# Patient Record
Sex: FEMALE | URBAN - METROPOLITAN AREA
[De-identification: names, ages, dates, MRNs, and addresses within clinical notes are randomized per-mention and may not be internally consistent; named-entity substitution may affect disease eponyms.]

---

## 2019-12-10 ENCOUNTER — APPOINTMENT (RX ONLY)
Dept: URBAN - METROPOLITAN AREA CLINIC 4 | Facility: CLINIC | Age: 64
Setting detail: DERMATOLOGY
End: 2019-12-10

## 2019-12-10 DIAGNOSIS — L30.9 DERMATITIS, UNSPECIFIED: ICD-10-CM

## 2019-12-10 PROCEDURE — 87220 TISSUE EXAM FOR FUNGI: CPT

## 2019-12-10 PROCEDURE — ? BIOPSY BY PUNCH METHOD

## 2019-12-10 PROCEDURE — ? COUNSELING

## 2019-12-10 PROCEDURE — 11104 PUNCH BX SKIN SINGLE LESION: CPT

## 2019-12-10 PROCEDURE — ? KOH PREP

## 2019-12-10 ASSESSMENT — LOCATION ZONE DERM: LOCATION ZONE: LEG

## 2019-12-10 ASSESSMENT — LOCATION SIMPLE DESCRIPTION DERM: LOCATION SIMPLE: LEFT KNEE

## 2019-12-10 ASSESSMENT — LOCATION DETAILED DESCRIPTION DERM: LOCATION DETAILED: LEFT MEDIAL KNEE

## 2019-12-10 NOTE — HPI: RASH
Is The Patient Presenting As Previously Scheduled?: Yes
Is This A New Presentation, Or A Follow-Up?: Rash
Additional History: Rash first started in June with a flare on lower legs, no trigger that she could think of. She used hydrocortisone and most of the rash went away besides one area behind her knee. That patch has been continued to enlarge over the last few months. She feels that the hydrocortisone will help for a while, then it will come back worse than before. Her PCP recommended continuing hydrocortisone and Vaseline to affected area

## 2019-12-10 NOTE — PROCEDURE: MIPS QUALITY
Quality 130: Documentation Of Current Medications In The Medical Record: Current Medications Documented
Quality 110: Preventive Care And Screening: Influenza Immunization: Influenza Immunization previously received during influenza season
Quality 402: Tobacco Use And Help With Quitting Among Adolescents: Patient screened for tobacco and never smoked
Quality 226: Preventive Care And Screening: Tobacco Use: Screening And Cessation Intervention: Patient screened for tobacco use and is an ex/non-smoker
Detail Level: Detailed

## 2019-12-24 ENCOUNTER — APPOINTMENT (RX ONLY)
Dept: URBAN - METROPOLITAN AREA CLINIC 4 | Facility: CLINIC | Age: 64
Setting detail: DERMATOLOGY
End: 2019-12-24

## 2019-12-24 DIAGNOSIS — Z48.02 ENCOUNTER FOR REMOVAL OF SUTURES: ICD-10-CM

## 2019-12-24 PROCEDURE — ? SUTURE REMOVAL (GLOBAL PERIOD)

## 2019-12-24 PROCEDURE — 99024 POSTOP FOLLOW-UP VISIT: CPT

## 2019-12-24 ASSESSMENT — LOCATION SIMPLE DESCRIPTION DERM: LOCATION SIMPLE: LEFT KNEE

## 2019-12-24 ASSESSMENT — LOCATION DETAILED DESCRIPTION DERM: LOCATION DETAILED: LEFT KNEE

## 2019-12-24 ASSESSMENT — LOCATION ZONE DERM: LOCATION ZONE: LEG

## 2019-12-24 NOTE — PROCEDURE: SUTURE REMOVAL (GLOBAL PERIOD)
Add 27498 Cpt? (Important Note: In 2017 The Use Of 02016 Is Being Tracked By Cms To Determine Future Global Period Reimbursement For Global Periods): yes
Detail Level: Detailed

## 2019-12-26 ENCOUNTER — RX ONLY (OUTPATIENT)
Age: 64
Setting detail: RX ONLY
End: 2019-12-26

## 2019-12-26 RX ORDER — TRIAMCINOLONE ACETONIDE 1 MG/G
OINTMENT TOPICAL
Qty: 1 | Refills: 1 | Status: ERX | COMMUNITY
Start: 2019-12-26

## 2020-02-11 ENCOUNTER — APPOINTMENT (RX ONLY)
Dept: URBAN - METROPOLITAN AREA CLINIC 4 | Facility: CLINIC | Age: 65
Setting detail: DERMATOLOGY
End: 2020-02-11

## 2020-02-11 DIAGNOSIS — L92.0 GRANULOMA ANNULARE: ICD-10-CM

## 2020-02-11 PROCEDURE — 99212 OFFICE O/P EST SF 10 MIN: CPT

## 2020-02-11 PROCEDURE — ? ADDITIONAL NOTES

## 2020-02-11 PROCEDURE — ? COUNSELING

## 2020-02-11 ASSESSMENT — LOCATION DETAILED DESCRIPTION DERM: LOCATION DETAILED: LEFT KNEE

## 2020-02-11 ASSESSMENT — LOCATION ZONE DERM: LOCATION ZONE: LEG

## 2020-02-11 ASSESSMENT — LOCATION SIMPLE DESCRIPTION DERM: LOCATION SIMPLE: LEFT KNEE

## 2020-02-11 NOTE — PROCEDURE: ADDITIONAL NOTES
Additional Notes: Patient has seen significant improvement in last few weeks.\\nUsed triamcinolone for 2 weeks, then stopped as it became difficult to see.\\nWill observe and monitor \\nPhotos taken\\nKeep triamcinolone as needed if flaring/spreading.
Detail Level: Simple

## 2021-03-23 ENCOUNTER — IMMUNIZATION (OUTPATIENT)
Dept: FAMILY PLANNING/WOMEN'S HEALTH CLINIC | Facility: IMMUNIZATION CENTER | Age: 66
End: 2021-03-23
Payer: COMMERCIAL

## 2021-03-23 DIAGNOSIS — Z23 ENCOUNTER FOR VACCINATION: Primary | ICD-10-CM

## 2021-03-23 PROCEDURE — 91300 PFIZER SARS-COV-2 VACCINE: CPT

## 2021-03-23 PROCEDURE — 0001A PFIZER SARS-COV-2 VACCINE: CPT

## 2021-04-16 ENCOUNTER — IMMUNIZATION (OUTPATIENT)
Dept: FAMILY PLANNING/WOMEN'S HEALTH CLINIC | Facility: IMMUNIZATION CENTER | Age: 66
End: 2021-04-16
Attending: INTERNAL MEDICINE
Payer: COMMERCIAL

## 2021-04-16 DIAGNOSIS — Z23 ENCOUNTER FOR VACCINATION: Primary | ICD-10-CM

## 2021-04-16 PROCEDURE — 91300 PFIZER SARS-COV-2 VACCINE: CPT

## 2021-04-16 PROCEDURE — 0002A PFIZER SARS-COV-2 VACCINE: CPT

## 2021-09-27 NOTE — PROCEDURE: BIOPSY BY PUNCH METHOD
X Size Of Lesion In Cm (Optional): 0
Detail Level: Detailed
Render Post-Care Instructions In Note?: no
Punch Size In Mm: 4
Anesthesia Volume In Cc: 0.5
Wound Care: Petrolatum
Billing Type: Third-Party Bill
Epidermal Sutures: 4-0 Ethilon
Lab: 253
Was A Bandage Applied: Yes
Biopsy Type: H and E
Hemostasis: None
Post-Care Instructions: I reviewed with the patient in detail post-care instructions. Patient is to keep the biopsy site dry overnight, and then apply bacitracin twice daily until healed. Patient may apply hydrogen peroxide soaks to remove any crusting.
Dressing: bandage
Lab Facility: 
Home Suture Removal Text: Patient was provided a home suture removal kit and will remove their sutures at home.  If they have any questions or difficulties they will call the office.
Anesthesia Type: 1% lidocaine with epinephrine
Consent: Written consent was obtained and risks were reviewed including but not limited to scarring, infection, bleeding, scabbing, incomplete removal, nerve damage and allergy to anesthesia.
Notification Instructions: Patient will be notified of biopsy results. However, patient instructed to call the office if not contacted within 2 weeks.
Suture Removal: 14 days
0

## 2022-01-25 ENCOUNTER — TELEPHONE (OUTPATIENT)
Dept: SCHEDULING | Facility: IMAGING CENTER | Age: 67
End: 2022-01-25

## 2022-03-03 ENCOUNTER — TELEPHONE (OUTPATIENT)
Dept: HEALTH INFORMATION MANAGEMENT | Facility: OTHER | Age: 67
End: 2022-03-03

## 2022-04-03 SDOH — ECONOMIC STABILITY: FOOD INSECURITY: WITHIN THE PAST 12 MONTHS, THE FOOD YOU BOUGHT JUST DIDN'T LAST AND YOU DIDN'T HAVE MONEY TO GET MORE.: NEVER TRUE

## 2022-04-03 SDOH — HEALTH STABILITY: PHYSICAL HEALTH: ON AVERAGE, HOW MANY MINUTES DO YOU ENGAGE IN EXERCISE AT THIS LEVEL?: 30 MIN

## 2022-04-03 SDOH — ECONOMIC STABILITY: TRANSPORTATION INSECURITY
IN THE PAST 12 MONTHS, HAS THE LACK OF TRANSPORTATION KEPT YOU FROM MEDICAL APPOINTMENTS OR FROM GETTING MEDICATIONS?: NO

## 2022-04-03 SDOH — ECONOMIC STABILITY: INCOME INSECURITY: IN THE LAST 12 MONTHS, WAS THERE A TIME WHEN YOU WERE NOT ABLE TO PAY THE MORTGAGE OR RENT ON TIME?: NO

## 2022-04-03 SDOH — HEALTH STABILITY: MENTAL HEALTH
STRESS IS WHEN SOMEONE FEELS TENSE, NERVOUS, ANXIOUS, OR CAN'T SLEEP AT NIGHT BECAUSE THEIR MIND IS TROUBLED. HOW STRESSED ARE YOU?: RATHER MUCH

## 2022-04-03 SDOH — ECONOMIC STABILITY: HOUSING INSECURITY
IN THE LAST 12 MONTHS, WAS THERE A TIME WHEN YOU DID NOT HAVE A STEADY PLACE TO SLEEP OR SLEPT IN A SHELTER (INCLUDING NOW)?: NO

## 2022-04-03 SDOH — HEALTH STABILITY: PHYSICAL HEALTH: ON AVERAGE, HOW MANY DAYS PER WEEK DO YOU ENGAGE IN MODERATE TO STRENUOUS EXERCISE (LIKE A BRISK WALK)?: 2 DAYS

## 2022-04-03 SDOH — ECONOMIC STABILITY: FOOD INSECURITY: WITHIN THE PAST 12 MONTHS, YOU WORRIED THAT YOUR FOOD WOULD RUN OUT BEFORE YOU GOT MONEY TO BUY MORE.: NEVER TRUE

## 2022-04-03 SDOH — ECONOMIC STABILITY: INCOME INSECURITY: HOW HARD IS IT FOR YOU TO PAY FOR THE VERY BASICS LIKE FOOD, HOUSING, MEDICAL CARE, AND HEATING?: NOT VERY HARD

## 2022-04-03 SDOH — ECONOMIC STABILITY: HOUSING INSECURITY: IN THE LAST 12 MONTHS, HOW MANY PLACES HAVE YOU LIVED?: 1

## 2022-04-03 SDOH — ECONOMIC STABILITY: TRANSPORTATION INSECURITY
IN THE PAST 12 MONTHS, HAS LACK OF TRANSPORTATION KEPT YOU FROM MEETINGS, WORK, OR FROM GETTING THINGS NEEDED FOR DAILY LIVING?: NO

## 2022-04-03 SDOH — ECONOMIC STABILITY: TRANSPORTATION INSECURITY
IN THE PAST 12 MONTHS, HAS LACK OF RELIABLE TRANSPORTATION KEPT YOU FROM MEDICAL APPOINTMENTS, MEETINGS, WORK OR FROM GETTING THINGS NEEDED FOR DAILY LIVING?: NO

## 2022-04-03 ASSESSMENT — SOCIAL DETERMINANTS OF HEALTH (SDOH)
WITHIN THE PAST 12 MONTHS, YOU WORRIED THAT YOUR FOOD WOULD RUN OUT BEFORE YOU GOT THE MONEY TO BUY MORE: NEVER TRUE
DO YOU BELONG TO ANY CLUBS OR ORGANIZATIONS SUCH AS CHURCH GROUPS UNIONS, FRATERNAL OR ATHLETIC GROUPS, OR SCHOOL GROUPS?: YES
HOW OFTEN DO YOU ATTENT MEETINGS OF THE CLUB OR ORGANIZATION YOU BELONG TO?: 1 TO 4 TIMES PER YEAR
DO YOU BELONG TO ANY CLUBS OR ORGANIZATIONS SUCH AS CHURCH GROUPS UNIONS, FRATERNAL OR ATHLETIC GROUPS, OR SCHOOL GROUPS?: YES
HOW HARD IS IT FOR YOU TO PAY FOR THE VERY BASICS LIKE FOOD, HOUSING, MEDICAL CARE, AND HEATING?: NOT VERY HARD
HOW OFTEN DO YOU ATTEND CHURCH OR RELIGIOUS SERVICES?: 1 TO 4 TIMES PER YEAR
HOW OFTEN DO YOU HAVE SIX OR MORE DRINKS ON ONE OCCASION: NEVER
HOW OFTEN DO YOU ATTEND CHURCH OR RELIGIOUS SERVICES?: 1 TO 4 TIMES PER YEAR
HOW OFTEN DO YOU GET TOGETHER WITH FRIENDS OR RELATIVES?: ONCE A WEEK
HOW OFTEN DO YOU HAVE A DRINK CONTAINING ALCOHOL: NEVER
IN A TYPICAL WEEK, HOW MANY TIMES DO YOU TALK ON THE PHONE WITH FAMILY, FRIENDS, OR NEIGHBORS?: TWICE A WEEK
IN A TYPICAL WEEK, HOW MANY TIMES DO YOU TALK ON THE PHONE WITH FAMILY, FRIENDS, OR NEIGHBORS?: TWICE A WEEK
HOW OFTEN DO YOU GET TOGETHER WITH FRIENDS OR RELATIVES?: ONCE A WEEK
HOW OFTEN DO YOU ATTENT MEETINGS OF THE CLUB OR ORGANIZATION YOU BELONG TO?: 1 TO 4 TIMES PER YEAR

## 2022-04-03 ASSESSMENT — LIFESTYLE VARIABLES
HOW OFTEN DO YOU HAVE SIX OR MORE DRINKS ON ONE OCCASION: NEVER
HOW OFTEN DO YOU HAVE A DRINK CONTAINING ALCOHOL: NEVER

## 2022-04-05 ENCOUNTER — TELEMEDICINE (OUTPATIENT)
Dept: MEDICAL GROUP | Facility: MEDICAL CENTER | Age: 67
End: 2022-04-05
Payer: MEDICARE

## 2022-04-05 VITALS — WEIGHT: 179 LBS | BODY MASS INDEX: 28.77 KG/M2 | HEIGHT: 66 IN

## 2022-04-05 DIAGNOSIS — E78.5 DYSLIPIDEMIA: ICD-10-CM

## 2022-04-05 DIAGNOSIS — F43.21 GRIEF: ICD-10-CM

## 2022-04-05 DIAGNOSIS — R53.83 OTHER FATIGUE: ICD-10-CM

## 2022-04-05 DIAGNOSIS — Z79.890 HORMONE REPLACEMENT THERAPY: ICD-10-CM

## 2022-04-05 DIAGNOSIS — R73.9 HYPERGLYCEMIA: ICD-10-CM

## 2022-04-05 DIAGNOSIS — Z11.59 NEED FOR HEPATITIS C SCREENING TEST: ICD-10-CM

## 2022-04-05 PROCEDURE — 99204 OFFICE O/P NEW MOD 45 MIN: CPT | Performed by: FAMILY MEDICINE

## 2022-04-05 ASSESSMENT — PATIENT HEALTH QUESTIONNAIRE - PHQ9: CLINICAL INTERPRETATION OF PHQ2 SCORE: 0

## 2022-04-05 NOTE — ASSESSMENT & PLAN NOTE
Tita lost her  9/2021 after a escalante with bladder cancer. She reports her  declined rapidly toward the end. She is not currently seeing a therapist but does feel this may be helpful. She has used some online resources.

## 2022-04-05 NOTE — PROGRESS NOTES
Virtual Visit: New Patient     This evaluation was conducted via Zoom using secure and encrypted videoconferencing technology. The patient was in a private location in the state of [place].   The patient's identity was confirmed and verbal consent was obtained for this virtual visit.      Subjective:     CC:   Tita Pitts is a 66 y.o. female presenting to [facility]. She lost her  per below, has children, is retired. UTD on mammo and colonoscopy, s/p hysterectomy.    Grief  Tita lost her  9/2021 after a escalante with bladder cancer. She reports her  declined rapidly toward the end. She is not currently seeing a therapist but does feel this may be helpful. She has used some online resources.     Other fatigue  Tita had COVID 1/2022. She reports decreased energy following her infection. She notes she it is hard to catch her breath following her infection. Her symptoms do seem to be slowly improving.    Hormone replacement therapy  Tiat has been on oral HRT since 35yo s/p TAHBSO secondary to severe endometriosis. She has been working on tapering HRT with her gynecologist.     BMI 28.0-28.9,adult  Tita is actively working on weight loss through diet and exercise.        ROS  See HPI  Constitutional: Negative for fever, chills and malaise/fatigue.   HENT: Negative for congestion.    Eyes: Negative for pain.   Respiratory: Negative for cough and shortness of breath.    Cardiovascular: Negative for leg swelling.   Gastrointestinal: Negative for nausea, vomiting, abdominal pain and diarrhea.     Allergies   Allergen Reactions   • Other Drug Unspecified     Other reaction(s): Welts on legs   • Macrodantin [Nitrofurantoin]        Current medicines (including changes today)  Current Outpatient Medications   Medication Sig Dispense Refill   • ESTRADIOL PO        No current facility-administered medications for this visit.       She  has a past medical history of Allergy.  She  has a past  "surgical history that includes abdominal hysterectomy total and appendectomy.      Family History   Problem Relation Age of Onset   • Diabetes Mother         Developed in 70s, at 90 Kidney failure   • Hypertension Mother         diagnosed in 70s   • Hyperlipidemia Mother         diagnosed in 70s   • Diabetes Maternal Uncle          at 68   • Heart Disease Father          at 64   • Heart Disease Brother         Heart Attack at 72   • Stroke Brother         occurred at 78     Family Status   Relation Name Status   • Mo Fawn Barb (Not Specified)   • Mario Alberto Samano (Not Specified)   • An Day (Not Specified)   • Jose Luis Patelesph Tolu (Not Specified)       Patient Active Problem List    Diagnosis Date Noted   • Grief 2022   • Other fatigue 2022   • Hormone replacement therapy 2022   • BMI 28.0-28.9,adult 2022          Objective:   Vitals obtained by patient:  Ht 1.676 m (5' 6\")   Wt 81.2 kg (179 lb)   BMI 28.89 kg/m²     Physical Exam:  Constitutional: Alert, no distress, well-groomed.  Skin: No rashes in visible areas.  Eye: Round. Conjunctiva clear, lids normal. No icterus.   ENMT: Lips pink without lesions, good dentition, moist mucous membranes. Phonation normal.  CV: Pulse as reported by patient  Respiratory: Unlabored respiratory effort, no cough or audible wheeze  Psych: Alert and oriented x3, normal affect and mood.       Assessment and Plan:   The following treatment plan was discussed:     1. Grief  - Referral to Behavioral Health    2. Other fatigue  Shortness of breath  - symptoms following COVID19 infection; will proceed with labs and gentle return to physical activity; if symptoms do not continue to improve over next 3 months discussed further testing   - Comp Metabolic Panel; Future  - CBC WITH DIFFERENTIAL; Future  - TSH WITH REFLEX TO FT4; Future    3. BMI 28.0-28.9,adult  - Comp Metabolic Panel; Future  - HEMOGLOBIN A1C; Future  - Lipid " Profile; Future    4. Hormone replacement therapy  - following with gynecology    5. Hyperglycemia  - HEMOGLOBIN A1C; Future  - Lipid Profile; Future    6. Dyslipidemia  - Lipid Profile; Future    7. Need for hepatitis C screening test  - HCV Scrn ( 5525-9616 1xLife); Future    Other orders  - ESTRADIOL PO        Follow-up: Return in about 3 months (around 2022).       Please note this dictation was created using voice recognition software. I have made every reasonable attempt to correct obvious errors, but I expect there may be errors of grammar, and possibly content, that I did not discover before finalizing the note.

## 2022-04-05 NOTE — ASSESSMENT & PLAN NOTE
Tita had COVID 1/2022. She reports decreased energy following her infection. She notes she it is hard to catch her breath following her infection. Her symptoms do seem to be slowly improving.

## 2022-04-05 NOTE — ASSESSMENT & PLAN NOTE
Tita has been on oral HRT since 35yo s/p TAHBSO secondary to severe endometriosis. She has been working on tapering HRT with her gynecologist.

## 2022-04-06 ENCOUNTER — HOSPITAL ENCOUNTER (OUTPATIENT)
Dept: LAB | Facility: MEDICAL CENTER | Age: 67
End: 2022-04-06
Attending: FAMILY MEDICINE
Payer: MEDICARE

## 2022-04-06 DIAGNOSIS — R53.83 OTHER FATIGUE: ICD-10-CM

## 2022-04-06 DIAGNOSIS — E78.5 DYSLIPIDEMIA: ICD-10-CM

## 2022-04-06 DIAGNOSIS — R73.9 HYPERGLYCEMIA: ICD-10-CM

## 2022-04-06 DIAGNOSIS — Z11.59 NEED FOR HEPATITIS C SCREENING TEST: ICD-10-CM

## 2022-04-06 LAB
ALBUMIN SERPL BCP-MCNC: 4.6 G/DL (ref 3.2–4.9)
ALBUMIN/GLOB SERPL: 1.7 G/DL
ALP SERPL-CCNC: 71 U/L (ref 30–99)
ALT SERPL-CCNC: 9 U/L (ref 2–50)
ANION GAP SERPL CALC-SCNC: 12 MMOL/L (ref 7–16)
AST SERPL-CCNC: 14 U/L (ref 12–45)
BASOPHILS # BLD AUTO: 2.4 % (ref 0–1.8)
BASOPHILS # BLD: 0.09 K/UL (ref 0–0.12)
BILIRUB SERPL-MCNC: 0.7 MG/DL (ref 0.1–1.5)
BUN SERPL-MCNC: 17 MG/DL (ref 8–22)
CALCIUM SERPL-MCNC: 9.5 MG/DL (ref 8.5–10.5)
CHLORIDE SERPL-SCNC: 102 MMOL/L (ref 96–112)
CHOLEST SERPL-MCNC: 168 MG/DL (ref 100–199)
CO2 SERPL-SCNC: 22 MMOL/L (ref 20–33)
CREAT SERPL-MCNC: 1.16 MG/DL (ref 0.5–1.4)
EOSINOPHIL # BLD AUTO: 0.09 K/UL (ref 0–0.51)
EOSINOPHIL NFR BLD: 2.4 % (ref 0–6.9)
ERYTHROCYTE [DISTWIDTH] IN BLOOD BY AUTOMATED COUNT: 44 FL (ref 35.9–50)
EST. AVERAGE GLUCOSE BLD GHB EST-MCNC: 103 MG/DL
FASTING STATUS PATIENT QL REPORTED: NORMAL
GFR SERPLBLD CREATININE-BSD FMLA CKD-EPI: 52 ML/MIN/1.73 M 2
GLOBULIN SER CALC-MCNC: 2.7 G/DL (ref 1.9–3.5)
GLUCOSE SERPL-MCNC: 91 MG/DL (ref 65–99)
HBA1C MFR BLD: 5.2 % (ref 4–5.6)
HCT VFR BLD AUTO: 38.4 % (ref 37–47)
HCV AB SER QL: NORMAL
HDLC SERPL-MCNC: 59 MG/DL
HGB BLD-MCNC: 12.7 G/DL (ref 12–16)
IMM GRANULOCYTES # BLD AUTO: 0.01 K/UL (ref 0–0.11)
IMM GRANULOCYTES NFR BLD AUTO: 0.3 % (ref 0–0.9)
LDLC SERPL CALC-MCNC: 96 MG/DL
LYMPHOCYTES # BLD AUTO: 1.54 K/UL (ref 1–4.8)
LYMPHOCYTES NFR BLD: 41.5 % (ref 22–41)
MCH RBC QN AUTO: 31.5 PG (ref 27–33)
MCHC RBC AUTO-ENTMCNC: 33.1 G/DL (ref 33.6–35)
MCV RBC AUTO: 95.3 FL (ref 81.4–97.8)
MONOCYTES # BLD AUTO: 0.34 K/UL (ref 0–0.85)
MONOCYTES NFR BLD AUTO: 9.2 % (ref 0–13.4)
NEUTROPHILS # BLD AUTO: 1.64 K/UL (ref 2–7.15)
NEUTROPHILS NFR BLD: 44.2 % (ref 44–72)
NRBC # BLD AUTO: 0 K/UL
NRBC BLD-RTO: 0 /100 WBC
PLATELET # BLD AUTO: 321 K/UL (ref 164–446)
PMV BLD AUTO: 10.4 FL (ref 9–12.9)
POTASSIUM SERPL-SCNC: 4.2 MMOL/L (ref 3.6–5.5)
PROT SERPL-MCNC: 7.3 G/DL (ref 6–8.2)
RBC # BLD AUTO: 4.03 M/UL (ref 4.2–5.4)
SODIUM SERPL-SCNC: 136 MMOL/L (ref 135–145)
TRIGL SERPL-MCNC: 67 MG/DL (ref 0–149)
TSH SERPL DL<=0.005 MIU/L-ACNC: 1.79 UIU/ML (ref 0.38–5.33)
WBC # BLD AUTO: 3.7 K/UL (ref 4.8–10.8)

## 2022-04-06 PROCEDURE — 84443 ASSAY THYROID STIM HORMONE: CPT

## 2022-04-06 PROCEDURE — 80053 COMPREHEN METABOLIC PANEL: CPT

## 2022-04-06 PROCEDURE — G0472 HEP C SCREEN HIGH RISK/OTHER: HCPCS

## 2022-04-06 PROCEDURE — 83036 HEMOGLOBIN GLYCOSYLATED A1C: CPT

## 2022-04-06 PROCEDURE — 85025 COMPLETE CBC W/AUTO DIFF WBC: CPT

## 2022-04-06 PROCEDURE — 36415 COLL VENOUS BLD VENIPUNCTURE: CPT

## 2022-04-06 PROCEDURE — 80061 LIPID PANEL: CPT

## 2022-04-08 ENCOUNTER — TELEMEDICINE (OUTPATIENT)
Dept: MEDICAL GROUP | Facility: MEDICAL CENTER | Age: 67
End: 2022-04-08
Payer: MEDICARE

## 2022-04-08 VITALS — WEIGHT: 177 LBS | BODY MASS INDEX: 28.45 KG/M2 | HEIGHT: 66 IN

## 2022-04-08 DIAGNOSIS — D70.9 NEUTROPENIA, UNSPECIFIED TYPE (HCC): ICD-10-CM

## 2022-04-08 DIAGNOSIS — R94.4 DECREASED GFR: ICD-10-CM

## 2022-04-08 PROCEDURE — 99214 OFFICE O/P EST MOD 30 MIN: CPT | Mod: 95 | Performed by: FAMILY MEDICINE

## 2022-04-08 ASSESSMENT — FIBROSIS 4 INDEX: FIB4 SCORE: 0.93

## 2022-04-09 NOTE — ASSESSMENT & PLAN NOTE
Recent labs reveal GFR 52. Patient reports she tried to stay hydrated prior to recent labs. She avoids regular use of NSAIDS, takes Advil occasionally for headaches. No DMII or history of HTN. Tita notes her mother had kidney disease (she did have DMII and HTN however renal disease preceded these conditions by multiple years per patient report).     Outside labs provided by patient today:  2018: 47  2016: 60  2015: 52

## 2022-04-09 NOTE — ASSESSMENT & PLAN NOTE
Recent labs demonstrate mild neutropenia per below.    Outside labs, results provided by patient today   11/2018: WBC 4.8, abs neutrophils 2.4  2015: WBC 3.7, abs neutrophils 1.8

## 2022-04-09 NOTE — PROGRESS NOTES
Telemedicine Visit: Established Patient     This evaluation was conducted via Zoom using secure and encrypted videoconferencing technology. The patient was in a private location in the state of [place].    The patient's identity was confirmed and verbal consent was obtained for this virtual visit.    Subjective:   CC:   Tita Pitts is a 66 y.o. female presenting for evaluation and management of:    Neutropenia (HCC)  Recent labs demonstrate mild neutropenia per below.    Outside labs, results provided by patient today   2018: WBC 4.8, abs neutrophils 2.4  2015: WBC 3.7, abs neutrophils 1.8    Decreased GFR  Recent labs reveal GFR 52. Patient reports she tried to stay hydrated prior to recent labs. She avoids regular use of NSAIDS, takes Advil occasionally for headaches. No DMII or history of HTN. Tita notes her mother had kidney disease (she did have DMII and HTN however renal disease preceded these conditions by multiple years per patient report).     Outside labs provided by patient today:  2018: 47  2016: 60  2015: 52    ROS  Denies any recent fevers or chills. No nausea or vomiting. No chest pains or shortness of breath.     Allergies   Allergen Reactions   • Other Drug Unspecified     Other reaction(s): Welts on legs   • Macrodantin [Nitrofurantoin]        Current medicines (including changes today)  Current Outpatient Medications   Medication Sig Dispense Refill   • ESTRADIOL PO 0.5 mg. 1/2 a tab daily       No current facility-administered medications for this visit.       Patient Active Problem List    Diagnosis Date Noted   • Neutropenia (HCC) 2022   • Decreased GFR 2022   • Grief 2022   • Other fatigue 2022   • Hormone replacement therapy 2022   • BMI 28.0-28.9,adult 2022       Family History   Problem Relation Age of Onset   • Diabetes Mother         Developed in 70s, at 90 Kidney failure   • Hypertension Mother         diagnosed in 70s   •  "Hyperlipidemia Mother         diagnosed in 70s   • Kidney Disease Mother    • Diabetes Maternal Uncle          at 68   • Heart Disease Father          at 64   • Heart Disease Brother         Heart Attack at 72   • Stroke Brother         occurred at 78       She  has a past medical history of Allergy.  She  has a past surgical history that includes abdominal hysterectomy total and appendectomy.       Objective:   Ht 1.676 m (5' 6\") Comment: pt. reported  Wt 80.3 kg (177 lb) Comment: pt. reported  BMI 28.57 kg/m²     Physical Exam:  Constitutional: Alert, no distress, well-groomed.  Skin: No rashes in visible areas.  Eye: Round. Conjunctiva clear, lids normal. No icterus.   ENMT: Lips pink without lesions, good dentition, moist mucous membranes. Phonation normal.  Respiratory: Unlabored respiratory effort, no cough or audible wheeze  Psych: Alert and oriented x3, normal affect and mood.       Assessment and Plan:   The following treatment plan was discussed:     1. Neutropenia, unspecified type (HCC)  Mild decrease in neutrophils per above however this may be patient's baseline as she reports similar previous labs. Will plan to repeat CBC prior to her follow up appointment 8/10/22.  - CBC WITH DIFFERENTIAL; Future    2. Decreased GFR  Mildly decreased GFR, pt reports her mother had renal disease preceding DMII and HTN, pt does not use NSAIDS regularly, maintains adequate hydration. Given family history of renal disease and unknown etiology of intermittently decreased GFR, recommended patient proceed with renal US.  - Comp Metabolic Panel; Future  - US-RENAL; Future      Follow-up: Return in about 3 months (around 2022).          "

## 2022-04-12 ENCOUNTER — HOSPITAL ENCOUNTER (OUTPATIENT)
Dept: RADIOLOGY | Facility: MEDICAL CENTER | Age: 67
End: 2022-04-12
Attending: FAMILY MEDICINE
Payer: MEDICARE

## 2022-04-12 DIAGNOSIS — R94.4 DECREASED GFR: ICD-10-CM

## 2022-04-12 PROCEDURE — 76775 US EXAM ABDO BACK WALL LIM: CPT

## 2022-04-21 ENCOUNTER — OFFICE VISIT (OUTPATIENT)
Dept: URGENT CARE | Facility: CLINIC | Age: 67
End: 2022-04-21
Payer: MEDICARE

## 2022-04-21 VITALS
WEIGHT: 176.5 LBS | TEMPERATURE: 99.1 F | HEART RATE: 72 BPM | OXYGEN SATURATION: 98 % | BODY MASS INDEX: 28.37 KG/M2 | HEIGHT: 66 IN | RESPIRATION RATE: 18 BRPM | SYSTOLIC BLOOD PRESSURE: 126 MMHG | DIASTOLIC BLOOD PRESSURE: 72 MMHG

## 2022-04-21 DIAGNOSIS — M54.6 ACUTE RIGHT-SIDED THORACIC BACK PAIN: ICD-10-CM

## 2022-04-21 DIAGNOSIS — M79.18 ACUTE MYOFASCIAL PAIN: ICD-10-CM

## 2022-04-21 PROCEDURE — 99213 OFFICE O/P EST LOW 20 MIN: CPT | Performed by: NURSE PRACTITIONER

## 2022-04-21 RX ORDER — KETOROLAC TROMETHAMINE 30 MG/ML
30 INJECTION, SOLUTION INTRAMUSCULAR; INTRAVENOUS ONCE
Status: COMPLETED | OUTPATIENT
Start: 2022-04-21 | End: 2022-04-21

## 2022-04-21 RX ORDER — CYCLOBENZAPRINE HCL 5 MG
5-10 TABLET ORAL 3 TIMES DAILY PRN
Qty: 30 TABLET | Refills: 0 | Status: SHIPPED | OUTPATIENT
Start: 2022-04-21 | End: 2022-08-10

## 2022-04-21 RX ORDER — KETOROLAC TROMETHAMINE 30 MG/ML
60 INJECTION, SOLUTION INTRAMUSCULAR; INTRAVENOUS ONCE
Status: DISCONTINUED | OUTPATIENT
Start: 2022-04-21 | End: 2022-04-21

## 2022-04-21 RX ADMIN — KETOROLAC TROMETHAMINE 30 MG: 30 INJECTION, SOLUTION INTRAMUSCULAR; INTRAVENOUS at 14:46

## 2022-04-21 ASSESSMENT — ENCOUNTER SYMPTOMS
HEADACHES: 0
TINGLING: 0
FEVER: 0

## 2022-04-21 ASSESSMENT — FIBROSIS 4 INDEX: FIB4 SCORE: 0.94

## 2022-04-21 NOTE — PROGRESS NOTES
Tita Pitts is a 67 y.o. female who presents for Back Pain (Today, constant RT side back pain under shoulder blade, pulsating pain.)      HPI   0200 this morning she was woke up with right sided scapula pain. Pulsating/ throbbing pain.  She was able to go back to sleep. Pain was persistent and constant. Tried changing position and pillow. She tried various positions.nothing works. Pain 10/10 . She also tried not using her right arm.  Used Lanacane back rub.  No unusual activity or trauma. No other aggravating or alleviating factors.  Hx of shingles at age 52 and shingles vaccination at age 57.   Right hand dominant       Review of Systems   Constitutional: Negative for fever.   Neurological: Negative for tingling and headaches.       Allergies:       Allergies   Allergen Reactions   • Other Drug Unspecified     Other reaction(s): Welts on legs   • Macrodantin [Nitrofurantoin]        PMSFS Hx:  Past Medical History:   Diagnosis Date   • Allergy      Past Surgical History:   Procedure Laterality Date   • ABDOMINAL HYSTERECTOMY TOTAL     • APPENDECTOMY       Family History   Problem Relation Age of Onset   • Diabetes Mother         Developed in 70s, at 90 Kidney failure   • Hypertension Mother         diagnosed in 70s   • Hyperlipidemia Mother         diagnosed in 70s   • Kidney Disease Mother    • Diabetes Maternal Uncle          at 68   • Heart Disease Father          at 64   • Heart Disease Brother         Heart Attack at 72   • Stroke Brother         occurred at 78     Social History     Tobacco Use   • Smoking status: Never Smoker   • Smokeless tobacco: Never Used   Substance Use Topics   • Alcohol use: Not Currently     Alcohol/week: 0.6 oz     Types: 1 Glasses of wine per week     Comment: Some alcohol socially over the years.  Not regular use       Problems:   Patient Active Problem List   Diagnosis   • Grief   • Other fatigue   • Hormone replacement therapy   • BMI  "28.0-28.9,adult   • Neutropenia (HCC)   • Decreased GFR       Medications:   Current Outpatient Medications on File Prior to Visit   Medication Sig Dispense Refill   • ESTRADIOL PO 0.5 mg. 1/2 a tab daily       No current facility-administered medications on file prior to visit.          Objective:     /72   Pulse 72   Temp 37.3 °C (99.1 °F) (Temporal)   Resp 18   Ht 1.676 m (5' 6\")   Wt 80.1 kg (176 lb 8 oz)   SpO2 98%   Breastfeeding No   BMI 28.49 kg/m²     Physical Exam  Vitals and nursing note reviewed.   Constitutional:       Appearance: Normal appearance. She is normal weight.   Cardiovascular:      Rate and Rhythm: Normal rate and regular rhythm.      Pulses: Normal pulses.      Heart sounds: Normal heart sounds.   Pulmonary:      Effort: Pulmonary effort is normal.      Breath sounds: Normal breath sounds.   Musculoskeletal:      Cervical back: Normal.      Thoracic back: Tenderness (see graphic) present.      Lumbar back: Normal.        Back:       Comments: + spasm sensations in her back during exam causing her to jump and grimace.    Skin:     General: Skin is warm.      Capillary Refill: Capillary refill takes less than 2 seconds.      Findings: No rash.   Neurological:      Mental Status: She is alert and oriented to person, place, and time.   Psychiatric:         Mood and Affect: Mood normal.         Behavior: Behavior normal.         Thought Content: Thought content normal.         Assessment /Associated Orders:      1. Acute right-sided thoracic back pain  cyclobenzaprine (FLEXERIL) 5 mg tablet    ketorolac (TORADOL) injection 30 mg    DISCONTINUED: ketorolac (TORADOL) injection 60 mg   2. Acute myofascial pain  cyclobenzaprine (FLEXERIL) 5 mg tablet    ketorolac (TORADOL) injection 30 mg         Medical Decision Making:    Pt is clinically stable at today's acute urgent care visit.  No acute distress noted. Appropriate for outpatient management at this time.   Acute problem today with " uncertain prognosis.   Differential Diagnosis includes but is not limited to:  Herpes Zoster that has yet to fully develop, muscle spasm    Toradol given in clinic today. Tolerated well without adverse effects. Advised not to take NSAIDS for 6-8 hours post injection.     Continue NSAIDS OTC   OTC acetaminophen for breakthrough pain. Dosage and directions per . Do not exceed 3000 mg in 24 hours.   OTC gels/ creams prn pain   Ice/ heat packs prn pain   Educated in proper administration of medication(s) ordered today including safety, possible SE, risks, benefits, rationale and alternatives to therapy.   Educated in sedative effect of flexeril. Fall precautions discussed. Do not drive, drink alcohol or operate machinery while using.       Advised to follow-up with the primary care provider for recheck, reevaluation, and consideration of further management if necessary.   Discussed management options (risks,benefits, and alternatives to treatment). Expressed understanding and the treatment plan was agreed upon. Questions were encouraged and answered   Return to urgent care prn if new or worsening sx or if there is no improvement in condition prn.    Educated in Red flags and indications to immediately call 911 or present to the Emergency Department.     I personally reviewed prior external notes and test results pertinent to today's visit.  I have independently reviewed and interpreted all diagnostics ordered during this urgent care acute visit.   Time spent evaluating this patient was at least 30 minutes and includes preparing for visit, counseling/education, exam and evaluation, obtaining history, independent interpretation, ordering lab/test/procedures,medication management and documentation.Time does not include separately billable procedures noted .

## 2022-05-02 ENCOUNTER — PATIENT MESSAGE (OUTPATIENT)
Dept: HEALTH INFORMATION MANAGEMENT | Facility: OTHER | Age: 67
End: 2022-05-02

## 2022-05-09 PROBLEM — E66.3 OVERWEIGHT WITH BODY MASS INDEX (BMI) OF 29 TO 29.9 IN ADULT: Status: ACTIVE | Noted: 2022-05-09

## 2022-07-26 ENCOUNTER — HOSPITAL ENCOUNTER (OUTPATIENT)
Dept: RADIOLOGY | Facility: MEDICAL CENTER | Age: 67
End: 2022-07-26
Payer: MEDICARE

## 2022-08-03 ENCOUNTER — HOSPITAL ENCOUNTER (OUTPATIENT)
Dept: LAB | Facility: MEDICAL CENTER | Age: 67
End: 2022-08-03
Attending: FAMILY MEDICINE
Payer: MEDICARE

## 2022-08-03 DIAGNOSIS — R94.4 DECREASED GFR: ICD-10-CM

## 2022-08-03 DIAGNOSIS — D70.9 NEUTROPENIA, UNSPECIFIED TYPE (HCC): ICD-10-CM

## 2022-08-03 LAB
ALBUMIN SERPL BCP-MCNC: 4.4 G/DL (ref 3.2–4.9)
ALBUMIN/GLOB SERPL: 1.6 G/DL
ALP SERPL-CCNC: 68 U/L (ref 30–99)
ALT SERPL-CCNC: 10 U/L (ref 2–50)
ANION GAP SERPL CALC-SCNC: 11 MMOL/L (ref 7–16)
AST SERPL-CCNC: 16 U/L (ref 12–45)
BASOPHILS # BLD AUTO: 1.9 % (ref 0–1.8)
BASOPHILS # BLD: 0.08 K/UL (ref 0–0.12)
BILIRUB SERPL-MCNC: 0.8 MG/DL (ref 0.1–1.5)
BUN SERPL-MCNC: 11 MG/DL (ref 8–22)
CALCIUM SERPL-MCNC: 9.2 MG/DL (ref 8.5–10.5)
CHLORIDE SERPL-SCNC: 104 MMOL/L (ref 96–112)
CO2 SERPL-SCNC: 23 MMOL/L (ref 20–33)
CREAT SERPL-MCNC: 0.93 MG/DL (ref 0.5–1.4)
EOSINOPHIL # BLD AUTO: 0.08 K/UL (ref 0–0.51)
EOSINOPHIL NFR BLD: 1.9 % (ref 0–6.9)
ERYTHROCYTE [DISTWIDTH] IN BLOOD BY AUTOMATED COUNT: 42.7 FL (ref 35.9–50)
GFR SERPLBLD CREATININE-BSD FMLA CKD-EPI: 68 ML/MIN/1.73 M 2
GLOBULIN SER CALC-MCNC: 2.8 G/DL (ref 1.9–3.5)
GLUCOSE SERPL-MCNC: 79 MG/DL (ref 65–99)
HCT VFR BLD AUTO: 37.5 % (ref 37–47)
HGB BLD-MCNC: 12.5 G/DL (ref 12–16)
IMM GRANULOCYTES # BLD AUTO: 0.01 K/UL (ref 0–0.11)
IMM GRANULOCYTES NFR BLD AUTO: 0.2 % (ref 0–0.9)
LYMPHOCYTES # BLD AUTO: 1.95 K/UL (ref 1–4.8)
LYMPHOCYTES NFR BLD: 47.3 % (ref 22–41)
MCH RBC QN AUTO: 31.3 PG (ref 27–33)
MCHC RBC AUTO-ENTMCNC: 33.3 G/DL (ref 33.6–35)
MCV RBC AUTO: 94 FL (ref 81.4–97.8)
MONOCYTES # BLD AUTO: 0.31 K/UL (ref 0–0.85)
MONOCYTES NFR BLD AUTO: 7.5 % (ref 0–13.4)
NEUTROPHILS # BLD AUTO: 1.69 K/UL (ref 2–7.15)
NEUTROPHILS NFR BLD: 41.2 % (ref 44–72)
NRBC # BLD AUTO: 0 K/UL
NRBC BLD-RTO: 0 /100 WBC
PLATELET # BLD AUTO: 354 K/UL (ref 164–446)
PMV BLD AUTO: 10.3 FL (ref 9–12.9)
POTASSIUM SERPL-SCNC: 4.5 MMOL/L (ref 3.6–5.5)
PROT SERPL-MCNC: 7.2 G/DL (ref 6–8.2)
RBC # BLD AUTO: 3.99 M/UL (ref 4.2–5.4)
SODIUM SERPL-SCNC: 138 MMOL/L (ref 135–145)
WBC # BLD AUTO: 4.1 K/UL (ref 4.8–10.8)

## 2022-08-03 PROCEDURE — 36415 COLL VENOUS BLD VENIPUNCTURE: CPT

## 2022-08-03 PROCEDURE — 85025 COMPLETE CBC W/AUTO DIFF WBC: CPT

## 2022-08-03 PROCEDURE — 80053 COMPREHEN METABOLIC PANEL: CPT

## 2022-08-10 ENCOUNTER — OFFICE VISIT (OUTPATIENT)
Dept: MEDICAL GROUP | Facility: MEDICAL CENTER | Age: 67
End: 2022-08-10
Payer: MEDICARE

## 2022-08-10 VITALS
OXYGEN SATURATION: 97 % | RESPIRATION RATE: 20 BRPM | SYSTOLIC BLOOD PRESSURE: 102 MMHG | HEIGHT: 66 IN | TEMPERATURE: 97.1 F | BODY MASS INDEX: 29.19 KG/M2 | DIASTOLIC BLOOD PRESSURE: 70 MMHG | HEART RATE: 65 BPM

## 2022-08-10 DIAGNOSIS — R94.4 DECREASED GFR: ICD-10-CM

## 2022-08-10 DIAGNOSIS — Z23 NEED FOR VACCINATION: ICD-10-CM

## 2022-08-10 DIAGNOSIS — D70.9 NEUTROPENIA, UNSPECIFIED TYPE (HCC): ICD-10-CM

## 2022-08-10 DIAGNOSIS — Z78.0 POSTMENOPAUSAL: ICD-10-CM

## 2022-08-10 PROCEDURE — 90677 PCV20 VACCINE IM: CPT | Performed by: FAMILY MEDICINE

## 2022-08-10 PROCEDURE — 90471 IMMUNIZATION ADMIN: CPT | Performed by: FAMILY MEDICINE

## 2022-08-10 PROCEDURE — 99214 OFFICE O/P EST MOD 30 MIN: CPT | Mod: 25 | Performed by: FAMILY MEDICINE

## 2022-08-10 NOTE — LETTER
[facility]  Zee Last M.D.  [address]  [facility] [address]  Fax: [phone]   Authorization for Release/Disclosure of   Protected Health Information   Name: TITA URENA : 1955 SSN: [id number]   Address: [address] [place] [address] Phone:    [phone] (home)    I authorize the entity listed below to release/disclose the PHI below to:   [facility]/Zee Last M.D. and Zee Last M.D.   Provider or Entity Name:     Address   City, State, Zip   Phone:    Fax:   Reason for request: continuity of care   Information to be released:    [  ] LAST COLONOSCOPY,  including any PATH REPORT and follow-up  [  ] LAST FIT/COLOGUARD RESULT [  ] LAST DEXA  [  ] LAST MAMMOGRAM  [  ] LAST PAP  [  ] LAST LABS [  ] RETINA EXAM REPORT  [  ] IMMUNIZATION RECORDS  [  ] Release all info      [  ] Check here and initial the line next to each item to release ALL health information INCLUDING  _____ Care and treatment for drug and / or alcohol abuse  _____ HIV testing, infection status, or AIDS  _____ Genetic Testing    DATES OF SERVICE OR TIME PERIOD TO BE DISCLOSED: _____________  I understand and acknowledge that:  * This Authorization may be revoked at any time by you in writing, except if your health information has already been used or disclosed.  * Your health information that will be used or disclosed as a result of you signing this authorization could be re-disclosed by the recipient. If this occurs, your re-disclosed health information may no longer be protected by State or Federal laws.  * You may refuse to sign this Authorization. Your refusal will not affect your ability to obtain treatment.  * This Authorization becomes effective upon signing and will  on (date) __________.      If no date is indicated, this Authorization will  one (1) year from the signature date.    Name: Tita Urena    Signature:   Date:            PLEASE FAX REQUESTED  RECORDS BACK TO: [phone]

## 2022-08-10 NOTE — PROGRESS NOTES
"Subjective:     CC: follow up labs    HPI:   Tita presents today with:    Neutropenia (HCC)  Recent labs demonstrate persistent mild neutropenia and lymphocytosis.    Outside labs, results provided by patient:  11/2018: WBC 4.8, abs neutrophils 2.4  2015: WBC 3.7, abs neutrophils 1.8    Patient reports she is feeling well with no s/sx of infection however she does note she often feels very tired and has been bruising more easily.    Decreased GFR  Most recent labs reveal normal GFR, patient is maintaining good hydration, avoiding NSAIDS.    Past Medical History:   Diagnosis Date    Allergy        Social History     Tobacco Use    Smoking status: Never    Smokeless tobacco: Never   Vaping Use    Vaping Use: Never used   Substance Use Topics    Alcohol use: Not Currently     Alcohol/week: 0.6 oz     Types: 1 Glasses of wine per week     Comment: Some alcohol socially over the years.  Not regular use    Drug use: Never       Current Outpatient Medications Ordered in Epic   Medication Sig Dispense Refill    Ascorbic Acid (VITAMIN C PO) Take  by mouth.      ESTRADIOL PO 0.5 mg. 1/2 a tab daily       No current [facility]-ordered facility-administered medications on file.       Allergies:  Other drug and Macrodantin [nitrofurantoin]    Health Maintenance: dexa ordered, requesting pap records, PCV 20 administered    ROS:  Gen: no fevers/chills, no changes in weight  Eyes: no changes in vision  ENT: no sore throat, no hearing loss, no bloody nose  Pulm: no SOB  CV: no chest pain        Objective:       Exam:  /70 (BP Location: Left arm, Patient Position: Sitting, BP Cuff Size: Adult long)   Pulse 65   Temp 36.2 °C (97.1 °F) (Temporal)   Resp 20   Ht 1.664 m (5' 5.5\")   SpO2 97%   BMI 29.19 kg/m²  Body mass index is 29.19 kg/m².    Gen: Alert and oriented, No apparent distress  Lungs: Normal effort, CTA bilaterally, no wheezes, rhonchi, or rales  CV: Regular rate and rhythm, no murmurs, rubs, or " alem      Assessment & Plan:     67 y.o. female with the following -     1. Neutropenia, unspecified type (HCC)  Persistent mild neutropenia and lymphocytosis, pt reports chronic fatigue; recommended consultation with hematology, patient is in agreement  - Referral to Hematology Oncology    2. Decreased GFR  GFR has returned to normal, continue adequate hydration and NSAID avoidance    3. Postmenopausal  - DS-BONE DENSITY STUDY (DEXA); Future    4. Need for vaccination  - Pneumococcal Conjugate Vaccine 20-Valent (19 yrs+)    Other orders  - Ascorbic Acid (VITAMIN C PO); Take  by mouth.      3-6 months    Please note this dictation was created using voice recognition software. I have made every reasonable attempt to correct obvious errors, but I expect there may be errors of grammar, and possibly content, that I did not discover before finalizing the note.

## 2022-08-10 NOTE — ASSESSMENT & PLAN NOTE
Recent labs demonstrate persistent mild neutropenia and lymphocytosis.    Outside labs, results provided by patient:  11/2018: WBC 4.8, abs neutrophils 2.4  2015: WBC 3.7, abs neutrophils 1.8    Patient reports she is feeling well with no s/sx of infection however she does note she often feels very tired and has been bruising more easily.

## 2022-08-23 ENCOUNTER — HOSPITAL ENCOUNTER (OUTPATIENT)
Dept: RADIOLOGY | Facility: MEDICAL CENTER | Age: 67
End: 2022-08-23
Attending: FAMILY MEDICINE
Payer: MEDICARE

## 2022-08-23 DIAGNOSIS — Z78.0 POSTMENOPAUSAL: ICD-10-CM

## 2022-08-23 PROCEDURE — 77080 DXA BONE DENSITY AXIAL: CPT

## 2022-08-25 ENCOUNTER — HOSPITAL ENCOUNTER (OUTPATIENT)
Dept: RADIOLOGY | Facility: MEDICAL CENTER | Age: 67
End: 2022-08-25
Attending: OBSTETRICS & GYNECOLOGY
Payer: MEDICARE

## 2022-08-25 DIAGNOSIS — Z12.31 VISIT FOR SCREENING MAMMOGRAM: ICD-10-CM

## 2022-08-25 PROCEDURE — 77063 BREAST TOMOSYNTHESIS BI: CPT

## 2022-09-01 ENCOUNTER — OFFICE VISIT (OUTPATIENT)
Dept: BEHAVIORAL HEALTH | Facility: CLINIC | Age: 67
End: 2022-09-01
Payer: MEDICARE

## 2022-09-01 DIAGNOSIS — F43.29 GRIEF REACTION WITH PROLONGED BEREAVEMENT: ICD-10-CM

## 2022-09-01 PROCEDURE — 90791 PSYCH DIAGNOSTIC EVALUATION: CPT | Performed by: MARRIAGE & FAMILY THERAPIST

## 2022-09-01 NOTE — PROGRESS NOTES
[facility]   Initial Assessment    Name: Tita Pitts  MRN: [id number]  : 1955  Age: 67 y.o.  Date of assessment: 2022  PCP: Zee Last M.D.  Persons in attendance: Patient  Total session time: 50 minutes    This intake assessment was completed with Mark Car MFT-Intern    CHIEF COMPLAINT AND HISTORY OF PRESENTING PROBLEM:  (as stated by Patient):  Ttia Pitts is a 67 y.o., White female referred for assessment by Zee Last M.D..  Primary presenting issue includes the upcoming one year anniversary of my  Je goff from cancer.       BEHAVIORAL HEALTH TREATMENT HISTORY  Does patient/parent report a history of prior behavioral health treatment for patient? No:  History of untreated behavioral health issues identified? No  Does patient/parent report change in appetite or weight loss/gain? Yes, I haven't been hungry, I've become regimented to eat something, but not much.   Does patient/parent report physical pain? No                 FAMILY/SOCIAL HISTORY  Current living situation/household members: Tita lives alone  Does patient/parent report a family history of behavioral health issues, diagnoses, or treatment? No  Family History   Problem Relation Age of Onset    Diabetes Mother         Developed in 70s, at 90 Kidney failure    Hypertension Mother         diagnosed in 70s    Hyperlipidemia Mother         diagnosed in 70s    Kidney Disease Mother     Diabetes Maternal Uncle          at 68    Heart Disease Father          at 64    Heart Disease Brother         Heart Attack at 72    Stroke Brother         occurred at 78          EMPLOYMENT/RESOURCES  Is the patient currently employed? No  Does the patient/parent report adequate financial resources? Yes, at this time I do.        HISTORY:  Does patient report current or past enlistment? No               SPIRITUAL/CULTURAL/IDENTITY:  What are the  patient's/family's spiritual beliefs or practices? Hindu, but not attending much after death of  Je      ABUSE/NEGLECT/TRAUMA SCREENING  Does patient report feeling “unsafe” in his/her home, or afraid of anyone? No  Does patient report any history of physical, sexual, or emotional abuse? No  Is there evidence of neglect by self? No                                                                                                        SAFETY ASSESSMENT - SELF  Does patient acknowledge current or past symptoms of dangerousness to self? No  Recent change in frequency/specificity/intensity of suicidal thoughts or self-harm behavior? No    Current Suicide Risk: Low  Crisis Safety Plan completed and copy given to patient: No      SAFETY ASSESSMENT - OTHERS  Recent change in frequency/specificity/intensity of thoughts or threats to harm others? No  If Yes:  Current access to firearms/other identified means of harm?   If yes, willing to restrict access to weapons/means of harm?     Current Homicide Risk:  Low    SUBSTANCE USE/ADDICTION HISTORY  Patient denies use of any substance/addictive behaviors No    If No:  Is there a family history of substance use/addiction? No  Does patient acknowledge or parent/significant other report use of/dependence on substances? No  Last time patient used alcohol: n/a  Within the past week? No  Last time patient used marijuana: n/a  Within the past month? No  Any other street drugs ever tried even once? No  Any use of prescription medications/pills without a prescription, or for reasons others than originally prescribed?  No  Any other addictive behavior reported (gambling, shopping, sex)? No       MENTAL STATUS/OBSERVATIONS              Participation: Active verbal participation, Attentive, Engaged, and Open to feedback  Grooming: Casual and Neat  Orientation:Alert and Fully Oriented   Behavior: Calm and Tense  Eye contact: Good          Mood:Euthymic and  Depressed  Affect:Flexible, Full range, Expansive, Congruent with content, and Sad  Thought process: Logical and Goal-directed  Thought content:  Within normal limits  Speech: Rate within normal limits and Volume within normal limits  Perception: Within normal limits  Memory: No gross evidence of memory deficits  Insight: Good  Judgment:  Good      Patient's motivation/readiness for change: Patient discussed some depressive symptoms. Rule out MDD    Topics addressed in psychotherapy include: Patient would like to learn to grieve and return to normal activities.    Care plan completed: No  Does patient express agreement with the above plan? Yes     Diagnosis:  1. Grief reaction with prolonged bereavement        Referral appointment(s) scheduled? No       FELICITAS Fournier.

## 2022-09-13 ENCOUNTER — OFFICE VISIT (OUTPATIENT)
Dept: BEHAVIORAL HEALTH | Facility: CLINIC | Age: 67
End: 2022-09-13
Payer: MEDICARE

## 2022-09-13 DIAGNOSIS — F41.1 GENERALIZED ANXIETY DISORDER: ICD-10-CM

## 2022-09-13 PROCEDURE — 90834 PSYTX W PT 45 MINUTES: CPT | Performed by: PSYCHIATRY & NEUROLOGY

## 2022-09-13 NOTE — PROGRESS NOTES
[facility]   Psychotherapy Progress Note    Therapy was provided on this date in coordination with the [facility] approved Clinical Supervisor under the direct supervision of Dr. Rafa Villarreal who was on site during this visit.     Name: Tita Pitts  MRN: [id number]  : 1955  Age: 67 y.o.  Date of assessment: 2022  PCP: Zee Last M.D.  Persons in attendance: Patient  Total session time: 50 minutes      Topics addressed in psychotherapy include:     Data: Mila has been keeping up with gratitude journalling, she attended [employer] and felt at peace for the rest of the day, but is coming up to the 1 year anniversary of Je passing on 2022 and knows it will be difficult.    Assessment: Mila has a lot of unprocessed emotions around the passing of Je and the 1 year anniversary will be difficult.  Mila will benefit from processing those emotions in providing her closure. She also needs grounding tools for when she feels anxiety that comes and goes with different activities.     Plan:  Mila will write a letter to Je communicating everything to him that she wants to. She may bring it in for review or revision if she feels it is unorganized. Additionally we reviewed a 5 senses grounding technique for when she feels anxiety or emotionally dysregulated.     Objective Observations:   Participation:Active verbal participation, Attentive, Engaged, and Open to feedback   Grooming:Casual and Neat   Cognition:Alert and Fully Oriented   Eye Contact:Good   Mood:Euthymic   Affect:Flexible, Full range, Expansive, Congruent with content, Sad, and Anxious   Thought Process:Logical and Goal-directed   Speech:Rate within normal limits and Volume within normal limits    Current Risk:   Suicide: low   Homicide: low   Self-Harm: low     Care Plan Updated: Yes    Does patient express agreement with the above plan? Yes     Diagnosis:  Generalized Anxiety Disorder    Referral appointment(s) scheduled?  Yes       Mark Car

## 2022-09-27 ENCOUNTER — OFFICE VISIT (OUTPATIENT)
Dept: BEHAVIORAL HEALTH | Facility: CLINIC | Age: 67
End: 2022-09-27
Payer: MEDICARE

## 2022-09-27 DIAGNOSIS — F41.1 GENERALIZED ANXIETY DISORDER: ICD-10-CM

## 2022-09-27 DIAGNOSIS — F43.29 GRIEF REACTION WITH PROLONGED BEREAVEMENT: ICD-10-CM

## 2022-09-27 PROCEDURE — 90834 PSYTX W PT 45 MINUTES: CPT | Performed by: PSYCHIATRY & NEUROLOGY

## 2022-09-27 NOTE — PROGRESS NOTES
[facility]   Psychotherapy Progress Note    Therapy was provided on this date in coordination with the [facility] approved Clinical Supervisor under the direct supervision of Dr. Rafa Goodrich who was on site during this visit.     Name: Tita Pitts  MRN: [id number]  : 1955  Age: 67 y.o.  Date of assessment: 2022  PCP: Zee Last M.D.  Persons in attendance: Patient  Total session time: 50 minutes      Topics addressed in psychotherapy include:     Data: Tita still has all the belongings of her   and she is having trouble deciding how to go through things to decide what to give away to the kids, keep, or donate.  She notes the kids haven't volunteered to help her with it, nor has she come out and asked for help.     Assessment: Je is trouble coming up with a helpful way of starting to clear out the house of things of Je's that she doesn't want to keep seeing.     Plan: We reviewed DBT models of assessing the emotional and logical values of items to help Mila easier decide what items of Je' she would like to keep and what items she will be comfortable getting rid of to reduce her anxiety around performing this activity.     Objective Observations:   Participation:Active verbal participation, Attentive, Engaged, and Open to feedback   Grooming:Casual and Neat   Cognition:Alert and Fully Oriented   Eye Contact:Good   Mood:Euthymic   Affect:Flexible, Full range, Expansive, Congruent with content, and Sad   Thought Process:Logical and Goal-directed   Speech:Rate within normal limits and Volume within normal limits    Current Risk:   Suicide: low   Homicide: low   Self-Harm: low     Care Plan Updated: Yes    Does patient express agreement with the above plan? Yes     Diagnosis:  1. Generalized anxiety disorder    2. Grief reaction with prolonged bereavement        Referral appointment(s) scheduled? Yes       Mark Car

## 2022-10-11 ENCOUNTER — OFFICE VISIT (OUTPATIENT)
Dept: BEHAVIORAL HEALTH | Facility: CLINIC | Age: 67
End: 2022-10-11
Payer: MEDICARE

## 2022-10-11 DIAGNOSIS — F43.29 GRIEF REACTION WITH PROLONGED BEREAVEMENT: ICD-10-CM

## 2022-10-11 DIAGNOSIS — F41.1 GENERALIZED ANXIETY DISORDER: ICD-10-CM

## 2022-10-11 PROCEDURE — 90834 PSYTX W PT 45 MINUTES: CPT | Performed by: PSYCHIATRY & NEUROLOGY

## 2022-10-11 NOTE — PROGRESS NOTES
[facility]   Psychotherapy Progress Note    Therapy was provided on this date in coordination with the [facility] approved Clinical Supervisor under the direct supervision of Dr. Rafa Villarreal who was on site during this visit.     Name: Tita Pitts  MRN: [id number]  : 1955  Age: 67 y.o.  Date of assessment: 10/11/2022  PCP: Zee Last M.D.  Persons in attendance: Patient  Total session time: 50 minutes      Topics addressed in psychotherapy include:     Data: Mila has been using the chart we developed last session to determine what things of her   Je' to keep and what is okay to donate or give away.  She has been able to get rid of most of his clothes by donating them.  Her son Angel will be moving in with his girlfriend next month for about 6-8 months while they save up some money to buy a home and Mila is already sad thinking of how lonely she will be when they move out.    Assessment: Mila reports that she has trouble getting back to sleep when she wakes up in the middle of the night, and she often has thoughts ruminating that prevent her from resting and relaxing.    Plan: Psychoeducation on Doing mode vs. Being mode to promote self-care and down time.  We also discussed Progressive Muscle Relaxation and how that may help her get comfortable and relaxed enough to get back to sleep when she wakes up in the middle of the night.     Objective Observations:   Participation:Active verbal participation, Attentive, Engaged, and Open to feedback   Grooming:Casual and Neat   Cognition:Alert and Fully Oriented   Eye Contact:Good   Mood:Euthymic   Affect:Flexible, Full range, Expansive, and Congruent with content   Thought Process:Logical and Goal-directed   Speech:Rate within normal limits and Volume within normal limits    Current Risk:   Suicide: low   Homicide: low   Self-Harm: low     Care Plan Updated: Yes    Does patient express agreement with the above plan? Yes      Diagnosis:  1. Generalized anxiety disorder    2. Grief reaction with prolonged bereavement        Referral appointment(s) scheduled? No       BRETT Mcpherson-I

## 2022-10-15 ENCOUNTER — TELEPHONE (OUTPATIENT)
Dept: SCHEDULING | Facility: IMAGING CENTER | Age: 67
End: 2022-10-15
Payer: MEDICARE

## 2022-10-19 NOTE — PROGRESS NOTES
10/25/22    Subjective    Chief Complaint:  Neutrpenia    HPI:  67 female referred for consultation by Dr. Zee Last because of neutropenia. H/H and platelet counts are normal. She is on no significant medications. Has had COVID x 2 despite vaccinations. Fatigued. Has lost weight but trying to..   a year ago of metastatic bladder cancer. Says has had abnormal lab in past but did not bring results with her/    ROS:    Constitutional: No weight loss  Skin: No rash or jaundice  HENT: No change in eyesight or hearing  Cardiovascular:No chest pain or arrythmia  Respiratory:No cough or SOB  GI:No nausea, vomiting, diarrhea, constipation  :No dysuria or frequency  Musculoskeletal:No bone or joint pain  Neuro:No sx's of neuropathy  Psych: No complaints    PMH:      Allergies   Allergen Reactions    Other Drug Unspecified     Other reaction(s): Welts on legs    Macrodantin [Nitrofurantoin]        Past Medical History:   Diagnosis Date    Allergy         Past Surgical History:   Procedure Laterality Date    ABDOMINAL HYSTERECTOMY TOTAL      APPENDECTOMY          Medications:    Current Outpatient Medications on File Prior to Encounter   Medication Sig Dispense Refill    Ascorbic Acid (VITAMIN C PO) Take  by mouth.      ESTRADIOL PO 0.5 mg. 1/2 a tab daily       No current facility-administered medications on file prior to encounter.       Social History     Tobacco Use    Smoking status: Never    Smokeless tobacco: Never   Substance Use Topics    Alcohol use: Not Currently     Alcohol/week: 0.6 oz     Types: 1 Glasses of wine per week     Comment: Some alcohol socially over the years.  Not regular use        Family History   Problem Relation Age of Onset    Diabetes Mother         Developed in 70s, at 90 Kidney failure    Hypertension Mother         diagnosed in 70s    Hyperlipidemia Mother         diagnosed in 70s    Kidney Disease Mother     Diabetes Maternal Uncle          at 68    Heart  "Disease Father          at 64    Heart Disease Brother         Heart Attack at 72    Stroke Brother         occurred at 78        Objective    Vitals:    Resp 18   Ht 1.664 m (5' 5.51\")   BMI 29.18 kg/m²     Physical Exam:    Appears well-developed and well-nourished. No distress.    Head -  Normocephalic .   Eyes - Pupils are equal. Conjunctivae normal. No scleral icterus.   Ears - normal hearing  Mouth - Throat: Oropharynx is clear and moist. No oropharyngeal exudate  Neck - Neck supple. No thyromegaly  Cardiovascular - Normal rate, regular rhythm, normal heart sounds and intact distal pulses. No  gallop, murmur or rub  Pulmonary - Normal breath sounds.  No wheeze, rales or rhonchi  Breast - symmetrical. No mass on indentation.  Abdominal -Soft. No distension, tenderness, organomegaly or mass  Extremities-  No edema or tenderness.    Nodes - No submental, submandibular, preauricular, cervical, axillary or inguinal adenopathy.    Neurological -   Alert and oriented.  Skin - Skin is warm and dry. No rash noted. Not diaphoretic. No erythema. No pallor. No jaundice   Psychiatric -  Normal mood and affect.    Labs:     Latest Reference Range & Units 22 08:19 8/3/22 08:48   WBC 4.8 - 10.8 K/uL 3.7 (L) 4.1 (L)   RBC 4.20 - 5.40 M/uL 4.03 (L) 3.99 (L)   Hemoglobin 12.0 - 16.0 g/dL 12.7 12.5   Hematocrit 37.0 - 47.0 % 38.4 37.5   MCV 81.4 - 97.8 fL 95.3 94.0   MCH 27.0 - 33.0 pg 31.5 31.3   MCHC 33.6 - 35.0 g/dL 33.1 (L) 33.3 (L)   RDW 35.9 - 50.0 fL 44.0 42.7   Platelet Count 164 - 446 K/uL 321 354   MPV 9.0 - 12.9 fL 10.4 10.3   Neutrophils-Polys 44.00 - 72.00 % 44.20 41.20 (L)   Neutrophils (Absolute) 2.00 - 7.15 K/uL 1.64 (L) 1.69 (L)   Lymphocytes 22.00 - 41.00 % 41.50 (H) 47.30 (H)   Lymphs (Absolute) 1.00 - 4.80 K/uL 1.54 1.95   Monocytes 0.00 - 13.40 % 9.20 7.50   Monos (Absolute) 0.00 - 0.85 K/uL 0.34 0.31   Eosinophils 0.00 - 6.90 % 2.40 1.90   Eos (Absolute) 0.00 - 0.51 K/uL 0.09 0.08   Basophils " 0.00 - 1.80 % 2.40 (H) 1.90 (H)       Assessment    Imp:    Visit Diagnosis:    1. Neutropenia, unspecified type (HCC)  VITAMIN B12    FOLATE    SANJIV REFLEXIVE PROFILE    HEPATITIS PANEL ACUTE(4 COMPONENTS)    HIV AG/AB COMBO ASSAY SCREENING    CBC WITH DIFFERENTIAL        Plan:  Above lab and then return to complete consult    Yvan Oleary M.D.

## 2022-10-25 ENCOUNTER — OFFICE VISIT (OUTPATIENT)
Dept: BEHAVIORAL HEALTH | Facility: CLINIC | Age: 67
End: 2022-10-25
Payer: MEDICARE

## 2022-10-25 ENCOUNTER — HOSPITAL ENCOUNTER (OUTPATIENT)
Dept: HEMATOLOGY ONCOLOGY | Facility: MEDICAL CENTER | Age: 67
End: 2022-10-25
Attending: INTERNAL MEDICINE
Payer: MEDICARE

## 2022-10-25 ENCOUNTER — HOSPITAL ENCOUNTER (OUTPATIENT)
Dept: LAB | Facility: MEDICAL CENTER | Age: 67
End: 2022-10-25
Attending: INTERNAL MEDICINE
Payer: MEDICARE

## 2022-10-25 VITALS
OXYGEN SATURATION: 98 % | TEMPERATURE: 96.6 F | SYSTOLIC BLOOD PRESSURE: 116 MMHG | BODY MASS INDEX: 25.07 KG/M2 | DIASTOLIC BLOOD PRESSURE: 70 MMHG | HEART RATE: 77 BPM | WEIGHT: 156 LBS | RESPIRATION RATE: 18 BRPM | HEIGHT: 66 IN

## 2022-10-25 DIAGNOSIS — F41.1 GENERALIZED ANXIETY DISORDER: ICD-10-CM

## 2022-10-25 DIAGNOSIS — F43.29 GRIEF REACTION WITH PROLONGED BEREAVEMENT: ICD-10-CM

## 2022-10-25 DIAGNOSIS — D70.9 NEUTROPENIA, UNSPECIFIED TYPE (HCC): ICD-10-CM

## 2022-10-25 LAB
BASOPHILS # BLD AUTO: 2.5 % (ref 0–1.8)
BASOPHILS # BLD: 0.09 K/UL (ref 0–0.12)
EOSINOPHIL # BLD AUTO: 0.06 K/UL (ref 0–0.51)
EOSINOPHIL NFR BLD: 1.7 % (ref 0–6.9)
ERYTHROCYTE [DISTWIDTH] IN BLOOD BY AUTOMATED COUNT: 45.8 FL (ref 35.9–50)
HCT VFR BLD AUTO: 37.5 % (ref 37–47)
HGB BLD-MCNC: 12.2 G/DL (ref 12–16)
IMM GRANULOCYTES # BLD AUTO: 0.01 K/UL (ref 0–0.11)
IMM GRANULOCYTES NFR BLD AUTO: 0.3 % (ref 0–0.9)
LYMPHOCYTES # BLD AUTO: 1.15 K/UL (ref 1–4.8)
LYMPHOCYTES NFR BLD: 32.3 % (ref 22–41)
MCH RBC QN AUTO: 31.6 PG (ref 27–33)
MCHC RBC AUTO-ENTMCNC: 32.5 G/DL (ref 33.6–35)
MCV RBC AUTO: 97.2 FL (ref 81.4–97.8)
MONOCYTES # BLD AUTO: 0.3 K/UL (ref 0–0.85)
MONOCYTES NFR BLD AUTO: 8.4 % (ref 0–13.4)
NEUTROPHILS # BLD AUTO: 1.95 K/UL (ref 2–7.15)
NEUTROPHILS NFR BLD: 54.8 % (ref 44–72)
NRBC # BLD AUTO: 0 K/UL
NRBC BLD-RTO: 0 /100 WBC
PLATELET # BLD AUTO: 325 K/UL (ref 164–446)
PMV BLD AUTO: 10.5 FL (ref 9–12.9)
RBC # BLD AUTO: 3.86 M/UL (ref 4.2–5.4)
WBC # BLD AUTO: 3.6 K/UL (ref 4.8–10.8)

## 2022-10-25 PROCEDURE — 90834 PSYTX W PT 45 MINUTES: CPT | Performed by: PSYCHIATRY & NEUROLOGY

## 2022-10-25 PROCEDURE — 85025 COMPLETE CBC W/AUTO DIFF WBC: CPT

## 2022-10-25 PROCEDURE — 36415 COLL VENOUS BLD VENIPUNCTURE: CPT

## 2022-10-25 PROCEDURE — 99203 OFFICE O/P NEW LOW 30 MIN: CPT | Performed by: INTERNAL MEDICINE

## 2022-10-25 PROCEDURE — 99212 OFFICE O/P EST SF 10 MIN: CPT | Performed by: INTERNAL MEDICINE

## 2022-10-25 PROCEDURE — 86038 ANTINUCLEAR ANTIBODIES: CPT

## 2022-10-25 PROCEDURE — 80074 ACUTE HEPATITIS PANEL: CPT

## 2022-10-25 PROCEDURE — 82607 VITAMIN B-12: CPT

## 2022-10-25 PROCEDURE — 82746 ASSAY OF FOLIC ACID SERUM: CPT

## 2022-10-25 PROCEDURE — 87389 HIV-1 AG W/HIV-1&-2 AB AG IA: CPT

## 2022-10-25 RX ORDER — VITAMIN B COMPLEX
1000 TABLET ORAL DAILY
COMMUNITY
End: 2023-07-05

## 2022-10-25 RX ORDER — IBUPROFEN 200 MG
500 CAPSULE ORAL DAILY
COMMUNITY
End: 2024-02-13

## 2022-10-25 ASSESSMENT — FIBROSIS 4 INDEX: FIB4 SCORE: 0.93

## 2022-10-25 ASSESSMENT — PAIN SCALES - GENERAL: PAINLEVEL: NO PAIN

## 2022-10-26 LAB
FOLATE SERPL-MCNC: >20 NG/ML
HAV IGM SERPL QL IA: NORMAL
HBV CORE IGM SER QL: NORMAL
HBV SURFACE AG SER QL: NORMAL
HCV AB SER QL: NORMAL
HIV 1+2 AB+HIV1 P24 AG SERPL QL IA: NORMAL
VIT B12 SERPL-MCNC: 319 PG/ML (ref 211–911)

## 2022-10-26 NOTE — PROGRESS NOTES
[facility]   Psychotherapy Progress Note    Therapy was provided on this date in coordination with the [facility] approved Clinical Supervisor under the direct supervision of Dr. Rafa Villarreal who was on site during this visit.     Name: Tita Pitts  MRN: [id number]  : 1955  Age: 67 y.o.  Date of assessment: 10/25/2022  PCP: Zee Last M.D.  Persons in attendance: Patient  Total session time: 50 minutes      Topics addressed in psychotherapy include:     Data: Mila has been feeling stressed out due to conflict between her two adult children Penny & Angel that they are unwilling to resolve amicably, and the compounded stress of lab results that require further testing to see if Mila has a weakened immune system due to long haul covid or if it is due to other health issues.    Assessment: Mila's children bring a lot of stress to her table and have not reached out to her a single time to offer support since prior to the passing away of Mila's / their father.  Mila is lacking assertiveness to communicate needs and boundaries.     Plan: We discussed love languages to improve communication of needs between Mila and her kids, and what setting boundaries looks like so that Mila can address her health issues instead of allowing the kids dramas to interfere with her stress and health.     Objective Observations:   Participation:Active verbal participation, Attentive, Engaged, and Open to feedback   Grooming:Casual and Neat   Cognition:Alert and Fully Oriented   Eye Contact:Good   Mood:Euthymic   Affect:Flexible, Full range, Expansive, and Congruent with content   Thought Process:Logical and Goal-directed   Speech:Rate within normal limits and Volume within normal limits    Current Risk:   Suicide: low   Homicide: low   Self-Harm: low     Care Plan Updated: Yes    Does patient express agreement with the above plan? Yes     Diagnosis:  1. Generalized anxiety disorder    2. Grief reaction with  prolonged bereavement        Referral appointment(s) scheduled? No       Mark Car, MFT-I

## 2022-10-27 LAB — NUCLEAR IGG SER QL IA: NORMAL

## 2022-10-31 ENCOUNTER — APPOINTMENT (OUTPATIENT)
Dept: MEDICAL GROUP | Facility: PHYSICIAN GROUP | Age: 67
End: 2022-10-31
Payer: MEDICARE

## 2022-11-02 ENCOUNTER — OFFICE VISIT (OUTPATIENT)
Dept: MEDICAL GROUP | Facility: MEDICAL CENTER | Age: 67
End: 2022-11-02
Payer: MEDICARE

## 2022-11-02 VITALS
SYSTOLIC BLOOD PRESSURE: 118 MMHG | HEART RATE: 77 BPM | TEMPERATURE: 97.1 F | WEIGHT: 155.98 LBS | BODY MASS INDEX: 25.07 KG/M2 | HEIGHT: 66 IN | OXYGEN SATURATION: 97 % | DIASTOLIC BLOOD PRESSURE: 68 MMHG

## 2022-11-02 DIAGNOSIS — Z79.890 HORMONE REPLACEMENT THERAPY: ICD-10-CM

## 2022-11-02 DIAGNOSIS — D70.9 NEUTROPENIA, UNSPECIFIED TYPE (HCC): ICD-10-CM

## 2022-11-02 DIAGNOSIS — R94.4 DECREASED GFR: ICD-10-CM

## 2022-11-02 DIAGNOSIS — R06.02 SOB (SHORTNESS OF BREATH): ICD-10-CM

## 2022-11-02 DIAGNOSIS — Z80.3 FAMILY HISTORY OF BREAST CANCER: ICD-10-CM

## 2022-11-02 DIAGNOSIS — Z91.89 OTHER SPECIFIED PERSONAL RISK FACTORS, NOT ELSEWHERE CLASSIFIED: ICD-10-CM

## 2022-11-02 DIAGNOSIS — M85.89 OSTEOPENIA OF MULTIPLE SITES: ICD-10-CM

## 2022-11-02 PROBLEM — E66.3 OVERWEIGHT WITH BODY MASS INDEX (BMI) OF 29 TO 29.9 IN ADULT: Status: RESOLVED | Noted: 2022-05-09 | Resolved: 2022-11-02

## 2022-11-02 PROCEDURE — 99214 OFFICE O/P EST MOD 30 MIN: CPT | Performed by: FAMILY MEDICINE

## 2022-11-02 RX ORDER — ESTRADIOL 0.5 MG/1
0.5 TABLET ORAL
COMMUNITY
Start: 2022-10-24 | End: 2023-06-23

## 2022-11-02 ASSESSMENT — FIBROSIS 4 INDEX: FIB4 SCORE: 1.011928851253881386

## 2022-11-02 ASSESSMENT — ENCOUNTER SYMPTOMS
COUGH: 0
SHORTNESS OF BREATH: 1
FEVER: 0
PALPITATIONS: 0
VOMITING: 0
CHILLS: 0
BLOOD IN STOOL: 0
WHEEZING: 0
DIARRHEA: 0
SORE THROAT: 0
CONSTIPATION: 0
NAUSEA: 0
ABDOMINAL PAIN: 0

## 2022-11-02 NOTE — ASSESSMENT & PLAN NOTE
Chronic problem, improving, continue to drink 64 ounces of water in a day.  Avoid nephrotoxic agents.

## 2022-11-02 NOTE — ASSESSMENT & PLAN NOTE
Chronic problem, stable, counseled regarding risk of breast cancer with hormone replacement.  Recommended to taper off estradiol which she is agreeable for.  Recommend to do this taper over last 6 months with increasing days in between hormone replacement every month

## 2022-11-02 NOTE — ASSESSMENT & PLAN NOTE
Chronic problem, neutrophil count improved on recent labs.  Continue to follow-up with hematology and oncology for further evaluation

## 2022-11-02 NOTE — ASSESSMENT & PLAN NOTE
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  Check chest x-ray and echocardiogram.  Check CT cardiac scoring also as she has family history of heart disease.

## 2022-11-02 NOTE — PROGRESS NOTES
FAMILY MEDICINE VISIT                                                               Chief complaint::Diagnoses of Decreased GFR, SOB (shortness of breath), Neutropenia, unspecified type (HCC), Family history of breast cancer, Hormone replacement therapy, Osteopenia of multiple sites, and Other specified personal risk factors, not elsewhere classified were pertinent to this visit.    History of present illness: Tita Pitts is a 67 y.o. female who presented for shortness of breath, fatigue symptoms.    Problem   Bmi 25.0-25.9,Adult   Osteopenia of Multiple Sites    Bone density scan on 8/23/2022 showed According to the World Health Organization classification, bone mineral density of this patient is osteopenic in the lumbar spine and left femur.  She is taking calcium and vitamin D supplementation.  No recent falls or fractures.     Sob (Shortness of Breath)    She reports that she got COVID in January and since then she has not been feeling to her normal self.  She has been experiencing shortness of breath on exertion and fatigue symptoms.  She has family history of congestive heart failure, coronary artery disease in his father.  Her last cholesterol numbers were normal.  Has been taking vitamin D and calcium supplementation.  No chest pain, palpitations, lower leg swelling.     Neutropenia (Hcc)    Has history of neutropenia, currently following with Dr. Oleary hematology and oncology.  Had blood work done including vitamin B12, folic acid, SANJIV, HIV and hepatitis B which came back negative.     Decreased Gfr    Recent GFR came back at 68 which improved from 52    Component      Latest Ref Rng & Units 4/6/2022 8/3/2022           8:19 AM  8:48 AM   GFR (CKD-EPI)      >60 mL/min/1.73 m 2 52 (A) 68        Hormone Replacement Therapy    She is currently on estradiol 0. 5 mg tablet which she takes half tablet daily for hormone replacement.  She has history of hysterectomy done in her 30s and she has been on just  "hormone replacement since then.  Has family history of breast cancer in her mother and grandmother     Family History of Breast Cancer    Has family history of breast cancer in her mother and her grandmother.  She is currently on estrogen which she has been taking when she was in her 30s when she had a hysterectomy done.     Bmi 29.0-29.9,Adult (Resolved)   Overweight With Body Mass Index (Bmi) of 29 to 29.9 in Adult (Resolved)   Bmi 28.0-28.9,Adult (Resolved)              Review of systems:     Review of Systems   Constitutional:  Positive for malaise/fatigue. Negative for chills and fever.   HENT:  Negative for ear discharge, ear pain and sore throat.    Respiratory:  Positive for shortness of breath. Negative for cough and wheezing.    Cardiovascular:  Negative for chest pain, palpitations and leg swelling.   Gastrointestinal:  Negative for abdominal pain, blood in stool, constipation, diarrhea, nausea and vomiting.   Skin:  Negative for rash.      Medications and Allergies:     Current Outpatient Medications   Medication Sig Dispense Refill    estradiol (ESTRACE) 0.5 MG tablet Take 0.5 mg by mouth every day.      vitamin D3 (CHOLECALCIFEROL) 1000 Unit (25 mcg) Tab Take 1,000 Units by mouth every day.      calcium carbonate (OS-RADHA 500) 1250 (500 Ca) MG Tab Take 500 mg by mouth every day.      Ascorbic Acid (VITAMIN C PO) Take  by mouth.       No current facility-administered medications for this visit.          Vitals:    /68 (BP Location: Left arm, Patient Position: Sitting, BP Cuff Size: Adult)   Pulse 77   Temp 36.2 °C (97.1 °F) (Temporal)   Ht 1.664 m (5' 5.5\")   Wt 70.8 kg (155 lb 15.6 oz)   SpO2 97%  Body mass index is 25.56 kg/m².    Physical Exam:     Physical Exam  Constitutional:       General: She is not in acute distress.  HENT:      Head: Normocephalic and atraumatic.   Eyes:      Conjunctiva/sclera: Conjunctivae normal.   Cardiovascular:      Rate and Rhythm: Normal rate and regular " rhythm.      Heart sounds: Normal heart sounds. No murmur heard.    No friction rub. No gallop.   Pulmonary:      Effort: Pulmonary effort is normal. No respiratory distress.      Breath sounds: Normal breath sounds. No wheezing or rales.   Musculoskeletal:         General: No deformity.      Cervical back: Neck supple.   Neurological:      Mental Status: She is alert.      Gait: Gait is intact.   Psychiatric:         Mood and Affect: Mood and affect normal.         Judgment: Judgment normal.        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  Check chest x-ray and echocardiogram.  Check CT cardiac scoring also as she has family history of heart disease.         Relevant Orders    DX-CHEST-2 VIEWS    EC-ECHOCARDIOGRAM COMPLETE W/O CONT    Osteopenia of multiple sites     Chronic problem, stable, continue to take calcium and vitamin D supplementation.  We will recheck her bone density scan in 2 years.  Counseled regarding muscle strengthening exercises         Neutropenia (HCC)     Chronic problem, neutrophil count improved on recent labs.  Continue to follow-up with hematology and oncology for further evaluation         Hormone replacement therapy     Chronic problem, stable, counseled regarding risk of breast cancer with hormone replacement.  Recommended to taper off estradiol which she is agreeable for.  Recommend to do this taper over last 6 months with increasing days in between hormone replacement every month         Family history of breast cancer     We discussed about doing genetic testing, would like to hold onto genetic testing currently.         Decreased GFR     Chronic problem, improving, continue to drink 64 ounces of water in a day.  Avoid nephrotoxic agents.          Other Visit Diagnoses        Other specified personal risk factors, not elsewhere classified        Relevant Orders    CT-CARDIAC SCORING             Please note that this dictation was created using voice recognition software. I have made every reasonable attempt to correct obvious errors, but I expect that there are errors of grammar and possibly content that I did not discover before finalizing the note.    Follow up in 2 months for imaging follow-up.

## 2022-11-02 NOTE — ASSESSMENT & PLAN NOTE
Chronic problem, stable, continue to take calcium and vitamin D supplementation.  We will recheck her bone density scan in 2 years.  Counseled regarding muscle strengthening exercises

## 2022-11-03 NOTE — PROGRESS NOTES
22    Subjective    Chief Complaint:  Follow up from consultation for neutropenia    HPI:  67 female seen in consultation 10/25/22 for neutropenia. B12 and folate are normal. Viral studies negative. SANJIV negative.   Brings in old lab slips - WBC 3.7 in .    ROS:    Constitutional: No weight loss  Skin: No rash or jaundice  HENT: No change in eyesight or hearing  Cardiovascular:No chest pain or arrythmia  Respiratory:No cough or SOB  GI:No nausea, vomiting, diarrhea, constipation  :No dysuria or frequency  Musculoskeletal:No bone or joint pain  Neuro:No sx's of neuropathy  Psych: No complaints    PMH:      Allergies   Allergen Reactions    Other Drug Unspecified     Other reaction(s): Welts on legs    Macrodantin [Nitrofurantoin]        Past Medical History:   Diagnosis Date    Allergy         Past Surgical History:   Procedure Laterality Date    ABDOMINAL HYSTERECTOMY TOTAL      APPENDECTOMY          Medications:    Current Outpatient Medications on File Prior to Encounter   Medication Sig Dispense Refill    estradiol (ESTRACE) 0.5 MG tablet Take 1 Tablet by mouth every day.      vitamin D3 (CHOLECALCIFEROL) 1000 Unit (25 mcg) Tab Take 1 Tablet by mouth every day.      calcium carbonate (OS-RADHA 500) 1250 (500 Ca) MG Tab Take 1 Tablet by mouth every day.      Ascorbic Acid (VITAMIN C PO) Take  by mouth.       No current facility-administered medications on file prior to encounter.       Social History     Tobacco Use    Smoking status: Never    Smokeless tobacco: Never   Substance Use Topics    Alcohol use: Not Currently     Alcohol/week: 0.6 oz     Types: 1 Glasses of wine per week     Comment: Some alcohol socially over the years.  Not regular use        Family History   Problem Relation Age of Onset    Breast Cancer Mother     Diabetes Mother         Developed in 70s, at 90 Kidney failure    Hypertension Mother         diagnosed in 70s    Hyperlipidemia Mother         diagnosed in 70s    Kidney  "Disease Mother     Heart Disease Father          at 64, CHF, CAD    Heart Disease Brother         Heart Attack at 72    Stroke Brother         occurred at 78    Diabetes Maternal Uncle          at 68        Objective    Vitals:    /80 (BP Location: Left arm, Patient Position: Sitting, BP Cuff Size: Adult)   Pulse 74   Temp 36.4 °C (97.5 °F) (Temporal)   Resp 18   Ht 1.664 m (5' 5.51\")   Wt 70.9 kg (156 lb 4.9 oz)   SpO2 98%   BMI 25.61 kg/m²     Physical Exam:    Appears well-developed and well-nourished. No distress.    Head -  Normocephalic .   Eyes - Pupils are equal. Conjunctivae normal. No scleral icterus.   Ears - normal hearing  Neurological -   Alert and oriented.  Skin - Skin is warm and dry. No rash noted. Not diaphoretic. No erythema. No pallor. No jaundice   Psychiatric -  Normal mood and affect.    Labs:     Latest Reference Range & Units 22 08:19 8/3/22 08:48 10/25/22 10:43   WBC 4.8 - 10.8 K/uL 3.7 (L) 4.1 (L) 3.6 (L)   RBC 4.20 - 5.40 M/uL 4.03 (L) 3.99 (L) 3.86 (L)   Hemoglobin 12.0 - 16.0 g/dL 12.7 12.5 12.2   Hematocrit 37.0 - 47.0 % 38.4 37.5 37.5   MCV 81.4 - 97.8 fL 95.3 94.0 97.2   MCH 27.0 - 33.0 pg 31.5 31.3 31.6   MCHC 33.6 - 35.0 g/dL 33.1 (L) 33.3 (L) 32.5 (L)   RDW 35.9 - 50.0 fL 44.0 42.7 45.8   Platelet Count 164 - 446 K/uL 321 354 325   MPV 9.0 - 12.9 fL 10.4 10.3 10.5   Neutrophils-Polys 44.00 - 72.00 % 44.20 41.20 (L) 54.80   Neutrophils (Absolute) 2.00 - 7.15 K/uL 1.64 (L) 1.69 (L) 1.95 (L)   Lymphocytes 22.00 - 41.00 % 41.50 (H) 47.30 (H) 32.30      Latest Reference Range & Units 10/25/22 10:43   Folate -Folic Acid >4.0 ng/mL >20.0   Vitamin B12 -True Cobalamin 211 - 911 pg/mL 319   Hepatitis A Virus Ab, IgM Non-Reactive  Non-Reactive   Hepatitis B Surface Antigen Non-Reactive  Non-Reactive   Hepatitis B Cors Ab,IgM Non-Reactive  Non-Reactive   Hepatitis C Antibody Non-Reactive  Non-Reactive   HIV Ag/Ab Combo Assay Non Reactive  Non-Reactive "   Antinuclear Antibody None Detected  None Detected       Assessment  I think it's her normal  Imp:    Visit Diagnosis:    1. Neutropenia, unspecified type (HCC)          Plan:  Return prn if something changes    Yvan Oleary M.D.

## 2022-11-09 ENCOUNTER — HOSPITAL ENCOUNTER (OUTPATIENT)
Dept: HEMATOLOGY ONCOLOGY | Facility: MEDICAL CENTER | Age: 67
End: 2022-11-09
Attending: INTERNAL MEDICINE
Payer: MEDICARE

## 2022-11-09 VITALS
RESPIRATION RATE: 18 BRPM | OXYGEN SATURATION: 98 % | DIASTOLIC BLOOD PRESSURE: 80 MMHG | WEIGHT: 156.31 LBS | HEART RATE: 74 BPM | BODY MASS INDEX: 25.12 KG/M2 | SYSTOLIC BLOOD PRESSURE: 120 MMHG | TEMPERATURE: 97.5 F | HEIGHT: 66 IN

## 2022-11-09 DIAGNOSIS — D70.9 NEUTROPENIA, UNSPECIFIED TYPE (HCC): ICD-10-CM

## 2022-11-09 PROCEDURE — 99212 OFFICE O/P EST SF 10 MIN: CPT | Performed by: INTERNAL MEDICINE

## 2022-11-09 PROCEDURE — 99213 OFFICE O/P EST LOW 20 MIN: CPT | Performed by: INTERNAL MEDICINE

## 2022-11-09 ASSESSMENT — PAIN SCALES - GENERAL: PAINLEVEL: NO PAIN

## 2022-11-09 ASSESSMENT — FIBROSIS 4 INDEX: FIB4 SCORE: 1.011928851253881386

## 2022-11-13 ENCOUNTER — HOSPITAL ENCOUNTER (OUTPATIENT)
Dept: RADIOLOGY | Facility: MEDICAL CENTER | Age: 67
End: 2022-11-13
Attending: FAMILY MEDICINE
Payer: MEDICARE

## 2022-11-13 ENCOUNTER — HOSPITAL ENCOUNTER (OUTPATIENT)
Dept: RADIOLOGY | Facility: MEDICAL CENTER | Age: 67
End: 2022-11-13
Attending: FAMILY MEDICINE
Payer: COMMERCIAL

## 2022-11-13 DIAGNOSIS — Z91.89 OTHER SPECIFIED PERSONAL RISK FACTORS, NOT ELSEWHERE CLASSIFIED: ICD-10-CM

## 2022-11-13 DIAGNOSIS — R06.02 SOB (SHORTNESS OF BREATH): ICD-10-CM

## 2022-11-13 PROCEDURE — 71046 X-RAY EXAM CHEST 2 VIEWS: CPT

## 2022-11-13 PROCEDURE — 4410556 CT-CARDIAC SCORING (SELF PAY ONLY)

## 2022-11-15 ENCOUNTER — OFFICE VISIT (OUTPATIENT)
Dept: MEDICAL GROUP | Facility: MEDICAL CENTER | Age: 67
End: 2022-11-15
Payer: MEDICARE

## 2022-11-15 ENCOUNTER — OFFICE VISIT (OUTPATIENT)
Dept: BEHAVIORAL HEALTH | Facility: CLINIC | Age: 67
End: 2022-11-15
Payer: MEDICARE

## 2022-11-15 VITALS
DIASTOLIC BLOOD PRESSURE: 66 MMHG | HEIGHT: 65 IN | BODY MASS INDEX: 25.71 KG/M2 | WEIGHT: 154.32 LBS | SYSTOLIC BLOOD PRESSURE: 122 MMHG | TEMPERATURE: 96.9 F | OXYGEN SATURATION: 97 % | RESPIRATION RATE: 16 BRPM | HEART RATE: 69 BPM

## 2022-11-15 DIAGNOSIS — R06.02 SOB (SHORTNESS OF BREATH): ICD-10-CM

## 2022-11-15 DIAGNOSIS — F41.1 GENERALIZED ANXIETY DISORDER: ICD-10-CM

## 2022-11-15 DIAGNOSIS — I51.7 CARDIOMEGALY: ICD-10-CM

## 2022-11-15 DIAGNOSIS — F43.29 GRIEF REACTION WITH PROLONGED BEREAVEMENT: ICD-10-CM

## 2022-11-15 DIAGNOSIS — I31.39 PERICARDIAL EFFUSION: ICD-10-CM

## 2022-11-15 PROCEDURE — 90834 PSYTX W PT 45 MINUTES: CPT | Performed by: PSYCHIATRY & NEUROLOGY

## 2022-11-15 PROCEDURE — 99214 OFFICE O/P EST MOD 30 MIN: CPT | Performed by: FAMILY MEDICINE

## 2022-11-15 ASSESSMENT — ENCOUNTER SYMPTOMS
WHEEZING: 0
SHORTNESS OF BREATH: 1
FEVER: 0
COUGH: 0
CHILLS: 0
PALPITATIONS: 0

## 2022-11-15 ASSESSMENT — FIBROSIS 4 INDEX: FIB4 SCORE: 1.011928851253881386

## 2022-11-15 NOTE — PROGRESS NOTES
"FAMILY MEDICINE VISIT                                                               Chief complaint::Diagnoses of SOB (shortness of breath), Cardiomegaly, and Pericardial effusion were pertinent to this visit.    History of present illness: Tita Pitts is a 67 y.o. female who presented for imaging follow-up.      She got COVID infection in January and since then she has not been feeling normal to herself.  She has been experiencing shortness of breath and fatigue symptoms on exertion.  She has family history of congestive heart failure, coronary artery disease in her father.    We checked chest x-ray, CT cardiac scoring and echocardiogram. .  She is scheduled for echocardiogram in January.  Chest x-ray showed no airspace disease, showed cardiomegaly.  CT cardiac scoring November 22 showed no cholesterol plaque buildup.  It did showed small pericardial effusion.      Review of systems:     Review of Systems   Constitutional:  Negative for chills, fever and malaise/fatigue.   Respiratory:  Positive for shortness of breath. Negative for cough and wheezing.    Cardiovascular:  Negative for chest pain, palpitations and leg swelling.      Medications and Allergies:     Current Outpatient Medications   Medication Sig Dispense Refill    estradiol (ESTRACE) 0.5 MG tablet Take 1 Tablet by mouth every day.      vitamin D3 (CHOLECALCIFEROL) 1000 Unit (25 mcg) Tab Take 1 Tablet by mouth every day.      calcium carbonate (OS-RADHA 500) 1250 (500 Ca) MG Tab Take 1 Tablet by mouth every day.      Ascorbic Acid (VITAMIN C PO) Take  by mouth.       No current facility-administered medications for this visit.          Vitals:    /66 (BP Location: Left arm, Patient Position: Sitting, BP Cuff Size: Adult)   Pulse 69   Temp 36.1 °C (96.9 °F)   Resp 16   Ht 1.651 m (5' 5\")   Wt 70 kg (154 lb 5.2 oz)   SpO2 97%  Body mass index is 25.68 kg/m².    Physical Exam:     Physical Exam  Constitutional:       General: She is not " in acute distress.  HENT:      Head: Normocephalic and atraumatic.      Right Ear: External ear normal.      Left Ear: External ear normal.   Eyes:      Conjunctiva/sclera: Conjunctivae normal.   Cardiovascular:      Rate and Rhythm: Normal rate.   Pulmonary:      Effort: Pulmonary effort is normal. No respiratory distress.   Musculoskeletal:         General: No deformity.      Cervical back: Neck supple.   Skin:     Findings: No rash.   Neurological:      Mental Status: She is alert.      Gait: Gait is intact.   Psychiatric:         Mood and Affect: Mood and affect normal.         Judgment: Judgment normal.      CT-CARDIAC SCORING  Narrative: 11/13/2022 8:35 AM    HISTORY/REASON FOR EXAM:  CAD screening, intermediate CAD risk, not treadmill candidate    TECHNIQUE/EXAM DESCRIPTION:  Multi-detector, helical imaging of the heart was performed with ECG gating and suspended respiration. Postprocessing was performed on a three-dimensional computer workstation. Diastolic phase images were evaluated for coronary artery calcification, and a   quantitative assessment provided.    Low dose optimization technique was utilized for this CT exam including automated exposure control and adjustment of the mA and/or kV according to patient size.    COMPARISON: None.    FINDINGS:    Coronary calcification:  LMA - 0.0  LCX - 0.0  LAD - 0.0  RCA - 0.0  PDA - 0.0    Total Calcium Score: 0.0    Percentile: Calcium score is below the 25th percentile for the patient's age and sex.    Other findings:  Heart: There is a small pericardial effusion.  Lungs: There appears to be partial visualization of trace pleural effusions.  Mediastinum: Normal.  Upper abdomen: Normal.  Impression: 1.  Coronary Calcium Score of 0    You have no detectable cholesterol (plaque) build-up in the arteries which supply blood flow to your heart. This test cannot rule out completely any cholesterol build-up in your arteries, but these results mean you have a very  low chance of having heart   disease, and a low future risk of heart attack and stroke. While this is excellent news, we still recommend a healthy low saturated fat, low sugar diet and regular exercise. Other ways to ensure you do not develop cholesterol build-up in your arteries   are to avoid smoking and maintain and normal weight. We recommend you consider rechecking your score in 5-10 years, which you can discuss with your doctor. If you are on aspirin or cholesterol medications, you can discuss stopping these with your doctor,   as they may be of minimal benefit for you.    Guidelines based upon the American College of Cardiology 2018 recommendations.    2.  Small pericardial effusion and apparent trace pleural effusions.  DX-CHEST-2 VIEWS  Narrative: 11/13/2022 8:52 AM    HISTORY/REASON FOR EXAM:  Shortness of Breath    TECHNIQUE/EXAM DESCRIPTION AND NUMBER OF VIEWS:  Two views of the chest.    COMPARISON:  None.    FINDINGS:  HEART: Enlarged  LUNGS: No areas of air space disease are demonstrated.  PLEURA: No effusion or pneumothorax.  Impression: Cardiomegaly     Assessment/Plan:         1. SOB (shortness of breath)  Chronic problem, improving, recent x-ray showed cardiomegaly and CT cardiac scoring showed small pericardial effusion.  Referral to cardiology for further evaluation.  Check proBNP, metabolic panel and thyroid function test.    - REFERRAL TO CARDIOLOGY  - proBrain Natriuretic Peptide, NT; Future  - Comp Metabolic Panel; Future  - TSH WITH REFLEX TO FT4; Future    2. Cardiomegaly  New problem, needs further evaluation with echocardiogram.  She is scheduled for echocardiogram in January.  Referral to cardiology.  Check blood work proBNP, metabolic panel and thyroid function test.    - REFERRAL TO CARDIOLOGY  - proBrain Natriuretic Peptide, NT; Future  - Comp Metabolic Panel; Future  - TSH WITH REFLEX TO FT4; Future    3. Pericardial effusion  New problem, mild pericardial effusion seen on CT  cardiac scoring.  We will check echocardiogram, check proBNP, metabolic panel and thyroid function test.  Referral to cardiology.  She asked question about COVID-vaccine.  Discussed COVID-vaccine one of the side effects is pericarditis.  On the other hand in case she gets COVID, that affects heart and lung too.  Discussed the risks and benefits of vaccine as well as risk of COVID infection.  Recommended to wear mask when she is out and when she is traveling.  We are N95 mask for protection.  We are going to check an echocardiogram in January.     - REFERRAL TO CARDIOLOGY  - proBrain Natriuretic Peptide, NT; Future  - Comp Metabolic Panel; Future  - TSH WITH REFLEX TO FT4; Future     Please note that this dictation was created using voice recognition software. I have made every reasonable attempt to correct obvious errors, but I expect that there are errors of grammar and possibly content that I did not discover before finalizing the note.    Follow up in 3 months for lab and imaging follow-up.

## 2022-11-15 NOTE — PROGRESS NOTES
"[facility]   Psychotherapy Progress Note    Therapy was provided on this date in coordination with the [facility] approved Clinical Supervisor under the direct supervision of Dr. Rafa Goodrich who was on site during this visit.     Name: Tita Pitts  MRN: [id number]  : 1955  Age: 67 y.o.  Date of assessment: 11/15/2022  PCP: Bassam Marinelli M.D.  Persons in attendance: Patient  Total session time: 50 minutes      Topics addressed in psychotherapy include:     Data: Mila is having several medical issues that have never been a problem for her before now.  She is having trouble deciding if it is symptoms of long haul covid, covid vaccines, stress due to issues with her adult children, or grief due to the loss of her  last year.     Assessment: There may be medical explanations for the issues that Mila is experiencing, or they may be due to or exacerbated by stress.    Plan: Mila will read \"The body keeps the score\" to gain a better understanding of how her body is impact by her mental health.    Objective Observations:   Participation:Active verbal participation, Attentive, Engaged, and Open to feedback   Grooming:Casual and Neat   Cognition:Alert and Fully Oriented   Eye Contact:Good   Mood:Euthymic   Affect:Flexible and Full range   Thought Process:Logical and Goal-directed   Speech:Rate within normal limits and Volume within normal limits    Current Risk:   Suicide: low   Homicide: low   Self-Harm: low    Care Plan Updated: Yes    Does patient express agreement with the above plan? Yes     Diagnosis:  Generalized Anxiety Disorder  Grief Reaction with prolonged bereavement    Referral appointment(s) scheduled? No       BRETT Mcpherson Intern    "

## 2022-11-17 ENCOUNTER — HOSPITAL ENCOUNTER (OUTPATIENT)
Dept: LAB | Facility: MEDICAL CENTER | Age: 67
End: 2022-11-17
Attending: FAMILY MEDICINE
Payer: MEDICARE

## 2022-11-17 DIAGNOSIS — I51.7 CARDIOMEGALY: ICD-10-CM

## 2022-11-17 DIAGNOSIS — I31.39 PERICARDIAL EFFUSION: ICD-10-CM

## 2022-11-17 DIAGNOSIS — R06.02 SOB (SHORTNESS OF BREATH): ICD-10-CM

## 2022-11-17 LAB
ALBUMIN SERPL BCP-MCNC: 4.5 G/DL (ref 3.2–4.9)
ALBUMIN/GLOB SERPL: 1.6 G/DL
ALP SERPL-CCNC: 63 U/L (ref 30–99)
ALT SERPL-CCNC: 14 U/L (ref 2–50)
ANION GAP SERPL CALC-SCNC: 12 MMOL/L (ref 7–16)
AST SERPL-CCNC: 21 U/L (ref 12–45)
BILIRUB SERPL-MCNC: 0.6 MG/DL (ref 0.1–1.5)
BUN SERPL-MCNC: 16 MG/DL (ref 8–22)
CALCIUM SERPL-MCNC: 9.7 MG/DL (ref 8.5–10.5)
CHLORIDE SERPL-SCNC: 105 MMOL/L (ref 96–112)
CO2 SERPL-SCNC: 24 MMOL/L (ref 20–33)
CREAT SERPL-MCNC: 1 MG/DL (ref 0.5–1.4)
GFR SERPLBLD CREATININE-BSD FMLA CKD-EPI: 62 ML/MIN/1.73 M 2
GLOBULIN SER CALC-MCNC: 2.9 G/DL (ref 1.9–3.5)
GLUCOSE SERPL-MCNC: 83 MG/DL (ref 65–99)
NT-PROBNP SERPL IA-MCNC: 117 PG/ML (ref 0–125)
POTASSIUM SERPL-SCNC: 4.2 MMOL/L (ref 3.6–5.5)
PROT SERPL-MCNC: 7.4 G/DL (ref 6–8.2)
SODIUM SERPL-SCNC: 141 MMOL/L (ref 135–145)

## 2022-11-17 PROCEDURE — 83880 ASSAY OF NATRIURETIC PEPTIDE: CPT

## 2022-11-17 PROCEDURE — 36415 COLL VENOUS BLD VENIPUNCTURE: CPT

## 2022-11-17 PROCEDURE — 84443 ASSAY THYROID STIM HORMONE: CPT

## 2022-11-17 PROCEDURE — 80053 COMPREHEN METABOLIC PANEL: CPT

## 2022-11-18 ENCOUNTER — OFFICE VISIT (OUTPATIENT)
Dept: CARDIOLOGY | Facility: MEDICAL CENTER | Age: 67
End: 2022-11-18
Attending: FAMILY MEDICINE
Payer: MEDICARE

## 2022-11-18 VITALS
SYSTOLIC BLOOD PRESSURE: 136 MMHG | BODY MASS INDEX: 25.83 KG/M2 | HEIGHT: 65 IN | OXYGEN SATURATION: 99 % | HEART RATE: 70 BPM | DIASTOLIC BLOOD PRESSURE: 82 MMHG | WEIGHT: 155 LBS | RESPIRATION RATE: 14 BRPM

## 2022-11-18 DIAGNOSIS — R42 POSTURAL DIZZINESS WITH PRESYNCOPE: ICD-10-CM

## 2022-11-18 DIAGNOSIS — R06.02 SOB (SHORTNESS OF BREATH): Primary | ICD-10-CM

## 2022-11-18 DIAGNOSIS — R55 POSTURAL DIZZINESS WITH PRESYNCOPE: ICD-10-CM

## 2022-11-18 DIAGNOSIS — Z71.89 COUNSELING ON HEALTH PROMOTION AND DISEASE PREVENTION: ICD-10-CM

## 2022-11-18 DIAGNOSIS — I31.39 PERICARDIAL EFFUSION: ICD-10-CM

## 2022-11-18 LAB
EKG IMPRESSION: NORMAL
TSH SERPL DL<=0.005 MIU/L-ACNC: 2.11 UIU/ML (ref 0.38–5.33)

## 2022-11-18 PROCEDURE — 93000 ELECTROCARDIOGRAM COMPLETE: CPT | Performed by: INTERNAL MEDICINE

## 2022-11-18 PROCEDURE — 99204 OFFICE O/P NEW MOD 45 MIN: CPT | Performed by: INTERNAL MEDICINE

## 2022-11-18 ASSESSMENT — FIBROSIS 4 INDEX: FIB4 SCORE: 1.122497216032182416

## 2022-11-18 NOTE — PROGRESS NOTES
"CARDIOLOGY NEW PATIENT:    PCP: Bassam Marinelli M.D.    1. SOB (shortness of breath)    2. Pericardial effusion    3. Postural dizziness with presyncope    4. Counseling on health promotion and disease prevention        Tita Pitts referred for pericardial effusion noted on CAC scoring CT.    Chief Complaint   Patient presents with    Shortness of Breath    Cardiomegaly    Pericardial Effusion       History: Tita Pitts is a 67 y.o. female referred after PCP visit 11/15/2022 for shortness of breath concerns.  Recent CAC score was 0 but report mentioned small pericardial effusion, and chest x-ray reported cardiomegaly, hence this referral.  Echocardiogram is scheduled for 1/17/2023. Normal NT-proBNP, TSH and CMP 11/18/22.  Normal lipid panel and hemoglobin A1c April 2022. Also referred to hematology for neutropenia.    Had \"bad COVID\" Jan 2022, milder July 2022. Since January, remained fatigued / exhausted and exertional SOB. No chest pain or chest pressure. No orthopnea, PND or LEDA. No rest symptoms. Overall symptoms better. Few weeks ago her son was moving into her house, she helped her son  an edge of a couch, but she felt \"power drain\" sensation and presyncopal / lightheaded with SOB. None of these symptoms at rest. No stairs at home. Occasionally gets SOB with 1 flight of stairs. No syncope.    Feels heart pounding at night but not fast or fluttering sensation.    Seeing a counselor for her recent loss of her  9/2021.    In her late 30's, she had pericarditis, unsure if she was given colchicine, but was given NSAIDs for pain.    No known DVT / PE history. On estrogen. Non-smoker.    Chest Xray 2 view 11/13/22:  HEART: Enlarged  LUNGS: No areas of air space disease are demonstrated.  PLEURA: No effusion or pneumothorax.    Social:  Son recently moved in with her  Has a son in [place]  ,  9/2021  Never smoked  No marijuana use  Retired  for the school " "[employer]    Lipid profile 2022:  TG 67, LDL 96    CAC score 22: personally reviewed  Coronary calcification:  LMA - 0.0  LCX - 0.0  LAD - 0.0  RCA - 0.0  PDA - 0.0  Total Calcium Score: 0.0  Percentile: Calcium score is below the 25th percentile for the patient's age and sex.  Heart: There is a small pericardial effusion.  Lungs: There appears to be partial visualization of trace pleural effusions.    Cardiovascular Risk Factors:  1. Smoking status: None  2. Type II Diabetes Mellitus: No  3. Hypertension: No  4. Dyslipidemia: No  5. Obesity / metabolic syndrome: No  6. Family history of ASCVD: Yes, father  from MI and CHF at age 64  7. Family history of premature ASCVD in a first degree relative (Male less than 55 years of age; Female less than 60 years of age): No  8. Sedentary lifestyle: No  9. Lack of exercise: Yes      PE:  /82 (BP Location: Left arm, Patient Position: Sitting, BP Cuff Size: Adult)   Pulse 70   Resp 14   Ht 1.651 m (5' 5\")   Wt 70.3 kg (155 lb)   SpO2 99%   BMI 25.79 kg/m²     Gen: well  HEENT: Symmetric face. Anicteric sclerae. Moist mucus membranes  NECK: No JVD. No lymphadenopathy  CARDIAC: Regular, Normal S1, S2, No murmur  VASCULATURE: carotids are normal bilaterally without bruit  RESP: Clear to auscultation bilaterally  ABD: Soft, non-tender, non-distended  EXT: No edema, no clubbing or cyanosis  SKIN: Warm and dry  NEURO: No gross deficits  PSYCH: Appropriate affect, participates in conversation    The 10-year ASCVD risk score (Yo DK, et al., 2019) is: 5.5%    Past Medical History:   Diagnosis Date    Allergy      Past Surgical History:   Procedure Laterality Date    ABDOMINAL HYSTERECTOMY TOTAL      APPENDECTOMY       Allergies   Allergen Reactions    Other Drug Unspecified     Other reaction(s): Welts on legs    Macrodantin [Nitrofurantoin]      Outpatient Encounter Medications as of 2022   Medication Sig Dispense Refill    estradiol (ESTRACE) 0.5 MG " tablet Take 1 Tablet by mouth every 48 hours.      vitamin D3 (CHOLECALCIFEROL) 1000 Unit (25 mcg) Tab Take 1 Tablet by mouth every day.      calcium carbonate (OS-RADHA 500) 1250 (500 Ca) MG Tab Take 1 Tablet by mouth every day.      Ascorbic Acid (VITAMIN C PO) Take  by mouth every day.       No facility-administered encounter medications on file as of 11/18/2022.     Social History     Socioeconomic History    Marital status:      Spouse name: Not on file    Number of children: Not on file    Years of education: Not on file    Highest education level: Bachelor's degree (e.g., BA, AB, BS)   Occupational History    Not on file   Tobacco Use    Smoking status: Never    Smokeless tobacco: Never   Vaping Use    Vaping Use: Never used   Substance and Sexual Activity    Alcohol use: Not Currently     Alcohol/week: 0.6 oz     Types: 1 Glasses of wine per week     Comment: Some alcohol socially over the years.  Not regular use    Drug use: Never    Sexual activity: Not Currently     Partners: Male     Birth control/protection: Surgical, Abstinence, None, Post-Menopausal     Comment: Had Endometriosis had hysterectomy.   Other Topics Concern    Not on file   Social History Narrative    Not on file     Social Determinants of Health     Financial Resource Strain: Low Risk     Difficulty of Paying Living Expenses: Not very hard   Food Insecurity: No Food Insecurity    Worried About Running Out of Food in the Last Year: Never true    Ran Out of Food in the Last Year: Never true   Transportation Needs: No Transportation Needs    Lack of Transportation (Medical): No    Lack of Transportation (Non-Medical): No   Physical Activity: Insufficiently Active    Days of Exercise per Week: 2 days    Minutes of Exercise per Session: 30 min   Stress: Stress Concern Present    Feeling of Stress : Rather much   Social Connections: Moderately Integrated    Frequency of Communication with Friends and Family: Twice a week    Frequency of  Social Gatherings with Friends and Family: Once a week    Attends Gnosticist Services: 1 to 4 times per year    Active Member of Clubs or Organizations: Yes    Attends Club or Organization Meetings: 1 to 4 times per year    Marital Status:    Intimate Partner Violence: Not on file   Housing Stability: Low Risk     Unable to Pay for Housing in the Last Year: No    Number of Places Lived in the Last Year: 1    Unstable Housing in the Last Year: No     Family History   Problem Relation Age of Onset    Breast Cancer Mother     Diabetes Mother         Developed in 70s, at 90 Kidney failure    Hypertension Mother         diagnosed in 70s    Hyperlipidemia Mother         diagnosed in 70s    Kidney Disease Mother     Heart Disease Father          at 64, CHF, CAD    Heart Disease Brother         Heart Attack at 72    Stroke Brother         occurred at 78    Diabetes Maternal Uncle          at 68         Studies    Lab Results   Component Value Date/Time    TSHULTRASEN 2.110 2022 0932      No results found for: FREET4   Lab Results   Component Value Date/Time    HBA1C 5.2 2022 08:19 AM     Lab Results   Component Value Date/Time    CHOLSTRLTOT 168 2022 08:19 AM    LDL 96 2022 08:19 AM    HDL 59 2022 08:19 AM    TRIGLYCERIDE 67 2022 08:19 AM       Lab Results   Component Value Date/Time    SODIUM 141 2022 09:32 AM    POTASSIUM 4.2 2022 09:32 AM    CHLORIDE 105 2022 09:32 AM    CO2 24 2022 09:32 AM    GLUCOSE 83 2022 09:32 AM    BUN 16 2022 09:32 AM    CREATININE 1.00 2022 09:32 AM     Lab Results   Component Value Date/Time    ALKPHOSPHAT 63 2022 09:32 AM    ASTSGOT 21 2022 09:32 AM    ALTSGPT 14 2022 09:32 AM    TBILIRUBIN 0.6 2022 09:32 AM        Echocardiogram:  No results found for this or any previous visit.    EC22 personally reviewed and interpreted  Sinus rhythm   Probably right  axis deviation   Low voltage, precordial leads   No previous ECG available for comparison   Electronically Signed On 11- 12:05:28 PST by Chase Pena MD     Assessment and Recommendations:    Problem List Items Addressed This Visit       SOB (shortness of breath) - Primary    Relevant Orders    EC-ECHOCARDIOGRAM COMPLETE W/O CONT    D-DIMER    Pericardial effusion    Relevant Orders    EC-ECHOCARDIOGRAM COMPLETE W/O CONT     Other Visit Diagnoses       Postural dizziness with presyncope        Relevant Orders    D-DIMER    Counseling on health promotion and disease prevention              The differential for Ms. Pitts's symptoms and clinical diagnoses thus far are if TTE confirms low EF, dilated LV and/or pericardial effusion: PE post COVID, stress induced CM post  loss last year, viral induced CM post COVID, pericarditis post COVID.    We will try to expedite her TTE from January since management plan moving forward depends on the results.    We will also check a blood test for D-dimer, if positive, will order CTPA to rule out PE especially post 2 recent COVID illnesses this year.  For the time being, I advised her to hold off receiving the COVID bivalent booster until the above work-up is more clarified.    If TTE shows no effusion with normal LVEF and nondilated LV, will proceed with 30-day cardiac event monitor to exclude any underlying arrhythmias explaining her above described symptoms.  Discussed with patient the utility of performing a coronary angiogram/left heart catheterization if her LV systolic function is reduced as part of work-up for cardiomyopathy.    Thank you for the opportunity to be involved in Tita Pitts 's care; and please reach out with any questions or concerns.    Return if symptoms worsen or fail to improve.  Follow-up will be arranged based on the above testing results.    Chase Pena MD, MPH [facility]  Interventional Cardiologist  [facility]   of Clinical Internal Medicine - [facility] [place]    ~ Portions of this note were completed using voice recognition software (Dragon Naturally speaking software) . Occasional transcription errors may have escaped proof reading. I have made every reasonable attempt to correct obvious errors, but I expect that there are errors of grammar and possibly content that I did not discover before finalizing the note. ~

## 2022-11-21 ENCOUNTER — HOSPITAL ENCOUNTER (OUTPATIENT)
Dept: CARDIOLOGY | Facility: MEDICAL CENTER | Age: 67
End: 2022-11-21
Attending: INTERNAL MEDICINE
Payer: MEDICARE

## 2022-11-21 ENCOUNTER — HOSPITAL ENCOUNTER (OUTPATIENT)
Dept: LAB | Facility: MEDICAL CENTER | Age: 67
End: 2022-11-21
Attending: INTERNAL MEDICINE
Payer: MEDICARE

## 2022-11-21 ENCOUNTER — TELEPHONE (OUTPATIENT)
Dept: CARDIOLOGY | Facility: MEDICAL CENTER | Age: 67
End: 2022-11-21

## 2022-11-21 DIAGNOSIS — R06.02 SOB (SHORTNESS OF BREATH): ICD-10-CM

## 2022-11-21 DIAGNOSIS — I31.39 PERICARDIAL EFFUSION: ICD-10-CM

## 2022-11-21 DIAGNOSIS — R55 POSTURAL DIZZINESS WITH PRESYNCOPE: ICD-10-CM

## 2022-11-21 DIAGNOSIS — R42 POSTURAL DIZZINESS WITH PRESYNCOPE: ICD-10-CM

## 2022-11-21 LAB
D DIMER PPP IA.FEU-MCNC: 0.27 UG/ML (FEU) (ref 0–0.5)
LV EJECT FRACT  99904: 72
LV EJECT FRACT MOD 2C 99903: 72.04
LV EJECT FRACT MOD 4C 99902: 70.7
LV EJECT FRACT MOD BP 99901: 71.79

## 2022-11-21 PROCEDURE — 85379 FIBRIN DEGRADATION QUANT: CPT

## 2022-11-21 PROCEDURE — 93306 TTE W/DOPPLER COMPLETE: CPT

## 2022-11-21 PROCEDURE — 93306 TTE W/DOPPLER COMPLETE: CPT | Mod: 26 | Performed by: INTERNAL MEDICINE

## 2022-11-21 PROCEDURE — 36415 COLL VENOUS BLD VENIPUNCTURE: CPT

## 2022-11-21 RX ORDER — COLCHICINE 0.6 MG/1
0.6 TABLET ORAL 2 TIMES DAILY
Qty: 180 TABLET | Refills: 0 | Status: ON HOLD | OUTPATIENT
Start: 2022-11-21 | End: 2022-12-28 | Stop reason: SDUPTHER

## 2022-11-22 ENCOUNTER — TELEPHONE (OUTPATIENT)
Dept: CARDIOLOGY | Facility: MEDICAL CENTER | Age: 67
End: 2022-11-22
Payer: MEDICARE

## 2022-11-22 ENCOUNTER — PATIENT MESSAGE (OUTPATIENT)
Dept: CARDIOLOGY | Facility: MEDICAL CENTER | Age: 67
End: 2022-11-22
Payer: MEDICARE

## 2022-11-22 NOTE — TELEPHONE ENCOUNTER
Tita Pitts Key: [id number] - PA Case ID: [id number] - Rx #: [id number] help? Call us at [phone]  Status  Sent to [facility]  Drug  Colchicine 0.6MG tablets  Form  MedImpact ePA Form 2017 NCPDP  Original Claim Info  16,569 TRANS FEE = 0.00PRIOR AUTH REQUIRED/INVALIDPROVIDE NOTICE: MEDICARE PRESCRIPTION DRUG COVERAGE AND YOUR RIGHTS

## 2022-11-22 NOTE — TELEPHONE ENCOUNTER
I called Ms. Pitts to discuss her recent TTE results.    Discussed the small-moderate effusion, LV Grade II diastolic dysfunction and non-dilated / normal LVEF.    Advised her to send me am and pm BP and HR log next week to ensure controlled BP at home given her elevated office BP readings and signs of diastolic dysfunction    Advised her to present to an ER with any syncope or worsening symptoms.    Discussed option of pericardiocentesis versus trial of colchicine for 1 month and re-assess pericardial effusion with limited TTE and we agreed on the latter. If she takes the colchicine and repeat TTE shows similar effusion, the we will attempt pericardiocentesis. Ddx post COVID pericarditis given chronicity of symptoms, idiopathic or malignancy related. Further workup to be done based on progress.    Advised her to keep me posted if she is able to afford the colchicine, and to try to use it 0.6mg BID for a month before we repeat limited TTE, otherwise to decrease dose to daily If causing her bothersome diarrhea. Advised her to let me know asap if she is unable to afford it, potentially tomorrow she will know.    She was appreciative of the call.    Chase Pena MD, MPH [facility]  Interventional Cardiologist  [facility]   of Clinical Internal Medicine - [facility] [place]

## 2022-11-22 NOTE — TELEPHONE ENCOUNTER
Leonard Price. Are you able to help expedite this prior auth for colchicine? Please let me know what I need to do and help with.

## 2022-11-25 DIAGNOSIS — I31.39 PERICARDIAL EFFUSION: ICD-10-CM

## 2022-11-28 NOTE — TELEPHONE ENCOUNTER
PA approved and scanned into Media.    Tita Nash Connors: BUTBPUTB - PA Case ID: [id number] - Rx #: [id number]  Need help? Call us at [phone]  Outcome   Approvedon November 24  The request has been approved. The authorization is effective from 11/24/2022 to 11/23/2023, as long as the member is enrolled in their current health plan. The request was reviewed and approved by a licensed clinical pharmacist. Please note, the requested medication has a plan limit of 120 per 30 days. A written notification letter will follow with additional details.  Drug    Colchicine 0.6MG tablets   Form    MedImpact ePA Form 2017 NCPDP           Original Claim Info    00,569 TRANS FEE = 0.00PRIOR AUTH REQUIRED/INVALIDPROVIDE NOTICE: MEDICARE PRESCRIPTION DRUG COVERAGE AND YOUR RIGHTS

## 2022-11-29 DIAGNOSIS — I10 ESSENTIAL HYPERTENSION, BENIGN: ICD-10-CM

## 2022-11-29 RX ORDER — LISINOPRIL 5 MG/1
5 TABLET ORAL DAILY
Qty: 90 TABLET | Refills: 1 | Status: ON HOLD | OUTPATIENT
Start: 2022-11-29 | End: 2022-12-28 | Stop reason: SDUPTHER

## 2022-12-01 DIAGNOSIS — I10 ESSENTIAL HYPERTENSION, BENIGN: ICD-10-CM

## 2022-12-02 NOTE — TELEPHONE ENCOUNTER
Is the patient due for a refill? Yes    Was the patient seen the past year? Yes    Date of last office visit: 11/18/2022    Does the patient have an upcoming appointment?  No    Provider to refill:AL    Does the patients insurance require a 100 day supply?  Yes

## 2022-12-13 ENCOUNTER — APPOINTMENT (OUTPATIENT)
Dept: BEHAVIORAL HEALTH | Facility: CLINIC | Age: 67
End: 2022-12-13
Payer: MEDICARE

## 2022-12-13 RX ORDER — LISINOPRIL 5 MG/1
5 TABLET ORAL DAILY
Qty: 100 TABLET | Refills: 3 | OUTPATIENT
Start: 2022-12-13

## 2022-12-13 NOTE — TELEPHONE ENCOUNTER
Disp Refills Start End    lisinopril (PRINIVIL) 5 MG Tab 90 Tablet 1/1 11/29/2022     Sig - Route: Take 1 Tablet by mouth every day. - Oral    Sent to pharmacy as: Lisinopril 5 MG Oral Tablet (PRINIVIL)    E-Prescribing Status: Receipt confirmed by pharmacy (11/29/2022 12:35 PM PST)      Pharmacy    [id number] - [place], [place] - [address]

## 2022-12-23 ENCOUNTER — TELEPHONE (OUTPATIENT)
Dept: CARDIOLOGY | Facility: MEDICAL CENTER | Age: 67
End: 2022-12-23

## 2022-12-23 ENCOUNTER — HOSPITAL ENCOUNTER (OUTPATIENT)
Dept: CARDIOLOGY | Facility: MEDICAL CENTER | Age: 67
End: 2022-12-23
Attending: INTERNAL MEDICINE
Payer: MEDICARE

## 2022-12-23 DIAGNOSIS — I31.39 PERICARDIAL EFFUSION: ICD-10-CM

## 2022-12-23 LAB
LV EJECT FRACT  99904: 65
LV EJECT FRACT MOD 4C 99902: 72.35

## 2022-12-23 PROCEDURE — 93308 TTE F-UP OR LMTD: CPT | Mod: 26 | Performed by: INTERNAL MEDICINE

## 2022-12-23 PROCEDURE — 93325 DOPPLER ECHO COLOR FLOW MAPG: CPT | Mod: 26 | Performed by: INTERNAL MEDICINE

## 2022-12-23 PROCEDURE — 93321 DOPPLER ECHO F-UP/LMTD STD: CPT | Mod: 26 | Performed by: INTERNAL MEDICINE

## 2022-12-23 PROCEDURE — 93321 DOPPLER ECHO F-UP/LMTD STD: CPT

## 2022-12-24 NOTE — TELEPHONE ENCOUNTER
"I called Ms. Pitts to follow up on her progress and to review her recent repeat TTE results. She has been taking colchicine daily ever since (BID caused her diarrhea). Still having left upper chest pressure. No syncope. Few days ago she was walking and had an episode of \"not feeling well\" that necessitated her to sit down. Chest pressure not worse with breathing but more prominent lying down.    Had high BP few days ago. No hypotension concerns.    am BPs 140's mmHg (1-2 hours after lisinopril) and pm SBPs 120's mmHg. But BP log Dec 6th was within normal.    No significant improvement since starting colchicine.    Advised her to keep taking colchicine and to try Advil 400mg prn for her chest pressure. Will avoid further increasing leeanna dose until pericardiocentesis is done.    I informed our procedure  about the procedure and she will contact the patient on Tuesday am.  "

## 2022-12-27 ENCOUNTER — TELEPHONE (OUTPATIENT)
Dept: CARDIOLOGY | Facility: MEDICAL CENTER | Age: 67
End: 2022-12-27
Payer: MEDICARE

## 2022-12-27 ENCOUNTER — PRE-ADMISSION TESTING (OUTPATIENT)
Dept: ADMISSIONS | Facility: MEDICAL CENTER | Age: 67
End: 2022-12-27
Attending: INTERNAL MEDICINE
Payer: MEDICARE

## 2022-12-27 DIAGNOSIS — Z01.812 PRE-OPERATIVE LABORATORY EXAMINATION: ICD-10-CM

## 2022-12-27 DIAGNOSIS — Z01.810 PRE-OPERATIVE CARDIOVASCULAR EXAMINATION: ICD-10-CM

## 2022-12-27 LAB
ALBUMIN SERPL BCP-MCNC: 4.5 G/DL (ref 3.2–4.9)
ALBUMIN/GLOB SERPL: 1.6 G/DL
ALP SERPL-CCNC: 66 U/L (ref 30–99)
ALT SERPL-CCNC: 13 U/L (ref 2–50)
ANION GAP SERPL CALC-SCNC: 11 MMOL/L (ref 7–16)
APTT PPP: 29.5 SEC (ref 24.7–36)
AST SERPL-CCNC: 17 U/L (ref 12–45)
BILIRUB SERPL-MCNC: 0.7 MG/DL (ref 0.1–1.5)
BUN SERPL-MCNC: 14 MG/DL (ref 8–22)
CALCIUM ALBUM COR SERPL-MCNC: 9.3 MG/DL (ref 8.5–10.5)
CALCIUM SERPL-MCNC: 9.7 MG/DL (ref 8.5–10.5)
CHLORIDE SERPL-SCNC: 102 MMOL/L (ref 96–112)
CO2 SERPL-SCNC: 25 MMOL/L (ref 20–33)
CREAT SERPL-MCNC: 0.83 MG/DL (ref 0.5–1.4)
EKG IMPRESSION: NORMAL
ERYTHROCYTE [DISTWIDTH] IN BLOOD BY AUTOMATED COUNT: 43.2 FL (ref 35.9–50)
GFR SERPLBLD CREATININE-BSD FMLA CKD-EPI: 78 ML/MIN/1.73 M 2
GLOBULIN SER CALC-MCNC: 2.9 G/DL (ref 1.9–3.5)
GLUCOSE SERPL-MCNC: 81 MG/DL (ref 65–99)
HCT VFR BLD AUTO: 38.9 % (ref 37–47)
HGB BLD-MCNC: 13 G/DL (ref 12–16)
INR PPP: 1.06 (ref 0.87–1.13)
MCH RBC QN AUTO: 31.9 PG (ref 27–33)
MCHC RBC AUTO-ENTMCNC: 33.4 G/DL (ref 33.6–35)
MCV RBC AUTO: 95.3 FL (ref 81.4–97.8)
PLATELET # BLD AUTO: 323 K/UL (ref 164–446)
PMV BLD AUTO: 10.6 FL (ref 9–12.9)
POTASSIUM SERPL-SCNC: 4 MMOL/L (ref 3.6–5.5)
PROT SERPL-MCNC: 7.4 G/DL (ref 6–8.2)
PROTHROMBIN TIME: 13.6 SEC (ref 12–14.6)
RBC # BLD AUTO: 4.08 M/UL (ref 4.2–5.4)
SODIUM SERPL-SCNC: 138 MMOL/L (ref 135–145)
WBC # BLD AUTO: 5.8 K/UL (ref 4.8–10.8)

## 2022-12-27 PROCEDURE — 80053 COMPREHEN METABOLIC PANEL: CPT

## 2022-12-27 PROCEDURE — 85610 PROTHROMBIN TIME: CPT

## 2022-12-27 PROCEDURE — 85730 THROMBOPLASTIN TIME PARTIAL: CPT

## 2022-12-27 PROCEDURE — 93005 ELECTROCARDIOGRAM TRACING: CPT

## 2022-12-27 PROCEDURE — 85027 COMPLETE CBC AUTOMATED: CPT

## 2022-12-27 PROCEDURE — 93010 ELECTROCARDIOGRAM REPORT: CPT | Performed by: INTERNAL MEDICINE

## 2022-12-27 PROCEDURE — 36415 COLL VENOUS BLD VENIPUNCTURE: CPT

## 2022-12-27 ASSESSMENT — FIBROSIS 4 INDEX: FIB4 SCORE: 1.122497216032182416

## 2022-12-27 NOTE — TELEPHONE ENCOUNTER
Patient scheduled for pericardiocenteses on 12-28-22 with Dr. Pena. Patient has been instructed to check in at 6:00 for 7:30 case time. Message sent to authorizations.

## 2022-12-28 ENCOUNTER — HOSPITAL ENCOUNTER (OUTPATIENT)
Dept: CARDIOLOGY | Facility: MEDICAL CENTER | Age: 67
End: 2022-12-28
Attending: INTERNAL MEDICINE | Admitting: INTERNAL MEDICINE
Payer: MEDICARE

## 2022-12-28 ENCOUNTER — APPOINTMENT (OUTPATIENT)
Dept: ADMISSIONS | Facility: MEDICAL CENTER | Age: 67
End: 2022-12-28
Payer: MEDICARE

## 2022-12-28 ENCOUNTER — HOSPITAL ENCOUNTER (OUTPATIENT)
Facility: MEDICAL CENTER | Age: 67
End: 2022-12-28
Attending: INTERNAL MEDICINE | Admitting: INTERNAL MEDICINE
Payer: MEDICARE

## 2022-12-28 ENCOUNTER — APPOINTMENT (OUTPATIENT)
Dept: CARDIOLOGY | Facility: MEDICAL CENTER | Age: 67
End: 2022-12-28
Attending: INTERNAL MEDICINE
Payer: MEDICARE

## 2022-12-28 VITALS
DIASTOLIC BLOOD PRESSURE: 74 MMHG | TEMPERATURE: 97.1 F | OXYGEN SATURATION: 98 % | BODY MASS INDEX: 26.45 KG/M2 | RESPIRATION RATE: 17 BRPM | SYSTOLIC BLOOD PRESSURE: 132 MMHG | HEIGHT: 65 IN | WEIGHT: 158.73 LBS | HEART RATE: 63 BPM

## 2022-12-28 DIAGNOSIS — I31.39 PERICARDIAL EFFUSION: ICD-10-CM

## 2022-12-28 DIAGNOSIS — I49.3 PVC (PREMATURE VENTRICULAR CONTRACTION): ICD-10-CM

## 2022-12-28 DIAGNOSIS — I10 PRIMARY HYPERTENSION: Chronic | ICD-10-CM

## 2022-12-28 LAB
APPEARANCE FLD: NORMAL
BODY FLD TYPE: NORMAL
BODY FLD TYPE: NORMAL
COLOR FLD: NORMAL
CYTOLOGY REG CYTOL: NORMAL
GRAM STN SPEC: NORMAL
LYMPHOCYTES NFR FLD: 78 %
MONONUC CELLS NFR FLD: 17 %
NEUTROPHILS NFR FLD: 5 %
PROT FLD-MCNC: 2 G/DL
RHODAMINE-AURAMINE STN SPEC: NORMAL
SIGNIFICANT IND 70042: NORMAL
SIGNIFICANT IND 70042: NORMAL
SITE SITE: NORMAL
SITE SITE: NORMAL
SOURCE SOURCE: NORMAL
SOURCE SOURCE: NORMAL
WBC # FLD: NORMAL CELLS/UL

## 2022-12-28 PROCEDURE — 93308 TTE F-UP OR LMTD: CPT

## 2022-12-28 PROCEDURE — 88342 IMHCHEM/IMCYTCHM 1ST ANTB: CPT

## 2022-12-28 PROCEDURE — 93308 TTE F-UP OR LMTD: CPT | Mod: 26 | Performed by: INTERNAL MEDICINE

## 2022-12-28 PROCEDURE — 87206 SMEAR FLUORESCENT/ACID STAI: CPT

## 2022-12-28 PROCEDURE — 88341 IMHCHEM/IMCYTCHM EA ADD ANTB: CPT | Mod: 91

## 2022-12-28 PROCEDURE — 88112 CYTOPATH CELL ENHANCE TECH: CPT

## 2022-12-28 PROCEDURE — 99153 MOD SED SAME PHYS/QHP EA: CPT

## 2022-12-28 PROCEDURE — 700111 HCHG RX REV CODE 636 W/ 250 OVERRIDE (IP)

## 2022-12-28 PROCEDURE — 89051 BODY FLUID CELL COUNT: CPT

## 2022-12-28 PROCEDURE — 33016 PERICARDIOCENTESIS W/IMAGING: CPT | Performed by: INTERNAL MEDICINE

## 2022-12-28 PROCEDURE — 160002 HCHG RECOVERY MINUTES (STAT)

## 2022-12-28 PROCEDURE — 87116 MYCOBACTERIA CULTURE: CPT

## 2022-12-28 PROCEDURE — 87070 CULTURE OTHR SPECIMN AEROBIC: CPT

## 2022-12-28 PROCEDURE — 700101 HCHG RX REV CODE 250

## 2022-12-28 PROCEDURE — 87015 SPECIMEN INFECT AGNT CONCNTJ: CPT

## 2022-12-28 PROCEDURE — 160035 HCHG PACU - 1ST 60 MINS PHASE I

## 2022-12-28 PROCEDURE — 88305 TISSUE EXAM BY PATHOLOGIST: CPT

## 2022-12-28 PROCEDURE — 87205 SMEAR GRAM STAIN: CPT

## 2022-12-28 PROCEDURE — 84157 ASSAY OF PROTEIN OTHER: CPT

## 2022-12-28 PROCEDURE — 160046 HCHG PACU - 1ST 60 MINS PHASE II

## 2022-12-28 RX ORDER — COLCHICINE 0.6 MG/1
0.6 TABLET ORAL DAILY
Qty: 60 TABLET | Refills: 0 | Status: SHIPPED | OUTPATIENT
Start: 2022-12-28 | End: 2023-02-06

## 2022-12-28 RX ORDER — LIDOCAINE HYDROCHLORIDE 20 MG/ML
INJECTION, SOLUTION INFILTRATION; PERINEURAL
Status: COMPLETED
Start: 2022-12-28 | End: 2022-12-28

## 2022-12-28 RX ORDER — LISINOPRIL 10 MG/1
10 TABLET ORAL DAILY
Qty: 90 TABLET | Refills: 0
Start: 2022-12-28 | End: 2023-01-04

## 2022-12-28 RX ORDER — MIDAZOLAM HYDROCHLORIDE 1 MG/ML
INJECTION INTRAMUSCULAR; INTRAVENOUS
Status: COMPLETED
Start: 2022-12-28 | End: 2022-12-28

## 2022-12-28 RX ADMIN — FENTANYL CITRATE 100 MCG: 50 INJECTION, SOLUTION INTRAMUSCULAR; INTRAVENOUS at 08:22

## 2022-12-28 RX ADMIN — LIDOCAINE HYDROCHLORIDE: 20 INJECTION, SOLUTION INFILTRATION; PERINEURAL at 08:22

## 2022-12-28 RX ADMIN — MIDAZOLAM HYDROCHLORIDE 2 MG: 1 INJECTION, SOLUTION INTRAMUSCULAR; INTRAVENOUS at 08:15

## 2022-12-28 RX ADMIN — FENTANYL CITRATE 100 MCG: 50 INJECTION, SOLUTION INTRAMUSCULAR; INTRAVENOUS at 08:11

## 2022-12-28 RX ADMIN — LIDOCAINE HYDROCHLORIDE: 20 INJECTION, SOLUTION INFILTRATION; PERINEURAL at 07:40

## 2022-12-28 ASSESSMENT — FIBROSIS 4 INDEX: FIB4 SCORE: 0.95

## 2022-12-28 NOTE — PROCEDURES
"DATE: 12/28/2022    : Chase Pena MD, MPH    PROCEDURES PERFORMED:  Pericardiocentesis  Moderate Sedation  Limited Echocardiogram to guide pericardiocentesis with the use of agitated saline      CONSENT:  The complete alternatives, risks, and benefits of the procedure were explained to the patient.  Signed informed consent was obtained and placed in the chart prior to the procedure.  A timeout was performed prior to beginning the procedure.      INDICATIONS:  Symptomatic moderate pericardial effusion    MEDICATIONS:  Lidocaine  Fentanyl  Midazolam    Moderate sedation was supervised for 37 minutes:  Sedation start time 7:53 AM  Sedation end time 8:30 AM    CONTRAST: None    ESTIMATED BLOOD LOSS: <10 cc    COMPLICATIONS: None    DESCRIPTION OF PROCEDURE:  The patient was brought to the cardiac catheterization laboratory in the fasting state.  The skin over the subxiphoid area was prepped and draped in the usual sterile fashion. Lidocaine infiltration was used to anesthetize the tissue over the subxiphoid area.  Using ultrasound guidance, a micropuncture needle was advanced into the pericardial space. The micropuncture needle was exchanged over a micro wire for the micropuncture dilator.  Agitated saline was injected through the micro dilator to confirm placement in the pericardial space.  After confirming appropriate placement, the micro dilator was exchanged for the micropuncture sheath.  A 0.035\"  wire was then advanced into the pericardial space with appropriate placement confirmed by fluoroscopy.  A 6-Luxembourgish pericardial drain was then advanced over the wire and 250 cc of serous fluid was removed.  Echocardiography demonstrated trivial residual fluid and the drain was removed at the end of the case.  Patient tolerated procedure well at the Cath Lab in stable condition.    IMPRESSION:  Successful pericardiocentesis with removal of 250ml of serous fluid with trivial residual pericardial fluid.  The drain " was removed.  120ml of fluid sent to the lab for analyses      NOTIFICATION:  The patient's son was called and notified of the results.    Chase Pena MD, MPH [facility]  Interventional Cardiologist  [facility]   of Clinical Internal Medicine - [facility] [place]

## 2022-12-28 NOTE — H&P
Preprocedure H&P NOTE      Date of procedure: 2022    Patient Name: Tita Pitts  YOB: 1955  MRN: [id number]    Reason for Consultation:   Pericardiocentesis    History of Present Illness:   Tita Pitts is a 67-year-old woman with symptomatic pericardial fluid, thought to be related to 2 COVID illnesses in , with hypertension and PVCs and recent 0 calcium score on a CAC score CT (not healthy pericardial effusion was first noted), on colchicine daily instead of twice daily due to diarrhea, is here for planned pericardiocentesis given persistent symptoms with persistent pericardial fluid despite colchicine therapy.    Of note, she had a history of pericarditis at a younger age.    Daughter with her    Family History: Non-pertinent to current illness     Medical History:     Past Medical History:   Diagnosis Date    Allergy     Anesthesia     Bronchitis     Cataract     Hypertension     Pneumonia     PONV (postoperative nausea and vomiting)     Psychiatric problem 2022    grief - in counseling       Surgical History:     Past Surgical History:   Procedure Laterality Date    ABDOMINAL HYSTERECTOMY TOTAL      APPENDECTOMY      GYN SURGERY      laparoscopy for endometriosis       Family History:     Family History   Problem Relation Age of Onset    Breast Cancer Mother     Diabetes Mother         Developed in 70s, at 90 Kidney failure    Hypertension Mother         diagnosed in 70s    Hyperlipidemia Mother         diagnosed in 70s    Kidney Disease Mother     Heart Disease Father          at 64, CHF, CAD    Heart Disease Brother         Heart Attack at 72    Stroke Brother         occurred at 78    Diabetes Maternal Uncle          at 68       Social History:    reports that she has never smoked. She has been exposed to tobacco smoke. She has never used smokeless tobacco. She reports that she does not currently use alcohol after a past usage of about  "0.6 oz per week. She reports that she does not use drugs.  The patient is a n achievedon smoker    Medications and Allergies:     No current facility-administered medications for this encounter.       Allergies   Allergen Reactions    Other Drug Unspecified     Ruvert = Other reaction(s): Welts on legs    Macrodantin [Nitrofurantoin]      nightmares       Review of Systems:   A pertinent review of systems was performed and was unremarkable except as per HPI above.    Vital Signs:   BP (!) 152/95   Pulse 73   Temp 36.5 °C (97.7 °F) (Temporal)   Ht 1.651 m (5' 5\")   Wt 72 kg (158 lb 11.7 oz)   SpO2 96%   BMI 26.41 kg/m²   Vitals:    12/27/22 1330 12/28/22 0629   BP:  (!) 152/95   Pulse:  73   Temp:  36.5 °C (97.7 °F)   TempSrc:  Temporal   SpO2:  96%   Weight: 72.1 kg (158 lb 15.2 oz) 72 kg (158 lb 11.7 oz)   Height: 1.651 m (5' 5\") 1.651 m (5' 5\")     Body mass index is 26.41 kg/m².  Oxygen Therapy:  Pulse Oximetry: 96 %, O2 Delivery Device: None - Room Air  Physical Exam    Physical Examination:     General: Well-appearing. No acute distress.  HEENT: EOM grossly intact, no scleral icterus  Neck:  No JVD, no bruits, trachea midline  Cardiovascular: Mildly distant heart sounds.  Regular rate and rhythm. Normal S1, S2. No M/R/G. LE edema.   Pulmonary: CTAB. No wheezing or crackles/rhonchi. Normal respiratory effort.  Abdomen: Soft,  MSK/Ext: No clubbing or cyanosis.  Skin: Warm and dry, no rashes.  Neurological: CN III-XII grossly intact. No gross focal motor deficits.   Psych: A&O x 3, appropriate affect.    Laboratories:   Estimated Creatinine Clearance: 67.2 mL/min (by C-G formula based on SCr of 0.83 mg/dL).  Recent Labs     12/27/22  1406   CREATININE 0.83   BUN 14   POTASSIUM 4.0   SODIUM 138   CALCIUM 9.7   CO2 25   ALBUMIN 4.5     Recent Labs     12/27/22  1406   GLUCOSE 81     Recent Labs     12/27/22  1406   ASTSGOT 17   ALTSGPT 13   ALKPHOSPHAT 66   INR 1.06     Recent Labs     12/27/22  1406   WBC 5.8 "   HEMOGLOBIN 13.0   PLATELETCT 323     No results for input(s): TROPONINT, NTPROBNP, HBA1C in the last 72 hours.  Lab Results   Component Value Date/Time    LDL 96 04/06/2022 0819     Lab Results   Component Value Date/Time    HDL 59 04/06/2022 0819       Lab Results   Component Value Date/Time    TRIGLYCERIDE 67 04/06/2022 0819       Lab Results   Component Value Date/Time    CHOLSTRLTOT 168 04/06/2022 0819         Assessment and Recommendations:   Persistent moderate symptomatic pericardial effusion      Discussed risk and benefits in details with patient regarding ultrasound-guided pericardiocentesis, including the rare but serious risk of cardiac chamber perforation necessitating emergent sternotomy, and rare risks of infection and bleeding.  She agrees to proceed.    We will plan to increase lisinopril to 10 mg in the morning after the pericardiocentesis and advised her to send me an updated twice daily blood pressure log in 5 days before we further adjust her antihypertensive medications.    We will also arrange for a 30-day cardiac event monitor to evaluate PVC burden.  PVC was noted on her EKG from yesterday, with symptoms of palpitations.    Chase Pena MD, MPH FACC [facility]  Interventional Cardiologist  [facility]   of Clinical Internal Medicine - [facility] [place]

## 2022-12-28 NOTE — OR NURSING
Awake, alert and tolerating PO fluids.  Denies pain/nausea.  Anterior chest dressing clean, dry and intact.  Vitals stable.  On monitors with alarms audible.    Called son with update and approximate DC timeline.

## 2022-12-28 NOTE — OR NURSING
Discharge information reviewed with patient and responsible adult. No questions or concerns at this time.     IV discontinued.     See vital sign flowsheets  for discharge details.

## 2022-12-28 NOTE — DISCHARGE INSTRUCTIONS
HOME CARE INSTRUCTIONS    ACTIVITY: Rest and take it easy for the first 24 hours.  A responsible adult is recommended to remain with you during that time.  It is normal to feel sleepy.  We encourage you to not do anything that requires balance, judgment or coordination.    FOR 24 HOURS DO NOT:  Drive, operate machinery or run household appliances.  Drink beer or alcoholic beverages.  Make important decisions or sign legal documents.    SPECIAL INSTRUCTIONS: You will be contacted by the office to schedule an an appointment for a remote cardiac monitor and echocardiogram.  Take 5mg of lisinopril when you get home today.  Starting tomorrow, change your lisinopril dose to 10mg in the morning.  Continue to keep a blood pressure log and bring it to your next visit with Dr. Pena.    DIET: To avoid nausea, slowly advance diet as tolerated, avoiding spicy or greasy foods for the first day.  No dietary restrictions.    SURGICAL DRESSING/BATHING: Keep dressing dry for 24 hours.  After that, you can remove dressing to shower.  Do not submerge in bath, swimming pool or hot tub for at least a week.    MEDICATIONS: Resume taking daily medication.  Take prescribed pain medication with food.  If no medication is prescribed, you may take non-aspirin pain medication if needed.      A follow-up appointment should be arranged with your doctor.  The office will call you to schedule.    You should CALL YOUR PHYSICIAN if you develop:  Fever greater than 101 degrees F.  Pain not relieved by medication, or persistent nausea or vomiting.  Excessive bleeding (blood soaking through dressing) or unexpected drainage from the wound.  Extreme redness or swelling around the incision site, drainage of pus or foul smelling drainage.  Inability to urinate or empty your bladder within 8 hours.  Problems with breathing or chest pain.    You should call 911 if you develop problems with breathing or chest pain.  If you are unable to contact your doctor  or surgical center, you should go to the nearest emergency room or urgent care center.  Physician's telephone #: [phone]    MILD FLU-LIKE SYMPTOMS ARE NORMAL.  YOU MAY EXPERIENCE GENERALIZED MUSCLE ACHES, THROAT IRRITATION, HEADACHE AND/OR SOME NAUSEA.    If any questions arise, call your doctor.  If your doctor is not available, please feel free to call the Surgical Center at [phone].  The Center is open Monday through Friday from 7AM to 7PM.      A registered nurse may call you a few days after your surgery to see how you are doing after your procedure.    You may also receive a survey in the mail within the next two weeks and we ask that you take a few moments to complete the survey and return it to us.  Our goal is to provide you with very good care and we value your comments.     Depression / Suicide Risk    As you are discharged from this [facility] facility, it is important to learn how to keep safe from harming yourself.    Recognize the warning signs:  Abrupt changes in personality, positive or negative- including increase in energy   Giving away possessions  Change in eating patterns- significant weight changes-  positive or negative  Change in sleeping patterns- unable to sleep or sleeping all the time   Unwillingness or inability to communicate  Depression  Unusual sadness, discouragement and loneliness  Talk of wanting to die  Neglect of personal appearance   Rebelliousness- reckless behavior  Withdrawal from people/activities they love  Confusion- inability to concentrate     If you or a loved one observes any of these behaviors or has concerns about self-harm, here's what you can do:  Talk about it- your feelings and reasons for harming yourself  Remove any means that you might use to hurt yourself (examples: pills, rope, extension cords, firearm)  Get professional help from the community (Mental Health, Substance Abuse, psychological counseling)  Do not be alone:Call your Safe Contact-  someone whom you trust who will be there for you.  Call your local CRISIS HOTLINE 005-9448 or 573-085-9085  Call your local Children's Mobile Crisis Response Team [place] (432) 261-0950 or www.[employer]  Call the toll free National Suicide Prevention Hotlines   National Suicide Prevention Lifeline 439-753-ADVK (6124)  [facility] 800-XWOJUOM (133-6607)    I acknowledge receipt and understanding of these Home Care instructions.

## 2022-12-29 ENCOUNTER — NON-PROVIDER VISIT (OUTPATIENT)
Dept: CARDIOLOGY | Facility: MEDICAL CENTER | Age: 67
End: 2022-12-29
Attending: INTERNAL MEDICINE
Payer: MEDICARE

## 2022-12-29 DIAGNOSIS — I47.29 NSVT (NONSUSTAINED VENTRICULAR TACHYCARDIA) (HCC): ICD-10-CM

## 2022-12-29 DIAGNOSIS — I49.1 PREMATURE ATRIAL CONTRACTIONS: ICD-10-CM

## 2022-12-29 DIAGNOSIS — I49.3 PVC (PREMATURE VENTRICULAR CONTRACTION): ICD-10-CM

## 2022-12-29 NOTE — PROGRESS NOTES
Patient enrolled in the 14 day [facility] Heart monitoring program per Chase Pena MD..  >Office hook-up with Baseline transmitted, serial # [id number].  >Pending EOS

## 2022-12-31 LAB
BACTERIA FLD AEROBE CULT: NORMAL
GRAM STN SPEC: NORMAL
SIGNIFICANT IND 70042: NORMAL
SITE SITE: NORMAL
SOURCE SOURCE: NORMAL

## 2023-01-04 ENCOUNTER — OFFICE VISIT (OUTPATIENT)
Dept: BEHAVIORAL HEALTH | Facility: CLINIC | Age: 68
End: 2023-01-04
Payer: MEDICARE

## 2023-01-04 DIAGNOSIS — F43.29 GRIEF REACTION WITH PROLONGED BEREAVEMENT: ICD-10-CM

## 2023-01-04 DIAGNOSIS — F41.1 GENERALIZED ANXIETY DISORDER: ICD-10-CM

## 2023-01-04 DIAGNOSIS — I10 PRIMARY HYPERTENSION: Chronic | ICD-10-CM

## 2023-01-04 RX ORDER — LISINOPRIL 10 MG/1
10 TABLET ORAL 2 TIMES DAILY
Qty: 180 TABLET | Refills: 3 | Status: SHIPPED | OUTPATIENT
Start: 2023-01-04 | End: 2023-05-24 | Stop reason: SDUPTHER

## 2023-01-04 NOTE — PROGRESS NOTES
[facility]   Psychotherapy Progress Note    Therapy was provided on this date in coordination with the [facility] approved Clinical Supervisor under the direct supervision of Dr. Rafa Goodrich who was on site during this visit.     Name: Tita Pitts  MRN: [id number]  : 1955  Age: 67 y.o.  Date of assessment: 2023  PCP: Bassam Marinelli M.D.  Persons in attendance: Patient  Total session time: 50 minutes      Topics addressed in psychotherapy include:     Data: Tita has been dealing with some heart issues, and last week fluid was removed from around her heart and she is starting to feel better but still experiences high blood pressure at times. She is trying to reduce stress but is unable to enjoy any activities or things she tries to distract herself since the passing of her  Je 2 years ago.     Assessment: Mila feels guilty about having fun of enjoying herself since her  has passed away.     Plan: CBT role reversal to help improve Mila's perspective on what her   would want for Mila to decrease severity of anxiety.  We also reviewed new ways for Mila to find activities that she would enjoy and ways for her to find friends and family that would join her on adventures that Je would have joined her with in the past.     Objective Observations:   Participation:Active verbal participation, Attentive, Engaged, and Open to feedback   Grooming:Casual and Neat   Cognition:Alert and Fully Oriented   Eye Contact:Good   Mood:Euthymic   Affect:Flexible, Full range, Expansive, and Congruent with content   Thought Process:Logical and Goal-directed   Speech:Rate within normal limits and Volume within normal limits    Current Risk:   Suicide: low   Homicide: low   Self-Harm: low     Care Plan Updated: Yes    Does patient express agreement with the above plan? Yes     Diagnosis:  1. Generalized anxiety disorder    2. Grief reaction with prolonged bereavement         Referral appointment(s) scheduled? No       Mark Car, MFT Intern

## 2023-01-06 ENCOUNTER — TELEPHONE (OUTPATIENT)
Dept: HEALTH INFORMATION MANAGEMENT | Facility: OTHER | Age: 68
End: 2023-01-06
Payer: MEDICARE

## 2023-01-13 ENCOUNTER — TELEPHONE (OUTPATIENT)
Dept: CARDIOLOGY | Facility: MEDICAL CENTER | Age: 68
End: 2023-01-13
Payer: MEDICARE

## 2023-01-13 NOTE — TELEPHONE ENCOUNTER
Received printed copy of EOS from [facility]. EOS noted in media dated today.     TW: Please see EOS in media dated today and read for final results per care team. Thank you!

## 2023-01-18 DIAGNOSIS — I10 ESSENTIAL HYPERTENSION, BENIGN: ICD-10-CM

## 2023-01-18 RX ORDER — AMLODIPINE BESYLATE 5 MG/1
5 TABLET ORAL EVERY EVENING
Qty: 90 TABLET | Refills: 3 | Status: SHIPPED | OUTPATIENT
Start: 2023-01-18 | End: 2023-05-05

## 2023-01-20 ENCOUNTER — TELEPHONE (OUTPATIENT)
Dept: CARDIOLOGY | Facility: MEDICAL CENTER | Age: 68
End: 2023-01-20
Payer: MEDICARE

## 2023-01-20 PROCEDURE — 93268 ECG RECORD/REVIEW: CPT | Performed by: INTERNAL MEDICINE

## 2023-01-20 NOTE — TELEPHONE ENCOUNTER
Reviewed heart monitor results with MsDylan Pitts. She denied any triggered events despite the monitor showing 1 patient triggered event.    Overall feeling better since the pericardiocentesis.    Occasional left chest achiness/pressure, got worse while riding her bike at one event. Rode the bike for 30 min and she stopped eventually. That day her chest ache persisted all day, next am resolved.     Yesterday she felt lightheaded and tired.    Started amlodipine 5 mg last night.    Recommendations:  - continue colchicine until March 1st  - for next 5 days , advised her to take 400mg of Advil twice a day, with food.  - BP and clinical update in 1 week via [employer]  - if no improvement with Advil, will try low dose metoprolol fr her presumed symptomatic PACs/PVCs.    Will touch base in about a week.    She was appreciative of the call.

## 2023-01-23 ENCOUNTER — TELEPHONE (OUTPATIENT)
Dept: CARDIOLOGY | Facility: MEDICAL CENTER | Age: 68
End: 2023-01-23
Payer: MEDICARE

## 2023-01-23 NOTE — TELEPHONE ENCOUNTER
AL recommendations noted below:  Received: 3 days ago  Chase Pena M.D.  Anila, MARILYN Peterson RN,   [facility] team,     Can you kindly help get this patient into my clinic for follow-up after her upcoming echocardiogram end of February, within few days afterwards.     Much appreciated.     Chase      [facility] Scheduling Team: Please schedule patient as noted by AL above. Thank you!

## 2023-01-24 ENCOUNTER — OFFICE VISIT (OUTPATIENT)
Dept: MEDICAL GROUP | Facility: MEDICAL CENTER | Age: 68
End: 2023-01-24
Payer: MEDICARE

## 2023-01-24 VITALS
WEIGHT: 160.94 LBS | TEMPERATURE: 96.8 F | HEART RATE: 75 BPM | SYSTOLIC BLOOD PRESSURE: 126 MMHG | DIASTOLIC BLOOD PRESSURE: 62 MMHG | HEIGHT: 65 IN | RESPIRATION RATE: 16 BRPM | BODY MASS INDEX: 26.81 KG/M2 | OXYGEN SATURATION: 98 %

## 2023-01-24 DIAGNOSIS — N64.4 BREAST PAIN, LEFT: ICD-10-CM

## 2023-01-24 DIAGNOSIS — I51.7 CARDIOMEGALY: ICD-10-CM

## 2023-01-24 DIAGNOSIS — D70.9 NEUTROPENIA, UNSPECIFIED TYPE (HCC): ICD-10-CM

## 2023-01-24 DIAGNOSIS — I10 PRIMARY HYPERTENSION: Chronic | ICD-10-CM

## 2023-01-24 DIAGNOSIS — I31.39 PERICARDIAL EFFUSION: ICD-10-CM

## 2023-01-24 DIAGNOSIS — H93.13 TINNITUS OF BOTH EARS: ICD-10-CM

## 2023-01-24 PROCEDURE — 99214 OFFICE O/P EST MOD 30 MIN: CPT | Performed by: FAMILY MEDICINE

## 2023-01-24 RX ORDER — LISINOPRIL 5 MG/1
TABLET ORAL
COMMUNITY
Start: 2023-01-24 | End: 2023-01-24

## 2023-01-24 ASSESSMENT — PATIENT HEALTH QUESTIONNAIRE - PHQ9
CLINICAL INTERPRETATION OF PHQ2 SCORE: 3
5. POOR APPETITE OR OVEREATING: 0 - NOT AT ALL
SUM OF ALL RESPONSES TO PHQ QUESTIONS 1-9: 7

## 2023-01-24 ASSESSMENT — ENCOUNTER SYMPTOMS
CHILLS: 0
PALPITATIONS: 0
FEVER: 0

## 2023-01-24 ASSESSMENT — FIBROSIS 4 INDEX: FIB4 SCORE: 0.95

## 2023-01-24 NOTE — ASSESSMENT & PLAN NOTE
New problem, unstable, tenderness on palpation at after lower quadrant of left breast, check diagnostic mammogram and ultrasound breast ordered.  Discussed if she experience chest pain, sharp back pain, to go to ER for further evaluation

## 2023-01-24 NOTE — ASSESSMENT & PLAN NOTE
Chronic problem, stable, continue to follow-up with cardiology for further management and monitoring.

## 2023-01-24 NOTE — ASSESSMENT & PLAN NOTE
New problem, unstable, referral to ENT for further evaluation.  This could likely be exacerbated due to recent use of NSAIDs.

## 2023-01-24 NOTE — PROGRESS NOTES
FAMILY MEDICINE VISIT                                                               Chief complaint::Diagnoses of Pericardial effusion, Neutropenia, unspecified type (HCC), Cardiomegaly, Primary hypertension, Tinnitus of both ears, and Breast pain, left were pertinent to this visit.    History of present illness: Tita Pitts is a 67 y.o. female who presented for follow-up, tinnitus in both ears, left breast pain.    Problem   Breast Pain, Left    She has been experiencing pain on left side of her breast since last month.  She had pericardiocentesis recently.  Fluid was checked and that came back negative for malignancy.  She reports that 1 day she had sharp pain on left side of her breast as well as on her back which resolved on its own.  She is currently trying to taper her off her Estrace.     Tinnitus of Both Ears    She has been experiencing tinnitus in both ears.  No hearing loss.  Tinnitus has been getting more worse     Primary Hypertension    Blood pressure today came back at 126/62.  She is on lisinopril 10 mg 2 times daily.  No side effects with this medication.  No symptoms.     Cardiomegaly    Last echo on 12/23/2022 showed Compared to the prior study on 11/21/22, pericardial effusion still   present but slightly smaller in size.  Normal LV systolic function  Normal RV size and systolic function  No significant valvular abnormalities  Small-Moderate pericardial effusion without echocardiographic evidence   of hemodynamic compromise.  Normal IVC size.    She is scheduled for another echocardiogram, currently following with cardiology.     Pericardial Effusion    Pericardial effusion seen on echocardiogram, had pericardiocentesis and was diagnosed with pericarditis.  She is currently on colchicine daily and as well as taking ibuprofen for this.  She is following with cardiology.  Scheduled for another echocardiogram     Neutropenia (Hcc)    Has history of neutropenia, currently following with   "[facility] hematology and oncology.  Had blood work done including vitamin B12, folic acid, SANJIV, HIV and hepatitis B which came back negative.  Last CBC that was done in December showed white cell count in normal range            Review of systems:     Review of Systems   Constitutional:  Negative for chills, fever and malaise/fatigue.   HENT:  Positive for tinnitus. Negative for hearing loss.    Cardiovascular:  Negative for chest pain, palpitations and leg swelling.    Left breast pain.    Medications and Allergies:     Current Outpatient Medications   Medication Sig Dispense Refill    amLODIPine (NORVASC) 5 MG Tab Take 1 Tablet by mouth every evening. 90 Tablet 3    lisinopril (PRINIVIL) 10 MG Tab Take 1 Tablet by mouth 2 times a day. 180 Tablet 3    colchicine (COLCRYS) 0.6 MG Tab Take 1 Tablet by mouth every day. 60 Tablet 0    estradiol (ESTRACE) 0.5 MG tablet Take 1 Tablet by mouth every 48 hours.      vitamin D3 (CHOLECALCIFEROL) 1000 Unit (25 mcg) Tab Take 1 Tablet by mouth every day.      calcium carbonate (OS-RADHA 500) 1250 (500 Ca) MG Tab Take 1 Tablet by mouth every day.      Ascorbic Acid (VITAMIN C PO) Take  by mouth every day.       No current facility-administered medications for this visit.          Vitals:    /62 (BP Location: Left arm, Patient Position: Sitting, BP Cuff Size: Adult)   Pulse 75   Temp 36 °C (96.8 °F)   Resp 16   Ht 1.651 m (5' 5\")   Wt 73 kg (160 lb 15 oz)   SpO2 98%  Body mass index is 26.78 kg/m².    Physical Exam:     Physical Exam  Constitutional:       General: She is not in acute distress.     Appearance: Normal appearance. She is not ill-appearing.   HENT:      Head: Normocephalic and atraumatic.      Right Ear: External ear normal.      Left Ear: External ear normal.   Eyes:      Conjunctiva/sclera: Conjunctivae normal.   Cardiovascular:      Rate and Rhythm: Normal rate.   Pulmonary:      Effort: Pulmonary effort is normal. No respiratory distress. "   Musculoskeletal:         General: No deformity.      Cervical back: Neck supple.   Skin:     Findings: No rash.   Neurological:      Mental Status: She is alert.      Gait: Gait is intact.   Psychiatric:         Mood and Affect: Mood and affect normal.         Judgment: Judgment normal.      Medical assistant Debra present in room during breast exam  Breast exam: Tender to palpate at outer lower quadrant of left breast, no mass palpated, no lymphadenopathy.  Right breast exam is normal        Assessment/Plan:    [facility] Form    Diagnosis to address: D70.9 - Neutropenia, unspecified type (HCC)  Assessment and plan: Chronic, stable. Has history of neutropenia, currently following with Dr. Oleary hematology and oncology.  Had blood work done including vitamin B12, folic acid, SANJIV, HIV and hepatitis B which came back negative.  Last CBC that was done in December showed white cell count in normal range.  Recheck labs ordered  Last edited 01/24/23 12:41 PST by Bassam Marinelli M.D.          Problem List Items Addressed This Visit       Primary hypertension (Chronic)     Chronic problem, stable, continue lisinopril 10 mg 2 times daily         Relevant Orders    Lipid Profile    Tinnitus of both ears     New problem, unstable, referral to ENT for further evaluation.  This could likely be exacerbated due to recent use of NSAIDs.         Relevant Orders    Referral to ENT    Pericardial effusion     Chronic problem, stable, continue to follow-up with cardiology for further monitoring and management         Relevant Orders    Comp Metabolic Panel    Neutropenia (HCC)     Chronic problem, stable, recheck labs before next visit for monitoring for CBC.         Relevant Orders    CBC WITH DIFFERENTIAL    Cardiomegaly     Chronic problem, stable, continue to follow-up with cardiology for further management and monitoring.         Breast pain, left     New problem, unstable, tenderness on palpation at after lower quadrant of  left breast, check diagnostic mammogram and ultrasound breast ordered.  Discussed if she experience chest pain, sharp back pain, to go to ER for further evaluation         Relevant Orders    MA-DIAGNOSTIC MAMMO LEFT W/TOMOSYNTHESIS W/CAD    US-BREAST LIMITED-LEFT        Please note that this dictation was created using voice recognition software. I have made every reasonable attempt to correct obvious errors, but I expect that there are errors of grammar and possibly content that I did not discover before finalizing the note.    Follow up in 2 months for lab follow up.

## 2023-01-24 NOTE — ASSESSMENT & PLAN NOTE
Chronic problem, stable, continue to follow-up with cardiology for further monitoring and management

## 2023-01-25 ENCOUNTER — OFFICE VISIT (OUTPATIENT)
Dept: BEHAVIORAL HEALTH | Facility: CLINIC | Age: 68
End: 2023-01-25
Payer: MEDICARE

## 2023-01-25 DIAGNOSIS — F41.1 GENERALIZED ANXIETY DISORDER: ICD-10-CM

## 2023-01-25 DIAGNOSIS — F43.29 GRIEF REACTION WITH PROLONGED BEREAVEMENT: ICD-10-CM

## 2023-01-25 PROCEDURE — 90834 PSYTX W PT 45 MINUTES: CPT | Mod: MISDOCU | Performed by: MARRIAGE & FAMILY THERAPIST

## 2023-01-25 NOTE — PROGRESS NOTES
[facility]   Psychotherapy Progress Note    Therapy was provided on this date in coordination with the [facility] approved Clinical Supervisor under the direct supervision of Dr. Rafa Villarreal who was on site during this visit.     Name: Tita Pitts  MRN: [id number]  : 1955  Age: 67 y.o.  Date of assessment: 2023  PCP: Bassam Marinelli M.D.  Persons in attendance: Patient  Total session time: 50 minutes      Topics addressed in psychotherapy include:     Data: There are several life stressors that Pat has been dealing with including health concerns & family related stressors that have resulted in some events, such as attending [employer], to seem uncomfortable instead of calming.     Assessment: Mila would benefit from continuing to implement self-care activities and a place to vent/ process her feelings.     Plan: We reviewed the benefits of getting back in to gratitude journalling, and progressive muscle relaxation to mitigate the physical symptoms of stress that manifest in the muscles and other body parts.     Objective Observations:   Participation:Active verbal participation, Attentive, Engaged, and Open to feedback   Grooming:Casual and Neat   Cognition:Alert and Fully Oriented   Eye Contact:Good   Mood:Euthymic   Affect:Flexible, Full range, Expansive, and Congruent with content   Thought Process:Logical and Goal-directed   Speech:Rate within normal limits and Volume within normal limits      Care Plan Updated: Yes    Does patient express agreement with the above plan? Yes     Diagnosis:  1. Generalized anxiety disorder    2. Grief reaction with prolonged bereavement        Referral appointment(s) scheduled? No       BRETT Mcpherson Intern

## 2023-02-01 DIAGNOSIS — I31.39 PERICARDIAL EFFUSION: ICD-10-CM

## 2023-02-03 ENCOUNTER — HOSPITAL ENCOUNTER (OUTPATIENT)
Dept: RADIOLOGY | Facility: MEDICAL CENTER | Age: 68
End: 2023-02-03
Attending: FAMILY MEDICINE
Payer: MEDICARE

## 2023-02-03 DIAGNOSIS — N64.4 BREAST PAIN, LEFT: ICD-10-CM

## 2023-02-03 PROCEDURE — G0279 TOMOSYNTHESIS, MAMMO: HCPCS

## 2023-02-03 NOTE — TELEPHONE ENCOUNTER
Is the patient due for a refill? Yes    Was the patient seen the past year? Yes    Date of last office visit: 11/18/2022    Does the patient have an upcoming appointment?  Yes   If yes, When? 03/02/2023    Provider to refill:AL    Does the patients insurance require a 100 day supply?  Yes

## 2023-02-06 RX ORDER — COLCHICINE 0.6 MG/1
0.6 TABLET ORAL DAILY
Qty: 100 TABLET | Refills: 0 | Status: SHIPPED | OUTPATIENT
Start: 2023-02-06 | End: 2023-03-02 | Stop reason: SDUPTHER

## 2023-02-08 LAB
MYCOBACTERIUM SPEC CULT: NORMAL
RHODAMINE-AURAMINE STN SPEC: NORMAL
SIGNIFICANT IND 70042: NORMAL
SITE SITE: NORMAL
SOURCE SOURCE: NORMAL

## 2023-02-15 ENCOUNTER — OFFICE VISIT (OUTPATIENT)
Dept: BEHAVIORAL HEALTH | Facility: CLINIC | Age: 68
End: 2023-02-15
Payer: MEDICARE

## 2023-02-15 DIAGNOSIS — F43.29 GRIEF REACTION WITH PROLONGED BEREAVEMENT: ICD-10-CM

## 2023-02-15 DIAGNOSIS — F41.1 GENERALIZED ANXIETY DISORDER: ICD-10-CM

## 2023-02-16 NOTE — PROGRESS NOTES
[facility]   Psychotherapy Progress Note    Therapy was provided on this date in coordination with the [facility] approved Clinical Supervisor under the direct supervision of Dr. Rafa Villarreal who was on site during this visit.     Name: Tita Pitts  MRN: [id number]  : 1955  Age: 67 y.o.  Date of assessment: 2/15/2023  PCP: Bassam Marinelli M.D.  Persons in attendance: Patient  Total session time: 50 minutes      Topics addressed in psychotherapy include:     Data: Pat reports that she has to hold back tears and emotions when she is with others b/c she is still so asffected by the loss of her  Je, especially doing everyday/ mundane things b/c especially since covid, they just enjoyed each other so much in just doing simple things together, so now simple things trigger her into becoming emotionally dysregulated thinking about him and the last few years of his life.     Assessment: Pat is hyperfocused on Je's last few years alive in which he was sick and it was extremely difficult on Pat.  She could benefit from focusing on the memories of Je from the time they met to before his illness to improve happy emotions associated with reminders of Je.     Plan:  We talked about how Pat and Je first, which helped Pat recall positive interactions and positive memories with Je.  For homework Pat will look through photo albums from anytime before Je' illness, in moderation, and grattitude journal about memories associated with those photos.     Objective Observations:   Participation:Active verbal participation, Attentive, Engaged, and Open to feedback   Grooming:Casual and Neat   Cognition:Alert and Fully Oriented   Eye Contact:Good   Mood:Euthymic and Depressed   Affect:Flexible, Full range, Expansive, Congruent with content, and Sad   Thought Process:Logical and Goal-directed   Speech:Rate within normal limits and Volume within normal limits    Current  Risk:   Suicide: low   Homicide: low   Self-Harm: low     Care Plan Updated: Yes    Does patient express agreement with the above plan? Yes     Diagnosis:  1. Generalized anxiety disorder    2. Grief reaction with prolonged bereavement        Referral appointment(s) scheduled? No       BRETT Mcpherson Intern

## 2023-02-27 ENCOUNTER — DOCUMENTATION (OUTPATIENT)
Dept: CARDIOLOGY | Facility: MEDICAL CENTER | Age: 68
End: 2023-02-27
Payer: MEDICARE

## 2023-02-27 ENCOUNTER — HOSPITAL ENCOUNTER (OUTPATIENT)
Dept: CARDIOLOGY | Facility: MEDICAL CENTER | Age: 68
End: 2023-02-27
Attending: INTERNAL MEDICINE
Payer: MEDICARE

## 2023-02-27 DIAGNOSIS — I31.39 PERICARDIAL EFFUSION: ICD-10-CM

## 2023-02-27 LAB
LV EJECT FRACT  99904: 65
LV EJECT FRACT MOD 2C 99903: 74.22
LV EJECT FRACT MOD 4C 99902: 65.84
LV EJECT FRACT MOD BP 99901: 72.24

## 2023-02-27 PROCEDURE — 93325 DOPPLER ECHO COLOR FLOW MAPG: CPT

## 2023-02-27 PROCEDURE — 93308 TTE F-UP OR LMTD: CPT | Mod: 26 | Performed by: INTERNAL MEDICINE

## 2023-02-27 NOTE — PROGRESS NOTES
I was informed by the echo tech regarding her pericardial effusion on the echocardiogram from today.    I evaluated Ms. Pitts in person.  She denies any syncope, presyncope, lightheadedness episodes.  Reports occasional chest pressure symptoms without significant shortness of breath.  Overall feels better compared to before after her pericardiocentesis in December 2022.  Remains on colchicine 0.6 mg daily given diarrhea with the twice daily dosing.  She was in no acute distress, no conversational dyspnea, hemodynamically stable. 'smmHg.  I also reviewed her blood pressure and heart rate log that she sent to me via [employer] earlier today.    I reviewed the echo images.    We will discuss the next steps regarding her recurrent pericardial effusion in clinic on 3/2/23 including repeat pericardiocentesis, consideration of whole body imaging for malignancy evaluation and continuation of colchicine (to try twice daily dosing again).    I advised her to come to the ER with any syncope, presyncope, concerning shortness of breath or chest pain episodes.    I also advised her to stop the amlodipine, and to continue taking her lisinopril 10 mg twice daily.    She was appreciative of the follow-up.

## 2023-03-02 ENCOUNTER — OFFICE VISIT (OUTPATIENT)
Dept: CARDIOLOGY | Facility: MEDICAL CENTER | Age: 68
End: 2023-03-02
Payer: MEDICARE

## 2023-03-02 VITALS
WEIGHT: 156 LBS | OXYGEN SATURATION: 99 % | DIASTOLIC BLOOD PRESSURE: 74 MMHG | HEART RATE: 82 BPM | HEIGHT: 65 IN | BODY MASS INDEX: 25.99 KG/M2 | RESPIRATION RATE: 12 BRPM | SYSTOLIC BLOOD PRESSURE: 110 MMHG

## 2023-03-02 DIAGNOSIS — I10 ESSENTIAL HYPERTENSION, BENIGN: ICD-10-CM

## 2023-03-02 DIAGNOSIS — I31.39 PERICARDIAL EFFUSION: Primary | ICD-10-CM

## 2023-03-02 DIAGNOSIS — I49.3 PVC (PREMATURE VENTRICULAR CONTRACTION): ICD-10-CM

## 2023-03-02 DIAGNOSIS — I31.39 PERICARDIAL EFFUSION: ICD-10-CM

## 2023-03-02 PROCEDURE — 99214 OFFICE O/P EST MOD 30 MIN: CPT | Performed by: INTERNAL MEDICINE

## 2023-03-02 RX ORDER — COLCHICINE 0.6 MG/1
0.6 TABLET ORAL 2 TIMES DAILY
Qty: 180 TABLET | Refills: 1 | Status: SHIPPED | OUTPATIENT
Start: 2023-03-02 | End: 2023-07-05 | Stop reason: SDUPTHER

## 2023-03-02 RX ORDER — FAMOTIDINE 40 MG/1
40 TABLET, FILM COATED ORAL DAILY
Qty: 30 TABLET | Refills: 1 | Status: SHIPPED | OUTPATIENT
Start: 2023-03-02 | End: 2023-05-24

## 2023-03-02 ASSESSMENT — FIBROSIS 4 INDEX: FIB4 SCORE: 0.95

## 2023-03-02 NOTE — PROGRESS NOTES
"CARDIOLOGY established PATIENT:    PCP: Bassam Marinelli M.D.    1. Pericardial effusion    2. PVC (premature ventricular contraction)    3. Essential hypertension, benign        Tita Pitts here for pericardial effusion follow up.    Chief Complaint   Patient presents with    Follow-Up     FV Dx: SOB     Shortness of Breath       History: Tita Pitts is a 67 y.o. female with HTN , PVCs is here for pericardial effusion follow up. She had a CAC score scan 11/2022 that showed zero calcium score but a pericardial effusion with subsequent TTE and hence seen in my clinic. 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 She had pericarditis at a younger age. We tried colchicine for a month and then due to persistent effusion we successfully performed pericardiocentesis of [id number] serous fluid 12/28/22 and continued colchicine. We repeated a TTE 2 months later that showed recurrent effusion, and is here for follow up.    Since 2/27/23 since her repeat echo, I saw her in the cho lab, we stopped her amlodipine and increased colchicine to BID. No concerning diarrhea thus far. BP log ever since: 120/80's mmHg off amlodipine and on lisinopril 10mg BID.    Since last week, she noticed recurrence of left chest pressure / ache.    Tried Advil for 5 days (400mg BID) mid January and that resolved her chest ache. Jan 20th she had an acute sharp left sided CP radiated to her right chest.    She has been more active. On 2/20/23, she was walking and noticed substernal chest pressure that necessitated her to stop. Improved with rest and then walked again without recurrence. No dizziness, LH or syncope then. Denied SOB then but she was \"breathing hard\". Rare palpitations / heart pounding episodes, can last an hour. Denies syncope, presyncope, concerning shortness of breath with exertion or at rest, ADL limiting lightheadedness.    Insomnia, not necessarily related to CP or SOB or " "palpitations concerns.    She reports being uptodate on mammograms, C scope and pap smears without concerns thus far.    Denies rash, joint pains, blood in urine or stools. No known AI illness.    CAC score 11/2022:  Coronary calcification:  LMA - 0.0  LCX - 0.0  LAD - 0.0  RCA - 0.0  PDA - 0.0  Total Calcium Score: 0.0  Percentile: Calcium score is below the 25th percentile for the patient's age and sex.    TTE 2/27/23: personally interpreted  Limited TTE for pericardial effusion follow up.  Compared to the prior study on 12/28/2022, the effusion on today's   study (2.6cm) is smaller compared to pre-pericardiocentesis (3.6cm),   but it has recurred ever since the pericardiocentesis.  Large pericardial effusion without echocardiographic evidence of hemodynamic compromise  Normal IVC size  Preserved biventricular systolic functions.  Estimated LVEF 65% - normal.  Patent foramen ovale present with a left-to-right shunt on color doppler.    EKG 12/27/22: personally reviewed  Sinus rhythm   Ventricular premature complex   Compared to ECG 11/18/2022 11:19:47   Ventricular premature complex(es) now present     Pericardial fluid analysis 12/2022:  Benign mesothelial cells and mixed inflammation   Negative for malignant cells     PE:  /74 (BP Location: Left arm, Patient Position: Sitting, BP Cuff Size: Adult)   Pulse 82   Resp 12   Ht 1.651 m (5' 5\")   Wt 70.8 kg (156 lb)   SpO2 99%   BMI 25.96 kg/m²     Gen: well  HEENT: Symmetric face. Anicteric sclerae. Moist mucus membranes  NECK: No JVD.   CARDIAC: Regular, Normal S1, S2, No murmur  VASCULATURE: carotids are normal bilaterally without bruit  RESP: Clear to auscultation bilaterally  ABD: Soft, non-tender, non-distended  EXT: No edema, no clubbing or cyanosis  SKIN: Warm and dry  NEURO: No gross deficits  PSYCH: Appropriate affect, participates in conversation    The 10-year ASCVD risk score (Yo LINCOLN, et al., 2019) is: 4.9%    Past Medical History: "   Diagnosis Date    Allergy     Anesthesia     Bronchitis     Cataract     Hypertension     Pneumonia     PONV (postoperative nausea and vomiting)     Psychiatric problem 12/27/2022    grief - in counseling     Past Surgical History:   Procedure Laterality Date    ABDOMINAL HYSTERECTOMY TOTAL      APPENDECTOMY      GYN SURGERY      laparoscopy for endometriosis     Allergies   Allergen Reactions    Other Drug Unspecified     Ruvert = Other reaction(s): Welts on legs    Macrodantin [Nitrofurantoin]      nightmares     Outpatient Encounter Medications as of 3/2/2023   Medication Sig Dispense Refill    famotidine (PEPCID) 40 MG Tab Take 1 Tablet by mouth every day. 30 Tablet 1    colchicine (COLCRYS) 0.6 MG Tab TAKE 1 TABLET BY MOUTH EVERY DAY (Patient taking differently: Take 0.6 mg by mouth 2 times a day.) 100 Tablet 0    lisinopril (PRINIVIL) 10 MG Tab Take 1 Tablet by mouth 2 times a day. 180 Tablet 3    estradiol (ESTRACE) 0.5 MG tablet Take 0.5 mg by mouth two times a week. 1/2 tablet every 5 days      vitamin D3 (CHOLECALCIFEROL) 1000 Unit (25 mcg) Tab Take 1 Tablet by mouth every day.      calcium carbonate (OS-RADHA 500) 1250 (500 Ca) MG Tab Take 1 Tablet by mouth every day.      Ascorbic Acid (VITAMIN C PO) Take  by mouth every day.      amLODIPine (NORVASC) 5 MG Tab Take 1 Tablet by mouth every evening. (Patient not taking: Reported on 3/2/2023) 90 Tablet 3     No facility-administered encounter medications on file as of 3/2/2023.     Social History     Socioeconomic History    Marital status:      Spouse name: Not on file    Number of children: Not on file    Years of education: Not on file    Highest education level: Bachelor's degree (e.g., BA, AB, BS)   Occupational History    Not on file   Tobacco Use    Smoking status: Never     Passive exposure: Past    Smokeless tobacco: Never   Vaping Use    Vaping Use: Never used   Substance and Sexual Activity    Alcohol use: Not Currently     Alcohol/week:  0.6 oz     Types: 1 Glasses of wine per week     Comment: Some alcohol socially over the years.  Not regular use    Drug use: Never    Sexual activity: Not Currently     Partners: Male     Birth control/protection: Surgical, Abstinence, None, Post-Menopausal     Comment: Had Endometriosis had hysterectomy.   Other Topics Concern    Not on file   Social History Narrative    Not on file     Social Determinants of Health     Financial Resource Strain: Low Risk     Difficulty of Paying Living Expenses: Not very hard   Food Insecurity: No Food Insecurity    Worried About Running Out of Food in the Last Year: Never true    Ran Out of Food in the Last Year: Never true   Transportation Needs: No Transportation Needs    Lack of Transportation (Medical): No    Lack of Transportation (Non-Medical): No   Physical Activity: Insufficiently Active    Days of Exercise per Week: 2 days    Minutes of Exercise per Session: 30 min   Stress: Stress Concern Present    Feeling of Stress : Rather much   Social Connections: Moderately Integrated    Frequency of Communication with Friends and Family: Twice a week    Frequency of Social Gatherings with Friends and Family: Once a week    Attends Mandaeism Services: 1 to 4 times per year    Active Member of Clubs or Organizations: Yes    Attends Club or Organization Meetings: 1 to 4 times per year    Marital Status:    Intimate Partner Violence: Not on file   Housing Stability: Low Risk     Unable to Pay for Housing in the Last Year: No    Number of Places Lived in the Last Year: 1    Unstable Housing in the Last Year: No     Family History   Problem Relation Age of Onset    Breast Cancer Mother     Diabetes Mother         Developed in 70s, at 90 Kidney failure    Hypertension Mother         diagnosed in 70s    Hyperlipidemia Mother         diagnosed in 70s    Kidney Disease Mother     Heart Disease Father          at 64, CHF, CAD    Heart Disease Brother         Heart  Attack at 72    Stroke Brother         occurred at 78    Diabetes Maternal Uncle          at 68         Studies    Lab Results   Component Value Date/Time    TSHULTRASEN 2.110 2022 0932      No results found for: FREET4   Lab Results   Component Value Date/Time    HBA1C 5.2 2022 08:19 AM     Lab Results   Component Value Date/Time    CHOLSTRLTOT 168 2022 08:19 AM    LDL 96 2022 08:19 AM    HDL 59 2022 08:19 AM    TRIGLYCERIDE 67 2022 08:19 AM       Lab Results   Component Value Date/Time    SODIUM 138 2022 02:06 PM    POTASSIUM 4.0 2022 02:06 PM    CHLORIDE 102 2022 02:06 PM    CO2 25 2022 02:06 PM    GLUCOSE 81 2022 02:06 PM    BUN 14 2022 02:06 PM    CREATININE 0.83 2022 02:06 PM     Lab Results   Component Value Date/Time    ALKPHOSPHAT 66 2022 02:06 PM    ASTSGOT 17 2022 02:06 PM    ALTSGPT 13 2022 02:06 PM    TBILIRUBIN 0.7 2022 02:06 PM        Echocardiogram:  No results found for this or any previous visit.        Assessment and Recommendations:    Problem List Items Addressed This Visit       Pericardial effusion - Primary    Relevant Orders    SANJIV W/REFLEX IF POSITIVE    Sed Rate    C-REACTIVE PROTEIN CARDIAC    EC-ECHOCARDIOGRAM LTD W/ CONT    Essential hypertension, benign     Other Visit Diagnoses       PVC (premature ventricular contraction)              Despite her recurrent pericardial effusion, Ms. Pitts is doing relatively well with no clinical or echocardiographic concerns of cardiac tamponade.    I advised her to continue holding the amlodipine and to continue with the lisinopril 10 mg twice a day.  I advised her to stop the lisinopril if you start noticing persistent BP <=100/60 mmHg and to notify me.  I also advised her to go to the ER if she develops syncope, presyncope or concerning shortness of breath episodes.    Given her clinical stability, we agreed on the following plan:  -Try  Advil 400 mg twice a day for 2 weeks  -Start famotidine 40 mg in the morning while taking Advil  -Advised her to stay well-hydrated, at least 4 L of fluid intake per day  -Continue colchicine 0.6 mg twice a day as long as well-tolerated with no concerning diarrhea  -Plan to repeat limited ultrasound in 4 weeks and accordingly we will decide on the need for recurrent pericardiocentesis  -If any of the above described concerning symptoms occur, we will plan on a sooner pericardiocentesis.    My differential for her recurrent effusion: Continued COVID-related chronic pericarditis, underlying autoimmune illness, underlying malignancy.    Ordered the following blood work: ESR, CRP and SANJIV with reflex panel; if all normal, then we will proceed with whole-body imaging with CT with contrast for underlying occult malignancy evaluation; especially given her up-to-date mammogram, colonoscopy screenings.    Thank you for the opportunity to be involved in Tita Pitts 's care; and please reach out with any questions or concerns.    Return in about 6 weeks (around 4/13/2023).    Chase Pena MD, MPH [facility]  Interventional Cardiologist  [facility]   of Clinical Internal Medicine - [facility]    ~ Portions of this note were completed using voice recognition software (Dragon Naturally speaking software) . Occasional transcription errors may have escaped proof reading. I have made every reasonable attempt to correct obvious errors, but I expect that there are errors of grammar and possibly content that I did not discover before finalizing the note. ~

## 2023-03-02 NOTE — PATIENT INSTRUCTIONS
Take Advil 400mg twice a day with meals for 2 weeks  Take famotidine 40mg in the am while taking the advil  Try to stay well hydrated, at least 4L /day if possible  Try your best to keep taking colchicine twice a day if you can  Blood work  If you start noticing BP <= 100/60 mmHg on at least 2 occasions, stop lisinopril and let me know  If you pass out, get very short of breath please go to the ER  Plan to repeat echo in 1 month

## 2023-03-08 ENCOUNTER — OFFICE VISIT (OUTPATIENT)
Dept: BEHAVIORAL HEALTH | Facility: CLINIC | Age: 68
End: 2023-03-08
Payer: MEDICARE

## 2023-03-08 DIAGNOSIS — F41.1 GENERALIZED ANXIETY DISORDER: ICD-10-CM

## 2023-03-08 DIAGNOSIS — F43.29 GRIEF REACTION WITH PROLONGED BEREAVEMENT: ICD-10-CM

## 2023-03-08 PROCEDURE — 90834 PSYTX W PT 45 MINUTES: CPT | Mod: MISDOCU | Performed by: MARRIAGE & FAMILY THERAPIST

## 2023-03-08 NOTE — PROGRESS NOTES
[facility]   Psychotherapy Progress Note    Therapy was provided on this date in coordination with the [facility] approved Clinical Supervisor under the direct supervision of Dr. Rafa Villarreal who was on site during this visit.     Name: Tita Pitts  MRN: [id number]  : 1955  Age: 67 y.o.  Date of assessment: 3/8/2023  PCP: Bassam Marinelli M.D.  Persons in attendance: Patient  Total session time: 50 minutes      Topics addressed in psychotherapy include:     Data: Mila has guilt and sadness about being reminded of her   throughout her days, and now she can't leave the house because she is having the kitchen renovated and the weather has been bad, and the kitchen renovation is something she and Je always talked about doing together.     Assessment: Mila is experiencing a lot of guilt and sadness about Je' last few months alive, especially when she was running around doing everything for him while he lay in bed, and he asked her to just sit with him, so she sat with him for 5 minutes before needing to get medicine.     Plan: We worked on reframing the five minutes Mila spent sitting with Je as probably some of the best five minutes of his life. Mila will start seeing Shira due to insurance contract issues this is our termination session.  We reviewed the wheel of emotions to help Mila identify what emotions she is experiencing and the more specific cause of them as well.     Objective Observations:   Participation:Active verbal participation, Attentive, Engaged, and Open to feedback   Grooming:Casual and Neat   Cognition:Alert and Fully Oriented   Eye Contact:Good   Mood:Euthymic   Affect:Flexible, Full range, Expansive, and Congruent with content   Thought Process:Logical and Goal-directed   Speech:Rate within normal limits and Volume within normal limits    Current Risk:   Suicide: low   Homicide: low   Self-Harm: low     Care Plan Updated: Yes    Does patient express  agreement with the above plan? Yes     Diagnosis:  1. Generalized anxiety disorder    2. Grief reaction with prolonged bereavement        Referral appointment(s) scheduled? No       BRETT Mcpherson Intern

## 2023-03-17 ENCOUNTER — HOSPITAL ENCOUNTER (OUTPATIENT)
Dept: LAB | Facility: MEDICAL CENTER | Age: 68
End: 2023-03-17
Attending: FAMILY MEDICINE
Payer: MEDICARE

## 2023-03-17 ENCOUNTER — OFFICE VISIT (OUTPATIENT)
Dept: MEDICAL GROUP | Facility: MEDICAL CENTER | Age: 68
End: 2023-03-17
Payer: MEDICARE

## 2023-03-17 VITALS
HEIGHT: 65 IN | BODY MASS INDEX: 26.52 KG/M2 | SYSTOLIC BLOOD PRESSURE: 110 MMHG | DIASTOLIC BLOOD PRESSURE: 80 MMHG | WEIGHT: 159.17 LBS | HEART RATE: 82 BPM | TEMPERATURE: 96.6 F

## 2023-03-17 DIAGNOSIS — D70.9 NEUTROPENIA, UNSPECIFIED TYPE (HCC): ICD-10-CM

## 2023-03-17 DIAGNOSIS — I10 PRIMARY HYPERTENSION: Chronic | ICD-10-CM

## 2023-03-17 DIAGNOSIS — I31.39 PERICARDIAL EFFUSION: ICD-10-CM

## 2023-03-17 DIAGNOSIS — J02.0 ACUTE STREPTOCOCCAL PHARYNGITIS: ICD-10-CM

## 2023-03-17 PROBLEM — J02.9 ACUTE PHARYNGITIS: Status: ACTIVE | Noted: 2023-03-17

## 2023-03-17 LAB
ALBUMIN SERPL BCP-MCNC: 4.1 G/DL (ref 3.2–4.9)
ALBUMIN/GLOB SERPL: 1.3 G/DL
ALP SERPL-CCNC: 79 U/L (ref 30–99)
ALT SERPL-CCNC: 37 U/L (ref 2–50)
ANION GAP SERPL CALC-SCNC: 12 MMOL/L (ref 7–16)
AST SERPL-CCNC: 34 U/L (ref 12–45)
BASOPHILS # BLD AUTO: 1.1 % (ref 0–1.8)
BASOPHILS # BLD: 0.06 K/UL (ref 0–0.12)
BILIRUB SERPL-MCNC: 0.4 MG/DL (ref 0.1–1.5)
BUN SERPL-MCNC: 12 MG/DL (ref 8–22)
CALCIUM ALBUM COR SERPL-MCNC: 9.2 MG/DL (ref 8.5–10.5)
CALCIUM SERPL-MCNC: 9.3 MG/DL (ref 8.4–10.2)
CHLORIDE SERPL-SCNC: 101 MMOL/L (ref 96–112)
CHOLEST SERPL-MCNC: 167 MG/DL (ref 100–199)
CO2 SERPL-SCNC: 24 MMOL/L (ref 20–33)
CREAT SERPL-MCNC: 0.89 MG/DL (ref 0.5–1.4)
CRP SERPL HS-MCNC: 6.4 MG/L (ref 0–3)
EOSINOPHIL # BLD AUTO: 0.09 K/UL (ref 0–0.51)
EOSINOPHIL NFR BLD: 1.6 % (ref 0–6.9)
ERYTHROCYTE [DISTWIDTH] IN BLOOD BY AUTOMATED COUNT: 42.3 FL (ref 35.9–50)
ERYTHROCYTE [SEDIMENTATION RATE] IN BLOOD BY WESTERGREN METHOD: 15 MM/HOUR (ref 0–25)
FASTING STATUS PATIENT QL REPORTED: NORMAL
GFR SERPLBLD CREATININE-BSD FMLA CKD-EPI: 71 ML/MIN/1.73 M 2
GLOBULIN SER CALC-MCNC: 3.1 G/DL (ref 1.9–3.5)
GLUCOSE SERPL-MCNC: 89 MG/DL (ref 65–99)
HCT VFR BLD AUTO: 38.9 % (ref 37–47)
HDLC SERPL-MCNC: 63 MG/DL
HGB BLD-MCNC: 13 G/DL (ref 12–16)
IMM GRANULOCYTES # BLD AUTO: 0.02 K/UL (ref 0–0.11)
IMM GRANULOCYTES NFR BLD AUTO: 0.4 % (ref 0–0.9)
LDLC SERPL CALC-MCNC: 89 MG/DL
LYMPHOCYTES # BLD AUTO: 1.1 K/UL (ref 1–4.8)
LYMPHOCYTES NFR BLD: 20 % (ref 22–41)
MCH RBC QN AUTO: 31.4 PG (ref 27–33)
MCHC RBC AUTO-ENTMCNC: 33.4 G/DL (ref 33.6–35)
MCV RBC AUTO: 94 FL (ref 81.4–97.8)
MONOCYTES # BLD AUTO: 0.5 K/UL (ref 0–0.85)
MONOCYTES NFR BLD AUTO: 9.1 % (ref 0–13.4)
NEUTROPHILS # BLD AUTO: 3.73 K/UL (ref 2–7.15)
NEUTROPHILS NFR BLD: 67.8 % (ref 44–72)
NRBC # BLD AUTO: 0 K/UL
NRBC BLD-RTO: 0 /100 WBC
PLATELET # BLD AUTO: 314 K/UL (ref 164–446)
PMV BLD AUTO: 10.1 FL (ref 9–12.9)
POTASSIUM SERPL-SCNC: 4.1 MMOL/L (ref 3.6–5.5)
PROT SERPL-MCNC: 7.2 G/DL (ref 6–8.2)
RBC # BLD AUTO: 4.14 M/UL (ref 4.2–5.4)
SODIUM SERPL-SCNC: 137 MMOL/L (ref 135–145)
TRIGL SERPL-MCNC: 76 MG/DL (ref 0–149)
WBC # BLD AUTO: 5.5 K/UL (ref 4.8–10.8)

## 2023-03-17 PROCEDURE — 36415 COLL VENOUS BLD VENIPUNCTURE: CPT

## 2023-03-17 PROCEDURE — 80061 LIPID PANEL: CPT

## 2023-03-17 PROCEDURE — 85025 COMPLETE CBC W/AUTO DIFF WBC: CPT

## 2023-03-17 PROCEDURE — 80053 COMPREHEN METABOLIC PANEL: CPT

## 2023-03-17 PROCEDURE — 86141 C-REACTIVE PROTEIN HS: CPT

## 2023-03-17 PROCEDURE — 85652 RBC SED RATE AUTOMATED: CPT

## 2023-03-17 PROCEDURE — 86038 ANTINUCLEAR ANTIBODIES: CPT

## 2023-03-17 PROCEDURE — 99214 OFFICE O/P EST MOD 30 MIN: CPT | Performed by: PHYSICIAN ASSISTANT

## 2023-03-17 RX ORDER — AMOXICILLIN 500 MG/1
500 CAPSULE ORAL 2 TIMES DAILY
Qty: 20 CAPSULE | Refills: 0 | Status: SHIPPED | OUTPATIENT
Start: 2023-03-17 | End: 2023-03-27

## 2023-03-17 RX ORDER — PREDNISONE 20 MG/1
60 TABLET ORAL DAILY
Qty: 9 TABLET | Refills: 0 | Status: SHIPPED | OUTPATIENT
Start: 2023-03-17 | End: 2023-03-20

## 2023-03-17 ASSESSMENT — FIBROSIS 4 INDEX: FIB4 SCORE: 0.95

## 2023-03-17 NOTE — ASSESSMENT & PLAN NOTE
This is a pleasant 67-year-old female here today to discuss a sore throat for approximately 3 days.  Temperature as high as 100.  Stopped ibuprofen because of her history of chest pain.  Thought to be secondary to pericarditis.  She does follow with cardiology.  Has labs that have been ordered for her to perform.  She is concerned about doing those if she has to restart other medication.  She is currently fasting today.  She denies any nasal congestion or coughing.  No known sick contacts.   Yes

## 2023-03-17 NOTE — PROGRESS NOTES
Subjective:   Tita Pitts is a 67 y.o. female here today for strep and left-sided chest pain secondary to pericardial effusion.    Acute streptococcal pharyngitis  This is a pleasant 67-year-old female here today to discuss a sore throat for approximately 3 days.  Temperature as high as 100.  Stopped ibuprofen because of her history of chest pain.  Thought to be secondary to pericarditis.  She does follow with cardiology.  Has labs that have been ordered for her to perform.  She is concerned about doing those if she has to restart other medication.  She is currently fasting today.  She denies any nasal congestion or coughing.  No known sick contacts.       Current medicines (including changes today)  Current Outpatient Medications   Medication Sig Dispense Refill    predniSONE (DELTASONE) 20 MG Tab Take 3 Tablets by mouth every day for 3 days. 9 Tablet 0    amoxicillin (AMOXIL) 500 MG Cap Take 1 Capsule by mouth 2 times a day for 10 days. 20 Capsule 0    amLODIPine (NORVASC) 5 MG Tab Take 1 Tablet by mouth every evening. 90 Tablet 3    famotidine (PEPCID) 40 MG Tab Take 1 Tablet by mouth every day. 30 Tablet 1    colchicine (COLCRYS) 0.6 MG Tab Take 1 Tablet by mouth 2 times a day. 180 Tablet 1    lisinopril (PRINIVIL) 10 MG Tab Take 1 Tablet by mouth 2 times a day. 180 Tablet 3    estradiol (ESTRACE) 0.5 MG tablet Take 0.5 mg by mouth two times a week. 1/2 tablet every 5 days      vitamin D3 (CHOLECALCIFEROL) 1000 Unit (25 mcg) Tab Take 1 Tablet by mouth every day.      calcium carbonate (OS-RADHA 500) 1250 (500 Ca) MG Tab Take 1 Tablet by mouth every day.      Ascorbic Acid (VITAMIN C PO) Take  by mouth every day.       No current facility-administered medications for this visit.     She  has a past medical history of Allergy, Anesthesia, Bronchitis, Cataract, Hypertension, Pneumonia, PONV (postoperative nausea and vomiting), and Psychiatric problem (12/27/2022).    Social History and Family History were  "reviewed and updated.    ROS   No chest pain, no shortness of breath, no abdominal pain and all other systems were reviewed and are negative.       Objective:     /80 (BP Location: Left arm, Patient Position: Sitting, BP Cuff Size: Adult)   Pulse 82   Temp 35.9 °C (96.6 °F) (Temporal)   Ht 1.651 m (5' 5\")   Wt 72.2 kg (159 lb 2.8 oz)  Body mass index is 26.49 kg/m².   Physical Exam:  Constitutional: Alert, no distress.  Skin: Warm, dry, good turgor, no rashes in visible areas.  Eye: Equal, round and reactive, conjunctiva clear, lids normal.  ENMT: Lips without lesions, good dentition, oropharynx erythematous.  Neck: Trachea midline, no masses.   Lymph: No cervical or supraclavicular lymphadenopathy  Respiratory: Unlabored respiratory effort, lungs appear clear.  Psych: Alert and oriented x3, normal affect and mood.        Assessment and Plan:   The following treatment plan was discussed    1. Acute streptococcal pharyngitis  Acute, new onset condition.  Rapid strep test was positive.  She is currently fasting so advised to get her labs done prior to starting amoxicillin.  Did also provide a prescription for prednisone prior to the results.  May hold off on starting prednisone.  Take amoxicillin as directed.  She will feel much better after couple dosing.  Follow-up with urgent care with any worsening symptoms.  - predniSONE (DELTASONE) 20 MG Tab; Take 3 Tablets by mouth every day for 3 days.  Dispense: 9 Tablet; Refill: 0  - amoxicillin (AMOXIL) 500 MG Cap; Take 1 Capsule by mouth 2 times a day for 10 days.  Dispense: 20 Capsule; Refill: 0    2. Pericardial effusion  Chronic condition.  Stable.  Continue to follow with cardiology.  She will perform labs ordered by both cardiology and her PCP today.  May take ibuprofen as needed for her current strep throat but she would feel much better after couple doses of amoxicillin.         Followup: Return if symptoms worsen or fail to improve.    Please note that " this dictation was created using voice recognition software. I have made every reasonable attempt to correct obvious errors, but I expect that there are errors of grammar and possibly content that I did not discover before finalizing the note.

## 2023-03-19 LAB — NUCLEAR IGG SER QL IA: NORMAL

## 2023-03-21 ENCOUNTER — HOSPITAL ENCOUNTER (OUTPATIENT)
Dept: RADIOLOGY | Facility: MEDICAL CENTER | Age: 68
End: 2023-03-21
Attending: STUDENT IN AN ORGANIZED HEALTH CARE EDUCATION/TRAINING PROGRAM
Payer: MEDICARE

## 2023-03-21 ENCOUNTER — OFFICE VISIT (OUTPATIENT)
Dept: MEDICAL GROUP | Facility: MEDICAL CENTER | Age: 68
End: 2023-03-21
Payer: MEDICARE

## 2023-03-21 VITALS
TEMPERATURE: 97 F | WEIGHT: 159 LBS | HEIGHT: 65 IN | BODY MASS INDEX: 26.49 KG/M2 | SYSTOLIC BLOOD PRESSURE: 108 MMHG | RESPIRATION RATE: 16 BRPM | OXYGEN SATURATION: 98 % | DIASTOLIC BLOOD PRESSURE: 70 MMHG | HEART RATE: 86 BPM

## 2023-03-21 DIAGNOSIS — R05.1 ACUTE COUGH: ICD-10-CM

## 2023-03-21 DIAGNOSIS — I31.39 PERICARDIAL EFFUSION: ICD-10-CM

## 2023-03-21 DIAGNOSIS — J02.0 ACUTE STREPTOCOCCAL PHARYNGITIS: ICD-10-CM

## 2023-03-21 LAB
EXTERNAL QUALITY CONTROL: NORMAL
INT CON NEG: NEGATIVE
INT CON POS: POSITIVE
SARS-COV+SARS-COV-2 AG RESP QL IA.RAPID: NEGATIVE

## 2023-03-21 PROCEDURE — 71046 X-RAY EXAM CHEST 2 VIEWS: CPT

## 2023-03-21 PROCEDURE — 99214 OFFICE O/P EST MOD 30 MIN: CPT | Mod: CS | Performed by: STUDENT IN AN ORGANIZED HEALTH CARE EDUCATION/TRAINING PROGRAM

## 2023-03-21 PROCEDURE — 87426 SARSCOV CORONAVIRUS AG IA: CPT | Performed by: STUDENT IN AN ORGANIZED HEALTH CARE EDUCATION/TRAINING PROGRAM

## 2023-03-21 RX ORDER — BENZONATATE 100 MG/1
100-200 CAPSULE ORAL 3 TIMES DAILY PRN
Qty: 60 CAPSULE | Refills: 0 | Status: SHIPPED | OUTPATIENT
Start: 2023-03-21 | End: 2023-04-07

## 2023-03-21 ASSESSMENT — FIBROSIS 4 INDEX: FIB4 SCORE: 1.16

## 2023-03-21 NOTE — PROGRESS NOTES
Subjective:     CC: cough, heavy chest    HPI:   Tita presents today for cough, heavy chest    Problem   Acute Cough    Acute condition.    Character: dry cough  Onset: Leonel 3/19.23  Location: n/a  Duration: constant  Exacerbating factors: unknown  Relieving factors: none  Associated symptoms: none  Severity: persistent     Acute Streptococcal Pharyngitis    Acute condition. She was recently seen on 3/17/23 and tested positive for strep. She was started on amoxicillin and has been taking it since. She has not felt much improvement in her symptoms so far.     Pericardial Effusion    Chronic condition. Followed by cardiology. Patient has upcoming repeat echocardiogram scheduled. She has been tolerating colchicine medication without diarrhea.    Current regimen: colchicine 0.6mg BID    Pericardial effusion seen on echocardiogram, had pericardiocentesis and was diagnosed with pericarditis.  She is currently on colchicine daily and as well as taking ibuprofen for this.          Current Outpatient Medications Ordered in Epic   Medication Sig Dispense Refill    benzonatate (TESSALON) 100 MG Cap Take 1-2 Capsules by mouth 3 times a day as needed for Cough. 60 Capsule 0    amoxicillin (AMOXIL) 500 MG Cap Take 1 Capsule by mouth 2 times a day for 10 days. 20 Capsule 0    famotidine (PEPCID) 40 MG Tab Take 1 Tablet by mouth every day. 30 Tablet 1    colchicine (COLCRYS) 0.6 MG Tab Take 1 Tablet by mouth 2 times a day. 180 Tablet 1    amLODIPine (NORVASC) 5 MG Tab Take 1 Tablet by mouth every evening. 90 Tablet 3    lisinopril (PRINIVIL) 10 MG Tab Take 1 Tablet by mouth 2 times a day. 180 Tablet 3    estradiol (ESTRACE) 0.5 MG tablet Take 0.5 mg by mouth two times a week. 1/2 tablet every 5 days      vitamin D3 (CHOLECALCIFEROL) 1000 Unit (25 mcg) Tab Take 1 Tablet by mouth every day.      calcium carbonate (OS-RADHA 500) 1250 (500 Ca) MG Tab Take 1 Tablet by mouth every day.      Ascorbic Acid (VITAMIN C PO) Take  by mouth  "every day.       No current [facility]-ordered facility-administered medications on file.     ROS:  See HPI    Objective:     Exam:  /70 (BP Location: Left arm, Patient Position: Sitting, BP Cuff Size: Adult)   Pulse 86   Temp 36.1 °C (97 °F) (Temporal)   Resp 16   Ht 1.651 m (5' 5\")   Wt 72.1 kg (159 lb)   SpO2 98%   BMI 26.46 kg/m²  Body mass index is 26.46 kg/m².    Gen: Alert and oriented, No apparent distress.  HEENT: Normocephalic. Ear canals are clear bilaterally, tympanic membranes are benign, nasal mucosa benign, oropharynx is without erythema, edema or exudates.  Neck: Neck is supple without lymphadenopathy.  Lungs: Normal effort, CTA bilaterally, no wheezes, rhonchi, or rales  CV: Regular rate and rhythm. No murmurs, rubs, or gallops.  Ext: No clubbing, cyanosis, edema.    Labs:   Lab Results   Component Value Date/Time    WBC 5.5 03/17/2023 09:21 AM    RBC 4.14 (L) 03/17/2023 09:21 AM    HEMOGLOBIN 13.0 03/17/2023 09:21 AM    HEMATOCRIT 38.9 03/17/2023 09:21 AM    MCV 94.0 03/17/2023 09:21 AM    MCH 31.4 03/17/2023 09:21 AM    MCHC 33.4 (L) 03/17/2023 09:21 AM    RDW 42.3 03/17/2023 09:21 AM    PLATELETCT 314 03/17/2023 09:21 AM    MPV 10.1 03/17/2023 09:21 AM      Lab Results   Component Value Date/Time    SODIUM 137 03/17/2023 09:21 AM    POTASSIUM 4.1 03/17/2023 09:21 AM    CHLORIDE 101 03/17/2023 09:21 AM    CO2 24 03/17/2023 09:21 AM    ANION 12.0 03/17/2023 09:21 AM    GLUCOSE 89 03/17/2023 09:21 AM    BUN 12 03/17/2023 09:21 AM    CREATININE 0.89 03/17/2023 09:21 AM    CALCIUM 9.3 03/17/2023 09:21 AM    ASTSGOT 34 03/17/2023 09:21 AM    ALTSGPT 37 03/17/2023 09:21 AM    TBILIRUBIN 0.4 03/17/2023 09:21 AM    ALBUMIN 4.1 03/17/2023 09:21 AM    TOTPROTEIN 7.2 03/17/2023 09:21 AM    GLOBULIN 3.1 03/17/2023 09:21 AM    AGRATIO 1.3 03/17/2023 09:21 AM     Lab Results   Component Value Date/Time    CHOLSTRLTOT 167 03/17/2023 0921    TRIGLYCERIDE 76 03/17/2023 0921    HDL 63 03/17/2023 0921    LDL " 89 03/17/2023 0921     CRP 6.4    Assessment & Plan:     67 y.o. female with the following -     1. Acute cough  Acute condition, persistent. POC COVID negative. Trial benzonatate as needed per order. Follow up chest X-ray. Advised patient to continue remaining course of amoxicillin for strep infection and complete repeat echocardiogram as scheduled to assess next step for her pericardial effusion. Patient has follow up appointment with PCP on 3/24/23.  - DX-CHEST-2 VIEWS; Future  - benzonatate (TESSALON) 100 MG Cap; Take 1-2 Capsules by mouth 3 times a day as needed for Cough.  Dispense: 60 Capsule; Refill: 0  - POCT SARS-COV Antigen JEREMIAH Manual Result    2. Acute streptococcal pharyngitis  Acute condition, stable.  - cont remaining course of amoxicillin  - cont symptomatic therapy as needed: OTC Tylenol as needed for pain/headache/fever, antihistamine/decongestant as needed for runny nose/congestion. Call or return to clinic prn if these symptoms worsen or fail to improve as anticipated.  - advised to maintain adequate rest, hydration, regular handwashing, facemask, social distancing, avoidance of respiratory irritants, soft diet    3. Pericardial effusion  Chronic condition since 11/2022, recurrent. Thought to be secondary to COVID pericarditis. Followed by cardiology.  - cont current regimen: colchicine 0.6mg BID, Advil as needed  - cont with scheduled repeat echocardiogram  - cont management per cardiology    Return in about 3 days (around 3/24/2023) for chronic medical conditions.    Please note that this dictation was created using voice recognition software. I have made every reasonable attempt to correct obvious errors, but I expect that there are errors of grammar and possibly content that I did not discover before finalizing the note.

## 2023-03-24 ENCOUNTER — OFFICE VISIT (OUTPATIENT)
Dept: MEDICAL GROUP | Facility: MEDICAL CENTER | Age: 68
End: 2023-03-24
Payer: MEDICARE

## 2023-03-24 VITALS
HEIGHT: 65 IN | HEART RATE: 80 BPM | SYSTOLIC BLOOD PRESSURE: 110 MMHG | BODY MASS INDEX: 26.45 KG/M2 | DIASTOLIC BLOOD PRESSURE: 68 MMHG | WEIGHT: 158.73 LBS | RESPIRATION RATE: 16 BRPM | TEMPERATURE: 96.7 F | OXYGEN SATURATION: 96 %

## 2023-03-24 DIAGNOSIS — R06.02 SOB (SHORTNESS OF BREATH): ICD-10-CM

## 2023-03-24 DIAGNOSIS — R53.83 OTHER FATIGUE: ICD-10-CM

## 2023-03-24 DIAGNOSIS — H10.9 BACTERIAL CONJUNCTIVITIS OF RIGHT EYE: ICD-10-CM

## 2023-03-24 DIAGNOSIS — I31.39 PERICARDIAL EFFUSION: ICD-10-CM

## 2023-03-24 LAB
FLUAV+FLUBV AG SPEC QL IA: NORMAL
INT CON NEG: NEGATIVE
INT CON POS: POSITIVE

## 2023-03-24 PROCEDURE — 99214 OFFICE O/P EST MOD 30 MIN: CPT | Performed by: FAMILY MEDICINE

## 2023-03-24 PROCEDURE — 87804 INFLUENZA ASSAY W/OPTIC: CPT | Performed by: FAMILY MEDICINE

## 2023-03-24 RX ORDER — OFLOXACIN 3 MG/ML
1 SOLUTION/ DROPS OPHTHALMIC 4 TIMES DAILY
Qty: 5 ML | Refills: 0 | Status: SHIPPED | OUTPATIENT
Start: 2023-03-24 | End: 2023-03-31

## 2023-03-24 ASSESSMENT — ENCOUNTER SYMPTOMS
DIARRHEA: 0
PALPITATIONS: 0
SPUTUM PRODUCTION: 0
ABDOMINAL PAIN: 0
COUGH: 0
FEVER: 0
BLOOD IN STOOL: 0
WHEEZING: 0
NAUSEA: 0
SORE THROAT: 1
EYE DISCHARGE: 1
VOMITING: 0
CHILLS: 0
CONSTIPATION: 0
EYE REDNESS: 1
SHORTNESS OF BREATH: 1

## 2023-03-24 ASSESSMENT — FIBROSIS 4 INDEX: FIB4 SCORE: 1.16

## 2023-03-24 NOTE — PROGRESS NOTES
FAMILY MEDICINE VISIT                                                               Chief complaint::Diagnoses of Other fatigue, SOB (shortness of breath), Pericardial effusion, and Bacterial conjunctivitis of right eye were pertinent to this visit.    History of present illness: Tita Pitts is a 67 y.o. female who presented for fatigue, shortness of breath.      She was seen by my colleague on 3/17/2023 for strep throat infection and was prescribed amoxicillin.  She was seen again by Dr. Way for cough, worsening symptoms.  COVID test came back negative.  Chest x-ray came back negative for infection, showed mild stable cardiomegaly.  Has history of pericardial effusion for which she is following with cardiology.  Is scheduled for echocardiogram.  She is currently on colchicine 0.6 mg 2 times daily and Advil as needed.    At today's visit she reports that she has not been feeling much better, she has been feeling fatigue and some shortness of breath.  No chest pain, palpitations, lower leg swelling.  Sore throat is getting much better.  No fever.    She reports that she has mild redness in her right eye and mild discharge from her right eye    Review of systems:     Review of Systems   Constitutional:  Positive for malaise/fatigue. Negative for chills and fever.   HENT:  Positive for sore throat. Negative for congestion, ear discharge and ear pain.    Eyes:  Positive for discharge and redness.   Respiratory:  Positive for shortness of breath. Negative for cough, sputum production and wheezing.    Cardiovascular:  Negative for chest pain, palpitations and leg swelling.   Gastrointestinal:  Negative for abdominal pain, blood in stool, constipation, diarrhea, nausea and vomiting.      Medications and Allergies:     Current Outpatient Medications   Medication Sig Dispense Refill    ofloxacin (OCUFLOX) 0.3 % Solution Administer 1 Drop into the right eye 4 times a day for 7 days. 5 mL 0    benzonatate (TESSALON)  "100 MG Cap Take 1-2 Capsules by mouth 3 times a day as needed for Cough. 60 Capsule 0    amoxicillin (AMOXIL) 500 MG Cap Take 1 Capsule by mouth 2 times a day for 10 days. 20 Capsule 0    famotidine (PEPCID) 40 MG Tab Take 1 Tablet by mouth every day. 30 Tablet 1    colchicine (COLCRYS) 0.6 MG Tab Take 1 Tablet by mouth 2 times a day. 180 Tablet 1    amLODIPine (NORVASC) 5 MG Tab Take 1 Tablet by mouth every evening. 90 Tablet 3    lisinopril (PRINIVIL) 10 MG Tab Take 1 Tablet by mouth 2 times a day. 180 Tablet 3    estradiol (ESTRACE) 0.5 MG tablet Take 0.5 mg by mouth two times a week. 1/2 tablet every 5 days      vitamin D3 (CHOLECALCIFEROL) 1000 Unit (25 mcg) Tab Take 1 Tablet by mouth every day.      calcium carbonate (OS-RADHA 500) 1250 (500 Ca) MG Tab Take 1 Tablet by mouth every day.      Ascorbic Acid (VITAMIN C PO) Take  by mouth every day.       No current facility-administered medications for this visit.          Vitals:    /68   Pulse 80   Temp 35.9 °C (96.7 °F)   Resp 16   Ht 1.651 m (5' 5\")   Wt 72 kg (158 lb 11.7 oz)   SpO2 96%  Body mass index is 26.41 kg/m².    Physical Exam:     Physical Exam  Constitutional:       Appearance: Normal appearance. She is well-developed and well-groomed.   HENT:      Head: Normocephalic and atraumatic.      Right Ear: External ear normal.      Left Ear: External ear normal.      Mouth/Throat:      Pharynx: Posterior oropharyngeal erythema present.   Eyes:      General:         Right eye: Discharge present.         Left eye: No discharge.      Conjunctiva/sclera: Conjunctivae normal.   Cardiovascular:      Rate and Rhythm: Normal rate.      Pulses: Normal pulses.      Heart sounds: Normal heart sounds. No murmur heard.    No friction rub. No gallop.   Pulmonary:      Effort: Pulmonary effort is normal. No respiratory distress.      Breath sounds: No wheezing or rales.   Musculoskeletal:      Cervical back: Neck supple.   Skin:     Findings: No rash. "   Neurological:      Mental Status: She is alert.   Psychiatric:         Mood and Affect: Mood and affect normal.         Behavior: Behavior normal.        Assessment/Plan:        1. Other fatigue  - POCT Influenza A/B    2. SOB (shortness of breath)    Chronic ongoing problem, unstable, likely recovering from strep infection and viral infection.  Flu test in office came back negative.  We did EKG also which showed sinus rhythm, no changes from previous EKG.  Recommended to monitor symptoms closely at home.  Take rest.  Take ibuprofen as needed for pain.  Drink plenty of fluids and eat healthy diet.  Discussed return to ER precautions with patient including worsening of shortness of breath, chest pain, palpitation, worsening of symptoms.    - POCT Influenza A/B  - EKG - Clinic Performed    3. Pericardial effusion  Chronic problem, stable, scheduled for echocardiogram.  Follow-up with cardiology    4. Bacterial conjunctivitis of right eye  New problem, unstable, start ofloxacin eyedrops.    - ofloxacin (OCUFLOX) 0.3 % Solution; Administer 1 Drop into the right eye 4 times a day for 7 days.  Dispense: 5 mL; Refill: 0     Please note that this dictation was created using voice recognition software. I have made every reasonable attempt to correct obvious errors, but I expect that there are errors of grammar and possibly content that I did not discover before finalizing the note.    Follow up next week if she is not feeling better.

## 2023-03-30 ENCOUNTER — OFFICE VISIT (OUTPATIENT)
Dept: BEHAVIORAL HEALTH | Facility: CLINIC | Age: 68
End: 2023-03-30
Payer: MEDICARE

## 2023-03-30 DIAGNOSIS — F43.29 GRIEF REACTION WITH PROLONGED BEREAVEMENT: ICD-10-CM

## 2023-03-30 PROCEDURE — 90837 PSYTX W PT 60 MINUTES: CPT | Performed by: MARRIAGE & FAMILY THERAPIST

## 2023-03-30 NOTE — PROGRESS NOTES
[facility]   Therapy Progress Note        Name: Tita Pitts  MRN: [id number]  : 1955  Age: 67 y.o.  Date of assessment: 3/30/2023  PCP: Bassam Marinelli M.D.  Persons in attendance: Patient  Total session time: 55 minutes      Topics addressed in psychotherapy include: Met with the patient in office for an individual counseling session.  The patient has transferred from [facility] intern.  Discussed previous treatment and highlights.  Patient reports that she is having a difficult time engaging.  She reports that she struggles with images of her  prior to his death from cancer.  Worked with patient to identify strengths in those images such as his resilience and will to live.  Discussed grieving with the patient has well as meet up groups and expanding out of her comfort zone.    This dictation has been created using voice recognition software and/or scribes. The accuracy of the dictation is limited by the abilities of the software and the expertise of the scribes. I expect there may be some errors of grammar and possibly content. I made every attempt to manually correct the errors within my dictation. However, errors related to voice recognition software and/or scribes may still exist and should be interpreted within the appropriate context.    Objective Observations:   Participation:Active verbal participation, Attentive, and Engaged   Grooming:Casual   Cognition:Alert and Fully Oriented   Eye Contact:Good   Mood:Euthymic and Depressed   Affect:Flexible, Full range, Congruent with content, and Sad   Thought Process:Logical and Goal-directed   Speech:Rate within normal limits and Volume within normal limits    Current Risk:   Suicide: low   Homicide: low   Self-Harm: low   Relapse: low   Safety Plan Reviewed: not applicable    Care Plan Updated: No    Does patient express agreement with the above plan? Yes     Diagnosis:  1. Grief reaction with prolonged bereavement         Referral appointment(s) scheduled? No       FELICITAS Fournier.

## 2023-04-03 ENCOUNTER — HOSPITAL ENCOUNTER (OUTPATIENT)
Dept: CARDIOLOGY | Facility: MEDICAL CENTER | Age: 68
End: 2023-04-03
Attending: INTERNAL MEDICINE
Payer: MEDICARE

## 2023-04-03 DIAGNOSIS — I31.39 PERICARDIAL EFFUSION: ICD-10-CM

## 2023-04-03 LAB
LV EJECT FRACT  99904: 65
LV EJECT FRACT  99904: 65

## 2023-04-03 PROCEDURE — 93325 DOPPLER ECHO COLOR FLOW MAPG: CPT

## 2023-04-03 PROCEDURE — 93325 DOPPLER ECHO COLOR FLOW MAPG: CPT | Mod: 26 | Performed by: INTERNAL MEDICINE

## 2023-04-03 PROCEDURE — 93308 TTE F-UP OR LMTD: CPT | Mod: 26 | Performed by: INTERNAL MEDICINE

## 2023-04-04 ENCOUNTER — TELEPHONE (OUTPATIENT)
Dept: CARDIOLOGY | Facility: MEDICAL CENTER | Age: 68
End: 2023-04-04

## 2023-04-04 ENCOUNTER — TELEPHONE (OUTPATIENT)
Dept: CARDIOLOGY | Facility: MEDICAL CENTER | Age: 68
End: 2023-04-04
Payer: MEDICARE

## 2023-04-04 DIAGNOSIS — I30.0 RECURRENT IDIOPATHIC PERICARDITIS: Primary | ICD-10-CM

## 2023-04-04 DIAGNOSIS — I31.39 PERICARDIAL EFFUSION: ICD-10-CM

## 2023-04-04 DIAGNOSIS — R06.02 SOB (SHORTNESS OF BREATH): ICD-10-CM

## 2023-04-04 DIAGNOSIS — I10 PRIMARY HYPERTENSION: ICD-10-CM

## 2023-04-04 PROCEDURE — 99442 PR PHYSICIAN TELEPHONE EVALUATION 11-20 MIN: CPT | Mod: 95 | Performed by: INTERNAL MEDICINE

## 2023-04-04 NOTE — TELEPHONE ENCOUNTER
Patient is scheduled on 4-10-23 for a Pericardiocentesis with Dr.Al Man. No meds to stop and patient to check in at 6:30 for an 8:30 procedure. Updated H&P to be done on admit by NP. Pre admit to call patient.

## 2023-04-04 NOTE — TELEPHONE ENCOUNTER
"  Telephone Appointment Visit       Reason for Call:  Follow up on her recurrent pericardial effusion    HPI:    I reviewed her recent TTE results in details with her.    She has been limiting her activities due to symptoms related to exertion. She is also noted by her children that she \"occasionally breaths hard\". Able to perform her ALDs with occasional exertional symptoms. Advil helps with chest pains.    Uptodate on her cancer screening. Has occasional puffy ankles but not too concerning. No abdominal distension.    No systemic concerns for AI illness    Normal BP at home.    Labs / Images Reviewed:     TTE 4/3/23:  Compared to the prior study on 2-27-23, pericardial effusion is   slightly smaller in size.  Moderate pericardial effusion without echocardiographic evidence of   hemodynamic compromise.  Normal IVC size with inspiratory collapse. Estimated RAP 3mmHg  Normal LV systolic function. Estimated LVEF 65%  Normal RV size and systolic function.    Assessment and Plan:     1. Pericardial effusion  - CL-PERICARDIOCENTESIS; Future  - MR-CARDIAC MORPH/FUNC WITH & W/O; Future    2. Primary hypertension    3. SOB (shortness of breath)    4. Recurrent idiopathic pericarditis  - MR-CARDIAC MORPH/FUNC WITH & W/O; Future      - Continue colchicine 0.6mg BID  - Advil prn  - pericardiocentesis next week followed by cardiac MRI  - Prednisone for at least 2 weeks after the pericardiocentesis (plan for 4/10/23): 0.2-0.5 mg/kg daily  -repeat limited TTE 4 weeks after the pericardiocentesis before referring for pericardial window and fourth line therapies such as Rilonacept or Anakinra or Azathioprine    Total Time Spent (mins): 15 minutes    Chase Pena MD, MPH [facility]  Interventional Cardiologist  [facility]   of Clinical Internal Medicine - [facility] [place] [facility]   "

## 2023-04-04 NOTE — TELEPHONE ENCOUNTER
----- Message from Chase Pena M.D. sent at 4/4/2023  1:42 PM PDT -----  Regarding: pericardiocentesis 4/10/23  Leonard Groves,  Can you please schedule this patient for pericardiocentesis, no drain, next Monday with me.    Thank you

## 2023-04-06 ENCOUNTER — APPOINTMENT (OUTPATIENT)
Dept: ADMISSIONS | Facility: MEDICAL CENTER | Age: 68
End: 2023-04-06
Attending: INTERNAL MEDICINE
Payer: MEDICARE

## 2023-04-07 ENCOUNTER — PRE-ADMISSION TESTING (OUTPATIENT)
Dept: ADMISSIONS | Facility: MEDICAL CENTER | Age: 68
End: 2023-04-07
Attending: INTERNAL MEDICINE
Payer: MEDICARE

## 2023-04-07 DIAGNOSIS — Z01.812 PRE-OPERATIVE LABORATORY EXAMINATION: ICD-10-CM

## 2023-04-07 DIAGNOSIS — Z01.810 PRE-OPERATIVE CARDIOVASCULAR EXAMINATION: ICD-10-CM

## 2023-04-07 LAB
ALBUMIN SERPL BCP-MCNC: 4.5 G/DL (ref 3.2–4.9)
ALBUMIN/GLOB SERPL: 1.6 G/DL
ALP SERPL-CCNC: 80 U/L (ref 30–99)
ALT SERPL-CCNC: 72 U/L (ref 2–50)
ANION GAP SERPL CALC-SCNC: 12 MMOL/L (ref 7–16)
APTT PPP: 30.4 SEC (ref 24.7–36)
AST SERPL-CCNC: 41 U/L (ref 12–45)
BILIRUB SERPL-MCNC: 0.6 MG/DL (ref 0.1–1.5)
BUN SERPL-MCNC: 16 MG/DL (ref 8–22)
CALCIUM ALBUM COR SERPL-MCNC: 9 MG/DL (ref 8.5–10.5)
CALCIUM SERPL-MCNC: 9.4 MG/DL (ref 8.5–10.5)
CHLORIDE SERPL-SCNC: 100 MMOL/L (ref 96–112)
CO2 SERPL-SCNC: 24 MMOL/L (ref 20–33)
CREAT SERPL-MCNC: 0.86 MG/DL (ref 0.5–1.4)
ERYTHROCYTE [DISTWIDTH] IN BLOOD BY AUTOMATED COUNT: 43.7 FL (ref 35.9–50)
GFR SERPLBLD CREATININE-BSD FMLA CKD-EPI: 74 ML/MIN/1.73 M 2
GLOBULIN SER CALC-MCNC: 2.9 G/DL (ref 1.9–3.5)
GLUCOSE SERPL-MCNC: 89 MG/DL (ref 65–99)
HCT VFR BLD AUTO: 38.2 % (ref 37–47)
HGB BLD-MCNC: 12.5 G/DL (ref 12–16)
INR PPP: 0.99 (ref 0.87–1.13)
MCH RBC QN AUTO: 31.3 PG (ref 27–33)
MCHC RBC AUTO-ENTMCNC: 32.7 G/DL (ref 33.6–35)
MCV RBC AUTO: 95.5 FL (ref 81.4–97.8)
PLATELET # BLD AUTO: 408 K/UL (ref 164–446)
PMV BLD AUTO: 10.4 FL (ref 9–12.9)
POTASSIUM SERPL-SCNC: 3.7 MMOL/L (ref 3.6–5.5)
PROT SERPL-MCNC: 7.4 G/DL (ref 6–8.2)
PROTHROMBIN TIME: 13 SEC (ref 12–14.6)
RBC # BLD AUTO: 4 M/UL (ref 4.2–5.4)
SODIUM SERPL-SCNC: 136 MMOL/L (ref 135–145)
WBC # BLD AUTO: 5.7 K/UL (ref 4.8–10.8)

## 2023-04-07 PROCEDURE — 36415 COLL VENOUS BLD VENIPUNCTURE: CPT

## 2023-04-07 PROCEDURE — 85027 COMPLETE CBC AUTOMATED: CPT

## 2023-04-07 PROCEDURE — 85610 PROTHROMBIN TIME: CPT

## 2023-04-07 PROCEDURE — 93005 ELECTROCARDIOGRAM TRACING: CPT

## 2023-04-07 PROCEDURE — 80053 COMPREHEN METABOLIC PANEL: CPT

## 2023-04-07 PROCEDURE — 85730 THROMBOPLASTIN TIME PARTIAL: CPT

## 2023-04-08 LAB — EKG IMPRESSION: NORMAL

## 2023-04-08 PROCEDURE — 93010 ELECTROCARDIOGRAM REPORT: CPT | Performed by: INTERNAL MEDICINE

## 2023-04-10 ENCOUNTER — APPOINTMENT (OUTPATIENT)
Dept: CARDIOLOGY | Facility: MEDICAL CENTER | Age: 68
End: 2023-04-10
Attending: INTERNAL MEDICINE
Payer: MEDICARE

## 2023-04-10 ENCOUNTER — TELEPHONE (OUTPATIENT)
Dept: CARDIOLOGY | Facility: MEDICAL CENTER | Age: 68
End: 2023-04-10

## 2023-04-10 ENCOUNTER — APPOINTMENT (OUTPATIENT)
Dept: CARDIOLOGY | Facility: MEDICAL CENTER | Age: 68
End: 2023-04-10
Attending: PHYSICIAN ASSISTANT
Payer: MEDICARE

## 2023-04-10 ENCOUNTER — HOSPITAL ENCOUNTER (OUTPATIENT)
Facility: MEDICAL CENTER | Age: 68
End: 2023-04-10
Attending: INTERNAL MEDICINE | Admitting: INTERNAL MEDICINE
Payer: MEDICARE

## 2023-04-10 VITALS
OXYGEN SATURATION: 97 % | SYSTOLIC BLOOD PRESSURE: 121 MMHG | WEIGHT: 158.95 LBS | HEIGHT: 65 IN | BODY MASS INDEX: 26.48 KG/M2 | DIASTOLIC BLOOD PRESSURE: 71 MMHG | HEART RATE: 71 BPM | TEMPERATURE: 97.8 F | RESPIRATION RATE: 14 BRPM

## 2023-04-10 DIAGNOSIS — I31.39 PERICARDIAL EFFUSION: ICD-10-CM

## 2023-04-10 DIAGNOSIS — I30.0 IDIOPATHIC PERICARDITIS, UNSPECIFIED CHRONICITY: ICD-10-CM

## 2023-04-10 LAB
ALBUMIN FLD-MCNC: 1.6 G/DL
APPEARANCE FLD: NORMAL
BODY FLD TYPE: NORMAL
BODY FLD TYPE: NORMAL
COLOR FLD: NORMAL
CYTOLOGY REG CYTOL: NORMAL
GRAM STN SPEC: NORMAL
LV EJECT FRACT  99904: 60
LV EJECT FRACT  99904: 70
LV EJECT FRACT MOD 2C 99903: 55.83
LV EJECT FRACT MOD 4C 99902: 67.17
LV EJECT FRACT MOD BP 99901: 64.08
LYMPHOCYTES NFR FLD: 78 %
MONONUC CELLS NFR FLD: 22 %
PROT FLD-MCNC: 2.4 G/DL
RHODAMINE-AURAMINE STN SPEC: NORMAL
SIGNIFICANT IND 70042: NORMAL
SIGNIFICANT IND 70042: NORMAL
SITE SITE: NORMAL
SITE SITE: NORMAL
SOURCE SOURCE: NORMAL
SOURCE SOURCE: NORMAL
WBC # FLD: NORMAL CELLS/UL

## 2023-04-10 PROCEDURE — 99152 MOD SED SAME PHYS/QHP 5/>YRS: CPT | Performed by: INTERNAL MEDICINE

## 2023-04-10 PROCEDURE — 88341 IMHCHEM/IMCYTCHM EA ADD ANTB: CPT

## 2023-04-10 PROCEDURE — 87015 SPECIMEN INFECT AGNT CONCNTJ: CPT

## 2023-04-10 PROCEDURE — 87205 SMEAR GRAM STAIN: CPT

## 2023-04-10 PROCEDURE — 87116 MYCOBACTERIA CULTURE: CPT

## 2023-04-10 PROCEDURE — 93308 TTE F-UP OR LMTD: CPT | Mod: 26 | Performed by: INTERNAL MEDICINE

## 2023-04-10 PROCEDURE — 700111 HCHG RX REV CODE 636 W/ 250 OVERRIDE (IP): Performed by: PHYSICIAN ASSISTANT

## 2023-04-10 PROCEDURE — 160036 HCHG PACU - EA ADDL 30 MINS PHASE I

## 2023-04-10 PROCEDURE — 88342 IMHCHEM/IMCYTCHM 1ST ANTB: CPT

## 2023-04-10 PROCEDURE — 84157 ASSAY OF PROTEIN OTHER: CPT

## 2023-04-10 PROCEDURE — 160035 HCHG PACU - 1ST 60 MINS PHASE I

## 2023-04-10 PROCEDURE — 99153 MOD SED SAME PHYS/QHP EA: CPT

## 2023-04-10 PROCEDURE — 93325 DOPPLER ECHO COLOR FLOW MAPG: CPT | Mod: 26 | Performed by: INTERNAL MEDICINE

## 2023-04-10 PROCEDURE — 89051 BODY FLUID CELL COUNT: CPT

## 2023-04-10 PROCEDURE — 700111 HCHG RX REV CODE 636 W/ 250 OVERRIDE (IP)

## 2023-04-10 PROCEDURE — 93321 DOPPLER ECHO F-UP/LMTD STD: CPT | Mod: 26 | Performed by: INTERNAL MEDICINE

## 2023-04-10 PROCEDURE — 88112 CYTOPATH CELL ENHANCE TECH: CPT

## 2023-04-10 PROCEDURE — 87070 CULTURE OTHR SPECIMN AEROBIC: CPT

## 2023-04-10 PROCEDURE — 88305 TISSUE EXAM BY PATHOLOGIST: CPT

## 2023-04-10 PROCEDURE — 160046 HCHG PACU - 1ST 60 MINS PHASE II

## 2023-04-10 PROCEDURE — 87206 SMEAR FLUORESCENT/ACID STAI: CPT

## 2023-04-10 PROCEDURE — 93325 DOPPLER ECHO COLOR FLOW MAPG: CPT

## 2023-04-10 PROCEDURE — 33017 PRCRD DRG 6YR+ W/O CGEN CAR: CPT | Performed by: INTERNAL MEDICINE

## 2023-04-10 PROCEDURE — 700101 HCHG RX REV CODE 250

## 2023-04-10 PROCEDURE — 93308 TTE F-UP OR LMTD: CPT

## 2023-04-10 PROCEDURE — 160002 HCHG RECOVERY MINUTES (STAT)

## 2023-04-10 PROCEDURE — 82042 OTHER SOURCE ALBUMIN QUAN EA: CPT

## 2023-04-10 RX ORDER — HEPARIN SODIUM 200 [USP'U]/100ML
INJECTION, SOLUTION INTRAVENOUS
Status: COMPLETED
Start: 2023-04-10 | End: 2023-04-10

## 2023-04-10 RX ORDER — MIDAZOLAM HYDROCHLORIDE 1 MG/ML
INJECTION INTRAMUSCULAR; INTRAVENOUS
Status: COMPLETED
Start: 2023-04-10 | End: 2023-04-10

## 2023-04-10 RX ORDER — SODIUM CHLORIDE 9 MG/ML
1000 INJECTION, SOLUTION INTRAVENOUS
Status: DISCONTINUED | OUTPATIENT
Start: 2023-04-10 | End: 2023-04-10 | Stop reason: HOSPADM

## 2023-04-10 RX ORDER — PREDNISONE 10 MG/1
TABLET ORAL
Qty: 49 TABLET | Refills: 0 | Status: SHIPPED | OUTPATIENT
Start: 2023-04-10 | End: 2023-05-01

## 2023-04-10 RX ORDER — KETOROLAC TROMETHAMINE 30 MG/ML
15 INJECTION, SOLUTION INTRAMUSCULAR; INTRAVENOUS ONCE
Status: COMPLETED | OUTPATIENT
Start: 2023-04-10 | End: 2023-04-10

## 2023-04-10 RX ORDER — METHYLPREDNISOLONE SODIUM SUCCINATE 125 MG/2ML
125 INJECTION, POWDER, LYOPHILIZED, FOR SOLUTION INTRAMUSCULAR; INTRAVENOUS ONCE
Status: COMPLETED | OUTPATIENT
Start: 2023-04-10 | End: 2023-04-10

## 2023-04-10 RX ORDER — LIDOCAINE HYDROCHLORIDE 20 MG/ML
INJECTION, SOLUTION INFILTRATION; PERINEURAL
Status: COMPLETED
Start: 2023-04-10 | End: 2023-04-10

## 2023-04-10 RX ADMIN — FENTANYL CITRATE 100 MCG: 50 INJECTION, SOLUTION INTRAMUSCULAR; INTRAVENOUS at 10:04

## 2023-04-10 RX ADMIN — LIDOCAINE HYDROCHLORIDE: 20 INJECTION, SOLUTION INFILTRATION; PERINEURAL at 09:48

## 2023-04-10 RX ADMIN — MIDAZOLAM HYDROCHLORIDE 2 MG: 1 INJECTION, SOLUTION INTRAMUSCULAR; INTRAVENOUS at 09:58

## 2023-04-10 RX ADMIN — FENTANYL CITRATE 100 MCG: 50 INJECTION, SOLUTION INTRAMUSCULAR; INTRAVENOUS at 10:16

## 2023-04-10 RX ADMIN — MIDAZOLAM HYDROCHLORIDE 1 MG: 1 INJECTION, SOLUTION INTRAMUSCULAR; INTRAVENOUS at 10:16

## 2023-04-10 RX ADMIN — HEPARIN SODIUM 2000 UNITS: 200 INJECTION, SOLUTION INTRAVENOUS at 09:51

## 2023-04-10 RX ADMIN — KETOROLAC TROMETHAMINE 15 MG: 30 INJECTION, SOLUTION INTRAMUSCULAR at 13:18

## 2023-04-10 RX ADMIN — METHYLPREDNISOLONE SODIUM SUCCINATE 125 MG: 125 INJECTION, POWDER, FOR SOLUTION INTRAMUSCULAR; INTRAVENOUS at 14:03

## 2023-04-10 RX ADMIN — KETOROLAC TROMETHAMINE 15 MG: 30 INJECTION, SOLUTION INTRAMUSCULAR at 12:16

## 2023-04-10 ASSESSMENT — PAIN DESCRIPTION - PAIN TYPE
TYPE: SURGICAL PAIN

## 2023-04-10 ASSESSMENT — FIBROSIS 4 INDEX: FIB4 SCORE: 0.77

## 2023-04-10 NOTE — TELEPHONE ENCOUNTER
Noted below from AL:  Received: Today  Chase Pena M.D.  ZULEYMA Peterson.  Can you please help with the following:   - reschedule her upcoming limited TTE to May 15th   - connect with Radiology to schedule her cardiac MRI sometime over next 7-10 days.     AL: Please review below and advise. Thank you !  Per radiology scheduling, no Echo appointments available until mid June. Unable to accommodate above request. No sooner appointments guaranteed if ordered as STAT. 4/28/23 appointment remains as is. Added to cancellation list for 5/15-5/18 availability.   Attempted to schedule Cardiac MRI w/in 7-10 days. Soonest available is May 23. Appointment scheduled in order to hold spot. Check in at 1230p [facility] (only location available to schedule these exams).  STAT appointments available for same day or next day only.     Time spent on call: 29 minutes

## 2023-04-10 NOTE — OR NURSING
Patient A+Ox4. Denies pain or nausea.  Patient small island dressing to mid chest clean and dry. VSS. Patient plan for discharge home when meets criteria

## 2023-04-10 NOTE — OR NURSING
Dr. Man at bedside. Consent verified and signed . NPO and allergies verified . IV started at Left Ac g 18 - flushed and saline lock. Call bell at the bedside. Belongings to locker 48 .

## 2023-04-10 NOTE — PROCEDURES
"DATE: 4/10/2023    : Chase Pena MD, MPH    PROCEDURES PERFORMED:  Pericardiocentesis  Moderate Sedation  Limited Echocardiogram to guide pericardiocentesis with the use of agitated saline      CONSENT:  The complete alternatives, risks, and benefits of the procedure were explained to the patient.  Signed informed consent was obtained and placed in the chart prior to the procedure.  A timeout was performed prior to beginning the procedure.      INDICATIONS:  Recurrent pericardial effusion  Chronic pericarditis    MEDICATIONS:  Lidocaine  Fentanyl  Midazolam    CONTRAST: None    ESTIMATED BLOOD LOSS: <10 cc    COMPLICATIONS: None    DESCRIPTION OF PROCEDURE:  The patient was brought to the cardiac catheterization laboratory in the fasting state.  The skin over the subxiphoid was prepped and draped in the usual sterile fashion. Lidocaine infiltration was used to anesthetize the tissue over the subxiphoid.  Using ultrasound guidance, a micropuncture needle was advanced into the pericardial space. The micropuncture needle was exchanged over a micro wire for the micropuncture dilator.  Agitated saline was injected through the micro dilator to confirm placement in the pericardial space.  After confirming appropriate placement, the micro dilator was exchanged for the micropuncture sheath.  A 0.035\"  wire was then advanced into the pericardial space with appropriate placement confirmed by fluoroscopy.  A 6-Portuguese pericardial drain was then advanced over the wire and 200 cc of serous fluid was removed.  Echocardiography demonstrated minimal residual fluid and the drain was sewn in place for use on the floor. Patient tolerated procedure well at the Cath Lab in stable condition.    IMPRESSION:  Successful pericardiocentesis with removal of 200 cc of serous fluid.    RECOMMENDATIONS:  - Recovery for 1-2 hours before discharge  - Prednisone with taper ordered to her pharmacy  - we will facilitate scheduling her " upcoming cardiac MRI and reschedule her upcoming limited echocardiogram to sometime around 5/18/23.  -Continue colchicine 0.6 mg twice a day    NOTIFICATION:  The patient's friend - Pamela-  was called and notified of the results.    Chase Pena MD, MPH [facility]  Interventional Cardiologist  [facility]   of Clinical Internal Medicine - [facility]

## 2023-04-10 NOTE — DISCHARGE INSTRUCTIONS
HOME CARE INSTRUCTIONS    ACTIVITY: Rest and take it easy for the first 24 hours.  A responsible adult is recommended to remain with you during that time.  It is normal to feel sleepy.  We encourage you to not do anything that requires balance, judgment or coordination.    FOR 24 HOURS DO NOT:  Drive, operate machinery or run household appliances.  Drink beer or alcoholic beverages.  Make important decisions or sign legal documents.  Special Instructions:   Pericardiocentesis, Care After  This sheet gives you information about how to care for yourself after your procedure. Your health care provider may also give you more specific instructions. If you have problems or questions, contact your health care provider.  What can I expect after the procedure?  After the procedure, it is common to have:  Soreness at the puncture site.  Follow these instructions at home:  Puncture site care  Follow instructions from your health care provider about how to take care of your puncture site. Make sure you:  Wash your hands with soap and water before you change your bandage (dressing). If soap and water are not available, use hand .  Change your dressing as told by your health care provider.  Check your puncture area every day for signs of infection. Check for:  Redness, swelling, or pain.  Fluid or blood.  Warmth.  Pus or a bad smell.  Do not take baths, swim, or use a hot tub until your health care provider approves. Ask your health care provider when you can start taking showers.  General instructions  Take over-the-counter and prescription medicines only as told by your health care provider.  Ask your health care provider whether you can taken medicines such as aspirin and ibuprofen. These medicines can cause bleeding after a procedure.  Do not use any products that contain nicotine or tobacco, such as cigarettes and e-cigarettes. If you need help quitting, ask your health care provider.  Rest at home and avoid  vigorous activity for a few days. Return to your normal activities as directed by your health care provider.  Keep all follow-up visits as told by your health care provider. This is important.  Contact a health care provider if you:  Have a fever.  Have redness, swelling, or pain at the site of your puncture.  Have fluid, blood, or pus coming from your puncture site.  Have a cough.  Get help right away if:  You have chest pain.  You have trouble breathing.  Your heart pounds or feels like it is skipping beats (palpitations).  You feel dizzy or light-headed.  These symptoms may represent a serious problem that is an emergency. Do not wait to see if the symptoms will go away. Get medical help right away. Call your local emergency services (911 in the [place] Do not drive yourself to the hospital.  Summary  After your procedure, it is common to have soreness at the site where the needle was inserted.  Follow instructions from your health care provider about how to take care of your puncture site.  Take over-the-counter and prescription medicines only as told by your health care provider. You may be asked to avoid taking medicines such as aspirin and ibuprofen. These can cause bleeding after a procedure.  Get help right away if you have palpitations, lightheadedness, chest pain, or trouble breathing.    DIET: To avoid nausea, slowly advance diet as tolerated, avoiding spicy or greasy foods for the first day.  Add more substantial food to your diet according to your physician's instructions.  Babies can be fed formula or breast milk as soon as they are hungry.  INCREASE FLUIDS AND FIBER TO AVOID CONSTIPATION.    SURGICAL DRESSING/BATHING: Leave dressing clean, dry and intact for 24 hours. Can be removed tomorrow. OK to shower, but no submerging in water, no bathtub and swimming pool until follow-up appointment     MEDICATIONS: Resume taking daily medication.  Take prescribed pain medication with food.  If no medication is  prescribed, you may take non-aspirin pain medication if needed.  PAIN MEDICATION CAN BE VERY CONSTIPATING.  Take a stool softener or laxative such as senokot, pericolace, or milk of magnesia if needed.    Prescription given for  Famotidine and Prednisone     A follow-up appointment should be arranged with your doctor in 1-2 weeks ; call to schedule.    You should CALL YOUR PHYSICIAN if you develop:  Fever greater than 101 degrees F.  Pain not relieved by medication, or persistent nausea or vomiting.  Excessive bleeding (blood soaking through dressing) or unexpected drainage from the wound.  Extreme redness or swelling around the incision site, drainage of pus or foul smelling drainage.  Inability to urinate or empty your bladder within 8 hours.  Problems with breathing or chest pain.    You should call 911 if you develop problems with breathing or chest pain.  If you are unable to contact your doctor or surgical center, you should go to the nearest emergency room or urgent care center.  Physician's telephone #: Dr Man [phone]    MILD FLU-LIKE SYMPTOMS ARE NORMAL.  YOU MAY EXPERIENCE GENERALIZED MUSCLE ACHES, THROAT IRRITATION, HEADACHE AND/OR SOME NAUSEA.    If any questions arise, call your doctor.  If your doctor is not available, please feel free to call the Surgical Center at [phone].  The Center is open Monday through Friday from 7AM to 7PM.      A registered nurse may call you a few days after your surgery to see how you are doing after your procedure.    You may also receive a survey in the mail within the next two weeks and we ask that you take a few moments to complete the survey and return it to us.  Our goal is to provide you with very good care and we value your comments.     Depression / Suicide Risk    As you are discharged from this [facility] facility, it is important to learn how to keep safe from harming yourself.    Recognize the warning signs:  Abrupt changes in personality,  positive or negative- including increase in energy   Giving away possessions  Change in eating patterns- significant weight changes-  positive or negative  Change in sleeping patterns- unable to sleep or sleeping all the time   Unwillingness or inability to communicate  Depression  Unusual sadness, discouragement and loneliness  Talk of wanting to die  Neglect of personal appearance   Rebelliousness- reckless behavior  Withdrawal from people/activities they love  Confusion- inability to concentrate     If you or a loved one observes any of these behaviors or has concerns about self-harm, here's what you can do:  Talk about it- your feelings and reasons for harming yourself  Remove any means that you might use to hurt yourself (examples: pills, rope, extension cords, firearm)  Get professional help from the community (Mental Health, Substance Abuse, psychological counseling)  Do not be alone:Call your Safe Contact- someone whom you trust who will be there for you.  Call your local CRISIS HOTLINE 565-1001 or 594-586-5635  Call your local Children's Mobile Crisis Response Team [place] (691) 956-7059 or www.[employer]  Call the toll free National Suicide Prevention Hotlines   National Suicide Prevention Lifeline 919-087-CTJP (9534)  National Hope Line Network 800-SUICIDE (355-0919)    I acknowledge receipt and understanding of these Home Care instructions.

## 2023-04-10 NOTE — OR NURSING
Patient given repeat Torodol 15mg iv as ordered.  Patient to have stat Echo now.  And will cont tele monitoring for 4 hours.  Friend Pamela updated with patient status and plan

## 2023-04-10 NOTE — OR NURSING
Daughter updated with patient status and plan. Patient able to talk with her.  VSS. To have Echo now. Tech at bedside

## 2023-04-10 NOTE — OR NURSING
1510: Assumed patient patient care, handoff report from pacu RNDiane. Pt resting, appears comfortable. Bedrest and tele monitoring until 1730 per cardiology.     1600: Per doctor Avinash Man, he would like patient to ambulate at this time. F/u with him to see how patient tolerated.     1605: Patient ambulated around unit, tolerated well, denies SOB, or change/increase to pain. RAD Molina at bedside to round on patient. Patient stating she would like to go home, feels comfortable with plan to discharge. Telephone message left for Pamela kiser regarding updates.     1615: Per doctor Avinash Man, patient patient can discharge home today. Monitor in pacu until 1630.     1633: Report given to phase 2 Maria G GONZALEZ.

## 2023-04-10 NOTE — OR NURSING
Patricia DUNNE again texted.  Patient states torodol ineffective to relieve pain to left chest.  She texted back to say she will come to bedside to assess patient.

## 2023-04-10 NOTE — OR NURSING
Pt's VSS; denies N/V; states pain is at tolerable level. Dressing CDI to mid chest. D/c orders received. IV dc'd. Pt changed into clothing with assistance. Pt up and ambulated to BR, steady gait, voided adequately. Discharge instructions given as well as pain management handout; pt and family verbalized understanding and questions answered. Patient states ready to d/c home. Prescriptions e-sent to preferred pharmacy . Pt dc'd in w/c with RN in stable condition.

## 2023-04-14 ENCOUNTER — APPOINTMENT (OUTPATIENT)
Dept: CARDIOLOGY | Facility: MEDICAL CENTER | Age: 68
End: 2023-04-14
Payer: MEDICARE

## 2023-04-17 ENCOUNTER — HOSPITAL ENCOUNTER (OUTPATIENT)
Dept: RADIOLOGY | Facility: MEDICAL CENTER | Age: 68
End: 2023-04-17
Attending: INTERNAL MEDICINE
Payer: MEDICARE

## 2023-04-17 DIAGNOSIS — I30.0 RECURRENT IDIOPATHIC PERICARDITIS: ICD-10-CM

## 2023-04-17 DIAGNOSIS — I31.39 PERICARDIAL EFFUSION: ICD-10-CM

## 2023-04-17 PROCEDURE — 75561 CARDIAC MRI FOR MORPH W/DYE: CPT

## 2023-04-17 PROCEDURE — 700117 HCHG RX CONTRAST REV CODE 255: Performed by: INTERNAL MEDICINE

## 2023-04-17 PROCEDURE — A9578 INJ MULTIHANCE MULTIPACK: HCPCS | Performed by: INTERNAL MEDICINE

## 2023-04-17 RX ADMIN — GADOBENATE DIMEGLUMINE 15 ML: 529 INJECTION, SOLUTION INTRAVENOUS at 10:32

## 2023-04-24 DIAGNOSIS — Z79.52 ON PREDNISONE THERAPY: Primary | ICD-10-CM

## 2023-04-24 PROBLEM — E66.3 OVERWEIGHT WITH BODY MASS INDEX (BMI) OF 26 TO 26.9 IN ADULT: Status: ACTIVE | Noted: 2023-04-24

## 2023-05-02 ENCOUNTER — PATIENT MESSAGE (OUTPATIENT)
Dept: CARDIOLOGY | Facility: MEDICAL CENTER | Age: 68
End: 2023-05-02
Payer: MEDICARE

## 2023-05-02 ENCOUNTER — HOSPITAL ENCOUNTER (OUTPATIENT)
Dept: LAB | Facility: MEDICAL CENTER | Age: 68
End: 2023-05-02
Attending: INTERNAL MEDICINE
Payer: MEDICARE

## 2023-05-02 DIAGNOSIS — Z79.52 ON PREDNISONE THERAPY: ICD-10-CM

## 2023-05-02 LAB
ANION GAP SERPL CALC-SCNC: 10 MMOL/L (ref 7–16)
BUN SERPL-MCNC: 13 MG/DL (ref 8–22)
CALCIUM SERPL-MCNC: 8.9 MG/DL (ref 8.5–10.5)
CHLORIDE SERPL-SCNC: 100 MMOL/L (ref 96–112)
CO2 SERPL-SCNC: 26 MMOL/L (ref 20–33)
CREAT SERPL-MCNC: 1.07 MG/DL (ref 0.5–1.4)
FASTING STATUS PATIENT QL REPORTED: NORMAL
GFR SERPLBLD CREATININE-BSD FMLA CKD-EPI: 57 ML/MIN/1.73 M 2
GLUCOSE SERPL-MCNC: 86 MG/DL (ref 65–99)
MAGNESIUM SERPL-MCNC: 2.2 MG/DL (ref 1.5–2.5)
POTASSIUM SERPL-SCNC: 4.6 MMOL/L (ref 3.6–5.5)
SODIUM SERPL-SCNC: 136 MMOL/L (ref 135–145)

## 2023-05-02 PROCEDURE — 80048 BASIC METABOLIC PNL TOTAL CA: CPT

## 2023-05-02 PROCEDURE — 36415 COLL VENOUS BLD VENIPUNCTURE: CPT

## 2023-05-02 PROCEDURE — 83735 ASSAY OF MAGNESIUM: CPT

## 2023-05-05 ENCOUNTER — OFFICE VISIT (OUTPATIENT)
Dept: CARDIOLOGY | Facility: MEDICAL CENTER | Age: 68
End: 2023-05-05
Payer: MEDICARE

## 2023-05-05 VITALS
WEIGHT: 158 LBS | HEIGHT: 65 IN | RESPIRATION RATE: 16 BRPM | OXYGEN SATURATION: 98 % | DIASTOLIC BLOOD PRESSURE: 66 MMHG | HEART RATE: 71 BPM | SYSTOLIC BLOOD PRESSURE: 94 MMHG | BODY MASS INDEX: 26.33 KG/M2

## 2023-05-05 DIAGNOSIS — I95.9 HYPOTENSION, UNSPECIFIED HYPOTENSION TYPE: ICD-10-CM

## 2023-05-05 DIAGNOSIS — I49.9 IRREGULAR HEART BEAT: Primary | ICD-10-CM

## 2023-05-05 DIAGNOSIS — I49.3 PVC (PREMATURE VENTRICULAR CONTRACTION): ICD-10-CM

## 2023-05-05 DIAGNOSIS — I10 ESSENTIAL HYPERTENSION, BENIGN: ICD-10-CM

## 2023-05-05 DIAGNOSIS — I49.1 PREMATURE ATRIAL CONTRACTIONS: ICD-10-CM

## 2023-05-05 LAB — EKG IMPRESSION: NORMAL

## 2023-05-05 PROCEDURE — 93000 ELECTROCARDIOGRAM COMPLETE: CPT | Performed by: INTERNAL MEDICINE

## 2023-05-05 PROCEDURE — 99214 OFFICE O/P EST MOD 30 MIN: CPT | Performed by: INTERNAL MEDICINE

## 2023-05-05 RX ORDER — PREDNISONE 10 MG/1
TABLET ORAL
Qty: 25 TABLET | Refills: 0 | Status: SHIPPED | OUTPATIENT
Start: 2023-05-05 | End: 2023-05-24

## 2023-05-05 RX ORDER — PREDNISONE 2.5 MG/1
TABLET ORAL
Qty: 10 TABLET | Refills: 0 | Status: SHIPPED | OUTPATIENT
Start: 2023-05-05 | End: 2023-05-19

## 2023-05-05 ASSESSMENT — FIBROSIS 4 INDEX: FIB4 SCORE: 0.78

## 2023-05-05 NOTE — PATIENT INSTRUCTIONS
Prednisone 20mg 5/5/23 - 5/11/23  Prednisone 10mg 5/12/23 - 5/18/23  Prednisone 5mg 5/19/23 - 5/25/23  Prednisone 2.5mg 5/26/23 - 6/1/23  Prednisone 2.5mg on M and W and F : 6/5/23, 6/7/23 and 6/9/23    Stop Lisinopril    Once you notice BP > 130/80mmHg consistent over 3 days, resume lisinopril 10mg twice a day    Keep checking your BP am and pm

## 2023-05-05 NOTE — TELEPHONE ENCOUNTER
Can we please get her into clinic today to do an EKG and BP check and if in normal rhythm place a heart monitor - ZioXT 2 weeks? If in A fib, call me ?

## 2023-05-05 NOTE — PATIENT COMMUNICATION
Patient added to AL schedule today per AL Voalte message. Called and spoke to patient and confirmed time.

## 2023-05-05 NOTE — PROGRESS NOTES
"CARDIOLOGY established PATIENT:    PCP: Bassam Marinelli M.D.    1. Irregular heart beat    2. Essential hypertension, benign    3. PVC (premature ventricular contraction)    4. Premature atrial contractions    5. Hypotension, unspecified hypotension type        Tita Pitts is here for follow up per my request due to concerns for hypotension at home with \"irregular heart beats\".    Chief Complaint   Patient presents with    Shortness of Breath    Hypertension     F/V Dx: Essential hypertension, benign         History:   Tita Pitts is a 68 y.o. female with HTN , PVCs, rec pericardial effusion is here for  follow up re hypotension and irregular heart beat concerns at home.     She had a CAC score scan 11/2022 that showed zero calcium score but a pericardial effusion with subsequent TTE and hence seen in my clinic. 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 She had pericarditis at a younger age. We tried colchicine for a month and then due to persistent effusion we successfully performed pericardiocentesis of [id number] serous fluid 12/28/22 and continued colchicine. We repeated a TTE 2 months later that showed recurrent effusion, and arranged for another pericardiocentesis 4/10/23 (200ml fluid removed) and started her on Prednisone (taper for ~ 4 weeks).    Due to occasional low BP, amlodipine was stopped 5/2/23.    Took the Pred taper for 21 days, last dose a week ago.    Since off the steroids, feeling tired and noticed occasional low BP. Having substernal chest pressure and today while being active she noticed exertional SOB. While on Prednisone, she felt better. Denies dizziness, LH, syncope, presyncope, fevers, chills.    This week, clearing her throat with runny nose, with occasional cough. On fonase. Gets these every Spring.    Today:  Sitting 94/66mmHg, HR 71bpm  Standing 90/60mmHg, HR 91bpm    cMRI 4/17/23:  1.  Small pericardial effusion. Normal " "pericardial thickness. No MR evidence of constrictive pericarditis.  2.  Normal left ventricular size and function, with estimated LVEF of 60.6%. No delayed enhancement.  3.  Trivial aortic regurgitation.    PE:  BP 94/66 (BP Location: Left arm, Patient Position: Sitting, BP Cuff Size: Adult)   Pulse 71   Resp 16   Ht 1.651 m (5' 5\")   Wt 71.7 kg (158 lb)   LMP  (LMP Unknown)   SpO2 98%   BMI 26.29 kg/m²     Gen: well  HEENT: Symmetric face. Anicteric sclerae. Moist mucus membranes  NECK: No JVD.   CARDIAC: Regular, Normal S1, S2, No murmur  VASCULATURE: carotids are normal bilaterally without bruit  RESP: Clear to auscultation bilaterally   ABD: Soft, non-tender, non-distended  EXT: No edema, no clubbing or cyanosis  SKIN: Warm and dry  NEURO: No gross deficits  PSYCH: Appropriate affect, participates in conversation    The 10-year ASCVD risk score (Yo LINCOLN, et al., 2019) is: 3.9%    Past Medical History:   Diagnosis Date    Allergy     Anesthesia     ponv    Breath shortness 2022    Related to current pericarditis    Bronchitis     history of    Cataract     NOT surgically removed    Gynecological disorder 1986    Had Hysterectomy 1990    Hypertension     well controlled on medication    Pericarditis 04/07/2023    Pneumonia     history of, in 20's    PONV (postoperative nausea and vomiting)     Psychiatric problem 12/27/2022    grief - in counseling     Past Surgical History:   Procedure Laterality Date    APPENDECTOMY  1986    ABDOMINAL HYSTERECTOMY TOTAL      GYN SURGERY      laparoscopy for endometriosis    OTHER CARDIAC SURGERY       Allergies   Allergen Reactions    Other Drug Unspecified     Ruvert = Other reaction(s): Welts on legs    Macrodantin [Nitrofurantoin]      nightmares     Outpatient Encounter Medications as of 5/5/2023   Medication Sig Dispense Refill    predniSONE (DELTASONE) 10 MG Tab Take 2 Tablets by mouth every day for 7 days, THEN 1 Tablet every day for 7 days, THEN 0.5 Tablets " every day for 7 days. 25 Tablet 0    prednisONE (DELTASONE) 2.5 MG Tab Take 1 Tablet by mouth every day for 7 days, THEN 1 Tablet every Monday, Wednesday, and Friday for 7 days. 10 Tablet 0    Fluticasone Propionate (FLONASE NA) Administer  into affected nostril(S).      Oral Electrolytes (PEDIALYTE PO) Take  by mouth.      colchicine (COLCRYS) 0.6 MG Tab Take 1 Tablet by mouth 2 times a day. 180 Tablet 1    lisinopril (PRINIVIL) 10 MG Tab Take 1 Tablet by mouth 2 times a day. 180 Tablet 3    estradiol (ESTRACE) 0.5 MG tablet Take 0.5 mg by mouth two times a week. 1/2 tab every 6 days- weaning off with MD instructions      vitamin D3 (CHOLECALCIFEROL) 1000 Unit (25 mcg) Tab Take 1,000 Units by mouth every day.      calcium carbonate (OS-RADHA 500) 1250 (500 Ca) MG Tab Take 500 mg by mouth every day.      Ascorbic Acid (VITAMIN C PO) Take  by mouth every day.      Magnesium 400 MG Cap Take  by mouth. (Patient not taking: Reported on 5/5/2023)      famotidine (PEPCID) 40 MG Tab Take 1 Tablet by mouth every day. (Patient not taking: Reported on 5/5/2023) 30 Tablet 1    [DISCONTINUED] amLODIPine (NORVASC) 5 MG Tab Take 1 Tablet by mouth every evening. (Patient not taking: Reported on 5/5/2023) 90 Tablet 3     No facility-administered encounter medications on file as of 5/5/2023.     Social History     Socioeconomic History    Marital status:      Spouse name: Not on file    Number of children: Not on file    Years of education: Not on file    Highest education level: Bachelor's degree (e.g., BA, AB, BS)   Occupational History    Not on file   Tobacco Use    Smoking status: Never     Passive exposure: Past    Smokeless tobacco: Never   Vaping Use    Vaping Use: Never used   Substance and Sexual Activity    Alcohol use: Not Currently     Alcohol/week: 0.6 oz     Types: 1 Glasses of wine per week     Comment: Some alcohol socially over the years.  Not regular use    Drug use: Never    Sexual activity: Not Currently      Partners: Male     Birth control/protection: Surgical, Abstinence, None, Post-Menopausal     Comment: Had Endometriosis had hysterectomy.   Other Topics Concern    Not on file   Social History Narrative    Not on file     Social Determinants of Health     Financial Resource Strain: Not on file   Food Insecurity: Not on file   Transportation Needs: Not on file   Physical Activity: Not on file   Stress: Not on file   Social Connections: Not on file   Intimate Partner Violence: Not on file   Housing Stability: Not on file     Family History   Problem Relation Age of Onset    Breast Cancer Mother     Diabetes Mother         Developed in 70s, at 90 Kidney failure    Hypertension Mother         diagnosed in 70s    Hyperlipidemia Mother         diagnosed in 70s    Kidney Disease Mother     Heart Disease Father          at 64, CHF, CAD    Heart Disease Brother         Heart Attack at 72    Stroke Brother         occurred at 78    Diabetes Maternal Uncle          at 68         Studies    Lab Results   Component Value Date/Time    TSHULTRASEN 2.110 2022 0932      No results found for: FREET4   Lab Results   Component Value Date/Time    HBA1C 5.2 2022 08:19 AM     Lab Results   Component Value Date/Time    CHOLSTRLTOT 167 2023 09:21 AM    LDL 89 2023 09:21 AM    HDL 63 2023 09:21 AM    TRIGLYCERIDE 76 2023 09:21 AM       Lab Results   Component Value Date/Time    SODIUM 136 2023 07:58 AM    POTASSIUM 4.6 2023 07:58 AM    CHLORIDE 100 2023 07:58 AM    CO2 26 2023 07:58 AM    GLUCOSE 86 2023 07:58 AM    BUN 13 2023 07:58 AM    CREATININE 1.07 2023 07:58 AM     Lab Results   Component Value Date/Time    ALKPHOSPHAT 80 2023 03:02 PM    ASTSGOT 41 2023 03:02 PM    ALTSGPT 72 (H) 2023 03:02 PM    TBILIRUBIN 0.6 2023 03:02 PM        Echocardiogram:  No results found for this or any previous visit.    ECG:  5/5/23 personally reviewed and interpreted  Sinus bradycardia 58bpm   Atrial premature complex   Right atrial enlargement   Low voltage, precordial leads   Baseline wander in lead(s) III,V1   Compared to ECG 04/07/2023 15:10:13   Atrial premature complex(es) now present   Electronically Signed On 5-5-2023 14:28:18 PDT by Chase Pena MD     Assessment and Recommendations:    Problem List Items Addressed This Visit       Essential hypertension, benign    Relevant Orders    EKG (Completed)    PVC (premature ventricular contraction)    Premature atrial contractions    Hypotension    Relevant Medications    predniSONE (DELTASONE) 10 MG Tab    prednisONE (DELTASONE) 2.5 MG Tab    Irregular heart beat - Primary     Her home irregularity in heart rate is probably related to PACs and/or PVCs. Continue to monitor.    I am concerned about mild adrenal sufficiency with low clinical concern for hemodynamically significant pericardial effusion given reassuring physical exam, EKG and symptomatology.    Reassuring recent labs.    Advised her on following:    Prednisone 20mg 5/5/23 - 5/11/23  Prednisone 10mg 5/12/23 - 5/18/23  Prednisone 5mg 5/19/23 - 5/25/23  Prednisone 2.5mg 5/26/23 - 6/1/23  Prednisone 2.5mg on M and W and F : 6/5/23, 6/7/23 and 6/9/23    Stop Lisinopril    Once BP > 130/80mmHg consistent over 3 days, resume lisinopril 10mg twice a day    Keep checking am and pm BP    Advised her on ER precautions.      Thank you for the opportunity to be involved in Tita Pitts 's care; and please reach out with any questions or concerns.    No follow-ups on file. Has appt in ~ 3 weeks with me after a repeat limited TTE.    Chase Pena MD, MPH FACC [facility]  Interventional Cardiologist  [facility]   of Clinical Internal Medicine - [facility]    ~ Portions of this note were completed using voice recognition software (Dragon Naturally speaking  software) . Occasional transcription errors may have escaped proof reading. I have made every reasonable attempt to correct obvious errors, but I expect that there are errors of grammar and possibly content that I did not discover before finalizing the note. ~

## 2023-05-08 ENCOUNTER — TELEPHONE (OUTPATIENT)
Dept: CARDIOLOGY | Facility: MEDICAL CENTER | Age: 68
End: 2023-05-08
Payer: MEDICARE

## 2023-05-08 NOTE — TELEPHONE ENCOUNTER
"Called and talked to patient to follow up from Friday. Patient states her BP is \"bouncing around\" between 120's/80's to 147/91 with HR 73 about an hour ago.   BP not going low like it was. She has not resumed lisinopril due to BP readings. HR yesterday AM was yesterday was 55-mid 60's. HR today is 62. Patient went on a walk yestserday and states no CP and No SOB and thinks things are getting \"better\".   Patient does note she is retaining fluid, gained 2lbs since Friday. Patient will monitor and c/b as needed.     Voalte to AL.  "

## 2023-05-15 ENCOUNTER — HOSPITAL ENCOUNTER (OUTPATIENT)
Dept: CARDIOLOGY | Facility: MEDICAL CENTER | Age: 68
End: 2023-05-15
Attending: INTERNAL MEDICINE
Payer: MEDICARE

## 2023-05-15 DIAGNOSIS — I31.39 PERICARDIAL EFFUSION: ICD-10-CM

## 2023-05-15 LAB
LV EJECT FRACT  99904: 65
LV EJECT FRACT MOD 2C 99903: 77.17
LV EJECT FRACT MOD 4C 99902: 62.83
LV EJECT FRACT MOD BP 99901: 70.53

## 2023-05-15 PROCEDURE — 93308 TTE F-UP OR LMTD: CPT | Mod: 26 | Performed by: INTERNAL MEDICINE

## 2023-05-15 PROCEDURE — 93325 DOPPLER ECHO COLOR FLOW MAPG: CPT

## 2023-05-16 ENCOUNTER — HOSPITAL ENCOUNTER (OUTPATIENT)
Dept: LAB | Facility: MEDICAL CENTER | Age: 68
End: 2023-05-16
Attending: INTERNAL MEDICINE
Payer: MEDICARE

## 2023-05-16 DIAGNOSIS — I31.39 PERICARDIAL EFFUSION: ICD-10-CM

## 2023-05-16 LAB
ALBUMIN SERPL BCP-MCNC: 4.2 G/DL (ref 3.2–4.9)
ALBUMIN/GLOB SERPL: 1.6 G/DL
ALP SERPL-CCNC: 77 U/L (ref 30–99)
ALT SERPL-CCNC: 25 U/L (ref 2–50)
ANION GAP SERPL CALC-SCNC: 11 MMOL/L (ref 7–16)
AST SERPL-CCNC: 21 U/L (ref 12–45)
BILIRUB SERPL-MCNC: 0.6 MG/DL (ref 0.1–1.5)
BUN SERPL-MCNC: 19 MG/DL (ref 8–22)
CALCIUM ALBUM COR SERPL-MCNC: 9 MG/DL (ref 8.5–10.5)
CALCIUM SERPL-MCNC: 9.2 MG/DL (ref 8.5–10.5)
CHLORIDE SERPL-SCNC: 101 MMOL/L (ref 96–112)
CO2 SERPL-SCNC: 23 MMOL/L (ref 20–33)
CREAT SERPL-MCNC: 1 MG/DL (ref 0.5–1.4)
GFR SERPLBLD CREATININE-BSD FMLA CKD-EPI: 62 ML/MIN/1.73 M 2
GLOBULIN SER CALC-MCNC: 2.6 G/DL (ref 1.9–3.5)
GLUCOSE SERPL-MCNC: 68 MG/DL (ref 65–99)
POTASSIUM SERPL-SCNC: 4.7 MMOL/L (ref 3.6–5.5)
PROT SERPL-MCNC: 6.8 G/DL (ref 6–8.2)
SODIUM SERPL-SCNC: 135 MMOL/L (ref 135–145)

## 2023-05-16 PROCEDURE — 83516 IMMUNOASSAY NONANTIBODY: CPT

## 2023-05-16 PROCEDURE — 86800 THYROGLOBULIN ANTIBODY: CPT

## 2023-05-16 PROCEDURE — 86480 TB TEST CELL IMMUN MEASURE: CPT

## 2023-05-16 PROCEDURE — 84432 ASSAY OF THYROGLOBULIN: CPT

## 2023-05-16 PROCEDURE — 36415 COLL VENOUS BLD VENIPUNCTURE: CPT

## 2023-05-16 PROCEDURE — 86038 ANTINUCLEAR ANTIBODIES: CPT

## 2023-05-16 PROCEDURE — 80053 COMPREHEN METABOLIC PANEL: CPT

## 2023-05-16 PROCEDURE — 86235 NUCLEAR ANTIGEN ANTIBODY: CPT | Mod: 91

## 2023-05-17 LAB
GAMMA INTERFERON BACKGROUND BLD IA-ACNC: 0.03 IU/ML
M TB IFN-G BLD-IMP: NEGATIVE
M TB IFN-G CD4+ BCKGRND COR BLD-ACNC: 0 IU/ML
MITOGEN IGNF BCKGRD COR BLD-ACNC: 8.93 IU/ML
QFT TB2 - NIL TBQ2: 0 IU/ML

## 2023-05-18 ENCOUNTER — HOSPITAL ENCOUNTER (OUTPATIENT)
Dept: LAB | Facility: MEDICAL CENTER | Age: 68
End: 2023-05-18
Attending: INTERNAL MEDICINE
Payer: MEDICARE

## 2023-05-18 ENCOUNTER — TELEPHONE (OUTPATIENT)
Dept: CARDIOLOGY | Facility: MEDICAL CENTER | Age: 68
End: 2023-05-18
Payer: MEDICARE

## 2023-05-18 DIAGNOSIS — R76.8 THYROGLOBULIN ANTIBODY POSITIVE: ICD-10-CM

## 2023-05-18 LAB
CENTROMERE IGG TITR SER IF: 0 AU/ML (ref 0–40)
ENA JO1 AB TITR SER: 0 AU/ML (ref 0–40)
ENA SCL70 IGG SER QL: 0 AU/ML (ref 0–40)
ENA SM IGG SER-ACNC: 1 AU/ML (ref 0–40)
ENA SS-B IGG SER IA-ACNC: 0 AU/ML (ref 0–40)
NUCLEAR IGG SER QL IA: NORMAL
RIBOSOMAL P AB SER-ACNC: 2 AU/ML (ref 0–40)
SSA52 R0ENA AB IGG Q0420: 2 AU/ML (ref 0–40)
SSA60 R0ENA AB IGG Q0419: 1 AU/ML (ref 0–40)
T3 SERPL-MCNC: 77.4 NG/DL (ref 60–181)
T4 FREE SERPL-MCNC: 1.35 NG/DL (ref 0.93–1.7)
THYROGLOB AB SERPL-ACNC: 130.8 IU/ML (ref 0–4)
THYROPEROXIDASE AB SERPL-ACNC: <9 IU/ML (ref 0–9)
U1 SNRNP IGG SER QL: 1 UNITS (ref 0–19)

## 2023-05-18 PROCEDURE — 86376 MICROSOMAL ANTIBODY EACH: CPT

## 2023-05-18 PROCEDURE — 86800 THYROGLOBULIN ANTIBODY: CPT

## 2023-05-18 PROCEDURE — 84439 ASSAY OF FREE THYROXINE: CPT

## 2023-05-18 PROCEDURE — 84432 ASSAY OF THYROGLOBULIN: CPT

## 2023-05-18 PROCEDURE — 84480 ASSAY TRIIODOTHYRONINE (T3): CPT

## 2023-05-18 PROCEDURE — 36415 COLL VENOUS BLD VENIPUNCTURE: CPT

## 2023-05-18 NOTE — TELEPHONE ENCOUNTER
"Voalte message from AL to see if we can add on additional labs to 5/16/23 labs. Called [facility] lab. Unable to add on, needed gold top and another green. Will need to re-draw labs.    Phone call to patient, advised need to redraw labs. Patient stated understanding and will have drawn today.     Patient reporting cramping in feet and in hands/fingers, \"like sharla horses\". Cramps are in top of foot into toes not in arch, feet and fingers. She also reports hands, fingers and toes freeze up and bend. It happens when walking, resting or when brushing teeth and happening on a regular basis. She states she is continuing to drink Pedialyte/electrolytes daily and staying hydrating.     AL: [place]. Please advise if any additional recommendations prior to appointment 5/24/23. Thank you.   "

## 2023-05-18 NOTE — TELEPHONE ENCOUNTER
Received warm transfer from Joyce, coordinator with someone from [facility] on the phone. Caller needed diagnosis clarification for 5/2/23 labs. Advised of hypertension as diagnosis. Answered all questions.

## 2023-05-22 ENCOUNTER — OFFICE VISIT (OUTPATIENT)
Dept: BEHAVIORAL HEALTH | Facility: CLINIC | Age: 68
End: 2023-05-22
Payer: MEDICARE

## 2023-05-22 DIAGNOSIS — F43.29 GRIEF REACTION WITH PROLONGED BEREAVEMENT: ICD-10-CM

## 2023-05-22 PROCEDURE — 90837 PSYTX W PT 60 MINUTES: CPT | Performed by: MARRIAGE & FAMILY THERAPIST

## 2023-05-22 NOTE — PROGRESS NOTES
[facility]   Therapy Progress Note        Name: Tita Pitts  MRN: [id number]  : 1955  Age: 68 y.o.  Date of assessment: 2023  PCP: Bassam Marinelli M.D.  Persons in attendance: Patient  Total session time: 58 minutes      Topics addressed in psychotherapy include: Met with the patient in office for an individual counseling session.  The patient reported that she continues to suffer with pericarditis and questions if there is an emotional component to her heart issue. She is also concerned about a possible thyroid issue. Discussed stress in her life and prolong grieving.  She discussed not having the emotional support of her .  Worked with the patient identify other family members on whom she could rely.  Worked with the patient in acknowledging that she has had new experiences and learn new things, i.e.  Flying on her own, getting an Uber carina, the and renewing her passport.  The patient discussed continuing disruptive sleep.  Worked with patient on sleep maintenance.    This dictation has been created using voice recognition software and/or scribes. The accuracy of the dictation is limited by the abilities of the software and the expertise of the scribes. I expect there may be some errors of grammar and possibly content. I made every attempt to manually correct the errors within my dictation. However, errors related to voice recognition software and/or scribes may still exist and should be interpreted within the appropriate context.    Objective Observations:   Participation:Active verbal participation, Attentive, Engaged, and Open to feedback   Grooming:Casual   Cognition:Alert and Fully Oriented   Eye Contact:Good   Mood:Anxious   Affect:Flexible and Full range   Thought Process:Logical and Goal-directed   Speech:Rate within normal limits and Volume within normal limits    Current Risk:   Suicide: low   Homicide: low   Self-Harm: low   Relapse: low   Safety Plan Reviewed:  not applicable    Care Plan Updated: No    Does patient express agreement with the above plan? Yes     Diagnosis:  1. Grief reaction with prolonged bereavement        Referral appointment(s) scheduled? No       FELICITAS Fournier.

## 2023-05-24 ENCOUNTER — OFFICE VISIT (OUTPATIENT)
Dept: CARDIOLOGY | Facility: MEDICAL CENTER | Age: 68
End: 2023-05-24
Attending: INTERNAL MEDICINE
Payer: MEDICARE

## 2023-05-24 VITALS
OXYGEN SATURATION: 97 % | BODY MASS INDEX: 26.99 KG/M2 | DIASTOLIC BLOOD PRESSURE: 72 MMHG | SYSTOLIC BLOOD PRESSURE: 112 MMHG | RESPIRATION RATE: 16 BRPM | HEART RATE: 84 BPM | HEIGHT: 65 IN | WEIGHT: 162 LBS

## 2023-05-24 DIAGNOSIS — I31.39 PERICARDIAL EFFUSION: Primary | ICD-10-CM

## 2023-05-24 DIAGNOSIS — I10 PRIMARY HYPERTENSION: Chronic | ICD-10-CM

## 2023-05-24 DIAGNOSIS — R76.8 THYROGLOBULIN ANTIBODY POSITIVE: ICD-10-CM

## 2023-05-24 DIAGNOSIS — I49.3 PVC (PREMATURE VENTRICULAR CONTRACTION): ICD-10-CM

## 2023-05-24 PROCEDURE — 3078F DIAST BP <80 MM HG: CPT | Performed by: INTERNAL MEDICINE

## 2023-05-24 PROCEDURE — 99213 OFFICE O/P EST LOW 20 MIN: CPT | Performed by: INTERNAL MEDICINE

## 2023-05-24 PROCEDURE — 99215 OFFICE O/P EST HI 40 MIN: CPT | Performed by: INTERNAL MEDICINE

## 2023-05-24 PROCEDURE — 3074F SYST BP LT 130 MM HG: CPT | Performed by: INTERNAL MEDICINE

## 2023-05-24 RX ORDER — AMLODIPINE BESYLATE 5 MG/1
5 TABLET ORAL DAILY
COMMUNITY
End: 2023-05-24

## 2023-05-24 RX ORDER — LISINOPRIL 5 MG/1
5 TABLET ORAL EVERY EVENING
Qty: 90 TABLET | Refills: 3 | Status: SHIPPED | OUTPATIENT
Start: 2023-05-24 | End: 2023-06-23

## 2023-05-24 RX ORDER — FAMOTIDINE 40 MG/1
40 TABLET, FILM COATED ORAL DAILY
Qty: 30 TABLET | Refills: 0 | Status: SHIPPED | OUTPATIENT
Start: 2023-05-24 | End: 2023-06-15

## 2023-05-24 RX ORDER — INDOMETHACIN 25 MG/1
25 CAPSULE ORAL 3 TIMES DAILY
Qty: 42 CAPSULE | Refills: 1 | Status: SHIPPED | OUTPATIENT
Start: 2023-05-24 | End: 2023-06-23 | Stop reason: SDUPTHER

## 2023-05-24 ASSESSMENT — FIBROSIS 4 INDEX: FIB4 SCORE: 0.68

## 2023-05-24 NOTE — PATIENT INSTRUCTIONS
Indomethacin 25mg three times a day for 2 weeks  Heart ultrasound in 1 month  Resume famotidine 40mg in the am  Resume lisinopril 5mg at night  Send me BP log in 1 week  Referral placed to endocrinology (thyroid)  Take 400mg Magnesium daily

## 2023-05-24 NOTE — PROGRESS NOTES
CARDIOLOGY established PATIENT:    PCP: Bassam Marinelli M.D.    1. Pericardial effusion    2. Thyroglobulin antibody positive    3. Primary hypertension    4. PVC (premature ventricular contraction)        Tita Pitts is here for follow-up for recurrent pericardial effusion    Chief Complaint   Patient presents with    Irregular Heart Beat    Hypertension    Premature Ventricular Contractions (PVCs)       History: Tita Pitts is a 68 y.o. 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    She had a CAC score scan 11/2022 that showed zero calcium score but a pericardial effusion with subsequent TTE and hence seen in my clinic. 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 She had pericarditis at a younger age. We tried colchicine for a month and then due to persistent effusion we successfully performed pericardiocentesis of [id number] serous fluid 12/28/22 and continued colchicine. We repeated a TTE 2 months later that showed recurrent effusion, and arranged for another pericardiocentesis 4/10/23 (200ml fluid removed) and started her on Prednisone (taper for ~ 4 weeks).  I saw her in clinic on 5/5/2023 due to possible concerns of mild adrenal deficiency after which we extended her prednisone taper. A repeat TTE was done 5/15/23 showing recurrent moderate pericardial effusion despite prednisone and colchicine therapy.  Work up thus far negative for underlying autoimmune illness, obvious malignancy and effusion has been attributed to either idiopathic etiology or COVID pericarditis.  More extensive thyroid work-up was pursued and the only lab abnormality was elevated antithyroglobulin antibody. cMRI 4/2023 did not show concerns for myocarditis or pericarditis.    She is here for follow-up.    This week feeling better. Now on 5mg Prednisone and in 2 days planning 1 week  "of 2.5mg daily for a week.    BP log (stopped amlo 5/3 and leeanna 5/5): average 120/70's but occasionally 140's SBP in the am.    SOB with 3rd flight of stairs. No recent chest pain. Denies dizziness, LH, syncope, presyncope.    TTE 5/15/23: Personally reviewed  Compared to the prior study on 4/10/23 (pericardioentesis TTE), there is recurrent pericardial effusion  PVCs noted.  Moderate pericardial effusion (1.5-2cm), predominantly anteriorly   located around the right atrium and right ventricle  Dilated IVC with inspiratory collapse  Preserved biventricular systolic functions, estimated LVEF 65%    cMRI 4/2023:  1.  Small pericardial effusion. Normal pericardial thickness. No MR evidence of constrictive pericarditis.  2.  Normal left ventricular size and function, with estimated LVEF of 60.6%. No delayed enhancement.  3.  Trivial aortic regurgitation.      PE:  /72 (BP Location: Left arm, Patient Position: Sitting, BP Cuff Size: Adult)   Pulse 84   Resp 16   Ht 1.651 m (5' 5\")   Wt 73.5 kg (162 lb)   LMP  (LMP Unknown)   SpO2 97%   BMI 26.96 kg/m²     Gen: well  HEENT: Symmetric face. Anicteric sclerae. Moist mucus membranes  NECK: No JVD.   CARDIAC: Regular, Normal S1, S2, No murmur  VASCULATURE: carotids are normal bilaterally without bruit  RESP: Clear to auscultation bilaterally   EXT: Trace lower extremity edema, no clubbing or cyanosis  SKIN: Warm and dry  NEURO: No gross deficits  PSYCH: Appropriate affect, participates in conversation    The 10-year ASCVD risk score ([place] DK, et al., 2019) is: 5.6%    Past Medical History:   Diagnosis Date    Allergy     Anesthesia     ponv    Breath shortness 2022    Related to current pericarditis    Bronchitis     history of    Cataract     NOT surgically removed    Gynecological disorder 1986    Had Hysterectomy 1990    Hypertension     well controlled on medication    Pericarditis 04/07/2023    Pneumonia     history of, in 20's    PONV (postoperative " nausea and vomiting)     Psychiatric problem 12/27/2022    grief - in counseling     Past Surgical History:   Procedure Laterality Date    APPENDECTOMY  1986    ABDOMINAL HYSTERECTOMY TOTAL      GYN SURGERY      laparoscopy for endometriosis    OTHER CARDIAC SURGERY       Allergies   Allergen Reactions    Other Drug Unspecified     Ruvert = Other reaction(s): Welts on legs    Macrodantin [Nitrofurantoin]      nightmares     Outpatient Encounter Medications as of 5/24/2023   Medication Sig Dispense Refill    lisinopril (PRINIVIL) 5 MG Tab Take 1 Tablet by mouth every evening. 90 Tablet 3    indomethacin (INDOCIN) 25 MG Cap Take 1 Capsule by mouth 3 times a day. 42 Capsule 1    famotidine (PEPCID) 40 MG Tab Take 1 Tablet by mouth every day. 30 Tablet 0    Fluticasone Propionate (FLONASE NA) Administer  into affected nostril(S).      Oral Electrolytes (PEDIALYTE PO) Take  by mouth.      colchicine (COLCRYS) 0.6 MG Tab Take 1 Tablet by mouth 2 times a day. 180 Tablet 1    estradiol (ESTRACE) 0.5 MG tablet Take 0.5 mg by mouth two times a week. 1/2 tab every 6 days- weaning off with MD instructions      calcium carbonate (OS-RADHA 500) 1250 (500 Ca) MG Tab Take 500 mg by mouth every day.      Ascorbic Acid (VITAMIN C PO) Take  by mouth every day.      [DISCONTINUED] amLODIPine (NORVASC) 5 MG Tab Take 5 mg by mouth every day.      [DISCONTINUED] predniSONE (DELTASONE) 10 MG Tab Take 2 Tablets by mouth every day for 7 days, THEN 1 Tablet every day for 7 days, THEN 0.5 Tablets every day for 7 days. 25 Tablet 0    Magnesium 400 MG Cap Take  by mouth. (Patient not taking: Reported on 5/5/2023)      [DISCONTINUED] famotidine (PEPCID) 40 MG Tab Take 1 Tablet by mouth every day. (Patient not taking: Reported on 5/5/2023) 30 Tablet 1    [DISCONTINUED] lisinopril (PRINIVIL) 10 MG Tab Take 1 Tablet by mouth 2 times a day. 180 Tablet 3    vitamin D3 (CHOLECALCIFEROL) 1000 Unit (25 mcg) Tab Take 1,000 Units by mouth every day.       No  facility-administered encounter medications on file as of 5/24/2023.     Social History     Socioeconomic History    Marital status:      Spouse name: Not on file    Number of children: Not on file    Years of education: Not on file    Highest education level: Bachelor's degree (e.g., BA, AB, BS)   Occupational History    Not on file   Tobacco Use    Smoking status: Never     Passive exposure: Past    Smokeless tobacco: Never   Vaping Use    Vaping Use: Never used   Substance and Sexual Activity    Alcohol use: Not Currently     Alcohol/week: 0.6 oz     Types: 1 Glasses of wine per week     Comment: Some alcohol socially over the years.  Not regular use    Drug use: Never    Sexual activity: Not Currently     Partners: Male     Birth control/protection: Surgical, Abstinence, None, Post-Menopausal     Comment: Had Endometriosis had hysterectomy.   Other Topics Concern    Not on file   Social History Narrative    Not on file     Social Determinants of Health     Financial Resource Strain: Low Risk  (4/3/2022)    Overall Financial Resource Strain (CARDIA)     Difficulty of Paying Living Expenses: Not very hard   Food Insecurity: No Food Insecurity (4/3/2022)    Hunger Vital Sign     Worried About Running Out of Food in the Last Year: Never true     Ran Out of Food in the Last Year: Never true   Transportation Needs: No Transportation Needs (4/3/2022)    PRAPARE - Transportation     Lack of Transportation (Medical): No     Lack of Transportation (Non-Medical): No   Physical Activity: Insufficiently Active (4/3/2022)    Exercise Vital Sign     Days of Exercise per Week: 2 days     Minutes of Exercise per Session: 30 min   Stress: Stress Concern Present (4/3/2022)    [employer] [place] [employer] - Occupational Stress Questionnaire     Feeling of Stress : Rather much   Social Connections: Moderately Integrated (4/3/2022)    Social Connection and Isolation Panel [NHANES]     Frequency of Communication  with Friends and Family: Twice a week     Frequency of Social Gatherings with Friends and Family: Once a week     Attends Sabianist Services: 1 to 4 times per year     Active Member of Clubs or Organizations: Yes     Attends Club or Organization Meetings: 1 to 4 times per year     Marital Status:    Intimate Partner Violence: Not on file   Housing Stability: Low Risk  (4/3/2022)    Housing Stability Vital Sign     Unable to Pay for Housing in the Last Year: No     Number of Places Lived in the Last Year: 1     Unstable Housing in the Last Year: No     Family History   Problem Relation Age of Onset    Breast Cancer Mother     Diabetes Mother         Developed in 70s, at 90 Kidney failure    Hypertension Mother         diagnosed in 70s    Hyperlipidemia Mother         diagnosed in 70s    Kidney Disease Mother     Heart Disease Father          at 64, CHF, CAD    Heart Disease Brother         Heart Attack at 72    Stroke Brother         occurred at 78    Diabetes Maternal Uncle          at 68         Studies    Lab Results   Component Value Date/Time    TSHULTRASEN 2.110 2022 0932        Lab Results   Component Value Date/Time    FREET4 1.35 2023 1001      Lab Results   Component Value Date/Time    HBA1C 5.2 2022 08:19 AM     Lab Results   Component Value Date/Time    CHOLSTRLTOT 167 2023 09:21 AM    LDL 89 2023 09:21 AM    HDL 63 2023 09:21 AM    TRIGLYCERIDE 76 2023 09:21 AM       Lab Results   Component Value Date/Time    SODIUM 135 2023 11:00 AM    POTASSIUM 4.7 2023 11:00 AM    CHLORIDE 101 2023 11:00 AM    CO2 23 2023 11:00 AM    GLUCOSE 68 2023 11:00 AM    BUN 19 2023 11:00 AM    CREATININE 1.00 2023 11:00 AM     Lab Results   Component Value Date/Time    ALKPHOSPHAT 77 2023 11:00 AM    ASTSGOT 21 2023 11:00 AM    ALTSGPT 25 2023 11:00 AM    TBILIRUBIN 0.6 2023 11:00 AM         Echocardiogram:  No results found for this or any previous visit.      Assessment and Recommendations:    Problem List Items Addressed This Visit       Pericardial effusion - Primary    Relevant Medications    lisinopril (PRINIVIL) 5 MG Tab    indomethacin (INDOCIN) 25 MG Cap    famotidine (PEPCID) 40 MG Tab    Other Relevant Orders    Referral to Endocrinology    EC-ECHOCARDIOGRAM LTD W/O CONT    PVC (premature ventricular contraction)    Relevant Medications    lisinopril (PRINIVIL) 5 MG Tab     Other Visit Diagnoses       Thyroglobulin antibody positive        Relevant Orders    Referral to Endocrinology    Primary hypertension  (Chronic)       Relevant Medications    lisinopril (PRINIVIL) 5 MG Tab          Fortunately she has been feeling better over the last week with no ongoing symptoms concerning for heart failure or pericarditis.  Our goal eventually is to prevent constrictive pericarditis with her persistent /recurrent pericardial effusion.  No such concerns thus far clinically or on recent cardiac MRI.    Her extensive workup thus far for her recurrent pericardial effusion has been non-revealing and excluded autoimmune illness and TB. Uptodate on age appropriate cancer screening without any systemic concerns for malignancy.  Her only pertinent work-up thus far has been recurrent pericardial effusion on imaging, mildly elevated CRP and elevated antithyroglobulin antibody.  Thyroglobulin protein quantification is pending.  Dysfunction thus far is that her recurrent pericardial effusion/pericarditis is due to her COVID illness x2 in 2022, or idiopathic which is the majority of the cases and usually tend to self resolve.     Given her chronic colchicine therapy at this time, and recurrent pericardial effusion on prednisone therapy, advised her to try indomethacin 25 mg 3 times a day for 2 weeks and we will plan to repeat an echocardiogram in 1 month.  In the interim, I placed an urgent referral to  endocrinology to assist with interpreting her endocrinology related blood work pertaining to any underlying thyroid disorder which we need to treat her for.  It is unlikely that a thyroid disorder is contributing to her pericardial effusion given normal TSH levels.    Based on her progress, I discussed with her the option of seeking a second opinion with an academic cardiology center such as [facility] or [facility] before we commit through more sophisticated medical regimen that includes rilonacept, anakinra, azathioprine, and/or IVIG during which I will defer to either a rheumatology specialist or a specialized academic cardiac clinic to help manage.     After reviewing her blood pressure log, I noticed slight increase in her blood pressure, hence advised her to try lisinopril 5 mg in the evening and to continue to hold off taking amlodipine at this time.  Advised her to send me an updated a.m. and p.m. blood pressure log in 1 week.    Thank you for the opportunity to be involved in Tita Pitts 's care; and please reach out with any questions or concerns.    Return in about 7 weeks (around 7/12/2023).    Chase Pena MD, MPH [facility]  Interventional Cardiologist  [facility]   of Clinical Internal Medicine - [facility] [place] NAVEEN    ~ Portions of this note were completed using voice recognition software (Dragon Naturally speaking software) . Occasional transcription errors may have escaped proof reading. I have made every reasonable attempt to correct obvious errors, but I expect that there are errors of grammar and possibly content that I did not discover before finalizing the note. ~

## 2023-05-26 ENCOUNTER — PATIENT MESSAGE (OUTPATIENT)
Dept: CARDIOLOGY | Facility: MEDICAL CENTER | Age: 68
End: 2023-05-26

## 2023-05-26 LAB
THYROGLOB AB SERPL-ACNC: 119.4 IU/ML (ref 0–4)
THYROGLOB AB SERPL-ACNC: 130.8 IU/ML (ref 0–4)
THYROGLOB SERPL-MCNC: 60.3 NG/ML (ref 1.3–31.8)
THYROGLOB SERPL-MCNC: 67.1 NG/ML (ref 1.3–31.8)
THYROGLOB SERPL-MCNC: ABNORMAL NG/ML (ref 1.3–31.8)
THYROGLOB SERPL-MCNC: ABNORMAL NG/ML (ref 1.3–31.8)

## 2023-05-31 ENCOUNTER — TELEPHONE (OUTPATIENT)
Dept: CARDIOLOGY | Facility: MEDICAL CENTER | Age: 68
End: 2023-05-31
Payer: MEDICARE

## 2023-05-31 NOTE — TELEPHONE ENCOUNTER
----- Message from Chase Pena M.D. sent at 5/31/2023  7:47 AM PDT -----  Regarding: RE: sooner appt  Sounds great. Yes fortunately her AI workup has been normal and is uptodate on her Ca screenings. I guess it is one of those idiopathic recurrent pericardial effusions or related to her COVID illnesses.    Thank you for seeing her in a few months and I will update her on our conversation.  ----- Message -----  From: Mark Mathews M.D.  Sent: 5/30/2023   5:58 PM PDT  To: JONI Peterson; Chase Pena M.D.  Subject: RE: sooner appt                                  Yes definitely not worried about thyroid cancer   Once I see her I will examine her thyroid and conduct an ultrasound medically necessary because of her thyroid antibodies.    The TSH be normal and her being euthyroid would not explain the recurrent pericardial effusion.  Although we can see pericardial effusion with myxedema she deftly does not have myxedema with a normal TSH.    Other potential causes should be ruled out such as lupus and other autoimmune disease.  Rheumatologic disorders and mixed connective tissue disorders, and paraneoplastic conditions may have to be considered    ----- Message -----  From: Chase Pena M.D.  Sent: 5/26/2023   4:07 PM PDT  To: JONI Peterson; #  Subject: RE: sooner appt                                  I understand. Thank you.    So based on reviewing these labs you have no additional recommendations and not concerned about thyroid cancer ? I am trying to ensure we do a thorough evaluation on this patient before seeking a second opinion at a larger academic center regarding her recurrent pericardial effusions. I agree: for TSH and T3/T4 to be normal then thyroid is functioning well and unlikely to contribute; hence the need to hear it from a specialist in that field.  ----- Message -----  From: Mark Mathews M.D.  Sent: 5/26/2023   3:28 PM PDT  To: JONI Peterson; Chase Pena M.D.  Subject: RE: sooner appt                                   No - that is actually opposite my point - the thyroglobulin is not used for diagnosis of thyroid cancer       Anyone who has an intact thyroid ( thyroid cancer or not ) will have an elevated thyroglobulin - thus it is not helpful and should not be ordered.   We only order it for individuals who have known thyroid cancer  that have been treated and we are monitoring the TG level as a tumor marker to assess for recurrence     Also the antibodies may suggest autoimmune thyroid disease but there is no evidence of thyroid failure as her her TSH Is normal   ----- Message -----  From: Chase Pena M.D.  Sent: 5/26/2023   3:24 PM PDT  To: JONI Peterson; #  Subject: RE: sooner appt                                  Due to persistent and recurrent pericardial effusion of undetermined etiology so far. So can it be thyroid cancer ? Hence appreciate your  input helping with ruling that out or in. Thank you  ----- Message -----  From: Mark Mathews M.D.  Sent: 5/26/2023   3:14 PM PDT  To: JONI Peterson; Chase Pena M.D.  Subject: RE: sooner appt                                  I am curious as to why these tests were obtained    Her TSH is normal and she is euthyroid      If patient has an intact thyroid - the thyroglobulin will be expected to be elevated -  thyroglobulin is a protein produced by normal thyroid tissue and thyroid cancer -  BUT it is not used to diagnose thyroid cancer and is only used for monitoring it once thyroid cancer has been diagnosed and treated with surgery.       AS her TSH is normal there is no urgency     ----- Message -----  From: Chase Pena M.D.  Sent: 5/26/2023   9:28 AM PDT  To: JONI Peterson; #  Subject: sooner appt                                      Fish Flores,    This is Chase Pena -interventional cardiologist caring for this patient who is scheduled to see you in September.  I am reaching out with the hopes that you can accommodate her in your  clinic sometime over the next 4 weeks and my concerns are her recurrent pericardial effusions (thus far of undetermined etiology ) and her extensive work-up that I have done shows the elevated thyroglobulin and antithyroglobulin antibodies.  I'd appreciate your sooner accommodation for her in clinic.      ----- Message -----  From: Alena, Lab  Sent: 5/26/2023   9:10 AM PDT  To: Chase Pena M.D.

## 2023-06-13 ENCOUNTER — OFFICE VISIT (OUTPATIENT)
Dept: BEHAVIORAL HEALTH | Facility: CLINIC | Age: 68
End: 2023-06-13
Payer: MEDICARE

## 2023-06-13 DIAGNOSIS — F43.21 GRIEF: ICD-10-CM

## 2023-06-13 DIAGNOSIS — F43.29 GRIEF REACTION WITH PROLONGED BEREAVEMENT: ICD-10-CM

## 2023-06-13 PROCEDURE — 90837 PSYTX W PT 60 MINUTES: CPT | Performed by: MARRIAGE & FAMILY THERAPIST

## 2023-06-14 NOTE — PROGRESS NOTES
[facility]   Therapy Progress Note        Name: Tita Pitts  MRN: [id number]  : 1955  Age: 68 y.o.  Date of assessment: 2023  PCP: Bassam Marinelli M.D.  Persons in attendance: Patient  Total session time: 55 minutes      Topics addressed in psychotherapy include: Met with the patient in office for an individual counseling session.  The patient discussed that she feels that nothing much has changed.  Patient discussed not being she is just going through the motions.  Discussed her lack of friendships since her 's death.  Worked with the patient on finding activities and expanding interests through [email or web address] and [employer].  Patient discussed a friend who wanted to go to [place] but that was the friends bucket list item.  Worked with patient on future planning for friendships and activities.      This dictation has been created using voice recognition software and/or scribes. The accuracy of the dictation is limited by the abilities of the software and the expertise of the scribes. I expect there may be some errors of grammar and possibly content. I made every attempt to manually correct the errors within my dictation. However, errors related to voice recognition software and/or scribes may still exist and should be interpreted within the appropriate context.    Objective Observations:   Participation:Active verbal participation, Attentive, and Engaged   Grooming:Casual   Cognition:Alert and Fully Oriented   Eye Contact:Good   Mood:Anxious   Affect:Flexible, Congruent with content, Sad, and Anxious   Thought Process:Logical and Goal-directed   Speech:Rate within normal limits and Volume within normal limits    Current Risk:   Suicide: low   Homicide: low   Self-Harm: low   Relapse: low   Safety Plan Reviewed: not applicable    Care Plan Updated: No    Does patient express agreement with the above plan? Yes     Diagnosis:  1. Grief reaction with prolonged bereavement     2. Grief        Referral appointment(s) scheduled? No       FELICITAS Fournier.

## 2023-06-15 DIAGNOSIS — I31.39 PERICARDIAL EFFUSION: ICD-10-CM

## 2023-06-15 RX ORDER — FAMOTIDINE 40 MG/1
40 TABLET, FILM COATED ORAL DAILY
Qty: 30 TABLET | Refills: 1 | Status: SHIPPED | OUTPATIENT
Start: 2023-06-15 | End: 2023-07-10

## 2023-06-23 ENCOUNTER — HOSPITAL ENCOUNTER (OUTPATIENT)
Dept: CARDIOLOGY | Facility: MEDICAL CENTER | Age: 68
End: 2023-06-23
Attending: INTERNAL MEDICINE
Payer: MEDICARE

## 2023-06-23 DIAGNOSIS — I31.39 PERICARDIAL EFFUSION: ICD-10-CM

## 2023-06-23 DIAGNOSIS — I10 PRIMARY HYPERTENSION: Chronic | ICD-10-CM

## 2023-06-23 DIAGNOSIS — I10 PRIMARY HYPERTENSION: ICD-10-CM

## 2023-06-23 LAB — LV EJECT FRACT  99904: 60

## 2023-06-23 PROCEDURE — 93321 DOPPLER ECHO F-UP/LMTD STD: CPT

## 2023-06-23 PROCEDURE — 93308 TTE F-UP OR LMTD: CPT | Mod: 26 | Performed by: INTERNAL MEDICINE

## 2023-06-23 RX ORDER — LISINOPRIL 10 MG/1
TABLET ORAL
Qty: 135 TABLET | Refills: 3 | Status: SHIPPED | OUTPATIENT
Start: 2023-06-23 | End: 2023-06-30

## 2023-06-23 RX ORDER — INDOMETHACIN 25 MG/1
25 CAPSULE ORAL 3 TIMES DAILY
Qty: 42 CAPSULE | Refills: 1 | Status: SHIPPED | OUTPATIENT
Start: 2023-06-23 | End: 2023-07-05

## 2023-06-26 NOTE — PATIENT COMMUNICATION
Phone Number Called: [phone]    Call outcome: Did not leave a detailed message. Requested patient to call back.    Message: Called to reschedule patient echocardiogram to week of 8/4-8/11. Left message requesting patient call back to move up echocardiogram. Provided direct line

## 2023-06-30 DIAGNOSIS — I10 PRIMARY HYPERTENSION: Chronic | ICD-10-CM

## 2023-06-30 RX ORDER — LISINOPRIL 10 MG/1
10 TABLET ORAL 2 TIMES DAILY
Qty: 180 TABLET | Refills: 3 | Status: SHIPPED | OUTPATIENT
Start: 2023-06-30 | End: 2023-12-11

## 2023-07-03 ENCOUNTER — OFFICE VISIT (OUTPATIENT)
Dept: BEHAVIORAL HEALTH | Facility: CLINIC | Age: 68
End: 2023-07-03
Payer: MEDICARE

## 2023-07-03 DIAGNOSIS — F43.29 GRIEF REACTION WITH PROLONGED BEREAVEMENT: ICD-10-CM

## 2023-07-03 DIAGNOSIS — F43.21 GRIEF: ICD-10-CM

## 2023-07-03 PROCEDURE — 90837 PSYTX W PT 60 MINUTES: CPT | Performed by: MARRIAGE & FAMILY THERAPIST

## 2023-07-03 NOTE — LETTER
Behavioral [facility] - Outpatient 23   Effective from: 7/3/2023  Effective to: 1/3/2024    Plan ID: [id number]                Participants as of 7/3/2023      Name Type Comments Contact Info    Tita Pitts Patient  [phone]    JOSE J Fournier Therapist  [phone]           Patient Demographics       Patient Name  Tita Pitts Legal Sex  Female   1955 N  [id number] Address  [address] [place] [id number] Phone  [phone] (Home)  [phone] (Work)  [phone] (Mobile) *Preferred*           Problem List as of 7/3/2023 Date Reviewed: 7/3/2023               ICD-10-CM Priority Class Noted - Resolved    Grief F43.21   2022 - Present    Other fatigue R53.83   2022 - Present    Hormone replacement therapy Z79.890   2022 - Present    Overview Signed 2022  9:32 AM by Bassam Marinelli M.D.     She is currently on estradiol 0. 5 mg tablet which she takes half tablet daily for hormone replacement.  She has history of hysterectomy done in her 30s and she has been on just hormone replacement since then.  Has family history of breast cancer in her mother and grandmother         RESOLVED: BMI 28.0-28.9,adult Z68.28   2022 - 2022    Neutropenia (HCC) D70.9   2022 - Present    Overview Addendum 2023 12:38 PM by Bassam Marinelli M.D.     Has history of neutropenia, currently following with Dr. Oleary hematology and oncology.  Had blood work done including vitamin B12, folic acid, SANJIV, HIV and hepatitis B which came back negative.  Last CBC that was done in December showed white cell count in normal range         Decreased GFR R94.4   2022 - Present    Overview Signed 2022  9:31 AM by Bassam Marinelli M.D.     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     Has family history of breast cancer in her mother and her grandmother.  She is currently on estrogen which she has been taking when she was in her 30s when she had a hysterectomy done.         BMI 26.0-26.9,adult Z68.26   11/2/2022 - Present    Osteopenia of multiple sites M85.89   11/2/2022 - Present    Overview Signed 11/2/2022  9:29 AM by Bassam Marinelli M.D.     Bone density scan on 8/23/2022 showed According to the World Health Organization classification, bone mineral density of this patient is osteopenic in the lumbar spine and left femur.  She is taking calcium and vitamin D supplementation.  No recent falls or fractures.         SOB (shortness of breath) R06.02   11/2/2022 - Present    Overview Signed 11/2/2022  9:23 AM by Bassam Marinelli M.D.     She reports that she got COVID in January and since then she has not been feeling to her normal self.  She has been experiencing shortness of breath on exertion and fatigue symptoms.  She has family history of congestive heart failure, coronary artery disease in his father.  Her last cholesterol numbers were normal.  Has been taking vitamin D and calcium supplementation.  No chest pain, palpitations, lower leg swelling.         Cardiomegaly I51.7   11/15/2022 - Present    Overview Signed 1/24/2023 12:38 PM by Bassam Marinelli M.D.     Last echo on 12/23/2022 showed Compared to the prior study on 11/21/22, pericardial effusion still   present but slightly smaller in size.  Normal LV systolic function  Normal RV size and systolic function  No significant valvular abnormalities  Small-Moderate pericardial effusion  without echocardiographic evidence   of hemodynamic compromise.  Normal IVC size.    She is scheduled for another echocardiogram, currently following with cardiology.         Pericardial effusion I31.39   11/15/2022 - Present    Overview Addendum 3/22/2023  8:20 AM by Reynaldo Way D.O.     Chronic condition. Followed by cardiology. Patient has upcoming repeat echocardiogram scheduled. She has been tolerating colchicine medication without diarrhea.    Current regimen: colchicine 0.6mg BID    Pericardial effusion seen on echocardiogram, had pericardiocentesis and was diagnosed with pericarditis.  She is currently on colchicine daily and as well as taking ibuprofen for this.          Essential hypertension, benign I10   12/28/2022 - Present    Overview Signed 1/24/2023 12:39 PM by Bassam Marinelli M.D.     Blood pressure today came back at 126/62.  She is on lisinopril 10 mg 2 times daily.  No side effects with this medication.  No symptoms.         Breast pain, left N64.4   1/24/2023 - Present    Overview Signed 1/24/2023 12:36 PM by Bassam Marinelli M.D.     She has been experiencing pain on left side of her breast since last month.  She had pericardiocentesis recently.  Fluid was checked and that came back negative for malignancy.  She reports that 1 day she had sharp pain on left side of her breast as well as on her back which resolved on its own.  She is currently trying to taper her off her Estrace.         Tinnitus of both ears H93.13   1/24/2023 - Present    Overview Signed 1/24/2023 12:40 PM by Bassam Marinelli M.D.     She has been experiencing tinnitus in both ears.  No hearing loss.  Tinnitus has been getting more worse         Acute streptococcal pharyngitis J02.0   3/17/2023 - Present    Overview Addendum 3/22/2023  8:09 AM by Reynaldo Way D.O.     Acute condition. She was recently seen on 3/17/23 and tested positive for strep. She was started on amoxicillin and has been taking it since. She has  not felt much improvement in her symptoms so far.         Acute cough R05.1   3/21/2023 - Present    Overview Addendum 3/22/2023  8:16 AM by Reynaldo Way D.O.     Acute condition.    Character: dry cough  Onset: Leonel 3/19.23  Location: n/a  Duration: constant  Exacerbating factors: unknown  Relieving factors: none  Associated symptoms: none  Severity: persistent         Overweight with body mass index (BMI) of 26 to 26.9 in adult E66.3, Z68.26   4/24/2023 - Present    PVC (premature ventricular contraction) I49.3   5/5/2023 - Present    Premature atrial contractions I49.1   5/5/2023 - Present    Hypotension I95.9   5/5/2023 - Present    Irregular heart beat I49.9   5/5/2023 - Present           Current Medications       lisinopril (PRINIVIL) 10 MG Tab    indomethacin (INDOCIN) 25 MG Cap    famotidine (PEPCID) 40 MG Tab    Fluticasone Propionate (FLONASE NA)    Magnesium 400 MG Cap    Oral Electrolytes (PEDIALYTE PO)    colchicine (COLCRYS) 0.6 MG Tab    vitamin D3 (CHOLECALCIFEROL) 1000 Unit (25 mcg) Tab    calcium carbonate (OS-RADHA 500) 1250 (500 Ca) MG Tab    Ascorbic Acid (VITAMIN C PO)           Treatment Plan Note       JOSE J Fournier Last edited by JOSE J Fournier on 7/3/2023 10:28 AM PDT         Discussed therapeutic goals and course of treatment with patient to develop treatment plan.          Care Plan: Grief and Loss       Problem: Grief and Loss       Long-Range Goal: Patient will report and demonstrate resolving grief and loss       Short-Term Goal: Increased understanding of grief and loss       Intervention: Assist client with identifying steps towards managing grief       Responsible User: JOSE J Fournier              Intervention: Encourage client to talk with others who have suffered losses as to how they resolved their grief       Responsible User: JOSE J Fournier              Intervention: Assist client with gaining awareness and accept that grief and loss are  "causing problems       Responsible User: JOSE J Fournier              Intervention: Assign/offer reading materials on grief and loss appropriate to stage of grief, type of loss, reading level, age and/or spiritual beliefs       Responsible User: JOSE J Fournier                      Short-Term Goal: Accept responsibility for change       Intervention: Assist client to deal with feelings about grief, survivor guilt, \"should haves\", \"if onlys\"       Responsible User: JOSE J Fournier                      Short-Term Goal: Correct irrational thinking relating to past grief and loss       Intervention: Assist client to identify issues of grief and loss from the past and resolve or let go       Responsible User: JOSE J Fournier                      Short-Term Goal: Deal with uncomfortable feelings resulting from grief or loss       Intervention: Assist client in dealing with feelings of sadness       Responsible User: JOSE J Fournier              Intervention: Encourage client to bring pictures or mementos linked with the loss and talk about them       Responsible User: JOSE J Fournier              Intervention: Assist client in identifying and list how he/she depended upon significant other, verbalizing and resolving accompanying feelings of abandonment and being left alone       Responsible User: JOSE J Fournier              Intervention: Teach client coping techniques to address feelings resulting from grief       Responsible User: JOSE J Fournier              Intervention: Encourage client to rely upon their spiritual hunter and fellowship as a source of support       Responsible User: JOSE J Fournier                      Short-Term Goal: Decrease extreme grief and loss       Intervention: Apply Cognitive Behavioral Therapy approach to confront client statements of responsibility for the loss and compare to reality based facts       Responsible User: Shira MARAVILLA" JOSE J Ch                                          Current Participants as of 7/3/2023      Name Type Comments Contact Info    Tita Pitts Patient  [phone]    Signature pending    JOSE J Fournier Therapist  [phone]    Electronically signed by JOSE J Fournier at 7/3/2023 10:28 AM PDT

## 2023-07-03 NOTE — PROGRESS NOTES
[facility]   Therapy Progress Note        Name: Tita Pitts  MRN: [id number]  : 1955  Age: 68 y.o.  Date of assessment: 7/3/2023  PCP: Bassam Marinelli M.D.  Persons in attendance: Patient  Total session time: 55 minutes      Topics addressed in psychotherapy include: Met with the patient in office for an individual counseling session.  The patient discussed that she continues to have bouts of fatigue.  She is unsure if it is caused by a medical or psychological issues.  Worked with the patient on sleep maintenance including going to bed when tired rather than falling asleep someone else.  She reported having a stressful three days and then a very good .  Worked with patient on identifying specific items which were different.  Discussed bereavement with the patient and learning an acceptance of having to do new things which are out of her comfort zone.  Discussed increased responsibility of home maintenance due to the loss of her .  Discussed timeline of grief.  Patient reported that she is attempting to work outside of her comfort zone, researching a trip to [place] and flying to visit her daughter.    This dictation has been created using voice recognition software and/or scribes. The accuracy of the dictation is limited by the abilities of the software and the expertise of the scribes. I expect there may be some errors of grammar and possibly content. I made every attempt to manually correct the errors within my dictation. However, errors related to voice recognition software and/or scribes may still exist and should be interpreted within the appropriate context.    Objective Observations:   Participation:Active verbal participation, Attentive, and Engaged   Grooming:Casual   Cognition:Alert and Fully Oriented   Eye Contact:Good   Mood:Euthymic   Affect:Flexible, Full range, and Congruent with content   Thought Process:Logical and Goal-directed   Speech:Rate within normal  limits and Volume within normal limits    Current Risk:   Suicide: low   Homicide: low   Self-Harm: low   Relapse: low   Safety Plan Reviewed: not applicable    Care Plan Updated: No    Does patient express agreement with the above plan? Yes     Diagnosis:  1. Grief reaction with prolonged bereavement    2. Grief        Referral appointment(s) scheduled? FELICITAS Dumont.

## 2023-07-05 ENCOUNTER — OFFICE VISIT (OUTPATIENT)
Dept: CARDIOLOGY | Facility: MEDICAL CENTER | Age: 68
End: 2023-07-05
Attending: INTERNAL MEDICINE
Payer: MEDICARE

## 2023-07-05 VITALS
OXYGEN SATURATION: 96 % | RESPIRATION RATE: 16 BRPM | HEIGHT: 65 IN | SYSTOLIC BLOOD PRESSURE: 108 MMHG | WEIGHT: 163 LBS | HEART RATE: 81 BPM | DIASTOLIC BLOOD PRESSURE: 60 MMHG | BODY MASS INDEX: 27.16 KG/M2

## 2023-07-05 DIAGNOSIS — I49.3 PVC (PREMATURE VENTRICULAR CONTRACTION): ICD-10-CM

## 2023-07-05 DIAGNOSIS — I10 PRIMARY HYPERTENSION: ICD-10-CM

## 2023-07-05 DIAGNOSIS — I31.39 PERICARDIAL EFFUSION: ICD-10-CM

## 2023-07-05 DIAGNOSIS — I31.39 IDIOPATHIC PERICARDIAL EFFUSION: Primary | ICD-10-CM

## 2023-07-05 PROCEDURE — 99212 OFFICE O/P EST SF 10 MIN: CPT | Performed by: INTERNAL MEDICINE

## 2023-07-05 PROCEDURE — 99214 OFFICE O/P EST MOD 30 MIN: CPT | Performed by: INTERNAL MEDICINE

## 2023-07-05 PROCEDURE — 3074F SYST BP LT 130 MM HG: CPT | Performed by: INTERNAL MEDICINE

## 2023-07-05 PROCEDURE — 3078F DIAST BP <80 MM HG: CPT | Performed by: INTERNAL MEDICINE

## 2023-07-05 RX ORDER — COLCHICINE 0.6 MG/1
0.6 TABLET ORAL
Qty: 60 TABLET | Refills: 3 | Status: ON HOLD | OUTPATIENT
Start: 2023-07-05 | End: 2024-01-08

## 2023-07-05 ASSESSMENT — FIBROSIS 4 INDEX: FIB4 SCORE: 0.68

## 2023-07-05 NOTE — PATIENT INSTRUCTIONS
10 exercises for varicose veins: try to do at least 2 of them at least once a day    [email or web address]://[employer].com/articles/exercises-to-clear-varicose-veins/

## 2023-07-05 NOTE — PROGRESS NOTES
"CARDIOLOGY established PATIENT:    PCP: Bassam Marinelli M.D.    1. Idiopathic pericardial effusion    2. Pericardial effusion    3. PVC (premature ventricular contraction)    4. Primary hypertension        Tita Pitts here for recurrent idiopathic pericardial effusion follow up.    Chief Complaint   Patient presents with    Irregular Heart Beat    Shortness of Breath    Premature Ventricular Contractions (PVCs)       History: Tita Pitts is a 68 y.o. female here for follow up for recurrent idiopathic pericardial effusion, presumed due to COVID given non-revealing extensive workup thus far.  See prior notes for more details.    Denies syncope, presyncope, LH. Still occasional left sided chest ache.     Due to diarrhea, decreased colchicine to 3 days a week on 6/24/23, daily not BID and that resolved her diarrhea. Occasional LEDA with long standing or sitting, not on days when she is active.  Feels good overall.    Once a week at the gym, walks at least 3 miles daily. Mild CARDENAS with incline. Some weakness in her legs with squats.    BP log daily: increased lisinopril to 10mg BID as of 6/30/23.    Resumed Indomethacin 6/23/23 since effusion improved     TTE 6/23/23:personally reviewed  Compared to the prior study on 5/15/23, there has been some regression   of the pericardial effusion with now normal in size IVC.  Limited TTE for pericardial effusion follow up.   PVCs noted.  Small-moderate pericardial effusion without echocardiographic evidence   of hemodynamic compromise.   Normal IVC size with normal estimted right atrial pressure.  Preserved biventricular systolic functions.       PE:  /60 (BP Location: Left arm, Patient Position: Sitting, BP Cuff Size: Adult)   Pulse 81   Resp 16   Ht 1.651 m (5' 5\")   Wt 73.9 kg (163 lb)   LMP  (LMP Unknown)   SpO2 96%   BMI 27.12 kg/m²     Gen: well  HEENT: Symmetric face. Anicteric sclerae. Moist mucus membranes  NECK: No JVD.   CARDIAC: Regular, " Normal S1, S2, No murmur  VASCULATURE: carotids are normal bilaterally without bruit  RESP: Clear to auscultation bilaterally   ABD: Soft, non-tender, non-distended  EXT: No edema, no clubbing or cyanosis. Varicose veins noted.  SKIN: Warm and dry  NEURO: No gross deficits  PSYCH: Appropriate affect, participates in conversation    The 10-year ASCVD risk score (Yo LINCOLN, et al., 2019) is: 5.2%    Past Medical History:   Diagnosis Date    Allergy     Anesthesia     ponv    Breath shortness 2022    Related to current pericarditis    Bronchitis     history of    Cataract     NOT surgically removed    Gynecological disorder 1986    Had Hysterectomy 1990    Hypertension     well controlled on medication    Pericarditis 04/07/2023    Pneumonia     history of, in 20's    PONV (postoperative nausea and vomiting)     Psychiatric problem 12/27/2022    grief - in counseling     Past Surgical History:   Procedure Laterality Date    APPENDECTOMY  1986    ABDOMINAL HYSTERECTOMY TOTAL      GYN SURGERY      laparoscopy for endometriosis    OTHER CARDIAC SURGERY       Allergies   Allergen Reactions    Other Drug Unspecified     Ruvert = Other reaction(s): Welts on legs    Macrodantin [Nitrofurantoin]      nightmares     Outpatient Encounter Medications as of 7/5/2023   Medication Sig Dispense Refill    colchicine (COLCRYS) 0.6 MG Tab Take 1 Tablet by mouth every Mon, Wed, Fri, Sat, and Sun at 6 PM. 60 Tablet 3    lisinopril (PRINIVIL) 10 MG Tab Take 1 Tablet by mouth 2 times a day. 180 Tablet 3    famotidine (PEPCID) 40 MG Tab TAKE 1 TABLET BY MOUTH EVERY DAY 30 Tablet 1    Fluticasone Propionate (FLONASE NA) Administer  into affected nostril(S).      Magnesium 400 MG Cap Take  by mouth.      Oral Electrolytes (PEDIALYTE PO) Take  by mouth.      calcium carbonate (OS-RADHA 500) 1250 (500 Ca) MG Tab Take 500 mg by mouth every day. With Vitamin D3      Ascorbic Acid (VITAMIN C PO) Take  by mouth every day.      [DISCONTINUED]  indomethacin (INDOCIN) 25 MG Cap Take 1 Capsule by mouth 3 times a day. 42 Capsule 1    [DISCONTINUED] colchicine (COLCRYS) 0.6 MG Tab Take 1 Tablet by mouth 2 times a day. 180 Tablet 1    [DISCONTINUED] vitamin D3 (CHOLECALCIFEROL) 1000 Unit (25 mcg) Tab Take 1,000 Units by mouth every day.       No facility-administered encounter medications on file as of 7/5/2023.     Social History     Socioeconomic History    Marital status:      Spouse name: Not on file    Number of children: Not on file    Years of education: Not on file    Highest education level: Bachelor's degree (e.g., BA, AB, BS)   Occupational History    Not on file   Tobacco Use    Smoking status: Never     Passive exposure: Past    Smokeless tobacco: Never   Vaping Use    Vaping Use: Never used   Substance and Sexual Activity    Alcohol use: Not Currently     Alcohol/week: 0.6 oz     Types: 1 Glasses of wine per week     Comment: Some alcohol socially over the years.  Not regular use    Drug use: Never    Sexual activity: Not Currently     Partners: Male     Birth control/protection: Surgical, Abstinence, None, Post-Menopausal     Comment: Had Endometriosis had hysterectomy.   Other Topics Concern    Not on file   Social History Narrative    Not on file     Social Determinants of Health     Financial Resource Strain: Low Risk  (4/3/2022)    Overall Financial Resource Strain (CARDIA)     Difficulty of Paying Living Expenses: Not very hard   Food Insecurity: No Food Insecurity (4/3/2022)    Hunger Vital Sign     Worried About Running Out of Food in the Last Year: Never true     Ran Out of Food in the Last Year: Never true   Transportation Needs: No Transportation Needs (4/3/2022)    PRAPARE - Transportation     Lack of Transportation (Medical): No     Lack of Transportation (Non-Medical): No   Physical Activity: Insufficiently Active (4/3/2022)    Exercise Vital Sign     Days of Exercise per Week: 2 days     Minutes of Exercise per Session: 30  min   Stress: Stress Concern Present (4/3/2022)    Citizen of [place] of Occupational Health - Occupational Stress Questionnaire     Feeling of Stress : Rather much   Social Connections: Moderately Integrated (4/3/2022)    Social Connection and Isolation Panel [NHANES]     Frequency of Communication with Friends and Family: Twice a week     Frequency of Social Gatherings with Friends and Family: Once a week     Attends Jain Services: 1 to 4 times per year     Active Member of Clubs or Organizations: Yes     Attends Club or Organization Meetings: 1 to 4 times per year     Marital Status:    Intimate Partner Violence: Not on file   Housing Stability: Low Risk  (4/3/2022)    Housing Stability Vital Sign     Unable to Pay for Housing in the Last Year: No     Number of Places Lived in the Last Year: 1     Unstable Housing in the Last Year: No     Family History   Problem Relation Age of Onset    Breast Cancer Mother     Diabetes Mother         Developed in 70s, at 90 Kidney failure    Hypertension Mother         diagnosed in 70s    Hyperlipidemia Mother         diagnosed in 70s    Kidney Disease Mother     Heart Disease Father          at 64, CHF, CAD    Heart Disease Brother         Heart Attack at 72    Stroke Brother         occurred at 78    Diabetes Maternal Uncle          at 68         Studies    Lab Results   Component Value Date/Time    TSHULTRASEN 2.110 2022 0932        Lab Results   Component Value Date/Time    FREET4 1.35 2023 1001      Lab Results   Component Value Date/Time    HBA1C 5.2 2022 08:19 AM     Lab Results   Component Value Date/Time    CHOLSTRLTOT 167 2023 09:21 AM    LDL 89 2023 09:21 AM    HDL 63 2023 09:21 AM    TRIGLYCERIDE 76 2023 09:21 AM       Lab Results   Component Value Date/Time    SODIUM 135 2023 11:00 AM    POTASSIUM 4.7 2023 11:00 AM    CHLORIDE 101 2023 11:00 AM    CO2 23 2023 11:00  AM    GLUCOSE 68 05/16/2023 11:00 AM    BUN 19 05/16/2023 11:00 AM    CREATININE 1.00 05/16/2023 11:00 AM     Lab Results   Component Value Date/Time    ALKPHOSPHAT 77 05/16/2023 11:00 AM    ASTSGOT 21 05/16/2023 11:00 AM    ALTSGPT 25 05/16/2023 11:00 AM    TBILIRUBIN 0.6 05/16/2023 11:00 AM        Echocardiogram:  No results found for this or any previous visit.        Assessment and Recommendations:    Problem List Items Addressed This Visit       Idiopathic pericardial effusion - Primary    Relevant Medications    colchicine (COLCRYS) 0.6 MG Tab    PVC (premature ventricular contraction)     Other Visit Diagnoses       Primary hypertension              Ms. Pitts is overall doing well from a cardiovascular standpoint with no clinical concerns of heart failure or constrictive pericarditis.  Her extensive work-up thus far has revealed no direct etiology for her idiopathic recurrent pericardial effusion, and we are assuming is due to COVID illnesses in 2022 especially with her history of pericarditis at younger age hence potentially she has a predisposition for pericarditis/pericardial effusion development with viral illnesses.    Recent indomethacin course has helped with her pericardial fusion, plan to recheck limited echocardiogram in August.  If worse, will refer for pericardial window with either Dr. Ganser here in [place] or at [facility].    Blood pressure overall seems to be well controlled however given the discrepancy between her numbers at home and today's blood pressure, we will arrange for another provider blood pressure visit to ensure accurate readings with her machine.  Continue lisinopril 10 mg twice a day for now.    Advised her to try colchicine 0.6 mg daily 5 days a week instead of 3 days a week.  Tentative plan is colchicine until 12/2023 or based on progress regarding her pericardial effusion.    Follow-up with endocrinology set for September regarding her elevated antithyroglobulin  antibody.    Encouraged her to incorporate more resistance exercise especially lower extremity resistance exercise into her routine; and provided her with some lower extremity exercises that might help with her underlying varicose veins and subsequent occasional lower extremity edema.    Thank you for the opportunity to be involved in Tita Pitts 's care; and please reach out with any questions or concerns.    Return in about 6 months (around 1/5/2024).    Chase Pena MD, MPH [facility]  Interventional Cardiologist  [facility]   of Clinical Internal Medicine - [facility]    ~ Portions of this note were completed using voice recognition software (Dragon Naturally speaking software) . Occasional transcription errors may have escaped proof reading. I have made every reasonable attempt to correct obvious errors, but I expect that there are errors of grammar and possibly content that I did not discover before finalizing the note. ~

## 2023-07-08 DIAGNOSIS — I31.39 PERICARDIAL EFFUSION: ICD-10-CM

## 2023-07-10 RX ORDER — FAMOTIDINE 40 MG/1
40 TABLET, FILM COATED ORAL DAILY
Qty: 100 TABLET | Refills: 3 | Status: SHIPPED | OUTPATIENT
Start: 2023-07-10

## 2023-07-10 NOTE — TELEPHONE ENCOUNTER
Is the patient due for a refill? Yes    Was the patient seen the past year? Yes    Date of last office visit: 7/05/23    Does the patient have an upcoming appointment?  No    Provider to refill:AL    Does the patients insurance require a 100 day supply?  Yes

## 2023-07-12 ENCOUNTER — NON-PROVIDER VISIT (OUTPATIENT)
Dept: CARDIOLOGY | Facility: MEDICAL CENTER | Age: 68
End: 2023-07-12
Attending: INTERNAL MEDICINE
Payer: MEDICARE

## 2023-07-12 VITALS — HEART RATE: 83 BPM | DIASTOLIC BLOOD PRESSURE: 90 MMHG | SYSTOLIC BLOOD PRESSURE: 121 MMHG

## 2023-07-12 NOTE — PROGRESS NOTES
MA discussed with Charge LISA Mccann. Patient stated had no symptoms or complaints.     AL: GERSON. Thank you.

## 2023-07-12 NOTE — PROGRESS NOTES
Patient was here today for blood pressure check per AL. BP readings located in vital sign section including patient's home BP cuff readings. Informed patient we will forward readings to nurse and they will receive a call with recommendations.

## 2023-07-17 ENCOUNTER — OFFICE VISIT (OUTPATIENT)
Dept: MEDICAL GROUP | Facility: MEDICAL CENTER | Age: 68
End: 2023-07-17
Payer: MEDICARE

## 2023-07-17 VITALS
OXYGEN SATURATION: 97 % | DIASTOLIC BLOOD PRESSURE: 60 MMHG | TEMPERATURE: 97.2 F | WEIGHT: 163.14 LBS | HEART RATE: 87 BPM | BODY MASS INDEX: 27.18 KG/M2 | HEIGHT: 65 IN | SYSTOLIC BLOOD PRESSURE: 110 MMHG | RESPIRATION RATE: 16 BRPM

## 2023-07-17 DIAGNOSIS — Z12.31 ENCOUNTER FOR SCREENING MAMMOGRAM FOR MALIGNANT NEOPLASM OF BREAST: ICD-10-CM

## 2023-07-17 DIAGNOSIS — Z12.83 SCREENING FOR SKIN CANCER: ICD-10-CM

## 2023-07-17 DIAGNOSIS — I31.39 IDIOPATHIC PERICARDIAL EFFUSION: ICD-10-CM

## 2023-07-17 DIAGNOSIS — I10 ESSENTIAL HYPERTENSION, BENIGN: ICD-10-CM

## 2023-07-17 PROCEDURE — 3074F SYST BP LT 130 MM HG: CPT | Performed by: FAMILY MEDICINE

## 2023-07-17 PROCEDURE — 99214 OFFICE O/P EST MOD 30 MIN: CPT | Performed by: FAMILY MEDICINE

## 2023-07-17 PROCEDURE — 3078F DIAST BP <80 MM HG: CPT | Performed by: FAMILY MEDICINE

## 2023-07-17 ASSESSMENT — ENCOUNTER SYMPTOMS
FEVER: 0
CHILLS: 0
PALPITATIONS: 0

## 2023-07-17 ASSESSMENT — FIBROSIS 4 INDEX: FIB4 SCORE: 0.68

## 2023-07-17 NOTE — ASSESSMENT & PLAN NOTE
Chronic problem, unstable, continue to follow with cardiology.  Continue colchicine and Pepcid.  Scheduled for echocardiogram.  Follow-up with endocrinology for thyroid antibodies

## 2023-07-17 NOTE — PROGRESS NOTES
FAMILY MEDICINE VISIT                                                               Chief complaint::Diagnoses of Idiopathic pericardial effusion, Essential hypertension, benign, Encounter for screening mammogram for malignant neoplasm of breast, and Screening for skin cancer were pertinent to this visit.    History of present illness: Tita Pitts is a 68 y.o. female who presented for referral to dermatology, mammogram, medications follow-up.    Problem   Essential Hypertension, Benign    Blood pressure today came back at 110/60..  She is on lisinopril 10 mg 2 times daily.  No side effects with this medication.  No symptoms.     Idiopathic Pericardial Effusion    Chronic condition. Followed by cardiology. Patient has upcoming repeat echocardiogram scheduled. She has been tolerating colchicine medication without diarrhea.    Current regimen: colchicine 0.6mg BID    Pericardial effusion seen on echocardiogram, had pericardiocentesis and was diagnosed with pericarditis.  She is currently on colchicine daily.  Was recently treated with indomethacin.  Thyroid antibodies came back positive            Review of systems:     Review of Systems   Constitutional:  Negative for chills and fever.   Cardiovascular:  Negative for chest pain, palpitations and leg swelling.        Medications and Allergies:     Current Outpatient Medications   Medication Sig Dispense Refill    famotidine (PEPCID) 40 MG Tab TAKE 1 TABLET BY MOUTH EVERY  Tablet 3    colchicine (COLCRYS) 0.6 MG Tab Take 1 Tablet by mouth every Mon, Wed, Fri, Sat, and Sun at 6 PM. 60 Tablet 3    lisinopril (PRINIVIL) 10 MG Tab Take 1 Tablet by mouth 2 times a day. 180 Tablet 3    Fluticasone Propionate (FLONASE NA) Administer  into affected nostril(S).      Magnesium 400 MG Cap Take  by mouth.      Oral Electrolytes (PEDIALYTE PO) Take  by mouth.      calcium carbonate (OS-RADHA 500) 1250 (500 Ca) MG Tab Take 500 mg by mouth every day. With Vitamin D3       "Ascorbic Acid (VITAMIN C PO) Take  by mouth every day.       No current facility-administered medications for this visit.          Vitals:    /60   Pulse 87   Temp 36.2 °C (97.2 °F)   Resp 16   Ht 1.651 m (5' 5\")   Wt 74 kg (163 lb 2.3 oz)   SpO2 97%  Body mass index is 27.15 kg/m².    Physical Exam:     Physical Exam  Constitutional:       Appearance: Normal appearance. She is well-developed and well-groomed.   HENT:      Head: Normocephalic and atraumatic.      Right Ear: External ear normal.      Left Ear: External ear normal.   Eyes:      General:         Right eye: No discharge.         Left eye: No discharge.      Conjunctiva/sclera: Conjunctivae normal.   Cardiovascular:      Rate and Rhythm: Normal rate.   Pulmonary:      Effort: Pulmonary effort is normal. No respiratory distress.   Musculoskeletal:      Cervical back: Neck supple.   Skin:     Findings: No rash.   Neurological:      Mental Status: She is alert.   Psychiatric:         Mood and Affect: Mood and affect normal.         Behavior: Behavior normal.            Assessment/Plan:         Problem List Items Addressed This Visit       Idiopathic pericardial effusion     Chronic problem, unstable, continue to follow with cardiology.  Continue colchicine and Pepcid.  Scheduled for echocardiogram.  Follow-up with endocrinology for thyroid antibodies         Essential hypertension, benign     Chronic problem, stable, continue lisinopril 10 mg 2 times daily.          Other Visit Diagnoses       Encounter for screening mammogram for malignant neoplasm of breast        Relevant Orders    MA-SCREENING MAMMO BILAT W/TOMOSYNTHESIS W/CAD    Screening for skin cancer        Relevant Orders    Referral to Dermatology             Please note that this dictation was created using voice recognition software. I have made every reasonable attempt to correct obvious errors, but I expect that there are errors of grammar and possibly content that I did not " discover before finalizing the note.    Follow up in 3 months for follow-up.

## 2023-07-24 ENCOUNTER — OFFICE VISIT (OUTPATIENT)
Dept: BEHAVIORAL HEALTH | Facility: CLINIC | Age: 68
End: 2023-07-24
Payer: MEDICARE

## 2023-07-24 DIAGNOSIS — F43.29 GRIEF REACTION WITH PROLONGED BEREAVEMENT: ICD-10-CM

## 2023-07-24 DIAGNOSIS — R53.83 OTHER FATIGUE: ICD-10-CM

## 2023-07-24 PROCEDURE — 90837 PSYTX W PT 60 MINUTES: CPT | Performed by: MARRIAGE & FAMILY THERAPIST

## 2023-07-24 NOTE — PROGRESS NOTES
[facility]   Therapy Progress Note        Name: Tita Pitts  MRN: [id number]  : 1955  Age: 68 y.o.  Date of assessment: 2023  PCP: Bassam Marinelli M.D.  Persons in attendance: Patient  Total session time: 58 minutes      Topics addressed in psychotherapy include: Met with the patient in office for an individual counseling session.  The patient discussed that she feels she is slowly doing better.  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  She continues to have things to do that her  would have normally done.  Patient discussed finding it difficult to set a boundary with her son.  He has bought a erich but will not call an  but to set up a charging port.  Discussed with the patient setting boundaries and adding fewer things to her plate.    This dictation has been created using voice recognition software and/or scribes. The accuracy of the dictation is limited by the abilities of the software and the expertise of the scribes. I expect there may be some errors of grammar and possibly content. I made every attempt to manually correct the errors within my dictation. However, errors related to voice recognition software and/or scribes may still exist and should be interpreted within the appropriate context.    Objective Observations:   Participation:Active verbal participation, Attentive, and Engaged   Grooming:Casual   Cognition:Alert and Fully Oriented   Eye Contact:Good   Mood:Depressed   Affect:Flexible, Full range, Congruent with content, and Sad   Thought Process:Logical and Goal-directed   Speech:Rate within normal limits and Volume within normal limits    Current Risk:   Suicide: low   Homicide: low   Self-Harm: low   Relapse: low   Safety Plan Reviewed: not applicable    Care Plan Updated: No    Does patient express agreement with the above plan? Yes     Diagnosis:  1. Grief reaction with prolonged bereavement    2. Other fatigue         Referral appointment(s) scheduled? No       FELICITAS Fournier.

## 2023-08-07 ENCOUNTER — HOSPITAL ENCOUNTER (OUTPATIENT)
Dept: CARDIOLOGY | Facility: MEDICAL CENTER | Age: 68
End: 2023-08-07
Attending: INTERNAL MEDICINE
Payer: MEDICARE

## 2023-08-07 DIAGNOSIS — I31.39 PERICARDIAL EFFUSION: ICD-10-CM

## 2023-08-07 LAB
LV EJECT FRACT  99904: 60
LV EJECT FRACT MOD 2C 99903: 60.18
LV EJECT FRACT MOD 4C 99902: 39.06
LV EJECT FRACT MOD BP 99901: 51.97

## 2023-08-07 PROCEDURE — 93321 DOPPLER ECHO F-UP/LMTD STD: CPT | Mod: 26 | Performed by: INTERNAL MEDICINE

## 2023-08-07 PROCEDURE — 93308 TTE F-UP OR LMTD: CPT | Mod: 26 | Performed by: INTERNAL MEDICINE

## 2023-08-07 PROCEDURE — 93325 DOPPLER ECHO COLOR FLOW MAPG: CPT

## 2023-08-07 PROCEDURE — 93325 DOPPLER ECHO COLOR FLOW MAPG: CPT | Mod: 26 | Performed by: INTERNAL MEDICINE

## 2023-08-28 ENCOUNTER — APPOINTMENT (OUTPATIENT)
Dept: RADIOLOGY | Facility: MEDICAL CENTER | Age: 68
End: 2023-08-28
Attending: FAMILY MEDICINE
Payer: MEDICARE

## 2023-08-28 DIAGNOSIS — Z12.31 ENCOUNTER FOR SCREENING MAMMOGRAM FOR MALIGNANT NEOPLASM OF BREAST: ICD-10-CM

## 2023-08-28 PROCEDURE — 77063 BREAST TOMOSYNTHESIS BI: CPT

## 2023-09-05 ENCOUNTER — OFFICE VISIT (OUTPATIENT)
Dept: BEHAVIORAL HEALTH | Facility: CLINIC | Age: 68
End: 2023-09-05
Payer: MEDICARE

## 2023-09-05 DIAGNOSIS — F43.29 GRIEF REACTION WITH PROLONGED BEREAVEMENT: ICD-10-CM

## 2023-09-05 PROCEDURE — 90837 PSYTX W PT 60 MINUTES: CPT | Performed by: MARRIAGE & FAMILY THERAPIST

## 2023-09-05 NOTE — PROGRESS NOTES
[facility]   Therapy Progress Note        Name: Tita Pitts  MRN: [id number]  : 1955  Age: 68 y.o.  Date of assessment: 2023  PCP: Bassam Marinelli M.D.  Persons in attendance: Patient  Total session time: 55 minutes      Topics addressed in psychotherapy include: Met with the patient in office for an individual counseling session.  The patient reported that she is continuing to have difficulty in maintaining focus.  She reports that her son is getting  in her house in October and that her daughter will be moving back from [place] in October as well.  Patient reports that she has a significant amount of fatigued.  Discussed with the patient levels of stress with a wedding and her daughter moving.  Discussed with the patient setting some easily attainable goals and activities which she can engage with when she is feeling low.  Discuss continuing life changes.  This dictation has been created using voice recognition software and/or scribes. The accuracy of the dictation is limited by the abilities of the software and the expertise of the scribes. I expect there may be some errors of grammar and possibly content. I made every attempt to manually correct the errors within my dictation. However, errors related to voice recognition software and/or scribes may still exist and should be interpreted within the appropriate context.    Objective Observations:   Participation:Active verbal participation, Attentive, and Engaged   Grooming:Casual   Cognition:Alert and Fully Oriented   Eye Contact:Good   Mood:Depressed and Anxious   Affect:Flexible, Full range, and Congruent with content   Thought Process:Logical and Goal-directed   Speech:Rate within normal limits and Volume within normal limits    Current Risk:   Suicide: low   Homicide: low   Self-Harm: low   Relapse: low   Safety Plan Reviewed: not applicable    Care Plan Updated: No    Does patient express agreement with the above plan?  Yes     Diagnosis:  1. Grief reaction with prolonged bereavement        Referral appointment(s) scheduled? FELICITAS Dumont.

## 2023-09-11 ENCOUNTER — OFFICE VISIT (OUTPATIENT)
Dept: DERMATOLOGY | Facility: IMAGING CENTER | Age: 68
End: 2023-09-11
Payer: MEDICARE

## 2023-09-11 DIAGNOSIS — L82.1 SK (SEBORRHEIC KERATOSIS): ICD-10-CM

## 2023-09-11 DIAGNOSIS — D18.01 CHERRY ANGIOMA: ICD-10-CM

## 2023-09-11 DIAGNOSIS — L81.4 LENTIGINES: ICD-10-CM

## 2023-09-11 DIAGNOSIS — L57.0 ACTINIC KERATOSIS: ICD-10-CM

## 2023-09-11 DIAGNOSIS — D48.9 NEOPLASM OF UNCERTAIN BEHAVIOR: ICD-10-CM

## 2023-09-11 DIAGNOSIS — L73.8 SEBACEOUS HYPERPLASIA: ICD-10-CM

## 2023-09-11 DIAGNOSIS — Z12.83 SKIN CANCER SCREENING: ICD-10-CM

## 2023-09-11 DIAGNOSIS — D22.9 MULTIPLE NEVI: ICD-10-CM

## 2023-09-11 PROCEDURE — 17000 DESTRUCT PREMALG LESION: CPT | Mod: 59 | Performed by: NURSE PRACTITIONER

## 2023-09-11 PROCEDURE — 11102 TANGNTL BX SKIN SINGLE LES: CPT | Performed by: NURSE PRACTITIONER

## 2023-09-11 PROCEDURE — 99213 OFFICE O/P EST LOW 20 MIN: CPT | Mod: 25 | Performed by: NURSE PRACTITIONER

## 2023-09-11 PROCEDURE — 17003 DESTRUCT PREMALG LES 2-14: CPT | Performed by: NURSE PRACTITIONER

## 2023-09-11 NOTE — PROGRESS NOTES
DERMATOLOGY NOTE  NEW VISIT       Chief complaint: Establish Care     Skin exam     HPI:  skin lesion   Location:  scalp   Time present: 6 months   Painful lesion: No  Itching lesion: No  Enlarging lesion: Yes  Anything make it better or worse?no     HPI:  skin lesion   Location: left upper arm   Time present:  1 month   Painful lesion: No  Itching lesion: No  Enlarging lesion: Yes, however, pt states lesion is now gone  Anything make it better or worse?no      History of skin cancer: No  History of precancers/actinic keratoses: No  History of biopsies:Yes, Details: left leg  , results benign   History of blistering/severe sunburns:Yes, Details: child   Family history of skin cancer:No  Family history of atypical moles:No      Allergies   Allergen Reactions    Other Drug Unspecified     Ruvert = Other reaction(s): Welts on legs    Macrodantin [Nitrofurantoin]      nightmares        MEDICATIONS:  Medications relevant to specialty reviewed.     REVIEW OF SYSTEMS:   Positive for skin (see HPI)  Negative for fevers and chills       EXAM:  LMP  (LMP Unknown)   Constitutional: Well-developed, well-nourished, and in no distress.     A total body skin exam was performed excluding the genitals per patient preference and including the following areas: head (including face), neck, chest, abdomen, groin/buttocks, back, bilateral upper extremities, and bilateral lower extremities with the following pertinent findings listed below and/or in assessment/plan.     4 mm pink papule mid upper forehead in hairline   -sun exposed skin of trunk and b/l upper, lower extremities and face with scattered clinically benign light brown reticulated macules all of which were morphologically similar and none of which were suspicious for skin cancer today on exam  Several scattered 1-3mm bright red macules and thin papules on the trunk, scalp and extremities  Multiple tan medium brown skin-colored macules papules scattered over the trunk >>  extremities--All with benign-appearing pigment network patterns on dermoscopy  Several medium brown skin-colored stuck-on waxy papules scattered on the trunk  Ill-defined erythematous gritty/scaly papule over the forehead , left cheek   Few scattered yellowish/red papules with telangiectasias and central dell  Face     IMPRESSION / PLAN:    1. Neoplasm of uncertain behavior  Procedure Note   Procedure: Biopsy by shave technique  Location: upper forehead  Size: as noted in exam  Preoperative diagnosis:BCC vs other  Risks, benefits and alternatives of procedure discussed, verbal consent obtained for photo (see chart) and written informed consent obtained for procedure. Time out completed. Area of biopsy prepped with alcohol. Anesthesia with 1% lidocaine with epinephrine administered with 30 gauge needle. Shave biopsy of the site performed. Hemostasis achieved with pressure and aluminum chloride. Vaseline applied to wound with bandage. Patient tolerated procedure well and there were no complications. The specimen was sent to the pathology lab by the staff. Wound care was discussed.      2. Actinic keratosis  CRYOTHERAPY:  Risks (including, but not limited to: skin discoloration, redness, blister, blood blister, recurrence, need for further treatment, infection, scar) and benefits of cryotherapy discussed. Patient verbally agreed to proceed with treatment. 1 cryotherapy freeze thaw cycles of 10 seconds were applied to 2 lesions on face as noted on exam with cryac. Patient tolerated procedure well. Aftercare instructions given--no specific care needed unless irritated during healing process, can apply Vaseline with small band-aid if needed.      3. Multiple nevi  - Benign-appearing nature of lesions discussed during exam.   - Advised to continue to monitor for any return to clinic for new or concerning changes.  - ABCDE's of melanoma discussed/handout given.      4. Lentigines  - Benign-appearing nature of lesions  discussed during exam.   - Advised to continue to monitor for any return to clinic for new or concerning changes.      5. SK (seborrheic keratosis)  - Benign-appearing nature of lesions discussed during exam.   - Advised to continue to monitor for any return to clinic for new or concerning changes.      6. Cherry angioma  - Benign-appearing nature of lesions discussed during exam.   - Advised to continue to monitor for any return to clinic for new or concerning changes.      7. Sebaceous hyperplasia  - Benign-appearing nature of lesions discussed during exam.   - Advised to continue to monitor for any return to clinic for new or concerning changes.      8. Skin cancer screening  Skin cancer education  discussed importance of sun protective clothing, eyewear in addition to the use of broad spectrum sunscreen with SPF 30 or greater, as well as need for reapplication ~every 2 hours when exposed to UVR/handout given  discussed importance following up for any new or changing lesions as noted in handout given, but every 12 months exams in clinic in the setting of dermatologic history  ABCDE's of melanoma discussed/handout given        Discussed risks associated with LN2 and shave bx, Patient verbalized understanding and agrees with plan regarding the above            Please note that this dictation was created using voice recognition software. I have made every reasonable attempt to correct obvious errors, but I expect that there are errors of grammar and possibly content that I did not discover before finalizing the note.      Return to clinic in: Return in about 1 year (around 9/11/2024) for GAEL, Pending Bx results. and as needed for any new or changing skin lesions.

## 2023-09-12 ENCOUNTER — HOSPITAL ENCOUNTER (OUTPATIENT)
Dept: LAB | Facility: MEDICAL CENTER | Age: 68
End: 2023-09-12
Attending: INTERNAL MEDICINE
Payer: MEDICARE

## 2023-09-12 ENCOUNTER — OFFICE VISIT (OUTPATIENT)
Dept: ENDOCRINOLOGY | Facility: MEDICAL CENTER | Age: 68
End: 2023-09-12
Attending: INTERNAL MEDICINE
Payer: MEDICARE

## 2023-09-12 VITALS
DIASTOLIC BLOOD PRESSURE: 70 MMHG | SYSTOLIC BLOOD PRESSURE: 120 MMHG | BODY MASS INDEX: 27.84 KG/M2 | RESPIRATION RATE: 20 BRPM | OXYGEN SATURATION: 97 % | HEART RATE: 76 BPM | WEIGHT: 167.1 LBS | HEIGHT: 65 IN

## 2023-09-12 DIAGNOSIS — I31.39 PERICARDIAL EFFUSION WITHOUT CARDIAC TAMPONADE: ICD-10-CM

## 2023-09-12 DIAGNOSIS — R76.8 THYROID ANTIBODY POSITIVE: ICD-10-CM

## 2023-09-12 DIAGNOSIS — L65.0 TELOGEN EFFLUVIUM: ICD-10-CM

## 2023-09-12 LAB
ALBUMIN SERPL BCP-MCNC: 4.7 G/DL (ref 3.2–4.9)
ALBUMIN/GLOB SERPL: 1.6 G/DL
ALP SERPL-CCNC: 84 U/L (ref 30–99)
ALT SERPL-CCNC: 15 U/L (ref 2–50)
ANION GAP SERPL CALC-SCNC: 14 MMOL/L (ref 7–16)
AST SERPL-CCNC: 22 U/L (ref 12–45)
BILIRUB SERPL-MCNC: 0.7 MG/DL (ref 0.1–1.5)
BUN SERPL-MCNC: 18 MG/DL (ref 8–22)
CALCIUM ALBUM COR SERPL-MCNC: 9.1 MG/DL (ref 8.5–10.5)
CALCIUM SERPL-MCNC: 9.7 MG/DL (ref 8.4–10.2)
CHLORIDE SERPL-SCNC: 99 MMOL/L (ref 96–112)
CO2 SERPL-SCNC: 23 MMOL/L (ref 20–33)
CREAT SERPL-MCNC: 1.05 MG/DL (ref 0.5–1.4)
GFR SERPLBLD CREATININE-BSD FMLA CKD-EPI: 58 ML/MIN/1.73 M 2
GLOBULIN SER CALC-MCNC: 2.9 G/DL (ref 1.9–3.5)
GLUCOSE SERPL-MCNC: 83 MG/DL (ref 65–99)
POTASSIUM SERPL-SCNC: 3.9 MMOL/L (ref 3.6–5.5)
PROT SERPL-MCNC: 7.6 G/DL (ref 6–8.2)
SODIUM SERPL-SCNC: 136 MMOL/L (ref 135–145)
T4 FREE SERPL-MCNC: 1.15 NG/DL (ref 0.93–1.7)
TSH SERPL DL<=0.005 MIU/L-ACNC: 1.61 UIU/ML (ref 0.38–5.33)

## 2023-09-12 PROCEDURE — 99205 OFFICE O/P NEW HI 60 MIN: CPT | Performed by: INTERNAL MEDICINE

## 2023-09-12 PROCEDURE — 84443 ASSAY THYROID STIM HORMONE: CPT

## 2023-09-12 PROCEDURE — 99211 OFF/OP EST MAY X REQ PHY/QHP: CPT | Performed by: INTERNAL MEDICINE

## 2023-09-12 PROCEDURE — 3074F SYST BP LT 130 MM HG: CPT | Performed by: INTERNAL MEDICINE

## 2023-09-12 PROCEDURE — 80053 COMPREHEN METABOLIC PANEL: CPT

## 2023-09-12 PROCEDURE — 3078F DIAST BP <80 MM HG: CPT | Performed by: INTERNAL MEDICINE

## 2023-09-12 PROCEDURE — 36415 COLL VENOUS BLD VENIPUNCTURE: CPT

## 2023-09-12 PROCEDURE — 84439 ASSAY OF FREE THYROXINE: CPT

## 2023-09-12 ASSESSMENT — FIBROSIS 4 INDEX: FIB4 SCORE: 0.68

## 2023-09-12 NOTE — PROGRESS NOTES
Chief Complaint: Consult requested by Bassam Marinelli M.D. for evaluation of Hypothyroidism    HPI:     Tita Pitts is a 68 y.o. 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   She does have a history of Covid 19 infection in Jan 2022 and July 2023.  She was referred for evaluation as she was found to have elevated TPO antibodies of 119 to 130 on 5/2023 as part of the work up to determine the etiology of her pericardial effusion.  It is notable however that she never had severely uncontrolled hypothyroidism with a baseline TSH of 2.1 on 11/2022      Initial fluid analysis showed neg AFB, neg cytology and predominant lymphocytes     After her initial pericardiocentesis in 11/2022 she had recurrence of an effusion in April 2023.  She was treated with oral steroids which did not work.   She was then treated with Indomethacin followed by Colchicine      She denies a family history of Hypothyroidism and Hashimoto's disease with her parents, siblings.   She is currently not on  thyroid hormone.      She reports the following symptoms: hair loss and brittle nails             Patient's medications, allergies, and social histories were reviewed and updated as appropriate.      ROS:     CONS:     No fever, no chills, no weight loss, no fatigue   EYES:      No diplopia, no blurry vision, no redness of eyes, no swelling of eyelids   ENT:    No hearing loss, No ear pain, No sore throat, no dysphagia, no neck swelling   CV:     No chest pain, no palpitations, no claudication, no orthopnea, no PND   PULM:    No SOB, no cough, no hemoptysis, no wheezing    GI:   No nausea, no vomiting, no diarrhea, no constipation, no bloody stools   :  Passing urine well, no dysuria, no hematuria   ENDO:   No polyuria, no polydipsia, no heat intolerance, no cold intolerance   NEURO: No headaches, no dizziness, no convulsions, no tremors   MUSC:  No joint swellings, no arthralgias,  no myalgias, no weakness   SKIN:   No rash, no ulcers, no dry skin   PSYCH:   No depression, no anxiety, no difficulty sleeping       Past Medical History:  Patient Active Problem List    Diagnosis Date Noted    PVC (premature ventricular contraction) 05/05/2023    Premature atrial contractions 05/05/2023    Hypotension 05/05/2023    Irregular heart beat 05/05/2023    Overweight with body mass index (BMI) of 26 to 26.9 in adult 04/24/2023    Acute cough 03/21/2023    Acute streptococcal pharyngitis 03/17/2023    Breast pain, left 01/24/2023    Tinnitus of both ears 01/24/2023    Essential hypertension, benign 12/28/2022    Cardiomegaly 11/15/2022    Idiopathic pericardial effusion 11/15/2022    BMI 26.0-26.9,adult 11/02/2022    Osteopenia of multiple sites 11/02/2022    SOB (shortness of breath) 11/02/2022    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    Neutropenia (HCC) 04/08/2022    Decreased GFR 04/08/2022    Grief 04/05/2022    Other fatigue 04/05/2022    Hormone replacement therapy 04/05/2022    Family history of breast cancer 06/27/2019       Past Surgical History:  Past Surgical History:   Procedure Laterality Date    APPENDECTOMY  1986    ABDOMINAL HYSTERECTOMY TOTAL      GYN SURGERY      laparoscopy for endometriosis    OTHER CARDIAC SURGERY          Allergies:  Other drug and Macrodantin [nitrofurantoin]     Current Medications:    Current Outpatient Medications:     famotidine (PEPCID) 40 MG Tab, TAKE 1 TABLET BY MOUTH EVERY DAY, Disp: 100 Tablet, Rfl: 3    colchicine (COLCRYS) 0.6 MG Tab, Take 1 Tablet by mouth every Mon, Wed, Fri, Sat, and Sun at 6 PM., Disp: 60 Tablet, Rfl: 3    lisinopril (PRINIVIL) 10 MG Tab, Take 1 Tablet by mouth 2 times a day., Disp: 180 Tablet, Rfl: 3    Fluticasone Propionate (FLONASE NA), Administer  into affected nostril(S)., Disp: , Rfl:     Magnesium 400 MG Cap, Take  by mouth., Disp: , Rfl:     Oral Electrolytes (PEDIALYTE PO), Take  by mouth., Disp: , Rfl:      calcium carbonate (OS-RADHA 500) 1250 (500 Ca) MG Tab, Take 500 mg by mouth every day. With Vitamin D3, Disp: , Rfl:     Ascorbic Acid (VITAMIN C PO), Take  by mouth every day., Disp: , Rfl:     Social History:  Social History     Socioeconomic History    Marital status:      Spouse name: Not on file    Number of children: Not on file    Years of education: Not on file    Highest education level: Bachelor's degree (e.g., BA, AB, BS)   Occupational History    Not on file   Tobacco Use    Smoking status: Never     Passive exposure: Past    Smokeless tobacco: Never   Vaping Use    Vaping Use: Never used   Substance and Sexual Activity    Alcohol use: Not Currently     Alcohol/week: 0.6 oz     Types: 1 Glasses of wine per week     Comment: Some alcohol socially over the years.  Not regular use    Drug use: Never    Sexual activity: Not Currently     Partners: Male     Birth control/protection: Surgical, Abstinence, None, Post-Menopausal     Comment: Had Endometriosis had hysterectomy.   Other Topics Concern    Not on file   Social History Narrative    Not on file     Social Determinants of Health     Financial Resource Strain: Low Risk  (4/3/2022)    Overall Financial Resource Strain (CARDIA)     Difficulty of Paying Living Expenses: Not very hard   Food Insecurity: No Food Insecurity (4/3/2022)    Hunger Vital Sign     Worried About Running Out of Food in the Last Year: Never true     Ran Out of Food in the Last Year: Never true   Transportation Needs: No Transportation Needs (4/3/2022)    PRAPARE - Transportation     Lack of Transportation (Medical): No     Lack of Transportation (Non-Medical): No   Physical Activity: Insufficiently Active (4/3/2022)    Exercise Vital Sign     Days of Exercise per Week: 2 days     Minutes of Exercise per Session: 30 min   Stress: Stress Concern Present (4/3/2022)    [employer] [place] [employer] - Occupational Stress Questionnaire     Feeling of Stress : Rather much  "  Social Connections: Moderately Integrated (4/3/2022)    Social Connection and Isolation Panel [NHANES]     Frequency of Communication with Friends and Family: Twice a week     Frequency of Social Gatherings with Friends and Family: Once a week     Attends Zoroastrian Services: 1 to 4 times per year     Active Member of Clubs or Organizations: Yes     Attends Club or Organization Meetings: 1 to 4 times per year     Marital Status:    Intimate Partner Violence: Not on file   Housing Stability: Low Risk  (4/3/2022)    Housing Stability Vital Sign     Unable to Pay for Housing in the Last Year: No     Number of Places Lived in the Last Year: 1     Unstable Housing in the Last Year: No        Family History:   Family History   Problem Relation Age of Onset    Breast Cancer Mother     Diabetes Mother         Developed in 70s, at 90 Kidney failure    Hypertension Mother         diagnosed in 70s    Hyperlipidemia Mother         diagnosed in 70s    Kidney Disease Mother     Heart Disease Father          at 64, CHF, CAD    Heart Disease Brother         Heart Attack at 72    Stroke Brother         occurred at 78    Diabetes Maternal Uncle          at 68         PHYSICAL EXAM:   Vital signs: /70 (BP Location: Left arm, Patient Position: Sitting, BP Cuff Size: Adult)   Pulse 76   Resp 20   Ht 1.651 m (5' 5\")   Wt 75.8 kg (167 lb 1.6 oz)   LMP  (LMP Unknown)   SpO2 97%   BMI 27.81 kg/m²   GENERAL: Well-developed, well-nourished  in no apparent distress.   EYE: No ocular and eyelid asymmetry, Anicteric sclerae,  PERRL  HENT: Hearing grossly intact, Normocephalic, atraumatic. Pink, moist mucous membranes, No exudate  NECK: Supple. Trachea midline. thyroid enlarged, smooth, nontender, no nodules  CARDIOVASCULAR: Regular rate and rhythm. No murmurs, rubs, or gallops.   LUNGS: Clear to auscultation bilaterally   ABDOMEN: Soft, nontender with positive bowel sounds.   EXTREMITIES: No clubbing, " "cyanosis, or edema.   NEUROLOGICAL: Cranial nerves II-XII are grossly intact   Symmetric reflexes at the patella no proximal muscle weakness  LYMPH: No cervical, supraclavicular,  adenopathy palpated.   SKIN: No rashes, lesions. Turgor is normal.    Labs:  Lab Results   Component Value Date/Time    WBC 5.7 04/07/2023 03:02 PM    RBC 4.00 (L) 04/07/2023 03:02 PM    HEMOGLOBIN 12.5 04/07/2023 03:02 PM    MCV 95.5 04/07/2023 03:02 PM    MCH 31.3 04/07/2023 03:02 PM    MCHC 32.7 (L) 04/07/2023 03:02 PM    RDW 43.7 04/07/2023 03:02 PM    MPV 10.4 04/07/2023 03:02 PM       Lab Results   Component Value Date/Time    SODIUM 135 05/16/2023 11:00 AM    POTASSIUM 4.7 05/16/2023 11:00 AM    CHLORIDE 101 05/16/2023 11:00 AM    CO2 23 05/16/2023 11:00 AM    ANION 11.0 05/16/2023 11:00 AM    GLUCOSE 68 05/16/2023 11:00 AM    BUN 19 05/16/2023 11:00 AM    CREATININE 1.00 05/16/2023 11:00 AM    CALCIUM 9.2 05/16/2023 11:00 AM    ASTSGOT 21 05/16/2023 11:00 AM    ALTSGPT 25 05/16/2023 11:00 AM    TBILIRUBIN 0.6 05/16/2023 11:00 AM    ALBUMIN 4.2 05/16/2023 11:00 AM    ALBUMIN 1.6 04/10/2023 10:11 AM    TOTPROTEIN 6.8 05/16/2023 11:00 AM    GLOBULIN 2.6 05/16/2023 11:00 AM    AGRATIO 1.6 05/16/2023 11:00 AM       Lab Results   Component Value Date/Time    CHOLSTRLTOT 167 03/17/2023 0921    TRIGLYCERIDE 76 03/17/2023 0921    HDL 63 03/17/2023 0921    LDL 89 03/17/2023 0921       Lab Results   Component Value Date/Time    TSHULTRASEN 2.110 11/17/2022 0932     Lab Results   Component Value Date/Time    FREET4 1.35 05/18/2023 1001     No results found for: \"FREET3\"  No results found for: \"THYSTIMIG\"    Lab Results   Component Value Date/Time    MICROSOMALA <9.0 05/18/2023 1001         Imaging:      ASSESSMENT/PLAN:     1. Thyroid antibody positive  Explained patient that the thyroid antibodies present in 5-20% of the normal population and the clinical significance is unknown  She does not have severe hypothyroidism based on her labs and " this would not explain her pericardial effusion.  What is available in the history is that she had COVID-19 twice and is most likely the etiology of her pericardial effusion.    I am going to repeat her thyroid labs again to make sure that it is stable and she is euthyroid and I am also scheduling her for a thyroid ultrasound because of the positive antibodies and because her thyroid is enlarged on exam      2. Pericardial effusion without cardiac tamponade  This is not explained by her normal TSH and free T4 levels and most likely this was caused by her history of COVID-19 infection    3. Telogen effluvium  This is most likely etiology for hair loss  I explained the diagnosis to the patient      Return in about 2 months (around 11/12/2023).       Total time spent on day of service was over 60 minutes which included obtaining a detailed history and physical exam, ordering labs, coordinating care and scheduling future follow-up      Thank you kindly for allowing me to participate in the thyroid care plan for this patient.    Mark Mathews MD, FACE, [facility]      CC:   Bassam Marinelli M.D.

## 2023-09-20 ENCOUNTER — TELEPHONE (OUTPATIENT)
Dept: DERMATOLOGY | Facility: IMAGING CENTER | Age: 68
End: 2023-09-20
Payer: MEDICARE

## 2023-09-20 DIAGNOSIS — C44.319 BASAL CELL CARCINOMA OF FOREHEAD: ICD-10-CM

## 2023-09-20 NOTE — TELEPHONE ENCOUNTER
Spoke with pt regarding results, BCC, recommend MOHS, pt agreeable, referral placed, please process  Thank you

## 2023-09-21 NOTE — TELEPHONE ENCOUNTER
[facility], patient has been notified and all information given . Scanned in D- path results and referral confirmation

## 2023-10-03 ENCOUNTER — OFFICE VISIT (OUTPATIENT)
Dept: BEHAVIORAL HEALTH | Facility: CLINIC | Age: 68
End: 2023-10-03
Payer: MEDICARE

## 2023-10-03 DIAGNOSIS — F43.21 GRIEF: ICD-10-CM

## 2023-10-03 PROCEDURE — 90837 PSYTX W PT 60 MINUTES: CPT | Performed by: MARRIAGE & FAMILY THERAPIST

## 2023-10-05 NOTE — PROGRESS NOTES
[facility]   Therapy Progress Note        Name: Tita Pitts  MRN: [id number]  : 1955  Age: 68 y.o.  Date of assessment: 10/03/2023  PCP: Bassam Marinelli M.D.  Persons in attendance: Patient  Total session time: 55 minutes      Topics addressed in psychotherapy include:   Met with the patient in office for an individual counseling session.  The patient appears to display a good response in treatment.  The symptoms, as noted, appear to be stable.    Behavior:  The patient was open and honest during session.  The patient appears to discuss more pressing topics in today session.    Content of Therapy:   The patient discussed that her daughter's renter had moved in her daughter will be moving back into her own residence.  Patient discussed continuing sleep problems specifically early waking.  Patient discussed that she has always been a “crier” however never felt that she could openly do so.  If patient discussed feeling a lack of validation from her children and her mother law.  Patient discussed several additional stressors related to responsibilities since her 's death.    Therapeutic Intervention:   This session, the therapeutic focus, was on validating the patient's experiences with increased  home responsibilities.  Worked with the patient to feel supported in her emotional expression.  Assisted the patient in identifying her own moods and emotions as being normal.    This dictation has been created using voice recognition software and/or scribes. The accuracy of the dictation is limited by the abilities of the software and the expertise of the scribes. I expect there may be some errors of grammar and possibly content. I made every attempt to manually correct the errors within my dictation. However, errors related to voice recognition software and/or scribes may still exist and should be interpreted within the appropriate context.    Objective  Observations:   Participation:Active verbal participation, Attentive, and Engaged   Grooming:Casual   Cognition:Alert and Fully Oriented   Eye Contact:Good   Mood:Anxious   Affect:Congruent with content and Sad   Thought Process:Logical and Goal-directed   Speech:Rate within normal limits and Volume within normal limits    Current Risk:   Suicide: low   Homicide: low   Self-Harm: low   Relapse: low   Safety Plan Reviewed: not applicable    Care Plan Updated: No    Does patient express agreement with the above plan? Yes     Diagnosis:  1. Grief        Referral appointment(s) scheduled? JOSE J Dumont

## 2023-10-18 ENCOUNTER — OFFICE VISIT (OUTPATIENT)
Dept: MEDICAL GROUP | Facility: MEDICAL CENTER | Age: 68
End: 2023-10-18
Payer: MEDICARE

## 2023-10-18 VITALS
HEART RATE: 74 BPM | WEIGHT: 163.14 LBS | TEMPERATURE: 97.1 F | BODY MASS INDEX: 27.18 KG/M2 | SYSTOLIC BLOOD PRESSURE: 118 MMHG | HEIGHT: 65 IN | RESPIRATION RATE: 16 BRPM | DIASTOLIC BLOOD PRESSURE: 66 MMHG | OXYGEN SATURATION: 98 %

## 2023-10-18 DIAGNOSIS — I10 ESSENTIAL HYPERTENSION, BENIGN: ICD-10-CM

## 2023-10-18 DIAGNOSIS — I31.39 IDIOPATHIC PERICARDIAL EFFUSION: ICD-10-CM

## 2023-10-18 PROCEDURE — 99214 OFFICE O/P EST MOD 30 MIN: CPT | Performed by: FAMILY MEDICINE

## 2023-10-18 PROCEDURE — 3078F DIAST BP <80 MM HG: CPT | Performed by: FAMILY MEDICINE

## 2023-10-18 PROCEDURE — 3074F SYST BP LT 130 MM HG: CPT | Performed by: FAMILY MEDICINE

## 2023-10-18 ASSESSMENT — ENCOUNTER SYMPTOMS
CHILLS: 0
PALPITATIONS: 0
FEVER: 0

## 2023-10-18 ASSESSMENT — FIBROSIS 4 INDEX: FIB4 SCORE: 0.92

## 2023-10-18 NOTE — ASSESSMENT & PLAN NOTE
Chronic problem, stable currently, home blood pressure readings on elevated sometimes.  Recommended to monitor blood pressure 5 minutes after taking rest, arm at heart level, with feet touching ground and with empty bladder.  Take blood pressure readings first in the morning when she wakes up in 2 hours after taking medication.  Bring that log in 3 weeks and also bring blood pressure machine.

## 2023-10-18 NOTE — PROGRESS NOTES
FAMILY MEDICINE VISIT                                                               Chief complaint::Diagnoses of Essential hypertension, benign and Idiopathic pericardial effusion were pertinent to this visit.    History of present illness: Tita Pitts is a 68 y.o. female who presented for blood pressure follow-up.    Problem   Essential Hypertension, Benign    Blood pressure today came back at 118/66.  She is on lisinopril 10 mg 2 times daily.  No side effects with this medication.  No symptoms.  Home blood pressure readings has been in 140s to 150s/70s to 90s range sometimes.     Idiopathic Pericardial Effusion    Chronic condition. Followed by cardiology. Patient has upcoming repeat echocardiogram scheduled. She has been tolerating colchicine medication without diarrhea.    Current regimen: colchicine 0.6mg BID    Pericardial effusion seen on echocardiogram, had pericardiocentesis and was diagnosed with pericarditis.  She is currently on colchicine 5 days a week.  Was recently treated with indomethacin.  Thyroid antibodies came back positive.            Review of systems:     Review of Systems   Constitutional:  Negative for chills and fever.   Cardiovascular:  Negative for chest pain, palpitations and leg swelling.        Medications and Allergies:     Current Outpatient Medications   Medication Sig Dispense Refill    famotidine (PEPCID) 40 MG Tab TAKE 1 TABLET BY MOUTH EVERY  Tablet 3    colchicine (COLCRYS) 0.6 MG Tab Take 1 Tablet by mouth every Mon, Wed, Fri, Sat, and Sun at 6 PM. 60 Tablet 3    lisinopril (PRINIVIL) 10 MG Tab Take 1 Tablet by mouth 2 times a day. 180 Tablet 3    Fluticasone Propionate (FLONASE NA) Administer  into affected nostril(S).      Magnesium 400 MG Cap Take  by mouth.      Oral Electrolytes (PEDIALYTE PO) Take  by mouth.      calcium carbonate (OS-RADHA 500) 1250 (500 Ca) MG Tab Take 500 mg by mouth every day. With Vitamin D3      Ascorbic Acid (VITAMIN C PO) Take  by  "mouth every day.       No current facility-administered medications for this visit.          Vitals:    /66   Pulse 74   Temp 36.2 °C (97.1 °F)   Resp 16   Ht 1.651 m (5' 5\")   Wt 74 kg (163 lb 2.3 oz)   SpO2 98%  Body mass index is 27.15 kg/m².    Physical Exam:     Physical Exam  Constitutional:       Appearance: Normal appearance. She is well-developed and well-groomed.   HENT:      Head: Normocephalic and atraumatic.      Right Ear: External ear normal.      Left Ear: External ear normal.   Eyes:      General:         Right eye: No discharge.         Left eye: No discharge.      Conjunctiva/sclera: Conjunctivae normal.   Cardiovascular:      Rate and Rhythm: Normal rate.   Pulmonary:      Effort: Pulmonary effort is normal. No respiratory distress.   Musculoskeletal:      Cervical back: Neck supple.   Skin:     Findings: No rash.   Neurological:      Mental Status: She is alert.   Psychiatric:         Mood and Affect: Mood and affect normal.         Behavior: Behavior normal.            Assessment/Plan:         Problem List Items Addressed This Visit       Idiopathic pericardial effusion     Chronic problem, stable and improving, continue colchicine at same dose.  Follow-up with cardiology.  She asked me about echocardiogram.  Recommended to message cardiology regarding when to do repeat echocardiogram.         Essential hypertension, benign     Chronic problem, stable currently, home blood pressure readings on elevated sometimes.  Recommended to monitor blood pressure 5 minutes after taking rest, arm at heart level, with feet touching ground and with empty bladder.  Take blood pressure readings first in the morning when she wakes up in 2 hours after taking medication.  Bring that log in 3 weeks and also bring blood pressure machine.             Please note that this dictation was created using voice recognition software. I have made every reasonable attempt to correct obvious errors, but I expect " that there are errors of grammar and possibly content that I did not discover before finalizing the note.    Follow up in 3 weeks  for blood pressure.

## 2023-10-18 NOTE — ASSESSMENT & PLAN NOTE
Chronic problem, stable and improving, continue colchicine at same dose.  Follow-up with cardiology.  She asked me about echocardiogram.  Recommended to message cardiology regarding when to do repeat echocardiogram.

## 2023-10-20 ENCOUNTER — APPOINTMENT (RX ONLY)
Dept: URBAN - METROPOLITAN AREA CLINIC 36 | Facility: CLINIC | Age: 68
Setting detail: DERMATOLOGY
End: 2023-10-20

## 2023-10-20 PROBLEM — C44.319 BASAL CELL CARCINOMA OF SKIN OF OTHER PARTS OF FACE: Status: ACTIVE | Noted: 2023-10-20

## 2023-10-20 PROCEDURE — ? MOHS SURGERY

## 2023-10-20 PROCEDURE — 14040 TIS TRNFR F/C/C/M/N/A/G/H/F: CPT

## 2023-10-20 PROCEDURE — ? PRESCRIPTION

## 2023-10-20 PROCEDURE — ? MEDICATION COUNSELING

## 2023-10-20 PROCEDURE — 17311 MOHS 1 STAGE H/N/HF/G: CPT

## 2023-10-20 RX ORDER — DOXYCYCLINE HYCLATE 100 MG/1
CAPSULE, GELATIN COATED ORAL BID
Qty: 20 | Refills: 0 | Status: ERX | COMMUNITY
Start: 2023-10-20

## 2023-10-20 RX ADMIN — DOXYCYCLINE HYCLATE: 100 CAPSULE, GELATIN COATED ORAL at 00:00

## 2023-10-20 NOTE — PROCEDURE: MOHS SURGERY
Body Location Override (Optional - Billing Will Still Be Based On Selected Body Map Location If Applicable): Upper Mid Forehead
Mohs Case Number: [id number]
Previous Accession (Optional): [id number]
Referring Physician (Optional): Elidia Pruitt MD
Consent Type: Consent 1 (Standard)
Eye Shield Used: No
Initial Size Of Lesion: 0.5
Number Of Stages: 1
Primary Defect Length In Cm (Final Defect Size - Required For Flaps/Grafts): 1.2
Primary Defect Width In Cm (Final Defect Size - Required For Flaps/Grafts): 1.1
Repair Type: Flap
Oculoplastic Surgeon Procedure Text (A): After obtaining clear surgical margins the patient was sent to oculoplastics for surgical repair.  The patient understands they will receive post-surgical care and follow-up from the referring physician's office.
Otolaryngologist Procedure Text (A): After obtaining clear surgical margins the patient was sent to otolaryngology for surgical repair.  The patient understands they will receive post-surgical care and follow-up from the referring physician's office.
Plastic Surgeon Procedure Text (A): After obtaining clear surgical margins the patient was sent to plastics for surgical repair.  The patient understands they will receive post-surgical care and follow-up from the referring physician's office.
Mid-Level Procedure Text (A): After obtaining clear surgical margins the patient was sent to a mid-level provider for surgical repair.  The patient understands they will receive post-surgical care and follow-up from the mid-level provider.
Provider Procedure Text (A): After obtaining clear surgical margins the defect was repaired by another provider.
Asc Procedure Text (A): After obtaining clear surgical margins the patient was sent to an ASC for surgical repair.  The patient understands they will receive post-surgical care and follow-up from the ASC physician.
Simple / Intermediate / Complex Repair - Final Wound Length In Cm: 0
Suturegard Retention Suture: 2-0 Nylon
Retention Suture Bite Size: 3 mm
Length To Time In Minutes Device Was In Place: 10
Undermining Type: Entire Wound
Debridement Text: The wound edges were debrided prior to proceeding with the closure to facilitate wound healing.
Helical Rim Text: The closure involved the helical rim.
Vermilion Border Text: The closure involved the vermilion border.
Nostril Rim Text: The closure involved the nostril rim.
Retention Suture Text: Retention sutures were placed to support the closure and prevent dehiscence.
Flap Type: Advancement Flap (Single)
Secondary Defect Length In Cm (Required For Flaps): 3
Secondary Defect Width In Cm (Required For Flaps): 2
Location Indication Override (Is Already Calculated Based On Selected Body Location): Area M
Include Size Of Lesion In Location Indication Statement: Yes
Area H Indication Text: Tumors in this location are included in Area H (eyelids, eyebrows, nose, lips, chin, ear, pre-auricular, post-auricular, temple, genitalia, hands, feet, ankles and areola).  Tissue conservation is critical in these anatomic locations.
Area M Indication Text: Tumors in this location are included in Area M (cheek, forehead, scalp, neck, jawline and pretibial skin).  Mohs surgery is indicated for tumors in these anatomic locations.
Area L Indication Text: Tumors in this location are included in Area L (trunk and extremities).  Mohs surgery is indicated for larger tumors, or tumors with aggressive histologic features, in these anatomic locations.
Special Stains Stage 1 - Results: Base On Clearance Noted Above
Stage 2: Additional Anesthesia Type: 1% lidocaine with epinephrine
Include Tumor Staging In Mohs Note?: Please Select the Appropriate Response
Staging Info: By selecting yes to the question above you will include information on AJCC 8 tumor staging in your Mohs note. Information on tumor staging will be automatically added for SCCs on the head and neck. AJCC 8 includes tumor size, tumor depth, perineural involvement and bone invasion.
Tumor Depth: Less than 6mm from granular layer and no invasion beyond the subcutaneous fat
Medical Necessity Statement: Based on my medical judgement, Mohs surgery is the most appropriate treatment for this cancer compared to other treatments.
Alternatives Discussed Intro (Do Not Add Period): I discussed alternative treatments to Mohs surgery and specifically discussed the risks and benefits of
Consent 1/Introductory Paragraph: The rationale for Mohs was explained to the patient and consent was obtained. The risks, benefits and alternatives to therapy were discussed in detail. Specifically, the risks of infection, scarring, bleeding, prolonged wound healing, incomplete removal, allergy to anesthesia, nerve injury and recurrence were addressed. Prior to the procedure, the treatment site was clearly identified and confirmed by the patient. All components of Universal Protocol/PAUSE Rule completed.
Consent 2/Introductory Paragraph: Mohs surgery was explained to the patient and consent was obtained. The risks, benefits and alternatives to therapy were discussed in detail. Specifically, the risks of infection, scarring, bleeding, prolonged wound healing, incomplete removal, allergy to anesthesia, nerve injury and recurrence were addressed. Prior to the procedure, the treatment site was clearly identified and confirmed by the patient. All components of Universal Protocol/PAUSE Rule completed.
Consent 3/Introductory Paragraph: I gave the patient a chance to ask questions they had about the procedure.  Following this I explained the Mohs procedure and consent was obtained. The risks, benefits and alternatives to therapy were discussed in detail. Specifically, the risks of infection, scarring, bleeding, prolonged wound healing, incomplete removal, allergy to anesthesia, nerve injury and recurrence were addressed. Prior to the procedure, the treatment site was clearly identified and confirmed by the patient. All components of Universal Protocol/PAUSE Rule completed.
Consent (Temporal Branch)/Introductory Paragraph: The rationale for Mohs was explained to the patient and consent was obtained. The risks, benefits and alternatives to therapy were discussed in detail. Specifically, the risks of damage to the temporal branch of the facial nerve, infection, scarring, bleeding, prolonged wound healing, incomplete removal, allergy to anesthesia, and recurrence were addressed. Prior to the procedure, the treatment site was clearly identified and confirmed by the patient. All components of Universal Protocol/PAUSE Rule completed.
Consent (Marginal Mandibular)/Introductory Paragraph: The rationale for Mohs was explained to the patient and consent was obtained. The risks, benefits and alternatives to therapy were discussed in detail. Specifically, the risks of damage to the marginal mandibular branch of the facial nerve, infection, scarring, bleeding, prolonged wound healing, incomplete removal, allergy to anesthesia, and recurrence were addressed. Prior to the procedure, the treatment site was clearly identified and confirmed by the patient. All components of Universal Protocol/PAUSE Rule completed.
Consent (Spinal Accessory)/Introductory Paragraph: The rationale for Mohs was explained to the patient and consent was obtained. The risks, benefits and alternatives to therapy were discussed in detail. Specifically, the risks of damage to the spinal accessory nerve, infection, scarring, bleeding, prolonged wound healing, incomplete removal, allergy to anesthesia, and recurrence were addressed. Prior to the procedure, the treatment site was clearly identified and confirmed by the patient. All components of Universal Protocol/PAUSE Rule completed.
Consent (Near Eyelid Margin)/Introductory Paragraph: The rationale for Mohs was explained to the patient and consent was obtained. The risks, benefits and alternatives to therapy were discussed in detail. Specifically, the risks of ectropion or eyelid deformity, infection, scarring, bleeding, prolonged wound healing, incomplete removal, allergy to anesthesia, nerve injury and recurrence were addressed. Prior to the procedure, the treatment site was clearly identified and confirmed by the patient. All components of Universal Protocol/PAUSE Rule completed.
Consent (Ear)/Introductory Paragraph: The rationale for Mohs was explained to the patient and consent was obtained. The risks, benefits and alternatives to therapy were discussed in detail. Specifically, the risks of ear deformity, infection, scarring, bleeding, prolonged wound healing, incomplete removal, allergy to anesthesia, nerve injury and recurrence were addressed. Prior to the procedure, the treatment site was clearly identified and confirmed by the patient. All components of Universal Protocol/PAUSE Rule completed.
Consent (Nose)/Introductory Paragraph: The rationale for Mohs was explained to the patient and consent was obtained. The risks, benefits and alternatives to therapy were discussed in detail. Specifically, the risks of nasal deformity, changes in the flow of air through the nose, infection, scarring, bleeding, prolonged wound healing, incomplete removal, allergy to anesthesia, nerve injury and recurrence were addressed. Prior to the procedure, the treatment site was clearly identified and confirmed by the patient. All components of Universal Protocol/PAUSE Rule completed.
Consent (Lip)/Introductory Paragraph: The rationale for Mohs was explained to the patient and consent was obtained. The risks, benefits and alternatives to therapy were discussed in detail. Specifically, the risks of lip deformity, changes in the oral aperture, infection, scarring, bleeding, prolonged wound healing, incomplete removal, allergy to anesthesia, nerve injury and recurrence were addressed. Prior to the procedure, the treatment site was clearly identified and confirmed by the patient. All components of Universal Protocol/PAUSE Rule completed.
Consent (Scalp)/Introductory Paragraph: The rationale for Mohs was explained to the patient and consent was obtained. The risks, benefits and alternatives to therapy were discussed in detail. Specifically, the risks of changes in hair growth pattern secondary to repair, infection, scarring, bleeding, prolonged wound healing, incomplete removal, allergy to anesthesia, nerve injury and recurrence were addressed. Prior to the procedure, the treatment site was clearly identified and confirmed by the patient. All components of Universal Protocol/PAUSE Rule completed.
Detail Level: Detailed
Postop Diagnosis: same
Anesthesia Volume In Cc: 6
Hemostasis: Electrocautery
Estimated Blood Loss (Cc): minimal
Brow Lift Text: A midfrontal incision was made medially to the defect to allow access to the tissues just superior to the left eyebrow. Following careful dissection inferiorly in a supraperiosteal plane to the level of the left eyebrow, several 3-0 monocryl sutures were used to resuspend the eyebrow orbicularis oculi muscular unit to the superior frontal bone periosteum. This resulted in an appropriate reapproximation of static eyebrow symmetry and correction of the left brow ptosis.
Deep Sutures: 3-0 Polysorb
Epidermal Sutures: 4-0 Surgipro
Additional Epidermal Sutures: 3-0 Surgipro
Epidermal Closure: simple interrupted
Suturegard Intro: Intraoperative tissue expansion was performed, utilizing the SUTUREGARD device, in order to reduce wound tension.
Suturegard Body: The suture ends were repeatedly re-tightened and re-clamped to achieve the desired tissue expansion.
Hemigard Intro: Due to skin fragility and wound tension, it was decided to use HEMIGARD adhesive retention suture devices to permit a linear closure. The skin was cleaned and dried for a 6cm distance away from the wound. Excessive hair, if present, was removed to allow for adhesion.
Hemigard Postcare Instructions: The HEMIGARD strips are to remain completely dry for at least 5-7 days.
Donor Site Anesthesia Type: same as repair anesthesia
Graft Donor Site Bandage (Optional-Leave Blank If You Don't Want In Note): Steri-strips and a pressure bandage were applied to the donor site.
Closure 2 Information: This tab is for additional flaps and grafts, including complex repair and grafts and complex repair and flaps. You can also specify a different location for the additional defect, if the location is the same you do not need to select a new one. We will insert the automated text for the repair you select below just as we do for solitary flaps and grafts. Please note that at this time if you select a location with a different insurance zone you will need to override the ICD10 and CPT if appropriate.
Closure 3 Information: This tab is for additional flaps and grafts above and beyond our usual structured repairs.  Please note if you enter information here it will not currently bill and you will need to add the billing information manually.
Wound Care: Petrolatum
Dressing: dry sterile dressing
Suture Removal: 7 days
Unna Boot Text: An Unna boot was placed to help immobilize the limb and facilitate more rapid healing.
Home Suture Removal Text: Patient was provided instructions on removing sutures and will remove their sutures at home.  If they have any questions or difficulties they will call the office.
Post-Care Instructions: I reviewed with the patient in detail post-care instructions. Patient is not to engage in any heavy lifting, exercise, or swimming for the next 14 days. Should the patient develop any fevers, chills, bleeding, severe pain patient will contact the office immediately.
Pain Refusal Text: I offered to prescribe pain medication but the patient refused to take this medication.
Mauc Instructions: By selecting yes to the question below the MAUC number will be added into the note.  This will be calculated automatically based on the diagnosis chosen, the size entered, the body zone selected (H,M,L) and the specific indications you chose. You will also have the option to override the Mohs AUC if you disagree with the automatically calculated number and this option is found in the Case Summary tab.
Where Do You Want The Question To Include Opioid Counseling Located?: Case Summary Tab
Eye Protection Verbiage: Before proceeding with the stage, a plastic scleral shield was inserted. The globe was anesthetized with a few drops of 1% lidocaine with 1:100,000 epinephrine. Then, an appropriate sized scleral shield was chosen and coated with lacrilube ointment. The shield was gently inserted and left in place for the duration of each stage. After the stage was completed, the shield was gently removed.
Mohs Method Verbiage: An incision at a 45 degree angle following the standard Mohs approach was done and the specimen was harvested as a microscopic controlled layer.
Surgeon/Pathologist Verbiage (Will Incorporate Name Of Surgeon From Intro If Not Blank): operated in two distinct and integrated capacities as the surgeon and pathologist.
Mohs Histo Method Verbiage: Each section was then chromacoded and processed in the Mohs lab using the Mohs protocol and submitted for frozen section.
Subsequent Stages Histo Method Verbiage: Using a similar technique to that described above, a thin layer of tissue was removed from all areas where tumor was visible on the previous stage.  The tissue was again oriented, mapped, dyed, and processed as above.
Mohs Rapid Report Verbiage: The area of clinically evident tumor was marked with skin marking ink and appropriately hatched.  The initial incision was made following the Mohs approach through the skin.  The specimen was taken to the lab, divided into the necessary number of pieces, chromacoded and processed according to the Mohs protocol.  This was repeated in successive stages until a tumor free defect was achieved.
Complex Repair Preamble Text (Leave Blank If You Do Not Want): Extensive wide undermining was performed.
Intermediate Repair Preamble Text (Leave Blank If You Do Not Want): Undermining was performed with blunt dissection.
Non-Graft Cartilage Fenestration Text: The cartilage was fenestrated with a 2mm punch biopsy to help facilitate healing.
Graft Cartilage Fenestration Text: The cartilage was fenestrated with a 2mm punch biopsy to help facilitate graft survival and healing.
Secondary Intention Text (Leave Blank If You Do Not Want): The defect will heal with secondary intention.
No Repair - Repaired With Adjacent Surgical Defect Text (Leave Blank If You Do Not Want): After obtaining clear surgical margins the defect was repaired concurrently with another surgical defect which was in close approximation.
Adjacent Tissue Transfer Text: The defect edges were debeveled with a #15 scalpel blade.  Given the location of the defect and the proximity to free margins an adjacent tissue transfer was deemed most appropriate.  Using a sterile surgical marker, an appropriate flap was drawn incorporating the defect and placing the expected incisions within the relaxed skin tension lines where possible.    The area thus outlined was incised deep to adipose tissue with a #15 scalpel blade.  The skin margins were undermined to an appropriate distance in all directions utilizing iris scissors.
Advancement Flap (Single) Text: The defect edges were debeveled with a #15 scalpel blade.  Given the location of the defect and the proximity to free margins a single advancement flap was deemed most appropriate.  Using a sterile surgical marker, an appropriate advancement flap was drawn incorporating the defect and placing the expected incisions within the relaxed skin tension lines where possible.    The area thus outlined was incised deep to adipose tissue with a #15 scalpel blade.  The skin margins were undermined to an appropriate distance in all directions utilizing iris scissors.
Advancement Flap (Double) Text: The defect edges were debeveled with a #15 scalpel blade.  Given the location of the defect and the proximity to free margins a double advancement flap was deemed most appropriate.  Using a sterile surgical marker, the appropriate advancement flaps were drawn incorporating the defect and placing the expected incisions within the relaxed skin tension lines where possible.    The area thus outlined was incised deep to adipose tissue with a #15 scalpel blade.  The skin margins were undermined to an appropriate distance in all directions utilizing iris scissors.
Burow's Advancement Flap Text: The defect edges were debeveled with a #15 scalpel blade.  Given the location of the defect and the proximity to free margins a Burow's advancement flap was deemed most appropriate.  Using a sterile surgical marker, the appropriate advancement flap was drawn incorporating the defect and placing the expected incisions within the relaxed skin tension lines where possible.    The area thus outlined was incised deep to adipose tissue with a #15 scalpel blade.  The skin margins were undermined to an appropriate distance in all directions utilizing iris scissors.
Chonodrocutaneous Helical Advancement Flap Text: The defect edges were debeveled with a #15 scalpel blade.  Given the location of the defect and the proximity to free margins a chondrocutaneous helical advancement flap was deemed most appropriate.  Using a sterile surgical marker, the appropriate advancement flap was drawn incorporating the defect and placing the expected incisions within the relaxed skin tension lines where possible.    The area thus outlined was incised deep to adipose tissue with a #15 scalpel blade.  The skin margins were undermined to an appropriate distance in all directions utilizing iris scissors.
Crescentic Advancement Flap Text: The defect edges were debeveled with a #15 scalpel blade.  Given the location of the defect and the proximity to free margins a crescentic advancement flap was deemed most appropriate.  Using a sterile surgical marker, the appropriate advancement flap was drawn incorporating the defect and placing the expected incisions within the relaxed skin tension lines where possible.    The area thus outlined was incised deep to adipose tissue with a #15 scalpel blade.  The skin margins were undermined to an appropriate distance in all directions utilizing iris scissors.
A-T Advancement Flap Text: The defect edges were debeveled with a #15 scalpel blade.  Given the location of the defect, shape of the defect and the proximity to free margins an A-T advancement flap was deemed most appropriate.  Using a sterile surgical marker, an appropriate advancement flap was drawn incorporating the defect and placing the expected incisions within the relaxed skin tension lines where possible.    The area thus outlined was incised deep to adipose tissue with a #15 scalpel blade.  The skin margins were undermined to an appropriate distance in all directions utilizing iris scissors.
O-T Advancement Flap Text: The defect edges were debeveled with a #15 scalpel blade.  Given the location of the defect, shape of the defect and the proximity to free margins an O-T advancement flap was deemed most appropriate.  Using a sterile surgical marker, an appropriate advancement flap was drawn incorporating the defect and placing the expected incisions within the relaxed skin tension lines where possible.    The area thus outlined was incised deep to adipose tissue with a #15 scalpel blade.  The skin margins were undermined to an appropriate distance in all directions utilizing iris scissors.
O-L Flap Text: The defect edges were debeveled with a #15 scalpel blade.  Given the location of the defect, shape of the defect and the proximity to free margins an O-L flap was deemed most appropriate.  Using a sterile surgical marker, an appropriate advancement flap was drawn incorporating the defect and placing the expected incisions within the relaxed skin tension lines where possible.    The area thus outlined was incised deep to adipose tissue with a #15 scalpel blade.  The skin margins were undermined to an appropriate distance in all directions utilizing iris scissors.
O-Z Flap Text: The defect edges were debeveled with a #15 scalpel blade.  Given the location of the defect, shape of the defect and the proximity to free margins an O-Z flap was deemed most appropriate.  Using a sterile surgical marker, an appropriate transposition flap was drawn incorporating the defect and placing the expected incisions within the relaxed skin tension lines where possible. The area thus outlined was incised deep to adipose tissue with a #15 scalpel blade.  The skin margins were undermined to an appropriate distance in all directions utilizing iris scissors.
Double O-Z Flap Text: The defect edges were debeveled with a #15 scalpel blade.  Given the location of the defect, shape of the defect and the proximity to free margins a Double O-Z flap was deemed most appropriate.  Using a sterile surgical marker, an appropriate transposition flap was drawn incorporating the defect and placing the expected incisions within the relaxed skin tension lines where possible. The area thus outlined was incised deep to adipose tissue with a #15 scalpel blade.  The skin margins were undermined to an appropriate distance in all directions utilizing iris scissors.
V-Y Flap Text: The defect edges were debeveled with a #15 scalpel blade.  Given the location of the defect, shape of the defect and the proximity to free margins a V-Y flap was deemed most appropriate.  Using a sterile surgical marker, an appropriate advancement flap was drawn incorporating the defect and placing the expected incisions within the relaxed skin tension lines where possible.    The area thus outlined was incised deep to adipose tissue with a #15 scalpel blade.  The skin margins were undermined to an appropriate distance in all directions utilizing iris scissors.
Advancement-Rotation Flap Text: The defect edges were debeveled with a #15 scalpel blade.  Given the location of the defect, shape of the defect and the proximity to free margins an advancement-rotation flap was deemed most appropriate.  Using a sterile surgical marker, an appropriate flap was drawn incorporating the defect and placing the expected incisions within the relaxed skin tension lines where possible. The area thus outlined was incised deep to adipose tissue with a #15 scalpel blade.  The skin margins were undermined to an appropriate distance in all directions utilizing iris scissors.
Mercedes Flap Text: The defect edges were debeveled with a #15 scalpel blade.  Given the location of the defect, shape of the defect and the proximity to free margins a Mercedes flap was deemed most appropriate.  Using a sterile surgical marker, an appropriate advancement flap was drawn incorporating the defect and placing the expected incisions within the relaxed skin tension lines where possible. The area thus outlined was incised deep to adipose tissue with a #15 scalpel blade.  The skin margins were undermined to an appropriate distance in all directions utilizing iris scissors.
Modified Advancement Flap Text: The defect edges were debeveled with a #15 scalpel blade.  Given the location of the defect, shape of the defect and the proximity to free margins a modified advancement flap was deemed most appropriate.  Using a sterile surgical marker, an appropriate advancement flap was drawn incorporating the defect and placing the expected incisions within the relaxed skin tension lines where possible.    The area thus outlined was incised deep to adipose tissue with a #15 scalpel blade.  The skin margins were undermined to an appropriate distance in all directions utilizing iris scissors.
Mucosal Advancement Flap Text: Given the location of the defect, shape of the defect and the proximity to free margins a mucosal advancement flap was deemed most appropriate. Incisions were made with a 15 blade scalpel in the appropriate fashion along the cutaneous vermilion border and the mucosal lip. The remaining actinically damaged mucosal tissue was excised.  The mucosal advancement flap was then elevated to the gingival sulcus with care taken to preserve the neurovascular structures and advanced into the primary defect. Care was taken to ensure that precise realignment of the vermilion border was achieved.
Peng Advancement Flap Text: The defect edges were debeveled with a #15 scalpel blade.  Given the location of the defect, shape of the defect and the proximity to free margins a Peng advancement flap was deemed most appropriate.  Using a sterile surgical marker, an appropriate advancement flap was drawn incorporating the defect and placing the expected incisions within the relaxed skin tension lines where possible. The area thus outlined was incised deep to adipose tissue with a #15 scalpel blade.  The skin margins were undermined to an appropriate distance in all directions utilizing iris scissors.
Hatchet Flap Text: The defect edges were debeveled with a #15 scalpel blade.  Given the location of the defect, shape of the defect and the proximity to free margins a hatchet flap was deemed most appropriate.  Using a sterile surgical marker, an appropriate hatchet flap was drawn incorporating the defect and placing the expected incisions within the relaxed skin tension lines where possible.    The area thus outlined was incised deep to adipose tissue with a #15 scalpel blade.  The skin margins were undermined to an appropriate distance in all directions utilizing iris scissors.
Rotation Flap Text: The defect edges were debeveled with a #15 scalpel blade.  Given the location of the defect, shape of the defect and the proximity to free margins a rotation flap was deemed most appropriate.  Using a sterile surgical marker, an appropriate rotation flap was drawn incorporating the defect and placing the expected incisions within the relaxed skin tension lines where possible.    The area thus outlined was incised deep to adipose tissue with a #15 scalpel blade.  The skin margins were undermined to an appropriate distance in all directions utilizing iris scissors.
Bilateral Rotation Flap Text: The defect edges were debeveled with a #15 scalpel blade. Given the location of the defect, shape of the defect and the proximity to free margins a bilateral rotation flap was deemed most appropriate. Using a sterile surgical marker, an appropriate rotation flap was drawn incorporating the defect and placing the expected incisions within the relaxed skin tension lines where possible. The area thus outlined was incised deep to adipose tissue with a #15 scalpel blade. The skin margins were undermined to an appropriate distance in all directions utilizing iris scissors. Following this, the designed flap was carried over into the primary defect and sutured into place.
Spiral Flap Text: The defect edges were debeveled with a #15 scalpel blade.  Given the location of the defect, shape of the defect and the proximity to free margins a spiral flap was deemed most appropriate.  Using a sterile surgical marker, an appropriate rotation flap was drawn incorporating the defect and placing the expected incisions within the relaxed skin tension lines where possible. The area thus outlined was incised deep to adipose tissue with a #15 scalpel blade.  The skin margins were undermined to an appropriate distance in all directions utilizing iris scissors.
Staged Advancement Flap Text: The defect edges were debeveled with a #15 scalpel blade.  Given the location of the defect, shape of the defect and the proximity to free margins a staged advancement flap was deemed most appropriate.  Using a sterile surgical marker, an appropriate advancement flap was drawn incorporating the defect and placing the expected incisions within the relaxed skin tension lines where possible. The area thus outlined was incised deep to adipose tissue with a #15 scalpel blade.  The skin margins were undermined to an appropriate distance in all directions utilizing iris scissors.
Star Wedge Flap Text: The defect edges were debeveled with a #15 scalpel blade.  Given the location of the defect, shape of the defect and the proximity to free margins a star wedge flap was deemed most appropriate.  Using a sterile surgical marker, an appropriate rotation flap was drawn incorporating the defect and placing the expected incisions within the relaxed skin tension lines where possible. The area thus outlined was incised deep to adipose tissue with a #15 scalpel blade.  The skin margins were undermined to an appropriate distance in all directions utilizing iris scissors.
Transposition Flap Text: The defect edges were debeveled with a #15 scalpel blade.  Given the location of the defect and the proximity to free margins a transposition flap was deemed most appropriate.  Using a sterile surgical marker, an appropriate transposition flap was drawn incorporating the defect.    The area thus outlined was incised deep to adipose tissue with a #15 scalpel blade.  The skin margins were undermined to an appropriate distance in all directions utilizing iris scissors.
Muscle Hinge Flap Text: The defect edges were debeveled with a #15 scalpel blade.  Given the size, depth and location of the defect and the proximity to free margins a muscle hinge flap was deemed most appropriate.  Using a sterile surgical marker, an appropriate hinge flap was drawn incorporating the defect. The area thus outlined was incised with a #15 scalpel blade.  The skin margins were undermined to an appropriate distance in all directions utilizing iris scissors.
Mustarde Flap Text: The defect edges were debeveled with a #15 scalpel blade.  Given the size, depth and location of the defect and the proximity to free margins a Mustarde flap was deemed most appropriate.  Using a sterile surgical marker, an appropriate flap was drawn incorporating the defect. The area thus outlined was incised with a #15 scalpel blade.  The skin margins were undermined to an appropriate distance in all directions utilizing iris scissors.
Nasal Turnover Hinge Flap Text: The defect edges were debeveled with a #15 scalpel blade.  Given the size, depth, location of the defect and the defect being full thickness a nasal turnover hinge flap was deemed most appropriate.  Using a sterile surgical marker, an appropriate hinge flap was drawn incorporating the defect. The area thus outlined was incised with a #15 scalpel blade. The flap was designed to recreate the nasal mucosal lining and the alar rim. The skin margins were undermined to an appropriate distance in all directions utilizing iris scissors.
Nasalis-Muscle-Based Myocutaneous Island Pedicle Flap Text: Using a #15 blade, an incision was made around the donor flap to the level of the nasalis muscle. Wide lateral undermining was then performed in both the subcutaneous plane above the nasalis muscle, and in a submuscular plane just above periosteum. This allowed the formation of a free nasalis muscle axial pedicle (based on the angular artery) which was still attached to the actual cutaneous flap, increasing its mobility and vascular viability. Hemostasis was obtained with pinpoint electrocoagulation. The flap was mobilized into position and the pivotal anchor points positioned and stabilized with buried interrupted sutures. Subcutaneous and dermal tissues were closed in a multilayered fashion with sutures. Tissue redundancies were excised, and the epidermal edges were apposed without significant tension and sutured with sutures.
Orbicularis Oris Muscle Flap Text: The defect edges were debeveled with a #15 scalpel blade.  Given that the defect affected the competency of the oral sphincter an orbicularis oris muscle flap was deemed most appropriate to restore this competency and normal muscle function.  Using a sterile surgical marker, an appropriate flap was drawn incorporating the defect. The area thus outlined was incised with a #15 scalpel blade.
Melolabial Transposition Flap Text: The defect edges were debeveled with a #15 scalpel blade.  Given the location of the defect and the proximity to free margins a melolabial flap was deemed most appropriate.  Using a sterile surgical marker, an appropriate melolabial transposition flap was drawn incorporating the defect.    The area thus outlined was incised deep to adipose tissue with a #15 scalpel blade.  The skin margins were undermined to an appropriate distance in all directions utilizing iris scissors.
Rhombic Flap Text: The defect edges were debeveled with a #15 scalpel blade.  Given the location of the defect and the proximity to free margins a rhombic flap was deemed most appropriate.  Using a sterile surgical marker, an appropriate rhombic flap was drawn incorporating the defect.    The area thus outlined was incised deep to adipose tissue with a #15 scalpel blade.  The skin margins were undermined to an appropriate distance in all directions utilizing iris scissors.
Rhomboid Transposition Flap Text: The defect edges were debeveled with a #15 scalpel blade.  Given the location of the defect and the proximity to free margins a rhomboid transposition flap was deemed most appropriate.  Using a sterile surgical marker, an appropriate rhomboid flap was drawn incorporating the defect.    The area thus outlined was incised deep to adipose tissue with a #15 scalpel blade.  The skin margins were undermined to an appropriate distance in all directions utilizing iris scissors.
Bi-Rhombic Flap Text: The defect edges were debeveled with a #15 scalpel blade.  Given the location of the defect and the proximity to free margins a bi-rhombic flap was deemed most appropriate.  Using a sterile surgical marker, an appropriate rhombic flap was drawn incorporating the defect. The area thus outlined was incised deep to adipose tissue with a #15 scalpel blade.  The skin margins were undermined to an appropriate distance in all directions utilizing iris scissors.
Helical Rim Advancement Flap Text: The defect edges were debeveled with a #15 blade scalpel.  Given the location of the defect and the proximity to free margins (helical rim) a double helical rim advancement flap was deemed most appropriate.  Using a sterile surgical marker, the appropriate advancement flaps were drawn incorporating the defect and placing the expected incisions between the helical rim and antihelix where possible.  The area thus outlined was incised through and through with a #15 scalpel blade.  With a skin hook and iris scissors, the flaps were gently and sharply undermined and freed up.
Bilateral Helical Rim Advancement Flap Text: The defect edges were debeveled with a #15 blade scalpel.  Given the location of the defect and the proximity to free margins (helical rim) a bilateral helical rim advancement flap was deemed most appropriate.  Using a sterile surgical marker, the appropriate advancement flaps were drawn incorporating the defect and placing the expected incisions between the helical rim and antihelix where possible.  The area thus outlined was incised through and through with a #15 scalpel blade.  With a skin hook and iris scissors, the flaps were gently and sharply undermined and freed up.
Ear Star Wedge Flap Text: The defect edges were debeveled with a #15 blade scalpel.  Given the location of the defect and the proximity to free margins (helical rim) an ear star wedge flap was deemed most appropriate.  Using a sterile surgical marker, the appropriate flap was drawn incorporating the defect and placing the expected incisions between the helical rim and antihelix where possible.  The area thus outlined was incised through and through with a #15 scalpel blade.
Banner Transposition Flap Text: The defect edges were debeveled with a #15 scalpel blade.  Given the location of the defect and the proximity to free margins a Banner transposition flap was deemed most appropriate.  Using a sterile surgical marker, an appropriate flap drawn around the defect. The area thus outlined was incised deep to adipose tissue with a #15 scalpel blade.  The skin margins were undermined to an appropriate distance in all directions utilizing iris scissors.
Bilobed Flap Text: The defect edges were debeveled with a #15 scalpel blade.  Given the location of the defect and the proximity to free margins a bilobe flap was deemed most appropriate.  Using a sterile surgical marker, an appropriate bilobe flap drawn around the defect.    The area thus outlined was incised deep to adipose tissue with a #15 scalpel blade.  The skin margins were undermined to an appropriate distance in all directions utilizing iris scissors.
Bilobed Transposition Flap Text: The defect edges were debeveled with a #15 scalpel blade.  Given the location of the defect and the proximity to free margins a bilobed transposition flap was deemed most appropriate.  Using a sterile surgical marker, an appropriate bilobe flap drawn around the defect.    The area thus outlined was incised deep to adipose tissue with a #15 scalpel blade.  The skin margins were undermined to an appropriate distance in all directions utilizing iris scissors.
Trilobed Flap Text: The defect edges were debeveled with a #15 scalpel blade.  Given the location of the defect and the proximity to free margins a trilobed flap was deemed most appropriate.  Using a sterile surgical marker, an appropriate trilobed flap drawn around the defect.    The area thus outlined was incised deep to adipose tissue with a #15 scalpel blade.  The skin margins were undermined to an appropriate distance in all directions utilizing iris scissors.
Dorsal Nasal Flap Text: The defect edges were debeveled with a #15 scalpel blade.  Given the location of the defect and the proximity to free margins a dorsal nasal flap was deemed most appropriate.  Using a sterile surgical marker, an appropriate dorsal nasal flap was drawn around the defect.    The area thus outlined was incised deep to adipose tissue with a #15 scalpel blade.  The skin margins were undermined to an appropriate distance in all directions utilizing iris scissors.
Island Pedicle Flap Text: The defect edges were debeveled with a #15 scalpel blade.  Given the location of the defect, shape of the defect and the proximity to free margins an island pedicle advancement flap was deemed most appropriate.  Using a sterile surgical marker, an appropriate advancement flap was drawn incorporating the defect, outlining the appropriate donor tissue and placing the expected incisions within the relaxed skin tension lines where possible.    The area thus outlined was incised deep to adipose tissue with a #15 scalpel blade.  The skin margins were undermined to an appropriate distance in all directions around the primary defect and laterally outward around the island pedicle utilizing iris scissors.  There was minimal undermining beneath the pedicle flap.
Island Pedicle Flap With Canthal Suspension Text: The defect edges were debeveled with a #15 scalpel blade.  Given the location of the defect, shape of the defect and the proximity to free margins an island pedicle advancement flap was deemed most appropriate.  Using a sterile surgical marker, an appropriate advancement flap was drawn incorporating the defect, outlining the appropriate donor tissue and placing the expected incisions within the relaxed skin tension lines where possible. The area thus outlined was incised deep to adipose tissue with a #15 scalpel blade.  The skin margins were undermined to an appropriate distance in all directions around the primary defect and laterally outward around the island pedicle utilizing iris scissors.  There was minimal undermining beneath the pedicle flap. A suspension suture was placed in the canthal tendon to prevent tension and prevent ectropion.
Alar Island Pedicle Flap Text: The defect edges were debeveled with a #15 scalpel blade.  Given the location of the defect, shape of the defect and the proximity to the alar rim an island pedicle advancement flap was deemed most appropriate.  Using a sterile surgical marker, an appropriate advancement flap was drawn incorporating the defect, outlining the appropriate donor tissue and placing the expected incisions within the nasal ala running parallel to the alar rim. The area thus outlined was incised with a #15 scalpel blade.  The skin margins were undermined minimally to an appropriate distance in all directions around the primary defect and laterally outward around the island pedicle utilizing iris scissors.  There was minimal undermining beneath the pedicle flap.
Double Island Pedicle Flap Text: The defect edges were debeveled with a #15 scalpel blade.  Given the location of the defect, shape of the defect and the proximity to free margins a double island pedicle advancement flap was deemed most appropriate.  Using a sterile surgical marker, an appropriate advancement flap was drawn incorporating the defect, outlining the appropriate donor tissue and placing the expected incisions within the relaxed skin tension lines where possible.    The area thus outlined was incised deep to adipose tissue with a #15 scalpel blade.  The skin margins were undermined to an appropriate distance in all directions around the primary defect and laterally outward around the island pedicle utilizing iris scissors.  There was minimal undermining beneath the pedicle flap.
Island Pedicle Flap-Requiring Vessel Identification Text: The defect edges were debeveled with a #15 scalpel blade.  Given the location of the defect, shape of the defect and the proximity to free margins an island pedicle advancement flap was deemed most appropriate.  Using a sterile surgical marker, an appropriate advancement flap was drawn, based on the axial vessel mentioned above, incorporating the defect, outlining the appropriate donor tissue and placing the expected incisions within the relaxed skin tension lines where possible.    The area thus outlined was incised deep to adipose tissue with a #15 scalpel blade.  The skin margins were undermined to an appropriate distance in all directions around the primary defect and laterally outward around the island pedicle utilizing iris scissors.  There was minimal undermining beneath the pedicle flap.
Keystone Flap Text: The defect edges were debeveled with a #15 scalpel blade.  Given the location of the defect, shape of the defect a keystone flap was deemed most appropriate.  Using a sterile surgical marker, an appropriate keystone flap was drawn incorporating the defect, outlining the appropriate donor tissue and placing the expected incisions within the relaxed skin tension lines where possible. The area thus outlined was incised deep to adipose tissue with a #15 scalpel blade.  The skin margins were undermined to an appropriate distance in all directions around the primary defect and laterally outward around the flap utilizing iris scissors.
O-T Plasty Text: The defect edges were debeveled with a #15 scalpel blade.  Given the location of the defect, shape of the defect and the proximity to free margins an O-T plasty was deemed most appropriate.  Using a sterile surgical marker, an appropriate O-T plasty was drawn incorporating the defect and placing the expected incisions within the relaxed skin tension lines where possible.    The area thus outlined was incised deep to adipose tissue with a #15 scalpel blade.  The skin margins were undermined to an appropriate distance in all directions utilizing iris scissors.
O-Z Plasty Text: The defect edges were debeveled with a #15 scalpel blade.  Given the location of the defect, shape of the defect and the proximity to free margins an O-Z plasty (double transposition flap) was deemed most appropriate.  Using a sterile surgical marker, the appropriate transposition flaps were drawn incorporating the defect and placing the expected incisions within the relaxed skin tension lines where possible.    The area thus outlined was incised deep to adipose tissue with a #15 scalpel blade.  The skin margins were undermined to an appropriate distance in all directions utilizing iris scissors.  Hemostasis was achieved with electrocautery.  The flaps were then transposed into place, one clockwise and the other counterclockwise, and anchored with interrupted buried subcutaneous sutures.
Double O-Z Plasty Text: The defect edges were debeveled with a #15 scalpel blade.  Given the location of the defect, shape of the defect and the proximity to free margins a Double O-Z plasty (double transposition flap) was deemed most appropriate.  Using a sterile surgical marker, the appropriate transposition flaps were drawn incorporating the defect and placing the expected incisions within the relaxed skin tension lines where possible. The area thus outlined was incised deep to adipose tissue with a #15 scalpel blade.  The skin margins were undermined to an appropriate distance in all directions utilizing iris scissors.  Hemostasis was achieved with electrocautery.  The flaps were then transposed into place, one clockwise and the other counterclockwise, and anchored with interrupted buried subcutaneous sutures.
V-Y Plasty Text: The defect edges were debeveled with a #15 scalpel blade.  Given the location of the defect, shape of the defect and the proximity to free margins an V-Y advancement flap was deemed most appropriate.  Using a sterile surgical marker, an appropriate advancement flap was drawn incorporating the defect and placing the expected incisions within the relaxed skin tension lines where possible.    The area thus outlined was incised deep to adipose tissue with a #15 scalpel blade.  The skin margins were undermined to an appropriate distance in all directions utilizing iris scissors.
H Plasty Text: Given the location of the defect, shape of the defect and the proximity to free margins a H-plasty was deemed most appropriate for repair.  Using a sterile surgical marker, the appropriate advancement arms of the H-plasty were drawn incorporating the defect and placing the expected incisions within the relaxed skin tension lines where possible. The area thus outlined was incised deep to adipose tissue with a #15 scalpel blade. The skin margins were undermined to an appropriate distance in all directions utilizing iris scissors.  The opposing advancement arms were then advanced into place in opposite direction and anchored with interrupted buried subcutaneous sutures.
W Plasty Text: The lesion was extirpated to the level of the fat with a #15 scalpel blade.  Given the location of the defect, shape of the defect and the proximity to free margins a W-plasty was deemed most appropriate for repair.  Using a sterile surgical marker, the appropriate transposition arms of the W-plasty were drawn incorporating the defect and placing the expected incisions within the relaxed skin tension lines where possible.    The area thus outlined was incised deep to adipose tissue with a #15 scalpel blade.  The skin margins were undermined to an appropriate distance in all directions utilizing iris scissors.  The opposing transposition arms were then transposed into place in opposite direction and anchored with interrupted buried subcutaneous sutures.
Z Plasty Text: The lesion was extirpated to the level of the fat with a #15 scalpel blade.  Given the location of the defect, shape of the defect and the proximity to free margins a Z-plasty was deemed most appropriate for repair.  Using a sterile surgical marker, the appropriate transposition arms of the Z-plasty were drawn incorporating the defect and placing the expected incisions within the relaxed skin tension lines where possible.    The area thus outlined was incised deep to adipose tissue with a #15 scalpel blade.  The skin margins were undermined to an appropriate distance in all directions utilizing iris scissors.  The opposing transposition arms were then transposed into place in opposite direction and anchored with interrupted buried subcutaneous sutures.
Double Z Plasty Text: The lesion was extirpated to the level of the fat with a #15 scalpel blade. Given the location of the defect, shape of the defect and the proximity to free margins a double Z-plasty was deemed most appropriate for repair. Using a sterile surgical marker, the appropriate transposition arms of the double Z-plasty were drawn incorporating the defect and placing the expected incisions within the relaxed skin tension lines where possible. The area thus outlined was incised deep to adipose tissue with a #15 scalpel blade. The skin margins were undermined to an appropriate distance in all directions utilizing iris scissors. The opposing transposition arms were then transposed and carried over into place in opposite direction and anchored with interrupted buried subcutaneous sutures.
Zygomaticofacial Flap Text: Given the location of the defect, shape of the defect and the proximity to free margins a zygomaticofacial flap was deemed most appropriate for repair.  Using a sterile surgical marker, the appropriate flap was drawn incorporating the defect and placing the expected incisions within the relaxed skin tension lines where possible. The area thus outlined was incised deep to adipose tissue with a #15 scalpel blade with preservation of a vascular pedicle.  The skin margins were undermined to an appropriate distance in all directions utilizing iris scissors.  The flap was then placed into the defect and anchored with interrupted buried subcutaneous sutures.
Cheek Interpolation Flap Text: A decision was made to reconstruct the defect utilizing an interpolation axial flap and a staged reconstruction.  A telfa template was made of the defect.  This telfa template was then used to outline the Cheek Interpolation flap.  The donor area for the pedicle flap was then injected with anesthesia.  The flap was excised through the skin and subcutaneous tissue down to the layer of the underlying musculature.  The interpolation flap was carefully excised within this deep plane to maintain its blood supply.  The edges of the donor site were undermined.   The donor site was closed in a primary fashion.  The pedicle was then rotated into position and sutured.  Once the tube was sutured into place, adequate blood supply was confirmed with blanching and refill.  The pedicle was then wrapped with xeroform gauze and dressed appropriately with a telfa and gauze bandage to ensure continued blood supply and protect the attached pedicle.
Cheek-To-Nose Interpolation Flap Text: A decision was made to reconstruct the defect utilizing an interpolation axial flap and a staged reconstruction.  A telfa template was made of the defect.  This telfa template was then used to outline the Cheek-To-Nose Interpolation flap.  The donor area for the pedicle flap was then injected with anesthesia.  The flap was excised through the skin and subcutaneous tissue down to the layer of the underlying musculature.  The interpolation flap was carefully excised within this deep plane to maintain its blood supply.  The edges of the donor site were undermined.   The donor site was closed in a primary fashion.  The pedicle was then rotated into position and sutured.  Once the tube was sutured into place, adequate blood supply was confirmed with blanching and refill.  The pedicle was then wrapped with xeroform gauze and dressed appropriately with a telfa and gauze bandage to ensure continued blood supply and protect the attached pedicle.
Interpolation Flap Text: A decision was made to reconstruct the defect utilizing an interpolation axial flap and a staged reconstruction.  A telfa template was made of the defect.  This telfa template was then used to outline the interpolation flap.  The donor area for the pedicle flap was then injected with anesthesia.  The flap was excised through the skin and subcutaneous tissue down to the layer of the underlying musculature.  The interpolation flap was carefully excised within this deep plane to maintain its blood supply.  The edges of the donor site were undermined.   The donor site was closed in a primary fashion.  The pedicle was then rotated into position and sutured.  Once the tube was sutured into place, adequate blood supply was confirmed with blanching and refill.  The pedicle was then wrapped with xeroform gauze and dressed appropriately with a telfa and gauze bandage to ensure continued blood supply and protect the attached pedicle.
Melolabial Interpolation Flap Text: A decision was made to reconstruct the defect utilizing an interpolation axial flap and a staged reconstruction.  A telfa template was made of the defect.  This telfa template was then used to outline the melolabial interpolation flap.  The donor area for the pedicle flap was then injected with anesthesia.  The flap was excised through the skin and subcutaneous tissue down to the layer of the underlying musculature.  The pedicle flap was carefully excised within this deep plane to maintain its blood supply.  The edges of the donor site were undermined.   The donor site was closed in a primary fashion.  The pedicle was then rotated into position and sutured.  Once the tube was sutured into place, adequate blood supply was confirmed with blanching and refill.  The pedicle was then wrapped with xeroform gauze and dressed appropriately with a telfa and gauze bandage to ensure continued blood supply and protect the attached pedicle.
Mastoid Interpolation Flap Text: A decision was made to reconstruct the defect utilizing an interpolation axial flap and a staged reconstruction.  A telfa template was made of the defect.  This telfa template was then used to outline the mastoid interpolation flap.  The donor area for the pedicle flap was then injected with anesthesia.  The flap was excised through the skin and subcutaneous tissue down to the layer of the underlying musculature.  The pedicle flap was carefully excised within this deep plane to maintain its blood supply.  The edges of the donor site were undermined.   The donor site was closed in a primary fashion.  The pedicle was then rotated into position and sutured.  Once the tube was sutured into place, adequate blood supply was confirmed with blanching and refill.  The pedicle was then wrapped with xeroform gauze and dressed appropriately with a telfa and gauze bandage to ensure continued blood supply and protect the attached pedicle.
Posterior Auricular Interpolation Flap Text: A decision was made to reconstruct the defect utilizing an interpolation axial flap and a staged reconstruction.  A telfa template was made of the defect.  This telfa template was then used to outline the posterior auricular interpolation flap.  The donor area for the pedicle flap was then injected with anesthesia.  The flap was excised through the skin and subcutaneous tissue down to the layer of the underlying musculature.  The pedicle flap was carefully excised within this deep plane to maintain its blood supply.  The edges of the donor site were undermined.   The donor site was closed in a primary fashion.  The pedicle was then rotated into position and sutured.  Once the tube was sutured into place, adequate blood supply was confirmed with blanching and refill.  The pedicle was then wrapped with xeroform gauze and dressed appropriately with a telfa and gauze bandage to ensure continued blood supply and protect the attached pedicle.
Paramedian Forehead Flap Text: A decision was made to reconstruct the defect utilizing an interpolation axial flap and a staged reconstruction.  A telfa template was made of the defect.  This telfa template was then used to outline the paramedian forehead pedicle flap.  The donor area for the pedicle flap was then injected with anesthesia.  The flap was excised through the skin and subcutaneous tissue down to the layer of the underlying musculature.  The pedicle flap was carefully excised within this deep plane to maintain its blood supply.  The edges of the donor site were undermined.   The donor site was closed in a primary fashion.  The pedicle was then rotated into position and sutured.  Once the tube was sutured into place, adequate blood supply was confirmed with blanching and refill.  The pedicle was then wrapped with xeroform gauze and dressed appropriately with a telfa and gauze bandage to ensure continued blood supply and protect the attached pedicle.
Abbe Flap (Upper To Lower Lip) Text: The defect of the lower lip was assessed and measured.  Given the location and size of the defect, an Abbe flap was deemed most appropriate. Using a sterile surgical marker, an appropriate Abbe flap was measured and drawn on the upper lip. Local anesthesia was then infiltrated.  A scalpel was then used to incise the upper lip through and through the skin, vermilion, muscle and mucosa, leaving the flap pedicled on the opposite side.  The flap was then rotated and transferred to the lower lip defect.  The flap was then sutured into place with a three layer technique, closing the orbicularis oris muscle layer with subcutaneous buried sutures, followed by a mucosal layer and an epidermal layer.
Abbe Flap (Lower To Upper Lip) Text: The defect of the upper lip was assessed and measured.  Given the location and size of the defect, an Abbe flap was deemed most appropriate. Using a sterile surgical marker, an appropriate Abbe flap was measured and drawn on the lower lip. Local anesthesia was then infiltrated. A scalpel was then used to incise the upper lip through and through the skin, vermilion, muscle and mucosa, leaving the flap pedicled on the opposite side.  The flap was then rotated and transferred to the lower lip defect.  The flap was then sutured into place with a three layer technique, closing the orbicularis oris muscle layer with subcutaneous buried sutures, followed by a mucosal layer and an epidermal layer.
Estlander Flap (Upper To Lower Lip) Text: The defect of the lower lip was assessed and measured.  Given the location and size of the defect, an Estlander flap was deemed most appropriate. Using a sterile surgical marker, an appropriate Estlander flap was measured and drawn on the upper lip. Local anesthesia was then infiltrated. A scalpel was then used to incise the lateral aspect of the flap, through skin, muscle and mucosa, leaving the flap pedicled medially.  The flap was then rotated and positioned to fill the lower lip defect.  The flap was then sutured into place with a three layer technique, closing the orbicularis oris muscle layer with subcutaneous buried sutures, followed by a mucosal layer and an epidermal layer.
Cheiloplasty (Less Than 50%) Text: A decision was made to reconstruct the defect with a  cheiloplasty.  The defect was undermined extensively.  Additional orbicularis oris muscle was excised with a 15 blade scalpel.  The defect was converted into a full thickness wedge, of less than 50% of the vertical height of the lip, to facilite a better cosmetic result.  Small vessels were then tied off with 5-0 monocyrl. The orbicularis oris, superficial fascia, adipose and dermis were then reapproximated.  After the deeper layers were approximated the epidermis was reapproximated with particular care given to realign the vermilion border.
Cheiloplasty (Complex) Text: A decision was made to reconstruct the defect with a  cheiloplasty.  The defect was undermined extensively.  Additional orbicularis oris muscle was excised with a 15 blade scalpel.  The defect was converted into a full thickness wedge to facilite a better cosmetic result.  Small vessels were then tied off with 5-0 monocyrl. The orbicularis oris, superficial fascia, adipose and dermis were then reapproximated.  After the deeper layers were approximated the epidermis was reapproximated with particular care given to realign the vermilion border.
Ear Wedge Repair Text: A wedge excision was completed by carrying down an excision through the full thickness of the ear and cartilage with an inward facing Burow's triangle. The wound was then closed in a layered fashion.
Full Thickness Lip Wedge Repair (Flap) Text: Given the location of the defect and the proximity to free margins a full thickness wedge repair was deemed most appropriate.  Using a sterile surgical marker, the appropriate repair was drawn incorporating the defect and placing the expected incisions perpendicular to the vermilion border.  The vermilion border was also meticulously outlined to ensure appropriate reapproximation during the repair.  The area thus outlined was incised through and through with a #15 scalpel blade.  The muscularis and dermis were reaproximated with deep sutures following hemostasis. Care was taken to realign the vermilion border before proceeding with the superficial closure.  Once the vermilion was realigned the superfical and mucosal closure was finished.
Ftsg Text: The defect edges were debeveled with a #15 scalpel blade.  Given the location of the defect, shape of the defect and the proximity to free margins a full thickness skin graft was deemed most appropriate.  Using a sterile surgical marker, the primary defect shape was transferred to the donor site. The area thus outlined was incised deep to adipose tissue with a #15 scalpel blade.  The harvested graft was then trimmed of adipose tissue until only dermis and epidermis was left.  The skin margins of the secondary defect were undermined to an appropriate distance in all directions utilizing iris scissors.  The secondary defect was closed with interrupted buried subcutaneous sutures.  The skin edges were then re-apposed with running  sutures.  The skin graft was then placed in the primary defect and oriented appropriately.
Split-Thickness Skin Graft Text: The defect edges were debeveled with a #15 scalpel blade.  Given the location of the defect, shape of the defect and the proximity to free margins a split thickness skin graft was deemed most appropriate.  Using a sterile surgical marker, the primary defect shape was transferred to the donor site. The split thickness graft was then harvested.  The skin graft was then placed in the primary defect and oriented appropriately.
Pinch Graft Text: The defect edges were debeveled with a #15 scalpel blade. Given the location of the defect, shape of the defect and the proximity to free margins a pinch graft was deemed most appropriate. Using a sterile surgical marker, the primary defect shape was transferred to the donor site. The area thus outlined was incised deep to adipose tissue with a #15 scalpel blade.  The harvested graft was then trimmed of adipose tissue until only dermis and epidermis was left. The skin margins of the secondary defect were undermined to an appropriate distance in all directions utilizing iris scissors.  The secondary defect was closed with interrupted buried subcutaneous sutures.  The skin edges were then re-apposed with running  sutures.  The skin graft was then placed in the primary defect and oriented appropriately.
Burow's Graft Text: The defect edges were debeveled with a #15 scalpel blade.  Given the location of the defect, shape of the defect, the proximity to free margins and the presence of a standing cone deformity a Burow's skin graft was deemed most appropriate. The standing cone was removed and this tissue was then trimmed to the shape of the primary defect. The adipose tissue was also removed until only dermis and epidermis were left.  The skin margins of the secondary defect were undermined to an appropriate distance in all directions utilizing iris scissors.  The secondary defect was closed with interrupted buried subcutaneous sutures.  The skin edges were then re-apposed with running  sutures.  The skin graft was then placed in the primary defect and oriented appropriately.
Cartilage Graft Text: The defect edges were debeveled with a #15 scalpel blade.  Given the location of the defect, shape of the defect, the fact the defect involved a full thickness cartilage defect a cartilage graft was deemed most appropriate.  An appropriate donor site was identified, cleansed, and anesthetized. The cartilage graft was then harvested and transferred to the recipient site, oriented appropriately and then sutured into place.  The secondary defect was then repaired using a primary closure.
Composite Graft Text: The defect edges were debeveled with a #15 scalpel blade.  Given the location of the defect, shape of the defect, the proximity to free margins and the fact the defect was full thickness a composite graft was deemed most appropriate.  The defect was outline and then transferred to the donor site.  A full thickness graft was then excised from the donor site. The graft was then placed in the primary defect, oriented appropriately and then sutured into place.  The secondary defect was then repaired using a primary closure.
Epidermal Autograft Text: The defect edges were debeveled with a #15 scalpel blade.  Given the location of the defect, shape of the defect and the proximity to free margins an epidermal autograft was deemed most appropriate.  Using a sterile surgical marker, the primary defect shape was transferred to the donor site. The epidermal graft was then harvested.  The skin graft was then placed in the primary defect and oriented appropriately.
Dermal Autograft Text: The defect edges were debeveled with a #15 scalpel blade.  Given the location of the defect, shape of the defect and the proximity to free margins a dermal autograft was deemed most appropriate.  Using a sterile surgical marker, the primary defect shape was transferred to the donor site. The area thus outlined was incised deep to adipose tissue with a #15 scalpel blade.  The harvested graft was then trimmed of adipose and epidermal tissue until only dermis was left.  The skin graft was then placed in the primary defect and oriented appropriately.
Skin Substitute Text: The defect edges were debeveled with a #15 scalpel blade.  Given the location of the defect, shape of the defect and the proximity to free margins a skin substitute graft was deemed most appropriate.  The graft material was trimmed to fit the size of the defect. The graft was then placed in the primary defect and oriented appropriately.
Tissue Cultured Epidermal Autograft Text: The defect edges were debeveled with a #15 scalpel blade.  Given the location of the defect, shape of the defect and the proximity to free margins a tissue cultured epidermal autograft was deemed most appropriate.  The graft was then trimmed to fit the size of the defect.  The graft was then placed in the primary defect and oriented appropriately.
Xenograft Text: The defect edges were debeveled with a #15 scalpel blade.  Given the location of the defect, shape of the defect and the proximity to free margins a xenograft was deemed most appropriate.  The graft was then trimmed to fit the size of the defect.  The graft was then placed in the primary defect and oriented appropriately.
Purse String (Simple) Text: Given the location of the defect and the characteristics of the surrounding skin a purse string closure was deemed most appropriate.  Undermining was performed circumfirentially around the surgical defect.  A purse string suture was then placed and tightened.
Purse String (Intermediate) Text: Given the location of the defect and the characteristics of the surrounding skin a purse string intermediate closure was deemed most appropriate.  Undermining was performed circumfirentially around the surgical defect.  A purse string suture was then placed and tightened.
Partial Purse String (Simple) Text: Given the location of the defect and the characteristics of the surrounding skin a simple purse string closure was deemed most appropriate.  Undermining was performed circumfirentially around the surgical defect.  A purse string suture was then placed and tightened. Wound tension only allowed a partial closure of the circular defect.
Partial Purse String (Intermediate) Text: Given the location of the defect and the characteristics of the surrounding skin an intermediate purse string closure was deemed most appropriate.  Undermining was performed circumfirentially around the surgical defect.  A purse string suture was then placed and tightened. Wound tension only allowed a partial closure of the circular defect.
Localized Dermabrasion With Wire Brush Text: The patient was draped in routine manner.  Localized dermabrasion using 3 x 17 mm wire brush was performed in routine manner to papillary dermis. This spot dermabrasion is being performed to complete skin cancer reconstruction. It also will eliminate the other sun damaged precancerous cells that are known to be part of the regional effect of a lifetime's worth of sun exposure. This localized dermabrasion is therapeutic and should not be considered cosmetic in any regard.
Tarsorrhaphy Text: A tarsorrhaphy was performed using Frost sutures.
Intermediate Repair And Flap Additional Text (Will Appearing After The Standard Complex Repair Text): The intermediate repair was not sufficient to completely close the primary defect. The remaining additional defect was repaired with the flap mentioned below.
Intermediate Repair And Graft Additional Text (Will Appearing After The Standard Complex Repair Text): The intermediate repair was not sufficient to completely close the primary defect. The remaining additional defect was repaired with the graft mentioned below.
Complex Repair And Flap Additional Text (Will Appearing After The Standard Complex Repair Text): The complex repair was not sufficient to completely close the primary defect. The remaining additional defect was repaired with the flap mentioned below.
Complex Repair And Graft Additional Text (Will Appearing After The Standard Complex Repair Text): The complex repair was not sufficient to completely close the primary defect. The remaining additional defect was repaired with the graft mentioned below.
Manual Repair Warning Statement: We plan on removing the manually selected variable below in favor of our much easier automatic structured text blocks found in the previous tab. We decided to do this to help make the flow better and give you the full power of structured data. Manual selection is never going to be ideal in our platform and I would encourage you to avoid using manual selection from this point on, especially since I will be sunsetting this feature. It is important that you do one of two things with the customized text below. First, you can save all of the text in a word file so you can have it for future reference. Second, transfer the text to the appropriate area in the Library tab. Lastly, if there is a flap or graft type which we do not have you need to let us know right away so I can add it in before the variable is hidden. No need to panic, we plan to give you roughly 6 months to make the change.
Same Histology In Subsequent Stages Text: The pattern and morphology of the tumor is as described in the first stage.
No Residual Tumor Seen Histology Text: There were no malignant cells seen in the sections examined.
Inflammation Suggestive Of Cancer Camouflage Histology Text: There was a dense lymphocytic infiltrate which prevented adequate histologic evaluation of adjacent structures.
Bcc Histology Text: There were numerous aggregates of basaloid cells.
Bcc Infiltrative Histology Text: There were numerous aggregates of basaloid cells demonstrating an infiltrative pattern.
Mart-1 - Positive Histology Text: MART-1 staining demonstrates areas of higher density and clustering of melanocytes with Pagetoid spread upwards within the epidermis. The surgical margins are positive for tumor cells.
Mart-1 - Negative Histology Text: MART-1 staining demonstrates a normal density and pattern of melanocytes along the dermal-epidermal junction. The surgical margins are negative for tumor cells.
Information: Selecting Yes will display possible errors in your note based on the variables you have selected. This validation is only offered as a suggestion for you. PLEASE NOTE THAT THE VALIDATION TEXT WILL BE REMOVED WHEN YOU FINALIZE YOUR NOTE. IF YOU WANT TO FAX A PRELIMINARY NOTE YOU WILL NEED TO TOGGLE THIS TO 'NO' IF YOU DO NOT WANT IT IN YOUR FAXED NOTE.

## 2023-10-20 NOTE — PROCEDURE: MEDICATION COUNSELING
Isotretinoin Counseling: Patient should get monthly blood tests, not donate blood, not drive at night if vision affected, not share medication, and not undergo elective surgery for 6 months after tx completed. Side effects reviewed, pt to contact office should one occur.
Picato Counseling:  I discussed with the patient the risks of Picato including but not limited to erythema, scaling, itching, weeping, crusting, and pain.
Gabapentin Pregnancy And Lactation Text: This medication is Pregnancy Category C and isn't considered safe during pregnancy. It is excreted in breast milk.
Azelaic Acid Counseling: Patient counseled that medicine may cause skin irritation and to avoid applying near the eyes.  In the event of skin irritation, the patient was advised to reduce the amount of the drug applied or use it less frequently.   The patient verbalized understanding of the proper use and possible adverse effects of azelaic acid.  All of the patient's questions and concerns were addressed.
Itraconazole Counseling:  I discussed with the patient the risks of itraconazole including but not limited to liver damage, nausea/vomiting, neuropathy, and severe allergy.  The patient understands that this medication is best absorbed when taken with acidic beverages such as non-diet cola or ginger ale.  The patient understands that monitoring is required including baseline LFTs and repeat LFTs at intervals.  The patient understands that they are to contact us or the primary physician if concerning signs are noted.
Simponi Pregnancy And Lactation Text: The risk during pregnancy and breastfeeding is uncertain with this medication.
Azathioprine Pregnancy And Lactation Text: This medication is Pregnancy Category D and isn't considered safe during pregnancy. It is unknown if this medication is excreted in breast milk.
Doxepin Counseling:  Patient advised that the medication is sedating and not to drive a car after taking this medication. Patient informed of potential adverse effects including but not limited to dry mouth, urinary retention, and blurry vision.  The patient verbalized understanding of the proper use and possible adverse effects of doxepin.  All of the patient's questions and concerns were addressed.
Topical Ketoconazole Counseling: Patient counseled that this medication may cause skin irritation or allergic reactions.  In the event of skin irritation, the patient was advised to reduce the amount of the drug applied or use it less frequently.   The patient verbalized understanding of the proper use and possible adverse effects of ketoconazole.  All of the patient's questions and concerns were addressed.
Arava Counseling:  Patient counseled regarding adverse effects of Arava including but not limited to nausea, vomiting, abnormalities in liver function tests. Patients may develop mouth sores, rash, diarrhea, and abnormalities in blood counts. The patient understands that monitoring is required including LFTs and blood counts.  There is a rare possibility of scarring of the liver and lung problems that can occur when taking methotrexate. Persistent nausea, loss of appetite, pale stools, dark urine, cough, and shortness of breath should be reported immediately. Patient advised to discontinue Arava treatment and consult with a physician prior to attempting conception. The patient will have to undergo a treatment to eliminate Arava from the body prior to conception.
Valtrex Pregnancy And Lactation Text: this medication is Pregnancy Category B and is considered safe during pregnancy. This medication is not directly found in breast milk but it's metabolite acyclovir is present.
Solaraze Pregnancy And Lactation Text: This medication is Pregnancy Category B and is considered safe. There is some data to suggest avoiding during the third trimester. It is unknown if this medication is excreted in breast milk.
Prednisone Pregnancy And Lactation Text: This medication is Pregnancy Category C and it isn't know if it is safe during pregnancy. This medication is excreted in breast milk.
Bactrim Counseling:  I discussed with the patient the risks of sulfa antibiotics including but not limited to GI upset, allergic reaction, drug rash, diarrhea, dizziness, photosensitivity, and yeast infections.  Rarely, more serious reactions can occur including but not limited to aplastic anemia, agranulocytosis, methemoglobinemia, blood dyscrasias, liver or kidney failure, lung infiltrates or desquamative/blistering drug rashes.
Winlevi Counseling:  I discussed with the patient the risks of topical clascoterone including but not limited to erythema, scaling, itching, and stinging. Patient voiced their understanding.
Detail Level: Detailed
Doxepin Pregnancy And Lactation Text: This medication is Pregnancy Category C and it isn't known if it is safe during pregnancy. It is also excreted in breast milk and breast feeding isn't recommended.
Dutasteride Pregnancy And Lactation Text: This medication is absolutely contraindicated in women, especially during pregnancy and breast feeding. Feminization of male fetuses is possible if taking while pregnant.
Propranolol Counseling:  I discussed with the patient the risks of propranolol including but not limited to low heart rate, low blood pressure, low blood sugar, restlessness and increased cold sensitivity. They should call the office if they experience any of these side effects.
Topical Ketoconazole Pregnancy And Lactation Text: This medication is Pregnancy Category B and is considered safe during pregnancy. It is unknown if it is excreted in breast milk.
Imiquimod Pregnancy And Lactation Text: This medication is Pregnancy Category C. It is unknown if this medication is excreted in breast milk.
Skyrizi Counseling: I discussed with the patient the risks of risankizumab-rzaa including but not limited to immunosuppression, and serious infections.  The patient understands that monitoring is required including a PPD at baseline and must alert us or the primary physician if symptoms of infection or other concerning signs are noted.
Sotyktu Counseling:  I discussed the most common side effects of Sotyktu including: common cold, sore throat, sinus infections, cold sores, canker sores, folliculitis, and acne.  I also discussed more serious side effects of Sotyktu including but not limited to: serious allergic reactions; increased risk for infections such as TB; cancers such as lymphomas; rhabdomyolysis and elevated CPK; and elevated triglycerides and liver enzymes. 
Humira Counseling:  I discussed with the patient the risks of adalimumab including but not limited to myelosuppression, immunosuppression, autoimmune hepatitis, demyelinating diseases, lymphoma, and serious infections.  The patient understands that monitoring is required including a PPD at baseline and must alert us or the primary physician if symptoms of infection or other concerning signs are noted.
Drysol Counseling:  I discussed with the patient the risks of drysol/aluminum chloride including but not limited to skin rash, itching, irritation, burning.
Glycopyrrolate Counseling:  I discussed with the patient the risks of glycopyrrolate including but not limited to skin rash, drowsiness, dry mouth, difficulty urinating, and blurred vision.
Azelaic Acid Pregnancy And Lactation Text: This medication is considered safe during pregnancy and breast feeding.
Cellcept Counseling:  I discussed with the patient the risks of mycophenolate mofetil including but not limited to infection/immunosuppression, GI upset, hypokalemia, hypercholesterolemia, bone marrow suppression, lymphoproliferative disorders, malignancy, GI ulceration/bleed/perforation, colitis, interstitial lung disease, kidney failure, progressive multifocal leukoencephalopathy, and birth defects.  The patient understands that monitoring is required including a baseline creatinine and regular CBC testing. In addition, patient must alert us immediately if symptoms of infection or other concerning signs are noted.
Sarecycline Counseling: Patient advised regarding possible photosensitivity and discoloration of the teeth, skin, lips, tongue and gums.  Patient instructed to avoid sunlight, if possible.  When exposed to sunlight, patients should wear protective clothing, sunglasses, and sunscreen.  The patient was instructed to call the office immediately if the following severe adverse effects occur:  hearing changes, easy bruising/bleeding, severe headache, or vision changes.  The patient verbalized understanding of the proper use and possible adverse effects of sarecycline.  All of the patient's questions and concerns were addressed.
Xolair Pregnancy And Lactation Text: This medication is Pregnancy Category B and is considered safe during pregnancy. This medication is excreted in breast milk.
Erythromycin Pregnancy And Lactation Text: This medication is Pregnancy Category B and is considered safe during pregnancy. It is also excreted in breast milk.
Nsaids Pregnancy And Lactation Text: These medications are considered safe up to 30 weeks gestation. It is excreted in breast milk.
Adbry Pregnancy And Lactation Text: It is unknown if this medication will adversely affect pregnancy or breast feeding.
Isotretinoin Pregnancy And Lactation Text: This medication is Pregnancy Category X and is considered extremely dangerous during pregnancy. It is unknown if it is excreted in breast milk.
Itraconazole Pregnancy And Lactation Text: This medication is Pregnancy Category C and it isn't know if it is safe during pregnancy. It is also excreted in breast milk.
Arava Pregnancy And Lactation Text: This medication is Pregnancy Category X and is absolutely contraindicated during pregnancy. It is unknown if it is excreted in breast milk.
Humira Pregnancy And Lactation Text: This medication is Pregnancy Category B and is considered safe during pregnancy. It is unknown if this medication is excreted in breast milk.
Propranolol Pregnancy And Lactation Text: This medication is Pregnancy Category C and it isn't known if it is safe during pregnancy. It is excreted in breast milk.
Soolantra Counseling: I discussed with the patients the risks of topial Soolantra. This is a medicine which decreases the number of mites and inflammation in the skin. You experience burning, stinging, eye irritation or allergic reactions.  Please call our office if you develop any problems from using this medication.
Finasteride Male Counseling: Finasteride Counseling:  I discussed with the patient the risks of use of finasteride including but not limited to decreased libido, decreased ejaculate volume, gynecomastia, and depression. Women should not handle medication.  All of the patient's questions and concerns were addressed.
Use Enhanced Medication Counseling?: No
Benzoyl Peroxide Counseling: Patient counseled that medicine may cause skin irritation and bleach clothing.  In the event of skin irritation, the patient was advised to reduce the amount of the drug applied or use it less frequently.   The patient verbalized understanding of the proper use and possible adverse effects of benzoyl peroxide.  All of the patient's questions and concerns were addressed.
Sarecycline Pregnancy And Lactation Text: This medication is Pregnancy Category D and not consider safe during pregnancy. It is also excreted in breast milk.
Soolantra Pregnancy And Lactation Text: This medication is Pregnancy Category C. This medication is considered safe during breast feeding.
Klisyri Counseling:  I discussed with the patient the risks of Klisyri including but not limited to erythema, scaling, itching, weeping, crusting, and pain.
Olanzapine Counseling- I discussed with the patient the common side effects of olanzapine including but are not limited to: lack of energy, dry mouth, increased appetite, sleepiness, tremor, constipation, dizziness, changes in behavior, or restlessness.  Explained that teenagers are more likely to experience headaches, abdominal pain, pain in the arms or legs, tiredness, and sleepiness.  Serious side effects include but are not limited: increased risk of death in elderly patients who are confused, have memory loss, or dementia-related psychosis; hyperglycemia; increased cholesterol and triglycerides; and weight gain.
Cibinqo Counseling: I discussed with the patient the risks of Cibinqo therapy including but not limited to common cold, nausea, headache, cold sores, increased blood CPK levels, dizziness, UTIs, fatigue, acne, and vomitting. Live vaccines should be avoided.  This medication has been linked to serious infections; higher rate of mortality; malignancy and lymphoproliferative disorders; major adverse cardiovascular events; thrombosis; thrombocytopenia and lymphopenia; lipid elevations; and retinal detachment.
Winlevi Pregnancy And Lactation Text: This medication is considered safe during pregnancy and breastfeeding.
Cimzia Counseling:  I discussed with the patient the risks of Cimzia including but not limited to immunosuppression, allergic reactions and infections.  The patient understands that monitoring is required including a PPD at baseline and must alert us or the primary physician if symptoms of infection or other concerning signs are noted.
Clofazimine Counseling:  I discussed with the patient the risks of clofazimine including but not limited to skin and eye pigmentation, liver damage, nausea/vomiting, gastrointestinal bleeding and allergy.
Glycopyrrolate Pregnancy And Lactation Text: This medication is Pregnancy Category B and is considered safe during pregnancy. It is unknown if it is excreted breast milk.
Metronidazole Counseling:  I discussed with the patient the risks of metronidazole including but not limited to seizures, nausea/vomiting, a metallic taste in the mouth, nausea/vomiting and severe allergy.
Sotyktu Pregnancy And Lactation Text: There is insufficient data to evaluate whether or not Sotyktu is safe to use during pregnancy.   It is not known if Sotyktu passes into breast milk and whether or not it is safe to use when breastfeeding.  
Hydroxyzine Counseling: Patient advised that the medication is sedating and not to drive a car after taking this medication.  Patient informed of potential adverse effects including but not limited to dry mouth, urinary retention, and blurry vision.  The patient verbalized understanding of the proper use and possible adverse effects of hydroxyzine.  All of the patient's questions and concerns were addressed.
Topical Metronidazole Counseling: Metronidazole is a topical antibiotic medication. You may experience burning, stinging, redness, or allergic reactions.  Please call our office if you develop any problems from using this medication.
Bactrim Pregnancy And Lactation Text: This medication is Pregnancy Category D and is known to cause fetal risk.  It is also excreted in breast milk.
Protopic Counseling: Patient may experience a mild burning sensation during topical application. Protopic is not approved in children less than 2 years of age. There have been case reports of hematologic and skin malignancies in patients using topical calcineurin inhibitors although causality is questionable.
VTAMA Counseling: I discussed with the patient that VTAMA is not for use in the eyes, mouth or mouth. They should call the office if they develop any signs of allergic reactions to VTAMA. The patient verbalized understanding of the proper use and possible adverse effects of VTAMA.  All of the patient's questions and concerns were addressed.
Erivedge Counseling- I discussed with the patient the risks of Erivedge including but not limited to nausea, vomiting, diarrhea, constipation, weight loss, changes in the sense of taste, decreased appetite, muscle spasms, and hair loss.  The patient verbalized understanding of the proper use and possible adverse effects of Erivedge.  All of the patient's questions and concerns were addressed.
Klisyri Pregnancy And Lactation Text: It is unknown if this medication can harm a developing fetus or if it is excreted in breast milk.
Ketoconazole Counseling:   Patient counseled regarding improving absorption with orange juice.  Adverse effects include but are not limited to breast enlargement, headache, diarrhea, nausea, upset stomach, liver function test abnormalities, taste disturbance, and stomach pain.  There is a rare possibility of liver failure that can occur when taking ketoconazole. The patient understands that monitoring of LFTs may be required, especially at baseline. The patient verbalized understanding of the proper use and possible adverse effects of ketoconazole.  All of the patient's questions and concerns were addressed.
Olanzapine Pregnancy And Lactation Text: This medication is pregnancy category C.   There are no adequate and well controlled trials with olanzapine in pregnant females.  Olanzapine should be used during pregnancy only if the potential benefit justifies the potential risk to the fetus.   In a study in lactating healthy women, olanzapine was excreted in breast milk.  It is recommended that women taking olanzapine should not breast feed.
Zoryve Counseling:  I discussed with the patient that Zoryve is not for use in the eyes, mouth or vagina. The most commonly reported side effects include diarrhea, headache, insomnia, application site pain, upper respiratory tract infections, and urinary tract infections.  All of the patient's questions and concerns were addressed.
Finasteride Pregnancy And Lactation Text: This medication is absolutely contraindicated during pregnancy. It is unknown if it is excreted in breast milk.
SSKI Counseling:  I discussed with the patient the risks of SSKI including but not limited to thyroid abnormalities, metallic taste, GI upset, fever, headache, acne, arthralgias, paraesthesias, lymphadenopathy, easy bleeding, arrhythmias, and allergic reaction.
Ilumya Counseling: I discussed with the patient the risks of tildrakizumab including but not limited to immunosuppression, malignancy, posterior leukoencephalopathy syndrome, and serious infections.  The patient understands that monitoring is required including a PPD at baseline and must alert us or the primary physician if symptoms of infection or other concerning signs are noted.
Hydroxyzine Pregnancy And Lactation Text: This medication is not safe during pregnancy and should not be taken. It is also excreted in breast milk and breast feeding isn't recommended.
Benzoyl Peroxide Pregnancy And Lactation Text: This medication is Pregnancy Category C. It is unknown if benzoyl peroxide is excreted in breast milk.
Tetracycline Counseling: Patient counseled regarding possible photosensitivity and increased risk for sunburn.  Patient instructed to avoid sunlight, if possible.  When exposed to sunlight, patients should wear protective clothing, sunglasses, and sunscreen.  The patient was instructed to call the office immediately if the following severe adverse effects occur:  hearing changes, easy bruising/bleeding, severe headache, or vision changes.  The patient verbalized understanding of the proper use and possible adverse effects of tetracycline.  All of the patient's questions and concerns were addressed. Patient understands to avoid pregnancy while on therapy due to potential birth defects.
Metronidazole Pregnancy And Lactation Text: This medication is Pregnancy Category B and considered safe during pregnancy.  It is also excreted in breast milk.
Cephalexin Counseling: I counseled the patient regarding use of cephalexin as an antibiotic for prophylactic and/or therapeutic purposes. Cephalexin (commonly prescribed under brand name Keflex) is a cephalosporin antibiotic which is active against numerous classes of bacteria, including most skin bacteria. Side effects may include nausea, diarrhea, gastrointestinal upset, rash, hives, yeast infections, and in rare cases, hepatitis, kidney disease, seizures, fever, confusion, neurologic symptoms, and others. Patients with severe allergies to penicillin medications are cautioned that there is about a 10% incidence of cross-reactivity with cephalosporins. When possible, patients with penicillin allergies should use alternatives to cephalosporins for antibiotic therapy.
Xeljanz Counseling: I discussed with the patient the risks of Xeljanz therapy including increased risk of infection, liver issues, headache, diarrhea, or cold symptoms. Live vaccines should be avoided. They were instructed to call if they have any problems.
Elidel Counseling: Patient may experience a mild burning sensation during topical application. Elidel is not approved in children less than 2 years of age. There have been case reports of hematologic and skin malignancies in patients using topical calcineurin inhibitors although causality is questionable.
Topical Retinoid counseling:  Patient advised to apply a pea-sized amount only at bedtime and wait 30 minutes after washing their face before applying.  If too drying, patient may add a non-comedogenic moisturizer. The patient verbalized understanding of the proper use and possible adverse effects of retinoids.  All of the patient's questions and concerns were addressed.
Cyclophosphamide Counseling:  I discussed with the patient the risks of cyclophosphamide including but not limited to hair loss, hormonal abnormalities, decreased fertility, abdominal pain, diarrhea, nausea and vomiting, bone marrow suppression and infection. The patient understands that monitoring is required while taking this medication.
Topical Metronidazole Pregnancy And Lactation Text: This medication is Pregnancy Category B and considered safe during pregnancy.  It is also considered safe to use while breastfeeding.
Cimzia Pregnancy And Lactation Text: This medication crosses the placenta but can be considered safe in certain situations. Cimzia may be excreted in breast milk.
Cibinqo Pregnancy And Lactation Text: It is unknown if this medication will adversely affect pregnancy or breast feeding.  You should not take this medication if you are currently pregnant or planning a pregnancy or while breastfeeding.
Stelara Counseling:  I discussed with the patient the risks of ustekinumab including but not limited to immunosuppression, malignancy, posterior leukoencephalopathy syndrome, and serious infections.  The patient understands that monitoring is required including a PPD at baseline and must alert us or the primary physician if symptoms of infection or other concerning signs are noted.
Hydroxychloroquine Counseling:  I discussed with the patient that a baseline ophthalmologic exam is needed at the start of therapy and every year thereafter while on therapy. A CBC may also be warranted for monitoring.  The side effects of this medication were discussed with the patient, including but not limited to agranulocytosis, aplastic anemia, seizures, rashes, retinopathy, and liver toxicity. Patient instructed to call the office should any adverse effect occur.  The patient verbalized understanding of the proper use and possible adverse effects of Plaquenil.  All the patient's questions and concerns were addressed.
Cosentyx Counseling:  I discussed with the patient the risks of Cosentyx including but not limited to worsening of Crohn's disease, immunosuppression, allergic reactions and infections.  The patient understands that monitoring is required including a PPD at baseline and must alert us or the primary physician if symptoms of infection or other concerning signs are noted.
Sski Pregnancy And Lactation Text: This medication is Pregnancy Category D and isn't considered safe during pregnancy. It is excreted in breast milk.
Zoryve Pregnancy And Lactation Text: It is unknown if this medication can cause problems during pregnancy and breastfeeding.
Minocycline Counseling: Patient advised regarding possible photosensitivity and discoloration of the teeth, skin, lips, tongue and gums.  Patient instructed to avoid sunlight, if possible.  When exposed to sunlight, patients should wear protective clothing, sunglasses, and sunscreen.  The patient was instructed to call the office immediately if the following severe adverse effects occur:  hearing changes, easy bruising/bleeding, severe headache, or vision changes.  The patient verbalized understanding of the proper use and possible adverse effects of minocycline.  All of the patient's questions and concerns were addressed.
Xelcrowz Pregnancy And Lactation Text: This medication is Pregnancy Category D and is not considered safe during pregnancy.  The risk during breast feeding is also uncertain.
Cephalexin Pregnancy And Lactation Text: This medication is Pregnancy Category B and considered safe during pregnancy.  It is also excreted in breast milk but can be used safely for shorter doses.
Protopic Pregnancy And Lactation Text: This medication is Pregnancy Category C. It is unknown if this medication is excreted in breast milk when applied topically.
Ketoconazole Pregnancy And Lactation Text: This medication is Pregnancy Category C and it isn't know if it is safe during pregnancy. It is also excreted in breast milk and breast feeding isn't recommended.
Oral Minoxidil Counseling- I discussed with the patient the risks of oral minoxidil including but not limited to shortness of breath, swelling of the feet or ankles, dizziness, lightheadedness, unwanted hair growth and allergic reaction.  The patient verbalized understanding of the proper use and possible adverse effects of oral minoxidil.  All of the patient's questions and concerns were addressed.
Birth Control Pills Counseling: Birth Control Pill Counseling: I discussed with the patient the potential side effects of OCPs including but not limited to increased risk of stroke, heart attack, thrombophlebitis, deep venous thrombosis, hepatic adenomas, breast changes, GI upset, headaches, and depression.  The patient verbalized understanding of the proper use and possible adverse effects of OCPs. All of the patient's questions and concerns were addressed.
Minoxidil Counseling: Minoxidil is a topical medication which can increase blood flow where it is applied. It is uncertain how this medication increases hair growth. Side effects are uncommon and include stinging and allergic reactions.
Qbrexza Counseling:  I discussed with the patient the risks of Qbrexza including but not limited to headache, mydriasis, blurred vision, dry eyes, nasal dryness, dry mouth, dry throat, dry skin, urinary hesitation, and constipation.  Local skin reactions including erythema, burning, stinging, and itching can also occur.
Litfulo Counseling: I discussed with the patient the risks of Litfulo therapy including but not limited to upper respiratory tract infections, shingles, cold sores, and nausea. Live vaccines should be avoided.  This medication has been linked to serious infections; higher rate of mortality; malignancy and lymphoproliferative disorders; major adverse cardiovascular events; thrombosis; gastrointestinal perforations; neutropenia; lymphopenia; anemia; liver enzyme elevations; and lipid elevations.
Hydroxychloroquine Pregnancy And Lactation Text: This medication has been shown to cause fetal harm but it isn't assigned a Pregnancy Risk Category. There are small amounts excreted in breast milk.
Carac Counseling:  I discussed with the patient the risks of Carac including but not limited to erythema, scaling, itching, weeping, crusting, and pain.
Terbinafine Counseling: Patient counseling regarding adverse effects of terbinafine including but not limited to headache, diarrhea, rash, upset stomach, liver function test abnormalities, itching, taste/smell disturbance, nausea, abdominal pain, and flatulence.  There is a rare possibility of liver failure that can occur when taking terbinafine.  The patient understands that a baseline LFT and kidney function test may be required. The patient verbalized understanding of the proper use and possible adverse effects of terbinafine.  All of the patient's questions and concerns were addressed.
Albendazole Counseling:  I discussed with the patient the risks of albendazole including but not limited to cytopenia, kidney damage, nausea/vomiting and severe allergy.  The patient understands that this medication is being used in an off-label manner.
Cyclophosphamide Pregnancy And Lactation Text: This medication is Pregnancy Category D and it isn't considered safe during pregnancy. This medication is excreted in breast milk.
Zyclara Counseling:  I discussed with the patient the risks of imiquimod including but not limited to erythema, scaling, itching, weeping, crusting, and pain.  Patient understands that the inflammatory response to imiquimod is variable from person to person and was educated regarded proper titration schedule.  If flu-like symptoms develop, patient knows to discontinue the medication and contact us.
Thalidomide Counseling: I discussed with the patient the risks of thalidomide including but not limited to birth defects, anxiety, weakness, chest pain, dizziness, cough and severe allergy.
Topical Steroids Counseling: I discussed with the patient that prolonged use of topical steroids can result in the increased appearance of superficial blood vessels (telangiectasias), lightening (hypopigmentation) and thinning of the skin (atrophy).  Patient understands to avoid using high potency steroids in skin folds, the groin or the face.  The patient verbalized understanding of the proper use and possible adverse effects of topical steroids.  All of the patient's questions and concerns were addressed.
Libtayo Counseling- I discussed with the patient the risks of Libtayo including but not limited to nausea, vomiting, diarrhea, and bone or muscle pain.  The patient verbalized understanding of the proper use and possible adverse effects of Libtayo.  All of the patient's questions and concerns were addressed.
Rituxan Counseling:  I discussed with the patient the risks of Rituxan infusions. Side effects can include infusion reactions, severe drug rashes including mucocutaneous reactions, reactivation of latent hepatitis and other infections and rarely progressive multifocal leukoencephalopathy.  All of the patient's questions and concerns were addressed.
Colchicine Counseling:  Patient counseled regarding adverse effects including but not limited to stomach upset (nausea, vomiting, stomach pain, or diarrhea).  Patient instructed to limit alcohol consumption while taking this medication.  Colchicine may reduce blood counts especially with prolonged use.  The patient understands that monitoring of kidney function and blood counts may be required, especially at baseline. The patient verbalized understanding of the proper use and possible adverse effects of colchicine.  All of the patient's questions and concerns were addressed.
Eucrisa Counseling: Patient may experience a mild burning sensation during topical application. Eucrisa is not approved in children less than 2 years of age.
High Dose Vitamin A Counseling: Side effects reviewed, pt to contact office should one occur.
Topical Steroids Applications Pregnancy And Lactation Text: Most topical steroids are considered safe to use during pregnancy and lactation.  Any topical steroid applied to the breast or nipple should be washed off before breastfeeding.
Birth Control Pills Pregnancy And Lactation Text: This medication should be avoided if pregnant and for the first 30 days post-partum.
Low Dose Naltrexone Counseling- I discussed with the patient the potential risks and side effects of low dose naltrexone including but not limited to: more vivid dreams, headaches, nausea, vomiting, abdominal pain, fatigue, dizziness, and anxiety.
Clindamycin Counseling: I counseled the patient regarding use of clindamycin as an antibiotic for prophylactic and/or therapeutic purposes. Clindamycin is active against numerous classes of bacteria, including skin bacteria. Side effects may include nausea, diarrhea, gastrointestinal upset, rash, hives, yeast infections, and in rare cases, colitis.
Minoxidil Pregnancy And Lactation Text: This medication has not been assigned a Pregnancy Risk Category but animal studies failed to show danger with the topical medication. It is unknown if the medication is excreted in breast milk.
Taltz Counseling: I discussed with the patient the risks of ixekizumab including but not limited to immunosuppression, serious infections, worsening of inflammatory bowel disease and drug reactions.  The patient understands that monitoring is required including a PPD at baseline and must alert us or the primary physician if symptoms of infection or other concerning signs are noted.
Carac Pregnancy And Lactation Text: This medication is Pregnancy Category X and contraindicated in pregnancy and in women who may become pregnant. It is unknown if this medication is excreted in breast milk.
Infliximab Counseling:  I discussed with the patient the risks of infliximab including but not limited to myelosuppression, immunosuppression, autoimmune hepatitis, demyelinating diseases, lymphoma, and serious infections.  The patient understands that monitoring is required including a PPD at baseline and must alert us or the primary physician if symptoms of infection or other concerning signs are noted.
Cyclosporine Counseling:  I discussed with the patient the risks of cyclosporine including but not limited to hypertension, gingival hyperplasia,myelosuppression, immunosuppression, liver damage, kidney damage, neurotoxicity, lymphoma, and serious infections. The patient understands that monitoring is required including baseline blood pressure, CBC, CMP, lipid panel and uric acid, and then 1-2 times monthly CMP and blood pressure.
Oral Minoxidil Pregnancy And Lactation Text: This medication should only be used when clearly needed if you are pregnant, attempting to become pregnant or breast feeding.
Terbinafine Pregnancy And Lactation Text: This medication is Pregnancy Category B and is considered safe during pregnancy. It is also excreted in breast milk and breast feeding isn't recommended.
Albendazole Pregnancy And Lactation Text: This medication is Pregnancy Category C and it isn't known if it is safe during pregnancy. It is also excreted in breast milk.
Litfulo Pregnancy And Lactation Text: Based on animal studies, Lifulo may cause embryo-fetal harm when administered to pregnant women.  The medication should not be used in pregnancy.  Breastfeeding is not recommended during treatment.
Libtayo Pregnancy And Lactation Text: This medication is contraindicated in pregnancy and when breast feeding.
Mirvaso Counseling: Mirvaso is a topical medication which can decrease superficial blood flow where applied. Side effects are uncommon and include stinging, redness and allergic reactions.
Rituxan Pregnancy And Lactation Text: This medication is Pregnancy Category C and it isn't know if it is safe during pregnancy. It is unknown if this medication is excreted in breast milk but similar antibodies are known to be excreted.
Spironolactone Counseling: Patient advised regarding risks of diarrhea, abdominal pain, hyperkalemia, birth defects (for female patients), liver toxicity and renal toxicity. The patient may need blood work to monitor liver and kidney function and potassium levels while on therapy. The patient verbalized understanding of the proper use and possible adverse effects of spironolactone.  All of the patient's questions and concerns were addressed.
High Dose Vitamin A Pregnancy And Lactation Text: High dose vitamin A therapy is contraindicated during pregnancy and breast feeding.
Fluconazole Counseling:  Patient counseled regarding adverse effects of fluconazole including but not limited to headache, diarrhea, nausea, upset stomach, liver function test abnormalities, taste disturbance, and stomach pain.  There is a rare possibility of liver failure that can occur when taking fluconazole.  The patient understands that monitoring of LFTs and kidney function test may be required, especially at baseline. The patient verbalized understanding of the proper use and possible adverse effects of fluconazole.  All of the patient's questions and concerns were addressed.
Tazorac Counseling:  Patient advised that medication is irritating and drying.  Patient may need to apply sparingly and wash off after an hour before eventually leaving it on overnight.  The patient verbalized understanding of the proper use and possible adverse effects of tazorac.  All of the patient's questions and concerns were addressed.
Acitretin Counseling:  I discussed with the patient the risks of acitretin including but not limited to hair loss, dry lips/skin/eyes, liver damage, hyperlipidemia, depression/suicidal ideation, photosensitivity.  Serious rare side effects can include but are not limited to pancreatitis, pseudotumor cerebri, bony changes, clot formation/stroke/heart attack.  Patient understands that alcohol is contraindicated since it can result in liver toxicity and significantly prolong the elimination of the drug by many years.
Qbrexza Pregnancy And Lactation Text: There is no available data on Qbrexza use in pregnant women.  There is no available data on Qbrexza use in lactation.
Topical Sulfur Applications Counseling: Topical Sulfur Counseling: Patient counseled that this medication may cause skin irritation or allergic reactions.  In the event of skin irritation, the patient was advised to reduce the amount of the drug applied or use it less frequently.   The patient verbalized understanding of the proper use and possible adverse effects of topical sulfur application.  All of the patient's questions and concerns were addressed.
Calcipotriene Counseling:  I discussed with the patient the risks of calcipotriene including but not limited to erythema, scaling, itching, and irritation.
Otezla Counseling: The side effects of Otezla were discussed with the patient, including but not limited to worsening or new depression, weight loss, diarrhea, nausea, upper respiratory tract infection, and headache. Patient instructed to call the office should any adverse effect occur.  The patient verbalized understanding of the proper use and possible adverse effects of Otezla.  All the patient's questions and concerns were addressed.
Tazorac Pregnancy And Lactation Text: This medication is not safe during pregnancy. It is unknown if this medication is excreted in breast milk.
Olumiant Counseling: I discussed with the patient the risks of Olumiant therapy including but not limited to upper respiratory tract infections, shingles, cold sores, and nausea. Live vaccines should be avoided.  This medication has been linked to serious infections; higher rate of mortality; malignancy and lymphoproliferative disorders; major adverse cardiovascular events; thrombosis; gastrointestinal perforations; neutropenia; lymphopenia; anemia; liver enzyme elevations; and lipid elevations.
Dupixent Counseling: I discussed with the patient the risks of dupilumab including but not limited to eye infection and irritation, cold sores, injection site reactions, worsening of asthma, allergic reactions and increased risk of parasitic infection.  Live vaccines should be avoided while taking dupilumab. Dupilumab will also interact with certain medications such as warfarin and cyclosporine. The patient understands that monitoring is required and they must alert us or the primary physician if symptoms of infection or other concerning signs are noted.
Dapsone Counseling: I discussed with the patient the risks of dapsone including but not limited to hemolytic anemia, agranulocytosis, rashes, methemoglobinemia, kidney failure, peripheral neuropathy, headaches, GI upset, and liver toxicity.  Patients who start dapsone require monitoring including baseline LFTs and weekly CBCs for the first month, then every month thereafter.  The patient verbalized understanding of the proper use and possible adverse effects of dapsone.  All of the patient's questions and concerns were addressed.
Quinolones Counseling:  I discussed with the patient the risks of fluoroquinolones including but not limited to GI upset, allergic reaction, drug rash, diarrhea, dizziness, photosensitivity, yeast infections, liver function test abnormalities, tendonitis/tendon rupture.
Low Dose Naltrexone Pregnancy And Lactation Text: Naltrexone is pregnancy category C.  There have been no adequate and well-controlled studies in pregnant women.  It should be used in pregnancy only if the potential benefit justifies the potential risk to the fetus.   Limited data indicates that naltrexone is minimally excreted into breastmilk.
Clindamycin Pregnancy And Lactation Text: This medication can be used in pregnancy if certain situations. Clindamycin is also present in breast milk.
Acitretin Pregnancy And Lactation Text: This medication is Pregnancy Category X and should not be given to women who are pregnant or may become pregnant in the future. This medication is excreted in breast milk.
Mirvaso Pregnancy And Lactation Text: This medication has not been assigned a Pregnancy Risk Category. It is unknown if the medication is excreted in breast milk.
Dupixent Pregnancy And Lactation Text: This medication likely crosses the placenta but the risk for the fetus is uncertain. This medication is excreted in breast milk.
Odomzo Counseling- I discussed with the patient the risks of Odomzo including but not limited to nausea, vomiting, diarrhea, constipation, weight loss, changes in the sense of taste, decreased appetite, muscle spasms, and hair loss.  The patient verbalized understanding of the proper use and possible adverse effects of Odomzo.  All of the patient's questions and concerns were addressed.
Siliq Counseling:  I discussed with the patient the risks of Siliq including but not limited to new or worsening depression, suicidal thoughts and behavior, immunosuppression, malignancy, posterior leukoencephalopathy syndrome, and serious infections.  The patient understands that monitoring is required including a PPD at baseline and must alert us or the primary physician if symptoms of infection or other concerning signs are noted. There is also a special program designed to monitor depression which is required with Siliq.
Otezla Pregnancy And Lactation Text: This medication is Pregnancy Category C and it isn't known if it is safe during pregnancy. It is unknown if it is excreted in breast milk.
Rhofade Counseling: Rhofade is a topical medication which can decrease superficial blood flow where applied. Side effects are uncommon and include stinging, redness and allergic reactions.
Ivermectin Counseling:  Patient instructed to take medication on an empty stomach with a full glass of water.  Patient informed of potential adverse effects including but not limited to nausea, diarrhea, dizziness, itching, and swelling of the extremities or lymph nodes.  The patient verbalized understanding of the proper use and possible adverse effects of ivermectin.  All of the patient's questions and concerns were addressed.
Calcipotriene Pregnancy And Lactation Text: The use of this medication during pregnancy or lactation is not recommended as there is insufficient data.
Spironolactone Pregnancy And Lactation Text: This medication can cause feminization of the male fetus and should be avoided during pregnancy. The active metabolite is also found in breast milk.
Tranexamic Acid Counseling:  Patient advised of the small risk of bleeding problems with tranexamic acid. They were also instructed to call if they developed any nausea, vomiting or diarrhea. All of the patient's questions and concerns were addressed.
Cantharidin Counseling:  I discussed with the patient the risks of Cantharidin including but not limited to pain, redness, burning, itching, and blistering.
Aklief counseling:  Patient advised to apply a pea-sized amount only at bedtime and wait 30 minutes after washing their face before applying.  If too drying, patient may add a non-comedogenic moisturizer.  The most commonly reported side effects including irritation, redness, scaling, dryness, stinging, burning, itching, and increased risk of sunburn.  The patient verbalized understanding of the proper use and possible adverse effects of retinoids.  All of the patient's questions and concerns were addressed.
Opioid Counseling: I discussed with the patient the potential side effects of opioids including but not limited to addiction, altered mental status, and depression. I stressed avoiding alcohol, benzodiazepines, muscle relaxants and sleep aids unless specifically okayed by a physician. The patient verbalized understanding of the proper use and possible adverse effects of opioids. All of the patient's questions and concerns were addressed. They were instructed to flush the remaining pills down the toilet if they did not need them for pain.
Doxycycline Counseling:  Patient counseled regarding possible photosensitivity and increased risk for sunburn.  Patient instructed to avoid sunlight, if possible.  When exposed to sunlight, patients should wear protective clothing, sunglasses, and sunscreen.  The patient was instructed to call the office immediately if the following severe adverse effects occur:  hearing changes, easy bruising/bleeding, severe headache, or vision changes.  The patient verbalized understanding of the proper use and possible adverse effects of doxycycline.  All of the patient's questions and concerns were addressed.
Niacinamide Counseling: I recommended taking niacin or niacinamide, also know as vitamin B3, twice daily. Recent evidence suggests that taking vitamin B3 (500 mg twice daily) can reduce the risk of actinic keratoses and non-melanoma skin cancers. Side effects of vitamin B3 include flushing and headache.
Hydroquinone Counseling:  Patient advised that medication may result in skin irritation, lightening (hypopigmentation), dryness, and burning.  In the event of skin irritation, the patient was advised to reduce the amount of the drug applied or use it less frequently.  Rarely, spots that are treated with hydroquinone can become darker (pseudoochronosis).  Should this occur, patient instructed to stop medication and call the office. The patient verbalized understanding of the proper use and possible adverse effects of hydroquinone.  All of the patient's questions and concerns were addressed.
Methotrexate Counseling:  Patient counseled regarding adverse effects of methotrexate including but not limited to nausea, vomiting, abnormalities in liver function tests. Patients may develop mouth sores, rash, diarrhea, and abnormalities in blood counts. The patient understands that monitoring is required including LFT's and blood counts.  There is a rare possibility of scarring of the liver and lung problems that can occur when taking methotrexate. Persistent nausea, loss of appetite, pale stools, dark urine, cough, and shortness of breath should be reported immediately. Patient advised to discontinue methotrexate treatment at least three months before attempting to become pregnant.  I discussed the need for folate supplements while taking methotrexate.  These supplements can decrease side effects during methotrexate treatment. The patient verbalized understanding of the proper use and possible adverse effects of methotrexate.  All of the patient's questions and concerns were addressed.
Dapsone Pregnancy And Lactation Text: This medication is Pregnancy Category C and is not considered safe during pregnancy or breast feeding.
Topical Sulfur Applications Pregnancy And Lactation Text: This medication is Pregnancy Category C and has an unknown safety profile during pregnancy. It is unknown if this topical medication is excreted in breast milk.
Tremfya Counseling: I discussed with the patient the risks of guselkumab including but not limited to immunosuppression, serious infections, and drug reactions.  The patient understands that monitoring is required including a PPD at baseline and must alert us or the primary physician if symptoms of infection or other concerning signs are noted.
Cimetidine Counseling:  I discussed with the patient the risks of Cimetidine including but not limited to gynecomastia, headache, diarrhea, nausea, drowsiness, arrhythmias, pancreatitis, skin rashes, psychosis, bone marrow suppression and kidney toxicity.
Topical Clindamycin Counseling: Patient counseled that this medication may cause skin irritation or allergic reactions.  In the event of skin irritation, the patient was advised to reduce the amount of the drug applied or use it less frequently.   The patient verbalized understanding of the proper use and possible adverse effects of clindamycin.  All of the patient's questions and concerns were addressed.
Olumiant Pregnancy And Lactation Text: Based on animal studies, Olumiant may cause embryo-fetal harm when administered to pregnant women.  The medication should not be used in pregnancy.  Breastfeeding is not recommended during treatment.
Cantharidin Pregnancy And Lactation Text: This medication has not been proven safe during pregnancy. It is unknown if this medication is excreted in breast milk.
Enbrel Counseling:  I discussed with the patient the risks of etanercept including but not limited to myelosuppression, immunosuppression, autoimmune hepatitis, demyelinating diseases, lymphoma, and infections.  The patient understands that monitoring is required including a PPD at baseline and must alert us or the primary physician if symptoms of infection or other concerning signs are noted.
Tranexamic Acid Pregnancy And Lactation Text: It is unknown if this medication is safe during pregnancy or breast feeding.
Wartpeel Counseling:  I discussed with the patient the risks of Wartpeel including but not limited to erythema, scaling, itching, weeping, crusting, and pain.
Bexarotene Counseling:  I discussed with the patient the risks of bexarotene including but not limited to hair loss, dry lips/skin/eyes, liver abnormalities, hyperlipidemia, pancreatitis, depression/suicidal ideation, photosensitivity, drug rash/allergic reactions, hypothyroidism, anemia, leukopenia, infection, cataracts, and teratogenicity.  Patient understands that they will need regular blood tests to check lipid profile, liver function tests, white blood cell count, thyroid function tests and pregnancy test if applicable.
Rifampin Counseling: I discussed with the patient the risks of rifampin including but not limited to liver damage, kidney damage, red-orange body fluids, nausea/vomiting and severe allergy.
Griseofulvin Counseling:  I discussed with the patient the risks of griseofulvin including but not limited to photosensitivity, cytopenia, liver damage, nausea/vomiting and severe allergy.  The patient understands that this medication is best absorbed when taken with a fatty meal (e.g., ice cream or french fries).
Methotrexate Pregnancy And Lactation Text: This medication is Pregnancy Category X and is known to cause fetal harm. This medication is excreted in breast milk.
Oxybutynin Counseling:  I discussed with the patient the risks of oxybutynin including but not limited to skin rash, drowsiness, dry mouth, difficulty urinating, and blurred vision.
Opzelura Counseling:  I discussed with the patient the risks of Opzelura including but not limited to nasopharngitis, bronchitis, ear infection, eosinophila, hives, diarrhea, folliculitis, tonsillitis, and rhinorrhea.  Taken orally, this medication has been linked to serious infections; higher rate of mortality; malignancy and lymphoproliferative disorders; major adverse cardiovascular events; thrombosis; thrombocytopenia, anemia, and neutropenia; and lipid elevations.
Azathioprine Counseling:  I discussed with the patient the risks of azathioprine including but not limited to myelosuppression, immunosuppression, hepatotoxicity, lymphoma, and infections.  The patient understands that monitoring is required including baseline LFTs, Creatinine, possible TPMP genotyping and weekly CBCs for the first month and then every 2 weeks thereafter.  The patient verbalized understanding of the proper use and possible adverse effects of azathioprine.  All of the patient's questions and concerns were addressed.
Aklief Pregnancy And Lactation Text: It is unknown if this medication is safe to use during pregnancy.  It is unknown if this medication is excreted in breast milk.  Breastfeeding women should use the topical cream on the smallest area of the skin for the shortest time needed while breastfeeding.  Do not apply to nipple and areola.
Doxycycline Pregnancy And Lactation Text: This medication is Pregnancy Category D and not consider safe during pregnancy. It is also excreted in breast milk but is considered safe for shorter treatment courses.
Rinvoq Counseling: I discussed with the patient the risks of Rinvoq therapy including but not limited to upper respiratory tract infections, shingles, cold sores, bronchitis, nausea, cough, fever, acne, and headache. Live vaccines should be avoided.  This medication has been linked to serious infections; higher rate of mortality; malignancy and lymphoproliferative disorders; major adverse cardiovascular events; thrombosis; thrombocytopenia, anemia, and neutropenia; lipid elevations; liver enzyme elevations; and gastrointestinal perforations.
Solaraze Counseling:  I discussed with the patient the risks of Solaraze including but not limited to erythema, scaling, itching, weeping, crusting, and pain.
Opioid Pregnancy And Lactation Text: These medications can lead to premature delivery and should be avoided during pregnancy. These medications are also present in breast milk in small amounts.
Azithromycin Counseling:  I discussed with the patient the risks of azithromycin including but not limited to GI upset, allergic reaction, drug rash, diarrhea, and yeast infections.
Griseofulvin Pregnancy And Lactation Text: This medication is Pregnancy Category X and is known to cause serious birth defects. It is unknown if this medication is excreted in breast milk but breast feeding should be avoided.
Niacinamide Pregnancy And Lactation Text: These medications are considered safe during pregnancy.
5-Fu Counseling: 5-Fluorouracil Counseling:  I discussed with the patient the risks of 5-fluorouracil including but not limited to erythema, scaling, itching, weeping, crusting, and pain.
Valtrex Counseling: I discussed with the patient the risks of valacyclovir including but not limited to kidney damage, nausea, vomiting and severe allergy.  The patient understands that if the infection seems to be worsening or is not improving, they are to call.
Bexarotene Pregnancy And Lactation Text: This medication is Pregnancy Category X and should not be given to women who are pregnant or may become pregnant. This medication should not be used if you are breast feeding.
Simponi Counseling:  I discussed with the patient the risks of golimumab including but not limited to myelosuppression, immunosuppression, autoimmune hepatitis, demyelinating diseases, lymphoma, and serious infections.  The patient understands that monitoring is required including a PPD at baseline and must alert us or the primary physician if symptoms of infection or other concerning signs are noted.
Gabapentin Counseling: I discussed with the patient the risks of gabapentin including but not limited to dizziness, somnolence, fatigue and ataxia.
Imiquimod Counseling:  I discussed with the patient the risks of imiquimod including but not limited to erythema, scaling, itching, weeping, crusting, and pain.  Patient understands that the inflammatory response to imiquimod is variable from person to person and was educated regarded proper titration schedule.  If flu-like symptoms develop, patient knows to discontinue the medication and contact us.
Prednisone Counseling:  I discussed with the patient the risks of prolonged use of prednisone including but not limited to weight gain, insomnia, osteoporosis, mood changes, diabetes, susceptibility to infection, glaucoma and high blood pressure.  In cases where prednisone use is prolonged, patients should be monitored with blood pressure checks, serum glucose levels and an eye exam.  Additionally, the patient may need to be placed on GI prophylaxis, PCP prophylaxis, and calcium and vitamin D supplementation and/or a bisphosphonate.  The patient verbalized understanding of the proper use and the possible adverse effects of prednisone.  All of the patient's questions and concerns were addressed.
Adbry Counseling: I discussed with the patient the risks of tralokinumab including but not limited to eye infection and irritation, cold sores, injection site reactions, worsening of asthma, allergic reactions and increased risk of parasitic infection.  Live vaccines should be avoided while taking tralokinumab. The patient understands that monitoring is required and they must alert us or the primary physician if symptoms of infection or other concerning signs are noted.
Rifampin Pregnancy And Lactation Text: This medication is Pregnancy Category C and it isn't know if it is safe during pregnancy. It is also excreted in breast milk and should not be used if you are breast feeding.
Nsaids Counseling: NSAID Counseling: I discussed with the patient that NSAIDs should be taken with food. Prolonged use of NSAIDs can result in the development of stomach ulcers.  Patient advised to stop taking NSAIDs if abdominal pain occurs.  The patient verbalized understanding of the proper use and possible adverse effects of NSAIDs.  All of the patient's questions and concerns were addressed.
Erythromycin Counseling:  I discussed with the patient the risks of erythromycin including but not limited to GI upset, allergic reaction, drug rash, diarrhea, increase in liver enzymes, and yeast infections.
Rinvoq Pregnancy And Lactation Text: Based on animal studies, Rinvoq may cause embryo-fetal harm when administered to pregnant women.  The medication should not be used in pregnancy.  Breastfeeding is not recommended during treatment and for 6 days after the last dose.
Azithromycin Pregnancy And Lactation Text: This medication is considered safe during pregnancy and is also secreted in breast milk.
Opzelura Pregnancy And Lactation Text: There is insufficient data to evaluate drug-associated risk for major birth defects, miscarriage, or other adverse maternal or fetal outcomes.  There is a pregnancy registry that monitors pregnancy outcomes in pregnant persons exposed to the medication during pregnancy.  It is unknown if this medication is excreted in breast milk.  Do not breastfeed during treatment and for about 4 weeks after the last dose.
Xolair Counseling:  Patient informed of potential adverse effects including but not limited to fever, muscle aches, rash and allergic reactions.  The patient verbalized understanding of the proper use and possible adverse effects of Xolair.  All of the patient's questions and concerns were addressed.
Dutasteride Male Counseling: Dustasteride Counseling:  I discussed with the patient the risks of use of dutasteride including but not limited to decreased libido, decreased ejaculate volume, and gynecomastia. Women who can become pregnant should not handle medication.  All of the patient's questions and concerns were addressed.

## 2023-10-20 NOTE — HPI: PROCEDURE (MOHS)
Has The Growth Been Previously Biopsied?: has been previously biopsied
Body Location Override (Optional): Upper Mid Forehead

## 2023-10-30 ENCOUNTER — PATIENT MESSAGE (OUTPATIENT)
Dept: CARDIOLOGY | Facility: MEDICAL CENTER | Age: 68
End: 2023-10-30
Payer: MEDICARE

## 2023-10-30 DIAGNOSIS — I31.39 PERICARDIAL EFFUSION: ICD-10-CM

## 2023-10-30 DIAGNOSIS — I31.39 IDIOPATHIC PERICARDIAL EFFUSION: ICD-10-CM

## 2023-11-03 ENCOUNTER — APPOINTMENT (RX ONLY)
Dept: URBAN - METROPOLITAN AREA CLINIC 36 | Facility: CLINIC | Age: 68
Setting detail: DERMATOLOGY
End: 2023-11-03

## 2023-11-03 DIAGNOSIS — Z48.02 ENCOUNTER FOR REMOVAL OF SUTURES: ICD-10-CM

## 2023-11-03 PROCEDURE — ? LASER RESURFACING

## 2023-11-03 PROCEDURE — ? SUTURE REMOVAL (GLOBAL PERIOD)

## 2023-11-03 ASSESSMENT — LOCATION ZONE DERM: LOCATION ZONE: SCALP

## 2023-11-03 ASSESSMENT — LOCATION DETAILED DESCRIPTION DERM: LOCATION DETAILED: MEDIAL FRONTAL SCALP

## 2023-11-03 ASSESSMENT — LOCATION SIMPLE DESCRIPTION DERM: LOCATION SIMPLE: FRONTAL SCALP

## 2023-11-03 NOTE — PROCEDURE: SUTURE REMOVAL (GLOBAL PERIOD)
Body Location Override (Optional - Billing Will Still Be Based On Selected Body Map Location If Applicable): Upper Mid Forehead
Detail Level: Detailed
Add 08558 Cpt? (Important Note: In 2017 The Use Of 10577 Is Being Tracked By Cms To Determine Future Global Period Reimbursement For Global Periods): no

## 2023-11-03 NOTE — PROCEDURE: LASER RESURFACING
Corneal Shield Text: A corneal shield was inserted after appropriate application of topical anesthesia.
Detail Level: Detailed
Energy(Mj): 900
Pulse Duration (Usec): 0
Consent: Written consent obtained, risks reviewed including but not limited to crusting, scabbing, blistering, scarring, darker or lighter pigmentary change, incomplete improvement of dyschromia, wrinkles, prolonged erythema and facial swelling, infection and bleeding.
Was A Corneal Shield Used?: No
Treatment Number: 1
Laser Type: CO2Re laser (Deep)
Post-Care Instructions: I reviewed with the patient in detail post-care instructions. Patient should avoid sun until area fully healed. Pt should apply vaseline to treated areas, and remove crusts gently with water-vinegar soaks.
Wavelength: 2,940nm
Power (Bernard): 70
External Cooling Fan Speed: 5

## 2023-11-07 ENCOUNTER — OFFICE VISIT (OUTPATIENT)
Dept: BEHAVIORAL HEALTH | Facility: CLINIC | Age: 68
End: 2023-11-07
Payer: MEDICARE

## 2023-11-07 DIAGNOSIS — F43.29 GRIEF REACTION WITH PROLONGED BEREAVEMENT: ICD-10-CM

## 2023-11-07 PROCEDURE — 90837 PSYTX W PT 60 MINUTES: CPT | Performed by: MARRIAGE & FAMILY THERAPIST

## 2023-11-07 NOTE — PROGRESS NOTES
[facility]   Therapy Progress Note        Name: Tita Pitts  MRN: [id number]  : 1955  Age: 68 y.o.  Date of assessment: 2023  PCP: Bassam Marinelli M.D.  Persons in attendance: Patient  Total session time: 56 minutes      Topics addressed in psychotherapy include: Met with the patient in office for an individual counseling session.      Behavior:  The patient was open and honest during session.      Content of Therapy:   The patient reported that she was unsure of when counseling should end.  She discussed the interactions with her son where he appears to be venting displaced anger towards her.  Patient discussed that she feels now that her son is  it is time for he and his wife to get their own place.    Therapeutic Intervention:   This session, the therapeutic focus, was on reinforcing the patient's progress so far.  Worked with the patient to understand peaks and valleys, and appropriate times to discuss boundaries with her son.  Worked on grief and the analogy of the duo process of learning to live without someone and learning to do things differently.  Lent the patient the peaks and valleys book.  Brainstormed ways with the patient of how to help launch her son.      This dictation has been created using voice recognition software and/or scribes. The accuracy of the dictation is limited by the abilities of the software and the expertise of the scribes. I expect there may be some errors of grammar and possibly content. I made every attempt to manually correct the errors within my dictation. However, errors related to voice recognition software and/or scribes may still exist and should be interpreted within the appropriate context.    Objective Observations:   Participation:Active verbal participation, Attentive, Engaged, and Open to feedback   Grooming:Casual   Cognition:Alert and Fully Oriented   Eye Contact:Good   Mood:Euthymic and Anxious   Affect:Flexible, Full range,  Congruent with content, Anxious, and Tearful   Thought Process:Logical and Goal-directed   Speech:Rate within normal limits and Volume within normal limits    Current Risk:   Suicide: low   Homicide: low   Self-Harm: low   Relapse: low   Safety Plan Reviewed: not applicable    Care Plan Updated: No    Does patient express agreement with the above plan? Yes     Diagnosis:  1. Grief reaction with prolonged bereavement        Referral appointment(s) scheduled? No       JOSE J Fournier

## 2023-11-15 ENCOUNTER — APPOINTMENT (RX ONLY)
Dept: URBAN - METROPOLITAN AREA CLINIC 36 | Facility: CLINIC | Age: 68
Setting detail: DERMATOLOGY
End: 2023-11-15

## 2023-11-15 DIAGNOSIS — L90.5 SCAR CONDITIONS AND FIBROSIS OF SKIN: ICD-10-CM

## 2023-11-15 PROCEDURE — ? FRAXEL

## 2023-11-15 ASSESSMENT — LOCATION ZONE DERM: LOCATION ZONE: FACE

## 2023-11-15 ASSESSMENT — LOCATION SIMPLE DESCRIPTION DERM: LOCATION SIMPLE: LEFT FOREHEAD

## 2023-11-15 ASSESSMENT — LOCATION DETAILED DESCRIPTION DERM: LOCATION DETAILED: LEFT MEDIAL FOREHEAD

## 2023-11-15 NOTE — PROCEDURE: FRAXEL
Number Of Passes: 4
Energy(Mj/Cm2): 1
External Cooling Fan Speed: 5
Was An Eye Shield Used?: No
Small Plastic Eye Shield Text: The ocular mucosa was anesthetized with tetracaine. Once adequate anesthesia was optained, small plastic eye shields were inserted and remained in place until the procedure was completed.
Post-Care Instructions: I reviewed with the patient in detail post-care instructions. Patient should avoid sun until area fully healed.
Medium Plastic Eye Shield Text: The ocular mucosa was anesthetized with tetracaine. Once adequate anesthesia was optained, medium plastic eye shields were inserted and remained in place until the procedure was completed.
Wavelength: 1550nm
Small Metal Eye Shield Text: The ocular mucosa was anesthetized with tetracaine. Once adequate anesthesia was optained, small metal eye shields were inserted and remained in place until the procedure was completed.
Large Plastic Eye Shield Text: The ocular mucosa was anesthetized with tetracaine. Once adequate anesthesia was optained, large plastic eye shields were inserted and remained in place until the procedure was completed.
Detail Level: Detailed
Medium Metal Eye Shield Text: The ocular mucosa was anesthetized with tetracaine. Once adequate anesthesia was optained, medium metal eye shields were inserted and remained in place until the procedure was completed.
Indication: surgical scars
Consent: Written consent obtained, risks reviewed including but not limited to pain and incomplete improvement.
Energy(Mj/Cm2): 70
Location: Use Location Override
Large Metal Eye Shield Text: The ocular mucosa was anesthetized with tetracaine. Once adequate anesthesia was optained, large metal eye shields were inserted and remained in place until the procedure was completed.

## 2023-11-16 ENCOUNTER — ANCILLARY PROCEDURE (OUTPATIENT)
Dept: CARDIOLOGY | Facility: MEDICAL CENTER | Age: 68
End: 2023-11-16
Attending: INTERNAL MEDICINE
Payer: MEDICARE

## 2023-11-16 ENCOUNTER — TELEPHONE (OUTPATIENT)
Dept: CARDIOLOGY | Facility: MEDICAL CENTER | Age: 68
End: 2023-11-16

## 2023-11-16 DIAGNOSIS — I31.39 IDIOPATHIC PERICARDIAL EFFUSION: ICD-10-CM

## 2023-11-16 LAB
LV EJECT FRACT  99904: 70
LV EJECT FRACT MOD 2C 99903: 66.67
LV EJECT FRACT MOD 4C 99902: 69.3
LV EJECT FRACT MOD BP 99901: 68.4

## 2023-11-16 PROCEDURE — 93321 DOPPLER ECHO F-UP/LMTD STD: CPT | Mod: 26 | Performed by: INTERNAL MEDICINE

## 2023-11-16 PROCEDURE — 93308 TTE F-UP OR LMTD: CPT | Mod: 26 | Performed by: INTERNAL MEDICINE

## 2023-11-16 PROCEDURE — 93325 DOPPLER ECHO COLOR FLOW MAPG: CPT

## 2023-11-16 PROCEDURE — 93325 DOPPLER ECHO COLOR FLOW MAPG: CPT | Mod: 26 | Performed by: INTERNAL MEDICINE

## 2023-11-16 NOTE — TELEPHONE ENCOUNTER
----- Message from Chase Pena M.D. sent at 11/16/2023  2:26 PM PST -----  Regarding: sooner appt  Team,    Do I have any availability to see her sooner than January ?    Thank you

## 2023-11-19 ENCOUNTER — PATIENT MESSAGE (OUTPATIENT)
Dept: CARDIOLOGY | Facility: MEDICAL CENTER | Age: 68
End: 2023-11-19
Payer: MEDICARE

## 2023-11-21 ENCOUNTER — PATIENT MESSAGE (OUTPATIENT)
Dept: CARDIOLOGY | Facility: MEDICAL CENTER | Age: 68
End: 2023-11-21
Payer: MEDICARE

## 2023-11-21 DIAGNOSIS — I31.39 IDIOPATHIC PERICARDIAL EFFUSION: ICD-10-CM

## 2023-11-21 RX ORDER — INDOMETHACIN 50 MG/1
50 CAPSULE ORAL 2 TIMES DAILY WITH MEALS
Qty: 14 CAPSULE | Refills: 1 | Status: SHIPPED | OUTPATIENT
Start: 2023-11-21 | End: 2023-11-29

## 2023-11-27 ENCOUNTER — PROCEDURE VISIT (OUTPATIENT)
Dept: ENDOCRINOLOGY | Facility: MEDICAL CENTER | Age: 68
End: 2023-11-27
Attending: INTERNAL MEDICINE
Payer: MEDICARE

## 2023-11-27 DIAGNOSIS — E04.2 NON-TOXIC MULTINODULAR GOITER: ICD-10-CM

## 2023-11-27 DIAGNOSIS — R76.8 THYROID ANTIBODY POSITIVE: ICD-10-CM

## 2023-11-27 PROCEDURE — 76536 US EXAM OF HEAD AND NECK: CPT | Performed by: INTERNAL MEDICINE

## 2023-11-27 NOTE — PROGRESS NOTES
Thyroid US done on this procedure visit  She has positive TPO antibodies- US today showed findings c/w Hashimoto's thyroiditis  and she has two isoechoic low suspicion solid nodules on the RML and isthmus (left side)  Recommend observation   RTC 6 mos  Please see dictated POC US report completed by endocrinologist  Patient advised to follow up as planned with labs prior    Mark Mathews M.D.

## 2023-11-28 ENCOUNTER — PATIENT MESSAGE (OUTPATIENT)
Dept: CARDIOLOGY | Facility: MEDICAL CENTER | Age: 68
End: 2023-11-28
Payer: MEDICARE

## 2023-11-29 ENCOUNTER — PATIENT MESSAGE (OUTPATIENT)
Dept: CARDIOLOGY | Facility: MEDICAL CENTER | Age: 68
End: 2023-11-29

## 2023-11-29 ENCOUNTER — OFFICE VISIT (OUTPATIENT)
Dept: CARDIOLOGY | Facility: MEDICAL CENTER | Age: 68
End: 2023-11-29
Attending: INTERNAL MEDICINE
Payer: MEDICARE

## 2023-11-29 VITALS
HEART RATE: 68 BPM | SYSTOLIC BLOOD PRESSURE: 140 MMHG | BODY MASS INDEX: 28.66 KG/M2 | WEIGHT: 172 LBS | OXYGEN SATURATION: 98 % | HEIGHT: 65 IN | DIASTOLIC BLOOD PRESSURE: 90 MMHG | RESPIRATION RATE: 16 BRPM

## 2023-11-29 DIAGNOSIS — I31.39 IDIOPATHIC PERICARDIAL EFFUSION: Primary | ICD-10-CM

## 2023-11-29 DIAGNOSIS — I10 PRIMARY HYPERTENSION: ICD-10-CM

## 2023-11-29 PROCEDURE — 3080F DIAST BP >= 90 MM HG: CPT | Performed by: INTERNAL MEDICINE

## 2023-11-29 PROCEDURE — 99212 OFFICE O/P EST SF 10 MIN: CPT | Performed by: INTERNAL MEDICINE

## 2023-11-29 PROCEDURE — 99214 OFFICE O/P EST MOD 30 MIN: CPT | Performed by: INTERNAL MEDICINE

## 2023-11-29 PROCEDURE — 3077F SYST BP >= 140 MM HG: CPT | Performed by: INTERNAL MEDICINE

## 2023-11-29 ASSESSMENT — FIBROSIS 4 INDEX: FIB4 SCORE: 0.92

## 2023-11-29 NOTE — PATIENT INSTRUCTIONS
Referral to Dr. Ganser placed to talk about the pericardial window  Labs    Advil 400mg as needed for pain, up to 3 times a day    Keep me updated every 2 weeks via Jimmy Fairly

## 2023-11-29 NOTE — PROGRESS NOTES
CARDIOLOGY established PATIENT:    PCP: Bassam Marinelli M.D.    1. Idiopathic pericardial effusion    2. Primary hypertension        Tita Pitts is here for follow-up for idiopathic persistent small pericardial effusion.    Chief Complaint   Patient presents with    Follow-Up     F/V DX:pericardial effusion       History: Tita Pitts is a 68 y.o. female here for follow up for recurrent / persistent pericardial effusion despite 2 pericardiocenteses this year and courses of Indocin / NSAIDs / Prednisone without defined etiology thus far (low concerns for autoimmune illness per labs or symptoms).  It has been most likely attributed to long COVID. . Zero coronary calcium score in 11/2022.    This is an expedited visit after recent TTE results and symptoms she shared with us via [employer].    2 weeks ago had recurrent CP with radiation to her neck and jaw while raking leaves, resolved with resting. Did not recur. Had similar CP with prior pericardiocentesis procedures. Then another incident last Saturday, while working in the yard, started with left arm pain and left upper chest pain, resolved with resting. Also had those symptoms with prior effusions. BP then 95/72mmHg, decreased to SBP 89mmHg with standing.    Had the COVID vaccine about 2 weeks ago.    Subsequently we started indomethacin, she took it for a week.    Still on colchicine 0.6mg 5 days a week, no diarrhea.    Yesterday: while working started feeling lightheaded, worsened, BP checked 92/62 mmHg, sweating, short of breath, presyncopal, neck - chest pressure    On leeanna 10mg BID for BP. Log 130/70-80's mmHg.    Notices weight fluctuations, ? Water weight gain.    Saw endocrinology, no thyroid treatment yet, high likelihood for Hashimoto's.    TTE 11/16/23:  Compared to the prior study on 8/7/23, no significant changes. Small   pericardial effusion still noted.  Limited study  Ectopy noted  Normal LV size, thickness and systolic function with  "normal estimated   LVEF 70% with normal septal motion  Grossly normal RV size and systolic function  Small pericardial effusion without evidence of hemodynamic compromise.    Calcium score 11/2022  LMA - 0.0  LCX - 0.0  LAD - 0.0  RCA - 0.0  PDA - 0.0  Total Calcium Score: 0.0    PE:  BP (!) 140/90 (BP Location: Left arm, Patient Position: Sitting, BP Cuff Size: Adult)   Pulse 68   Resp 16   Ht 1.651 m (5' 5\")   Wt 78 kg (172 lb)   LMP  (LMP Unknown)   SpO2 98%   BMI 28.62 kg/m²     Gen: well  HEENT: Symmetric face. Anicteric sclerae. Moist mucus membranes  NECK: No JVD.   CARDIAC: Regular, Normal S1, S2, No murmur  VASCULATURE: carotids are normal bilaterally without bruit  RESP: Clear to auscultation bilaterally   EXT: No edema, no clubbing or cyanosis  SKIN: Warm and dry  NEURO: No gross deficits  PSYCH: Appropriate affect, participates in conversation    The 10-year ASCVD risk score (Yo LINCOLN, et al., 2019) is: 8.6%    Past Medical History:   Diagnosis Date    Allergy     Anesthesia     ponv    Breath shortness 2022    Related to current pericarditis    Bronchitis     history of    Cataract     NOT surgically removed    Gynecological disorder 1986    Had Hysterectomy 1990    Hypertension     well controlled on medication    Pericarditis 04/07/2023    Pneumonia     history of, in 20's    PONV (postoperative nausea and vomiting)     Psychiatric problem 12/27/2022    grief - in counseling     Past Surgical History:   Procedure Laterality Date    APPENDECTOMY  1986    ABDOMINAL HYSTERECTOMY TOTAL      GYN SURGERY      laparoscopy for endometriosis    OTHER CARDIAC SURGERY       Allergies   Allergen Reactions    Other Drug Unspecified     Ruvert = Other reaction(s): Welts on legs    Macrodantin [Nitrofurantoin]      nightmares     Outpatient Encounter Medications as of 11/29/2023   Medication Sig Dispense Refill    famotidine (PEPCID) 40 MG Tab TAKE 1 TABLET BY MOUTH EVERY  Tablet 3    colchicine " (COLCRYS) 0.6 MG Tab Take 1 Tablet by mouth every Mon, Wed, Fri, Sat, and Sun at 6 PM. 60 Tablet 3    lisinopril (PRINIVIL) 10 MG Tab Take 1 Tablet by mouth 2 times a day. 180 Tablet 3    Fluticasone Propionate (FLONASE NA) Administer  into affected nostril(S).      Magnesium 400 MG Cap Take  by mouth.      Oral Electrolytes (PEDIALYTE PO) Take  by mouth.      calcium carbonate (OS-RADHA 500) 1250 (500 Ca) MG Tab Take 500 mg by mouth every day. With Vitamin D3      Ascorbic Acid (VITAMIN C PO) Take  by mouth every day.      [DISCONTINUED] indomethacin (INDOCIN) 50 MG Cap Take 1 Capsule by mouth 2 times a day with meals. (Patient not taking: Reported on 11/29/2023) 14 Capsule 1     No facility-administered encounter medications on file as of 11/29/2023.     Social History     Socioeconomic History    Marital status:      Spouse name: Not on file    Number of children: Not on file    Years of education: Not on file    Highest education level: Bachelor's degree (e.g., BA, AB, BS)   Occupational History    Not on file   Tobacco Use    Smoking status: Never     Passive exposure: Past    Smokeless tobacco: Never   Vaping Use    Vaping Use: Never used   Substance and Sexual Activity    Alcohol use: Not Currently     Alcohol/week: 0.6 oz     Types: 1 Glasses of wine per week     Comment: Some alcohol socially over the years.  Not regular use    Drug use: Never    Sexual activity: Not Currently     Partners: Male     Birth control/protection: Surgical, Abstinence, None, Post-Menopausal     Comment: Had Endometriosis had hysterectomy.   Other Topics Concern    Not on file   Social History Narrative    Not on file     Social Determinants of Health     Financial Resource Strain: Low Risk  (4/3/2022)    Overall Financial Resource Strain (CARDIA)     Difficulty of Paying Living Expenses: Not very hard   Food Insecurity: No Food Insecurity (4/3/2022)    Hunger Vital Sign     Worried About Running Out of Food in the Last Year:  Never true     Ran Out of Food in the Last Year: Never true   Transportation Needs: No Transportation Needs (4/3/2022)    PRAPARE - Transportation     Lack of Transportation (Medical): No     Lack of Transportation (Non-Medical): No   Physical Activity: Insufficiently Active (4/3/2022)    Exercise Vital Sign     Days of Exercise per Week: 2 days     Minutes of Exercise per Session: 30 min   Stress: Stress Concern Present (4/3/2022)    [employer] - Occupational Stress Questionnaire     Feeling of Stress : Rather much   Social Connections: Moderately Integrated (4/3/2022)    Social Connection and Isolation Panel [NHANES]     Frequency of Communication with Friends and Family: Twice a week     Frequency of Social Gatherings with Friends and Family: Once a week     Attends [employer] Services: 1 to 4 times per year     Active Member of Clubs or Organizations: Yes     Attends Club or Organization Meetings: 1 to 4 times per year     Marital Status:    Intimate Partner Violence: Not on file   Housing Stability: Low Risk  (4/3/2022)    Housing Stability Vital Sign     Unable to Pay for Housing in the Last Year: No     Number of Places Lived in the Last Year: 1     Unstable Housing in the Last Year: No     Family History   Problem Relation Age of Onset    Breast Cancer Mother     Diabetes Mother         Developed in 70s, at 90 Kidney failure    Hypertension Mother         diagnosed in 70s    Hyperlipidemia Mother         diagnosed in 70s    Kidney Disease Mother     Heart Disease Father          at 64, CHF, CAD    Heart Disease Brother         Heart Attack at 72    Stroke Brother         occurred at 78    Diabetes Maternal Uncle          at 68         Studies    Lab Results   Component Value Date/Time    TSHULTRASEN 1.610 2023 1358        Lab Results   Component Value Date/Time    FREET4 1.15 2023 1358      Lab Results   Component Value Date/Time     HBA1C 5.2 04/06/2022 08:19 AM     Lab Results   Component Value Date/Time    CHOLSTRLTOT 167 03/17/2023 09:21 AM    LDL 89 03/17/2023 09:21 AM    HDL 63 03/17/2023 09:21 AM    TRIGLYCERIDE 76 03/17/2023 09:21 AM       Lab Results   Component Value Date/Time    SODIUM 136 09/12/2023 01:58 PM    POTASSIUM 3.9 09/12/2023 01:58 PM    CHLORIDE 99 09/12/2023 01:58 PM    CO2 23 09/12/2023 01:58 PM    GLUCOSE 83 09/12/2023 01:58 PM    BUN 18 09/12/2023 01:58 PM    CREATININE 1.05 09/12/2023 01:58 PM     Lab Results   Component Value Date/Time    ALKPHOSPHAT 84 09/12/2023 01:58 PM    ASTSGOT 22 09/12/2023 01:58 PM    ALTSGPT 15 09/12/2023 01:58 PM    TBILIRUBIN 0.7 09/12/2023 01:58 PM        Echocardiogram:  No results found for this or any previous visit.        Assessment and Recommendations:    Problem List Items Addressed This Visit       Idiopathic pericardial effusion - Primary    Relevant Orders    Referral to General Surgery    Sed Rate    CRP HIGH SENSITIVE    Primary hypertension     Given the persistence of a small pericardial effusion with ongoing occasional symptoms, we discussed the utility of exploring the option of pericardial window with thoracic surgery, referral placed; with the main objective to avoid eventually the development of effusive constrictive pericarditis which can contribute to heart failure concerns and worsening pain.    Meanwhile, advised her to utilize Advil / NSAIDs 400 mg every 8 hours as needed for chest pain, continue colchicine (tolerating 0.6 mg 5 days a week without diarrhea), and keep me posted via [employer] with her updates every 2 weeks and will decide accordingly if we need to retrial a 4-6 weeks of Prednisone.    Ordered CRP and ESR to assess for any active inflammation signs.    Given her symptoms with occasional LH and SBP 90's, will keep lisinopril 10 mg twice daily without adding more antihypertensives and allow for -140's mmHg occasionally. Goal BP  <=140/80mmHg.    Thank you for the opportunity to be involved in Tita Pitts 's care; and please reach out with any questions or concerns.    Return in about 2 months (around 1/29/2024).    Chase Pena MD, MPH [facility]  Interventional Cardiologist  [facility]   of Clinical Internal Medicine - [facility]    ~ Portions of this note were completed using voice recognition software (Dragon Naturally speaking software) . Occasional transcription errors may have escaped proof reading. I have made every reasonable attempt to correct obvious errors, but I expect that there are errors of grammar and possibly content that I did not discover before finalizing the note. ~

## 2023-12-01 ENCOUNTER — HOSPITAL ENCOUNTER (OUTPATIENT)
Dept: LAB | Facility: MEDICAL CENTER | Age: 68
End: 2023-12-01
Attending: INTERNAL MEDICINE
Payer: MEDICARE

## 2023-12-01 DIAGNOSIS — I31.39 IDIOPATHIC PERICARDIAL EFFUSION: ICD-10-CM

## 2023-12-01 LAB
CRP SERPL HS-MCNC: 1.1 MG/L (ref 0–3)
ERYTHROCYTE [SEDIMENTATION RATE] IN BLOOD BY WESTERGREN METHOD: 12 MM/HOUR (ref 0–25)

## 2023-12-01 PROCEDURE — 36415 COLL VENOUS BLD VENIPUNCTURE: CPT

## 2023-12-01 PROCEDURE — 86141 C-REACTIVE PROTEIN HS: CPT

## 2023-12-01 PROCEDURE — 85652 RBC SED RATE AUTOMATED: CPT

## 2023-12-07 ENCOUNTER — OFFICE VISIT (OUTPATIENT)
Dept: BEHAVIORAL HEALTH | Facility: CLINIC | Age: 68
End: 2023-12-07
Payer: MEDICARE

## 2023-12-07 DIAGNOSIS — F41.9 ANXIETY: ICD-10-CM

## 2023-12-07 DIAGNOSIS — F43.29 GRIEF REACTION WITH PROLONGED BEREAVEMENT: ICD-10-CM

## 2023-12-07 PROCEDURE — 90837 PSYTX W PT 60 MINUTES: CPT | Performed by: MARRIAGE & FAMILY THERAPIST

## 2023-12-07 NOTE — PROGRESS NOTES
[facility]   Therapy Progress Note        Name: Tita Pitts  MRN: [id number]  : 1955  Age: 68 y.o.  Date of assessment: 2023  PCP: Bassam Marinelli M.D.  Persons in attendance: Patient  Total session time: 56 minutes      Topics addressed in psychotherapy include: Met with the patient in office for an individual counseling session.      Behavior:  The patient was open and honest during session.      Content of Therapy:   The patient discussed that her son will probably not move until April.  She has concerns as she is scheduled to see a cardiologist soon.  Patient discussed bringing a signed book [place].      Therapeutic Intervention:   This session, the therapeutic focus, was on allowing the patient to continue to set boundaries with their son.  Assisted the patient in language to reinforce those boundaries.    This dictation has been created using voice recognition software and/or scribes. The accuracy of the dictation is limited by the abilities of the software and the expertise of the scribes. I expect there may be some errors of grammar and possibly content. I made every attempt to manually correct the errors within my dictation. However, errors related to voice recognition software and/or scribes may still exist and should be interpreted within the appropriate context.    Objective Observations:   Participation:Active verbal participation, Attentive, Engaged, and Open to feedback   Grooming:Casual   Cognition:Alert and Fully Oriented   Eye Contact:Good   Mood:Euthymic   Affect:Flexible, Full range, and Congruent with content   Thought Process:Logical and Goal-directed   Speech:Rate within normal limits and Volume within normal limits    Current Risk:   Suicide: low   Homicide: low   Self-Harm: low   Relapse: low   Safety Plan Reviewed: not applicable    Care Plan Updated: No    Does patient express agreement with the above plan? Yes     Diagnosis:  1. Grief reaction  with prolonged bereavement    2. Anxiety        Referral appointment(s) scheduled? [facility].

## 2023-12-11 ENCOUNTER — PATIENT MESSAGE (OUTPATIENT)
Dept: CARDIOLOGY | Facility: MEDICAL CENTER | Age: 68
End: 2023-12-11
Payer: MEDICARE

## 2023-12-11 ENCOUNTER — TELEPHONE (OUTPATIENT)
Dept: CARDIOLOGY | Facility: MEDICAL CENTER | Age: 68
End: 2023-12-11
Payer: MEDICARE

## 2023-12-11 VITALS — SYSTOLIC BLOOD PRESSURE: 155 MMHG | DIASTOLIC BLOOD PRESSURE: 86 MMHG

## 2023-12-11 DIAGNOSIS — I10 PRIMARY HYPERTENSION: Chronic | ICD-10-CM

## 2023-12-11 RX ORDER — LISINOPRIL 10 MG/1
TABLET ORAL
Qty: 120 TABLET | Refills: 3 | Status: ON HOLD | OUTPATIENT
Start: 2023-12-11 | End: 2024-01-08

## 2023-12-15 ENCOUNTER — TELEPHONE (OUTPATIENT)
Dept: CARDIOLOGY | Facility: MEDICAL CENTER | Age: 68
End: 2023-12-15
Payer: MEDICARE

## 2023-12-15 NOTE — TELEPHONE ENCOUNTER
PA started.    Tita Pitts Connors: BBYGYFLB - Rx #: [id number] help? Call us at [phone]  Status  New(Not sent to plan)  Drug  Colchicine 0.6MG tablets  Form  MedImpact ePA Form 2017 NCPDP  Original Claim Info  [id number] TRANS FEE = 0.00PRIOR AUTH REQUIRED/INVALIDPROVIDE NOTICE: MEDICARE PRESCRIPTION DRUG COVERAGE AND YOUR RIGHTS

## 2023-12-15 NOTE — TELEPHONE ENCOUNTER
PA sent to [employer].    Tita Pitts Connors: BBYGYFLB - PA Case ID: [id number] - Rx #: [id number] help? Call us at [phone]  Status  Sent to [employer]  Drug  Colchicine 0.6MG tablets  Form  MedImpact ePA Form 2017 NCPDP  Original Claim Info  75569 TRANS FEE = 0.00PRIOR AUTH REQUIRED/INVALIDPROVIDE NOTICE: MEDICARE PRESCRIPTION DRUG COVERAGE AND YOUR RIGHTS

## 2023-12-18 NOTE — TELEPHONE ENCOUNTER
PA approved through 12/16/2024.    Tita Nash Connors: BBYGYFLB - PA Case ID: [id number] - Rx #: [id number] help? Call us at [phone]  Outcome  Approvedon December 17  The request has been approved. The authorization is effective from 12/17/2023 to 12/16/2024, as long as the member is enrolled in their current health plan. The request was reviewed and approved by a licensed clinical pharmacist. Please note, the requested drug has a plan limit of 120 tablets per 30 days. A written notification letter will follow with additional details.  Drug  Colchicine 0.6MG tablets  Form  MedImpact ePA Form 2017 NCPDP  Original Claim Info  [id number] TRANS FEE = 0.00PRIOR AUTH REQUIRED/INVALIDPROVIDE NOTICE: MEDICARE PRESCRIPTION DRUG COVERAGE AND YOUR RIGHTS

## 2023-12-21 ENCOUNTER — APPOINTMENT (OUTPATIENT)
Dept: ADMISSIONS | Facility: MEDICAL CENTER | Age: 68
DRG: 272 | End: 2023-12-21
Attending: SURGERY
Payer: MEDICARE

## 2023-12-29 ENCOUNTER — PATIENT MESSAGE (OUTPATIENT)
Dept: CARDIOLOGY | Facility: MEDICAL CENTER | Age: 68
End: 2023-12-29
Payer: MEDICARE

## 2023-12-29 ENCOUNTER — RESEARCH ENCOUNTER (OUTPATIENT)
Dept: RESEARCH | Facility: MEDICAL CENTER | Age: 68
End: 2023-12-29

## 2023-12-29 NOTE — RESEARCH NOTE
Patient responded to [facility] recruitment. Patient has been scheduled to provide a saliva sample following their Cardiologist appointment. Consent forms for [employer] and M/URENA have been signed. Patient is ELF test eligible.

## 2024-01-02 ENCOUNTER — PRE-ADMISSION TESTING (OUTPATIENT)
Dept: ADMISSIONS | Facility: MEDICAL CENTER | Age: 69
DRG: 272 | End: 2024-01-02
Attending: SURGERY
Payer: MEDICARE

## 2024-01-04 ENCOUNTER — PRE-ADMISSION TESTING (OUTPATIENT)
Dept: ADMISSIONS | Facility: MEDICAL CENTER | Age: 69
DRG: 272 | End: 2024-01-04
Attending: SURGERY
Payer: MEDICARE

## 2024-01-04 DIAGNOSIS — Z01.810 PRE-OPERATIVE CARDIOVASCULAR EXAMINATION: ICD-10-CM

## 2024-01-04 DIAGNOSIS — Z01.812 PRE-OPERATIVE LABORATORY EXAMINATION: ICD-10-CM

## 2024-01-04 LAB
ANION GAP SERPL CALC-SCNC: 12 MMOL/L (ref 7–16)
BUN SERPL-MCNC: 19 MG/DL (ref 8–22)
CALCIUM SERPL-MCNC: 9.5 MG/DL (ref 8.5–10.5)
CHLORIDE SERPL-SCNC: 104 MMOL/L (ref 96–112)
CO2 SERPL-SCNC: 25 MMOL/L (ref 20–33)
CREAT SERPL-MCNC: 1.1 MG/DL (ref 0.5–1.4)
EKG IMPRESSION: NORMAL
ERYTHROCYTE [DISTWIDTH] IN BLOOD BY AUTOMATED COUNT: 43.1 FL (ref 35.9–50)
GFR SERPLBLD CREATININE-BSD FMLA CKD-EPI: 55 ML/MIN/1.73 M 2
GLUCOSE SERPL-MCNC: 91 MG/DL (ref 65–99)
HCT VFR BLD AUTO: 38.9 % (ref 37–47)
HGB BLD-MCNC: 13.2 G/DL (ref 12–16)
MCH RBC QN AUTO: 31.7 PG (ref 27–33)
MCHC RBC AUTO-ENTMCNC: 33.9 G/DL (ref 32.2–35.5)
MCV RBC AUTO: 93.5 FL (ref 81.4–97.8)
PLATELET # BLD AUTO: 326 K/UL (ref 164–446)
PMV BLD AUTO: 10.3 FL (ref 9–12.9)
POTASSIUM SERPL-SCNC: 4.3 MMOL/L (ref 3.6–5.5)
RBC # BLD AUTO: 4.16 M/UL (ref 4.2–5.4)
SODIUM SERPL-SCNC: 141 MMOL/L (ref 135–145)
WBC # BLD AUTO: 4.1 K/UL (ref 4.8–10.8)

## 2024-01-04 PROCEDURE — 36415 COLL VENOUS BLD VENIPUNCTURE: CPT

## 2024-01-04 PROCEDURE — 93005 ELECTROCARDIOGRAM TRACING: CPT

## 2024-01-04 PROCEDURE — 93010 ELECTROCARDIOGRAM REPORT: CPT | Performed by: INTERNAL MEDICINE

## 2024-01-04 PROCEDURE — 80048 BASIC METABOLIC PNL TOTAL CA: CPT

## 2024-01-04 PROCEDURE — 85027 COMPLETE CBC AUTOMATED: CPT

## 2024-01-06 ENCOUNTER — ANESTHESIA EVENT (OUTPATIENT)
Dept: SURGERY | Facility: MEDICAL CENTER | Age: 69
DRG: 272 | End: 2024-01-06
Payer: MEDICARE

## 2024-01-08 ENCOUNTER — TELEPHONE (OUTPATIENT)
Dept: CARDIOLOGY | Facility: MEDICAL CENTER | Age: 69
End: 2024-01-08

## 2024-01-08 ENCOUNTER — APPOINTMENT (OUTPATIENT)
Dept: RADIOLOGY | Facility: MEDICAL CENTER | Age: 69
DRG: 272 | End: 2024-01-08
Attending: PHYSICIAN ASSISTANT
Payer: MEDICARE

## 2024-01-08 ENCOUNTER — ANESTHESIA (OUTPATIENT)
Dept: SURGERY | Facility: MEDICAL CENTER | Age: 69
DRG: 272 | End: 2024-01-08
Payer: MEDICARE

## 2024-01-08 ENCOUNTER — HOSPITAL ENCOUNTER (INPATIENT)
Facility: MEDICAL CENTER | Age: 69
LOS: 1 days | DRG: 272 | End: 2024-01-09
Attending: SURGERY | Admitting: SURGERY
Payer: MEDICARE

## 2024-01-08 ENCOUNTER — PATIENT MESSAGE (OUTPATIENT)
Dept: CARDIOLOGY | Facility: MEDICAL CENTER | Age: 69
End: 2024-01-08

## 2024-01-08 DIAGNOSIS — I31.39 PERICARDIAL EFFUSION: ICD-10-CM

## 2024-01-08 LAB — PATHOLOGY CONSULT NOTE: NORMAL

## 2024-01-08 PROCEDURE — 700102 HCHG RX REV CODE 250 W/ 637 OVERRIDE(OP): Performed by: ANESTHESIOLOGY

## 2024-01-08 PROCEDURE — 0W9D4ZZ DRAINAGE OF PERICARDIAL CAVITY, PERCUTANEOUS ENDOSCOPIC APPROACH: ICD-10-PCS | Performed by: SURGERY

## 2024-01-08 PROCEDURE — 160042 HCHG SURGERY MINUTES - EA ADDL 1 MIN LEVEL 5: Performed by: SURGERY

## 2024-01-08 PROCEDURE — 160009 HCHG ANES TIME/MIN: Performed by: SURGERY

## 2024-01-08 PROCEDURE — 700111 HCHG RX REV CODE 636 W/ 250 OVERRIDE (IP): Performed by: PHYSICIAN ASSISTANT

## 2024-01-08 PROCEDURE — 160031 HCHG SURGERY MINUTES - 1ST 30 MINS LEVEL 5: Performed by: SURGERY

## 2024-01-08 PROCEDURE — 160048 HCHG OR STATISTICAL LEVEL 1-5: Performed by: SURGERY

## 2024-01-08 PROCEDURE — 71045 X-RAY EXAM CHEST 1 VIEW: CPT

## 2024-01-08 PROCEDURE — 700105 HCHG RX REV CODE 258: Performed by: SURGERY

## 2024-01-08 PROCEDURE — 770001 HCHG ROOM/CARE - MED/SURG/GYN PRIV*

## 2024-01-08 PROCEDURE — 160035 HCHG PACU - 1ST 60 MINS PHASE I: Performed by: SURGERY

## 2024-01-08 PROCEDURE — 700101 HCHG RX REV CODE 250: Performed by: ANESTHESIOLOGY

## 2024-01-08 PROCEDURE — 700105 HCHG RX REV CODE 258: Performed by: PHYSICIAN ASSISTANT

## 2024-01-08 PROCEDURE — A9270 NON-COVERED ITEM OR SERVICE: HCPCS | Performed by: PHYSICIAN ASSISTANT

## 2024-01-08 PROCEDURE — A9270 NON-COVERED ITEM OR SERVICE: HCPCS | Performed by: ANESTHESIOLOGY

## 2024-01-08 PROCEDURE — 160002 HCHG RECOVERY MINUTES (STAT): Performed by: SURGERY

## 2024-01-08 PROCEDURE — 700101 HCHG RX REV CODE 250: Performed by: SURGERY

## 2024-01-08 PROCEDURE — 700111 HCHG RX REV CODE 636 W/ 250 OVERRIDE (IP): Mod: JZ | Performed by: ANESTHESIOLOGY

## 2024-01-08 PROCEDURE — 88305 TISSUE EXAM BY PATHOLOGIST: CPT

## 2024-01-08 PROCEDURE — 700102 HCHG RX REV CODE 250 W/ 637 OVERRIDE(OP): Performed by: PHYSICIAN ASSISTANT

## 2024-01-08 PROCEDURE — 700111 HCHG RX REV CODE 636 W/ 250 OVERRIDE (IP): Performed by: ANESTHESIOLOGY

## 2024-01-08 RX ORDER — DIPHENHYDRAMINE HCL 25 MG
25 TABLET ORAL EVERY 6 HOURS PRN
Status: DISCONTINUED | OUTPATIENT
Start: 2024-01-08 | End: 2024-01-09 | Stop reason: HOSPADM

## 2024-01-08 RX ORDER — ROCURONIUM BROMIDE 10 MG/ML
INJECTION, SOLUTION INTRAVENOUS PRN
Status: DISCONTINUED | OUTPATIENT
Start: 2024-01-08 | End: 2024-01-08 | Stop reason: SURG

## 2024-01-08 RX ORDER — HYDROCODONE BITARTRATE AND ACETAMINOPHEN 5; 325 MG/1; MG/1
1-2 TABLET ORAL EVERY 4 HOURS PRN
Status: DISCONTINUED | OUTPATIENT
Start: 2024-01-08 | End: 2024-01-09 | Stop reason: HOSPADM

## 2024-01-08 RX ORDER — DEXAMETHASONE SODIUM PHOSPHATE 4 MG/ML
INJECTION, SOLUTION INTRA-ARTICULAR; INTRALESIONAL; INTRAMUSCULAR; INTRAVENOUS; SOFT TISSUE PRN
Status: DISCONTINUED | OUTPATIENT
Start: 2024-01-08 | End: 2024-01-08 | Stop reason: SURG

## 2024-01-08 RX ORDER — HYDRALAZINE HYDROCHLORIDE 20 MG/ML
5 INJECTION INTRAMUSCULAR; INTRAVENOUS
Status: DISCONTINUED | OUTPATIENT
Start: 2024-01-08 | End: 2024-01-08 | Stop reason: HOSPADM

## 2024-01-08 RX ORDER — HYDRALAZINE HYDROCHLORIDE 20 MG/ML
10 INJECTION INTRAMUSCULAR; INTRAVENOUS EVERY 6 HOURS PRN
Status: DISCONTINUED | OUTPATIENT
Start: 2024-01-08 | End: 2024-01-09 | Stop reason: HOSPADM

## 2024-01-08 RX ORDER — DIPHENHYDRAMINE HYDROCHLORIDE 50 MG/ML
25 INJECTION INTRAMUSCULAR; INTRAVENOUS EVERY 6 HOURS PRN
Status: DISCONTINUED | OUTPATIENT
Start: 2024-01-08 | End: 2024-01-09 | Stop reason: HOSPADM

## 2024-01-08 RX ORDER — SODIUM CHLORIDE, SODIUM LACTATE, POTASSIUM CHLORIDE, CALCIUM CHLORIDE 600; 310; 30; 20 MG/100ML; MG/100ML; MG/100ML; MG/100ML
INJECTION, SOLUTION INTRAVENOUS CONTINUOUS
Status: DISCONTINUED | OUTPATIENT
Start: 2024-01-08 | End: 2024-01-09 | Stop reason: HOSPADM

## 2024-01-08 RX ORDER — LIDOCAINE HYDROCHLORIDE 40 MG/ML
SOLUTION TOPICAL PRN
Status: DISCONTINUED | OUTPATIENT
Start: 2024-01-08 | End: 2024-01-08 | Stop reason: HOSPADM

## 2024-01-08 RX ORDER — CEFAZOLIN SODIUM 1 G/3ML
INJECTION, POWDER, FOR SOLUTION INTRAMUSCULAR; INTRAVENOUS PRN
Status: DISCONTINUED | OUTPATIENT
Start: 2024-01-08 | End: 2024-01-08 | Stop reason: SURG

## 2024-01-08 RX ORDER — ONDANSETRON 4 MG/1
4 TABLET, ORALLY DISINTEGRATING ORAL EVERY 4 HOURS PRN
Status: DISCONTINUED | OUTPATIENT
Start: 2024-01-08 | End: 2024-01-09 | Stop reason: HOSPADM

## 2024-01-08 RX ORDER — ONDANSETRON 2 MG/ML
4 INJECTION INTRAMUSCULAR; INTRAVENOUS EVERY 4 HOURS PRN
Status: DISCONTINUED | OUTPATIENT
Start: 2024-01-08 | End: 2024-01-09 | Stop reason: HOSPADM

## 2024-01-08 RX ORDER — FAMOTIDINE 20 MG/1
40 TABLET, FILM COATED ORAL DAILY
Status: DISCONTINUED | OUTPATIENT
Start: 2024-01-08 | End: 2024-01-09 | Stop reason: HOSPADM

## 2024-01-08 RX ORDER — SODIUM CHLORIDE, SODIUM LACTATE, POTASSIUM CHLORIDE, CALCIUM CHLORIDE 600; 310; 30; 20 MG/100ML; MG/100ML; MG/100ML; MG/100ML
INJECTION, SOLUTION INTRAVENOUS CONTINUOUS
Status: DISCONTINUED | OUTPATIENT
Start: 2024-01-08 | End: 2024-01-08

## 2024-01-08 RX ORDER — SODIUM CHLORIDE, SODIUM LACTATE, POTASSIUM CHLORIDE, CALCIUM CHLORIDE 600; 310; 30; 20 MG/100ML; MG/100ML; MG/100ML; MG/100ML
INJECTION, SOLUTION INTRAVENOUS CONTINUOUS
Status: DISCONTINUED | OUTPATIENT
Start: 2024-01-08 | End: 2024-01-08 | Stop reason: HOSPADM

## 2024-01-08 RX ORDER — LISINOPRIL 10 MG/1
15 TABLET ORAL 2 TIMES DAILY
COMMUNITY
End: 2024-02-14 | Stop reason: SDUPTHER

## 2024-01-08 RX ORDER — LIDOCAINE HYDROCHLORIDE 20 MG/ML
INJECTION, SOLUTION EPIDURAL; INFILTRATION; INTRACAUDAL; PERINEURAL PRN
Status: DISCONTINUED | OUTPATIENT
Start: 2024-01-08 | End: 2024-01-08 | Stop reason: SURG

## 2024-01-08 RX ORDER — KETOROLAC TROMETHAMINE 30 MG/ML
15 INJECTION, SOLUTION INTRAMUSCULAR; INTRAVENOUS EVERY 6 HOURS PRN
Status: DISCONTINUED | OUTPATIENT
Start: 2024-01-08 | End: 2024-01-09 | Stop reason: HOSPADM

## 2024-01-08 RX ORDER — EPHEDRINE SULFATE 50 MG/ML
5 INJECTION, SOLUTION INTRAVENOUS
Status: DISCONTINUED | OUTPATIENT
Start: 2024-01-08 | End: 2024-01-08 | Stop reason: HOSPADM

## 2024-01-08 RX ORDER — ONDANSETRON 2 MG/ML
4 INJECTION INTRAMUSCULAR; INTRAVENOUS
Status: DISCONTINUED | OUTPATIENT
Start: 2024-01-08 | End: 2024-01-08 | Stop reason: HOSPADM

## 2024-01-08 RX ORDER — ACETAMINOPHEN 500 MG
1000 TABLET ORAL ONCE
Status: COMPLETED | OUTPATIENT
Start: 2024-01-08 | End: 2024-01-08

## 2024-01-08 RX ORDER — OXYCODONE HCL 5 MG/5 ML
10 SOLUTION, ORAL ORAL
Status: COMPLETED | OUTPATIENT
Start: 2024-01-08 | End: 2024-01-08

## 2024-01-08 RX ORDER — ENALAPRILAT 1.25 MG/ML
2.5 INJECTION INTRAVENOUS EVERY 6 HOURS PRN
Status: DISCONTINUED | OUTPATIENT
Start: 2024-01-08 | End: 2024-01-09 | Stop reason: HOSPADM

## 2024-01-08 RX ORDER — HALOPERIDOL 5 MG/ML
1 INJECTION INTRAMUSCULAR
Status: DISCONTINUED | OUTPATIENT
Start: 2024-01-08 | End: 2024-01-08 | Stop reason: HOSPADM

## 2024-01-08 RX ORDER — ENOXAPARIN SODIUM 100 MG/ML
40 INJECTION SUBCUTANEOUS DAILY
Status: DISCONTINUED | OUTPATIENT
Start: 2024-01-09 | End: 2024-01-09 | Stop reason: HOSPADM

## 2024-01-08 RX ORDER — OXYCODONE HCL 5 MG/5 ML
5 SOLUTION, ORAL ORAL
Status: COMPLETED | OUTPATIENT
Start: 2024-01-08 | End: 2024-01-08

## 2024-01-08 RX ORDER — BUPIVACAINE HYDROCHLORIDE AND EPINEPHRINE 5; 5 MG/ML; UG/ML
INJECTION, SOLUTION EPIDURAL; INTRACAUDAL; PERINEURAL
Status: DISCONTINUED | OUTPATIENT
Start: 2024-01-08 | End: 2024-01-08 | Stop reason: HOSPADM

## 2024-01-08 RX ORDER — HYDROMORPHONE HYDROCHLORIDE 1 MG/ML
0.4 INJECTION, SOLUTION INTRAMUSCULAR; INTRAVENOUS; SUBCUTANEOUS
Status: DISCONTINUED | OUTPATIENT
Start: 2024-01-08 | End: 2024-01-08 | Stop reason: HOSPADM

## 2024-01-08 RX ORDER — HYDROMORPHONE HYDROCHLORIDE 1 MG/ML
0.2 INJECTION, SOLUTION INTRAMUSCULAR; INTRAVENOUS; SUBCUTANEOUS
Status: DISCONTINUED | OUTPATIENT
Start: 2024-01-08 | End: 2024-01-08 | Stop reason: HOSPADM

## 2024-01-08 RX ORDER — COLCHICINE 0.6 MG/1
0.6 TABLET ORAL
Status: DISCONTINUED | OUTPATIENT
Start: 2024-01-09 | End: 2024-01-09 | Stop reason: HOSPADM

## 2024-01-08 RX ORDER — MIDAZOLAM HYDROCHLORIDE 1 MG/ML
INJECTION INTRAMUSCULAR; INTRAVENOUS PRN
Status: DISCONTINUED | OUTPATIENT
Start: 2024-01-08 | End: 2024-01-08 | Stop reason: SURG

## 2024-01-08 RX ORDER — LABETALOL HYDROCHLORIDE 5 MG/ML
5 INJECTION, SOLUTION INTRAVENOUS
Status: DISCONTINUED | OUTPATIENT
Start: 2024-01-08 | End: 2024-01-08 | Stop reason: HOSPADM

## 2024-01-08 RX ORDER — COLCHICINE 0.6 MG/1
0.6 TABLET ORAL
COMMUNITY
End: 2024-02-14 | Stop reason: SDUPTHER

## 2024-01-08 RX ORDER — HYDROMORPHONE HYDROCHLORIDE 1 MG/ML
0.1 INJECTION, SOLUTION INTRAMUSCULAR; INTRAVENOUS; SUBCUTANEOUS
Status: DISCONTINUED | OUTPATIENT
Start: 2024-01-08 | End: 2024-01-08 | Stop reason: HOSPADM

## 2024-01-08 RX ORDER — DIPHENHYDRAMINE HYDROCHLORIDE 50 MG/ML
12.5 INJECTION INTRAMUSCULAR; INTRAVENOUS
Status: DISCONTINUED | OUTPATIENT
Start: 2024-01-08 | End: 2024-01-08 | Stop reason: HOSPADM

## 2024-01-08 RX ADMIN — LISINOPRIL 15 MG: 10 TABLET ORAL at 21:48

## 2024-01-08 RX ADMIN — FENTANYL CITRATE 50 MCG: 50 INJECTION, SOLUTION INTRAMUSCULAR; INTRAVENOUS at 13:30

## 2024-01-08 RX ADMIN — FENTANYL CITRATE 50 MCG: 50 INJECTION, SOLUTION INTRAMUSCULAR; INTRAVENOUS at 13:45

## 2024-01-08 RX ADMIN — PROPOFOL 100 MG: 10 INJECTION, EMULSION INTRAVENOUS at 12:43

## 2024-01-08 RX ADMIN — SODIUM CHLORIDE, POTASSIUM CHLORIDE, SODIUM LACTATE AND CALCIUM CHLORIDE: 600; 310; 30; 20 INJECTION, SOLUTION INTRAVENOUS at 10:21

## 2024-01-08 RX ADMIN — CEFAZOLIN 2 G: 1 INJECTION, POWDER, FOR SOLUTION INTRAMUSCULAR; INTRAVENOUS at 12:45

## 2024-01-08 RX ADMIN — ONDANSETRON 4 MG: 4 TABLET, ORALLY DISINTEGRATING ORAL at 17:32

## 2024-01-08 RX ADMIN — OXYCODONE HYDROCHLORIDE 10 MG: 5 SOLUTION ORAL at 13:30

## 2024-01-08 RX ADMIN — FAMOTIDINE 40 MG: 20 TABLET, FILM COATED ORAL at 18:30

## 2024-01-08 RX ADMIN — ROCURONIUM BROMIDE 70 MG: 50 INJECTION, SOLUTION INTRAVENOUS at 12:43

## 2024-01-08 RX ADMIN — DEXAMETHASONE SODIUM PHOSPHATE 8 MG: 4 INJECTION INTRA-ARTICULAR; INTRALESIONAL; INTRAMUSCULAR; INTRAVENOUS; SOFT TISSUE at 12:57

## 2024-01-08 RX ADMIN — LIDOCAINE HYDROCHLORIDE 3 ML: 20 INJECTION, SOLUTION EPIDURAL; INFILTRATION; INTRACAUDAL at 12:41

## 2024-01-08 RX ADMIN — ACETAMINOPHEN 1000 MG: 500 TABLET ORAL at 10:19

## 2024-01-08 RX ADMIN — MIDAZOLAM HYDROCHLORIDE 2 MG: 1 INJECTION, SOLUTION INTRAMUSCULAR; INTRAVENOUS at 12:38

## 2024-01-08 RX ADMIN — SODIUM CHLORIDE, POTASSIUM CHLORIDE, SODIUM LACTATE AND CALCIUM CHLORIDE: 600; 310; 30; 20 INJECTION, SOLUTION INTRAVENOUS at 17:34

## 2024-01-08 RX ADMIN — HYDROCODONE BITARTRATE AND ACETAMINOPHEN 1 TABLET: 5; 325 TABLET ORAL at 17:54

## 2024-01-08 RX ADMIN — LIDOCAINE HYDROCHLORIDE 4 ML: 40 SOLUTION TOPICAL at 12:43

## 2024-01-08 RX ADMIN — FENTANYL CITRATE 100 MCG: 50 INJECTION, SOLUTION INTRAMUSCULAR; INTRAVENOUS at 12:41

## 2024-01-08 ASSESSMENT — PAIN DESCRIPTION - PAIN TYPE
TYPE: SURGICAL PAIN
TYPE: ACUTE PAIN
TYPE: SURGICAL PAIN

## 2024-01-08 ASSESSMENT — COGNITIVE AND FUNCTIONAL STATUS - GENERAL
SUGGESTED CMS G CODE MODIFIER DAILY ACTIVITY: CH
DAILY ACTIVITIY SCORE: 24
SUGGESTED CMS G CODE MODIFIER MOBILITY: CH
MOBILITY SCORE: 24

## 2024-01-08 ASSESSMENT — LIFESTYLE VARIABLES
ON A TYPICAL DAY WHEN YOU DRINK ALCOHOL HOW MANY DRINKS DO YOU HAVE: 0
CONSUMPTION TOTAL: NEGATIVE
TOTAL SCORE: 0
TOTAL SCORE: 0
EVER HAD A DRINK FIRST THING IN THE MORNING TO STEADY YOUR NERVES TO GET RID OF A HANGOVER: NO
ALCOHOL_USE: NO
HOW MANY TIMES IN THE PAST YEAR HAVE YOU HAD 5 OR MORE DRINKS IN A DAY: 0
AVERAGE NUMBER OF DAYS PER WEEK YOU HAVE A DRINK CONTAINING ALCOHOL: 0
EVER FELT BAD OR GUILTY ABOUT YOUR DRINKING: NO
TOTAL SCORE: 0
HAVE PEOPLE ANNOYED YOU BY CRITICIZING YOUR DRINKING: NO
HAVE YOU EVER FELT YOU SHOULD CUT DOWN ON YOUR DRINKING: NO
DOES PATIENT WANT TO STOP DRINKING: NO

## 2024-01-08 ASSESSMENT — PATIENT HEALTH QUESTIONNAIRE - PHQ9
1. LITTLE INTEREST OR PLEASURE IN DOING THINGS: NOT AT ALL
2. FEELING DOWN, DEPRESSED, IRRITABLE, OR HOPELESS: NOT AT ALL
SUM OF ALL RESPONSES TO PHQ9 QUESTIONS 1 AND 2: 0

## 2024-01-08 ASSESSMENT — FIBROSIS 4 INDEX
FIB4 SCORE: 1.15
FIB4 SCORE: 1.15

## 2024-01-08 NOTE — TELEPHONE ENCOUNTER
Echo scheduled for 2/5 at 1:15pm, FV with AL scheduled for 12/14 at 10:20am.     [employer] message to pt.     To AL, GERSON

## 2024-01-08 NOTE — ANESTHESIA PREPROCEDURE EVALUATION
Case: [id number] Date/Time: 01/08/24 1115    Procedures:       LEFT THORACOSCOPY, PERICARDIAL WINDOW      CREATION, PERICARDIAL WINDOW    Pre-op diagnosis: PERICARDIAL EFFUSION    Location: [facility] OR 11 / SURGERY [facility]    Surgeons: John H Ganser, M.D.            Relevant Problems   PULMONARY   (positive) SOB (shortness of breath)      CARDIAC   (positive) PVC (premature ventricular contraction)   (positive) Premature atrial contractions   (positive) Primary hypertension      Other   (positive) Overweight with body mass index (BMI) of 26 to 26.9 in adult     ECHO 11/2023  CONCLUSIONS  Compared to the prior study on 8/7/23, no significant changes. Small   pericardial effusion still noted.  Limited study  Ectopy noted  Normal LV size, thickness and systolic function with normal estimated   LVEF 70% with normal septal motion  Grossly normal RV size and systolic function  Small pericardial effusion without evidence of hemodynamic compromise.      Physical Exam    Airway   Mallampati: II  TM distance: >3 FB  Neck ROM: full       Cardiovascular - normal exam  Rhythm: regular  Rate: normal  (-) murmur     Dental - normal exam           Pulmonary - normal exam  Breath sounds clear to auscultation     Abdominal    Neurological - normal exam                   Anesthesia Plan    ASA 3 (pericardial effusion)       Plan - general       Airway plan will be ETT    (A-line)      Induction: intravenous    Postoperative Plan: Postoperative administration of opioids is intended.    Pertinent diagnostic labs and testing reviewed    Informed Consent:    Anesthetic plan and risks discussed with patient.    Use of blood products discussed with: patient whom consented to blood products.

## 2024-01-08 NOTE — OR NURSING
Patient arrived to PACU in stable condition.  Oral airway in place, removed upon arrival to PACU  Surgical sites to L lateral chest, closed with dermabond  Medicated for pain per MAR  Daughter updated on patient condition   Patient tolerated apple juice without nausea or vomiting  Patient sat at edge of bed  10L ERAS O2 initiated in PACU  1638:Report given to Toño GONZALEZ. Patient transferred to T403/1

## 2024-01-08 NOTE — ANESTHESIA PROCEDURE NOTES
Arterial Line    Performed by: Dionisio Narayanan M.D.  Authorized by: Dionisio Narayanan M.D.    Start Time:  1/8/2024 12:50 PM  End Time:  1/8/2024 12:52 PM  Localization: ultrasound guidance and surface landmarks    Patient Location:  OR  Indication: continuous blood pressure monitoring        Catheter Size:  20 G  Seldinger Technique?: Yes    Laterality:  Right  Site:  Radial artery  Line Secured:  Antimicrobial disc, tape and transparent dressing  Events: patient tolerated procedure well with no complications     Alcohol prep, arrow catheter.  Secured with tegaderm and tape.

## 2024-01-08 NOTE — ANESTHESIA TIME REPORT
Anesthesia Start and Stop Event Times       Date Time Event    1/8/2024 1215 Ready for Procedure     1237 Anesthesia Start     1324 Anesthesia Stop          Responsible Staff  01/08/24      Name Role Begin End    Dionisio Narayanan M.D. Anesth 1237 1324          Overtime Reason:  no overtime (within assigned shift)    Comments:

## 2024-01-08 NOTE — OP REPORT
DATE OF SERVICE:  01/08/2024     PREOPERATIVE DIAGNOSIS:  Pericardial effusion.     POSTOPERATIVE DIAGNOSIS:  Pericardial effusion.     PROCEDURE:  Left thoracoscopy with pericardial window.     SURGEON:  John H. Ganser, MD     ASSISTANT:  Piyush Grider PA-C     ANESTHESIA:  General.     ANESTHESIOLOGIST:  Dionisio Narayanan MD     INDICATIONS:  The patient is a 68-year-old female, who was found to have   pericardial effusion of unknown etiology.  She has undergone   pericardiocentesis on two occasions with improvement in symptoms.  Risks,   benefits, and alternatives to thoracoscopic pericardial window were outlined   in detail.  Questions answered.  She wished to proceed.     FINDINGS:  The pericardium was thin with no significant inflammatory change.    A window was made in the left posterolateral aspect removing fluid under   pressure and removing about 400 mL of clear serous fluid.  Pericardium was   sent for pathology.  There was a slight increase in her systolic blood   pressure about 20 mmHg after releasing the effusion.     DESCRIPTION OF PROCEDURE:  The patient identified, general anesthetic   administered with a double lumen endotracheal tube.  She was placed in right   lateral decubitus position and her left chest prepped and draped in the usual   sterile fashion.  Local anesthesia 0.5% Marcaine with epinephrine was injected   prior to making skin incision.  A small incision was made, midaxillary line,   approximately may be 7th intercostal space and a 5 mm blunt trocar and camera   inserted.  Two additional 5 mm trocars were placed under direct vision above   and below triangulating towards the pericardium.  The above findings were   noted.  The pericardium was able to be grasped just above the diaphragm   posterolaterally on the left and endoscopic Metzenbaum scissors used to remove   approximately 2 to 3 cm [place] of pericardium.  This was removed and sent for   pathology.  Hemostasis was assured.   All fluid was suctioned.     The chest was insufflated with carbon dioxide under low pressure and the lung   reexpanded.  Holding Valsalva by anesthesia, all gas was suctioned and the   trocars were removed.  The incisions were closed with Vicryl.  Sterile   dressings were applied and the patient returned to recovery in stable   condition.     An assistant was required in this case to run the camera, assist with the   closure.        ______________________________  JOHN H. GANSER, MD JHG/LILYM    DD:  01/08/2024 13:18  DT:  01/08/2024 14:01    Job#:  [id number]    CC:MD Bassam Beltran MD

## 2024-01-08 NOTE — OR SURGEON
Immediate Post OP Note    PreOp Diagnosis: Pericardial effusion      PostOp Diagnosis: Same      Procedure(s):  LEFT THORACOSCOPY, PERICARDIAL WINDOW  CREATION, PERICARDIAL WINDOW    Surgeon(s):  John H Ganser, M.D.    Anesthesiologist/Type of Anesthesia:  Anesthesiologist: Dionisio Narayanan M.D./* No anesthesia type entered *    Surgical Staff:  Assistant: HILDA Grant  Circulator: Kaylee Frank R.N.  Relief Scrub: Nani Helms  Scrub Person: Dara Means    Specimens removed if any:  ID Type Source Tests Collected by Time Destination   A : Pericardium Other Other PATHOLOGY SPECIMEN John H Ganser, M.D. 1/8/2024  1:05 PM        Estimated Blood Loss: 0    Findings: Moderate effusion    Complications: 0        1/8/2024 1:13 PM John H Ganser, M.D.

## 2024-01-08 NOTE — ANESTHESIA PROCEDURE NOTES
Airway    Date/Time: 1/8/2024 12:44 PM    Performed by: Dionisio Narayanan M.D.  Authorized by: Dionisio Narayanan M.D.    Location:  OR  Urgency:  Elective  Difficult Airway: No    Indications for Airway Management:  Anesthesia      Spontaneous Ventilation: absent    Sedation Level:  Deep  Preoxygenated: Yes    Patient Position:  Sniffing  Mask Difficulty Assessment:  2 - vent by mask + OA or adjuvant +/- NMBA  Final Airway Type:  Endotracheal airway  Final Endotracheal Airway:  ETT - double lumen left with ONE LUNG VENTILATION  Cuffed: Yes    Technique Used for Successful ETT Placement:  Direct laryngoscopy    Insertion Site:  Oral  Blade Type:  Hetal  Laryngoscope Blade/Videolaryngoscope Blade Size:  3  ETT Double Lumen (fr):  37  Measured from:  Teeth  ETT to Teeth (cm):  31  Placement Verified by: auscultation and capnometry    Cormack-Lehane Classification:  Grade I - full view of glottis  Number of Attempts at Approach:  1

## 2024-01-08 NOTE — TELEPHONE ENCOUNTER
Yovana,     Can we please switch her upcoming appointment to sometime in the first 2 weeks of February instead, and arrange for limited echocardiogram for pericardial effusion check within 1 week before the upcoming appointment.  Can you also please send her message with these changes once they occur.  She had her surgery /pericardial window today.     Thank you very much.   ----- Message -----   From: Gaby Carvajal   Sent: 1/8/2024   2:02 PM PST   To: Bassam Marinelli M.D.; Chase Pena M.D.

## 2024-01-09 VITALS
BODY MASS INDEX: 28.72 KG/M2 | OXYGEN SATURATION: 96 % | HEIGHT: 65 IN | TEMPERATURE: 98.1 F | SYSTOLIC BLOOD PRESSURE: 136 MMHG | DIASTOLIC BLOOD PRESSURE: 77 MMHG | HEART RATE: 67 BPM | RESPIRATION RATE: 18 BRPM | WEIGHT: 172.4 LBS

## 2024-01-09 LAB — GLUCOSE BLD STRIP.AUTO-MCNC: 133 MG/DL (ref 65–99)

## 2024-01-09 PROCEDURE — 700102 HCHG RX REV CODE 250 W/ 637 OVERRIDE(OP): Performed by: SURGERY

## 2024-01-09 PROCEDURE — 82962 GLUCOSE BLOOD TEST: CPT

## 2024-01-09 PROCEDURE — 700111 HCHG RX REV CODE 636 W/ 250 OVERRIDE (IP): Mod: JZ | Performed by: PHYSICIAN ASSISTANT

## 2024-01-09 PROCEDURE — A9270 NON-COVERED ITEM OR SERVICE: HCPCS | Performed by: PHYSICIAN ASSISTANT

## 2024-01-09 PROCEDURE — 700102 HCHG RX REV CODE 250 W/ 637 OVERRIDE(OP): Performed by: PHYSICIAN ASSISTANT

## 2024-01-09 PROCEDURE — A9270 NON-COVERED ITEM OR SERVICE: HCPCS | Performed by: SURGERY

## 2024-01-09 RX ORDER — ACETAMINOPHEN 325 MG/1
325-625 TABLET ORAL EVERY 4 HOURS PRN
Status: DISCONTINUED | OUTPATIENT
Start: 2024-01-09 | End: 2024-01-09 | Stop reason: HOSPADM

## 2024-01-09 RX ADMIN — ENOXAPARIN SODIUM 40 MG: 100 INJECTION SUBCUTANEOUS at 08:12

## 2024-01-09 RX ADMIN — FAMOTIDINE 40 MG: 20 TABLET, FILM COATED ORAL at 05:45

## 2024-01-09 RX ADMIN — LISINOPRIL 15 MG: 10 TABLET ORAL at 07:26

## 2024-01-09 RX ADMIN — COLCHICINE 0.6 MG: 0.6 TABLET, FILM COATED ORAL at 08:12

## 2024-01-09 RX ADMIN — KETOROLAC TROMETHAMINE 15 MG: 30 INJECTION, SOLUTION INTRAMUSCULAR; INTRAVENOUS at 01:47

## 2024-01-09 RX ADMIN — ACETAMINOPHEN 650 MG: 325 TABLET, FILM COATED ORAL at 08:38

## 2024-01-09 RX ADMIN — DIPHENHYDRAMINE HYDROCHLORIDE 25 MG: 50 INJECTION, SOLUTION INTRAMUSCULAR; INTRAVENOUS at 11:24

## 2024-01-09 RX ADMIN — ONDANSETRON 4 MG: 4 TABLET, ORALLY DISINTEGRATING ORAL at 09:26

## 2024-01-09 ASSESSMENT — ENCOUNTER SYMPTOMS
CHILLS: 0
FEVER: 0
SHORTNESS OF BREATH: 0
VOMITING: 0
NAUSEA: 1

## 2024-01-09 ASSESSMENT — PAIN DESCRIPTION - PAIN TYPE
TYPE: ACUTE PAIN

## 2024-01-09 NOTE — PROGRESS NOTES
"Admitted to room T 403-1 via transport in [facility] from PACU at 1710.  BP (!) 153/86   Pulse 75   Temp 36.4 °C (97.5 °F) (Temporal)   Resp 18   Ht 1.651 m (5' 5\")   Wt 78.2 kg (172 lb 6.4 oz)   LMP  (LMP Unknown)   SpO2 99%   BMI 28.69 kg/m²       Patient reports pain at 4 on a scale of 0-10. Educated patient regarding pharmacologic and non pharmacologic modalities for pain management. Oriented to room call light and smoking policy.  Reviewed plan of care (equipment, incentive spirometer, sequential compression devices, medications, activity, diet, fall precautions, skin care, and pain) with patient and family. Welcome packet given and reviewed with patient, all questions answered. Education provided on oral hygiene program.    AA&Ox4. Denies CP/SOB.  See 2 RN skin note  Initiated on Regular diet. Denies N/V.  + void. Last BM PTA   Pt ambulates x1 Handheld assit.  All needs met at this time. Call light within reach. Pt calls appropriately. Bed low and locked, non skid socks in place. Hourly rounding in place.    "

## 2024-01-09 NOTE — ANESTHESIA POSTPROCEDURE EVALUATION
Patient: Tita Pitts    Procedure Summary       Date: 01/08/24 Room / Location: [place] [id number] / SURGERY [facility]    Anesthesia Start: 1237 Anesthesia Stop: 1324    Procedures:       LEFT THORACOSCOPY, PERICARDIAL WINDOW (Left: Chest)      CREATION, PERICARDIAL WINDOW (Left: Chest) Diagnosis: (PERICARDIAL EFFUSION)    Surgeons: John H Ganser, M.D. Responsible Provider: Dionisio Narayanan M.D.    Anesthesia Type: general ASA Status: 3            Final Anesthesia Type: general  Last vitals  BP   Blood Pressure : (!) 153/86    Temp   36.4 °C (97.5 °F)    Pulse   75   Resp   18    SpO2   99 %      Anesthesia Post Evaluation    Patient location during evaluation: PACU  Patient participation: complete - patient participated  Level of consciousness: awake and alert    Airway patency: patent  Anesthetic complications: no  Cardiovascular status: hemodynamically stable  Respiratory status: acceptable  Hydration status: euvolemic    PONV: none          There were no known notable events for this encounter.     Nurse Pain Score: 3 (NPRS)

## 2024-01-09 NOTE — PROGRESS NOTES
4 Eyes Skin Assessment Completed by Toño RN and LISA Welch.    Head WDL  Ears WDL  Nose WDL  Mouth WDL  Neck WDL  Breast/Chest x3 Incisions to L Chest Closed w Dermabond, Well Approximated, CDI   Shoulder Blades WDL  Spine WDL  (R) Arm/Elbow/Hand WDL  (L) Arm/Elbow/Hand WDL  Abdomen WDL  Groin WDL  Scrotum/Coccyx/Buttocks WDL  (R) Leg WDL  (L) Leg WDL  (R) Heel/Foot/Toe WDL  (L) Heel/Foot/Toe WDL          Devices In Places Blood Pressure Cuff, Pulse Ox, SCD's, and Oxy Mask      Interventions In Place Gray Ear Foams, Pillows, Heels Loaded W/Pillows, and Pressure Redistribution Mattress    Possible Skin Injury No    Pictures Uploaded Into Epic N/A  Wound Consult Placed N/A  RN Wound Prevention Protocol Ordered No

## 2024-01-09 NOTE — CARE PLAN
Problem: Pain - Standard  Goal: Alleviation of pain or a reduction in pain to the patient’s comfort goal  Outcome: Progressing     Problem: Knowledge Deficit - Standard  Goal: Patient and family/care givers will demonstrate understanding of plan of care, disease process/condition, diagnostic tests and medications  Outcome: Progressing   The patient is Stable - Low risk of patient condition declining or worsening    Shift Goals  Clinical Goals: Pain/ Nausea Control  Patient Goals: Comfort  Family Goals: N/A    Progress made toward(s) clinical / shift goals:  Pain/ Nausea controlled per MAR, Educated patient on IS use and plan of care this shift, Patient verbalized understanding     Patient is not progressing towards the following goals:

## 2024-01-09 NOTE — DISCHARGE PLANNING
HTH/ [facility] TCN chart review completed. Due to low LACE 7 and high 6 click score24 ADL's and 24 mobility as well as patients currently documented level of function, no TCN needs anticipated in patient discharge planning at this time. Should post acute discharge needs arise warranting TCN involvement, please contact TCN team via Voalte. Thank you.

## 2024-01-09 NOTE — PROGRESS NOTES
Assumed care of patient at 0645. Bedside report received. Assessment complete.  AA&Ox4. Denies CP/SOB.  Reporting 3/10 pain. Declined intervention at this time.  Educated patient regarding pharmacologic and non pharmacologic modalities for pain management.  Skin per flowsheets  Tolerating Regular diet. Denies N/V.  + void. Last BM PTA   Pt ambulates SBA w FWW.  All needs met at this time. Call light within reach. Pt calls appropriately. Bed low and locked, non skid socks in place. Hourly rounding in place.

## 2024-01-09 NOTE — DISCHARGE INSTRUCTIONS
1.  Discharge home today when alert, comfortable, ambulatory, and tolerating PO well  2. Pt counseled re: diet , activity, wound care, I.S., and home med's  3. May shower today.  4. No baths, hot tubs, soaks for 14 days.  5. No lifting >15 lbs for 2 wks  6. No driving for 4-5 days   7. F/U with Dr. Ganser in 1-2 weeks  8. Path d/w pt.      CALL IF YOU:   (1) fevers greater than 101.0 degrees F.  (2) Unusual chest or leg pain, redness or swelling behind the knee or in the calf muscle.  (3) Drainage or fluid from incision that may be foul smelling, increased tenderness or soreness at the wound or the wound edges are no longer together, redness or swelling at the incision site.  (4) Severe, and/or progressively worsening shortness of breath.    (5) Please do not hesitate to call with any other questions. ([phone])      Pericardial Effusion         Pericardial effusion is a buildup of fluid around the heart. The heart is surrounded by a thin, double-layered sac (pericardium). When fluid builds up in this sac, it can put too much pressure on the heart and make it harder for the heart to pump blood (cardiac tamponade). This can be life-threatening.  What are the causes?  Often, the cause of pericardial effusion is not known (idiopathic effusion). In some cases, the condition may be caused by:  Inflammation of the pericardium (pericarditis).  Infections from a virus, fungus, parasite, or bacteria.  Damage to the pericardium from heart surgery or a heart attack.  Inflammatory diseases, such as rheumatoid arthritis or lupus.  Kidney or thyroid disease.  Cancer or treatment for cancer, including radiation or chemotherapy.  Certain medicines, including medicines for tuberculosis or seizures.  Chest injury.  What are the signs or symptoms?  Pericardial effusion may not cause symptoms at first, especially if the fluid builds up slowly. In time, pressure on the heart may cause:  Chest pain and trouble breathing. These  symptoms may get worse when lying down.  Dizziness and fainting.  Fast or irregular heartbeats (palpitations).  Anxiety and confusion.  Weakness and tiredness (fatigue).  Swollen legs and ankles.  A feeling of fullness in the chest.  Coughing or a hoarse voice.  How is this diagnosed?  This condition is diagnosed based on your symptoms and testing, which may include:  A test that creates ultrasound images of your heart (echocardiogram).  A test to examine the electrical functions of your heart (electrocardiogram).  Chest X-ray.  CT scan.  MRI.  Blood tests.  How is this treated?  Your medical team may include a heart specialist (cardiologist), an X-ray specialist (radiologist), and a heart surgeon if needed. Treatment for this condition depends on the cause of your condition, the amount of fluid, and how severe your symptoms are. Treatment may include:  Medicines, such as:  Medicines to fight infection, such as antibiotics.  NSAIDs, such as ibuprofen.  Anti-inflammatory medicines, such as steroids.  Hospital treatment. This may be needed if the fluid around the heart prevents it from pumping enough blood. Treatment in the hospital may include:  IV fluids.  Breathing support, such as oxygen.  Surgery. This may be needed in severe cases. Surgery may include:  A procedure to remove fluid from the pericardium by placing a needle into it (pericardiocentesis).  A procedure to make a permanent opening in the pericardium (pericardial window).  Open heart surgery.  Follow these instructions at home:  Take over-the-counter and prescription medicines only as told by your health care provider.  If you were prescribed a medicine to fight infection, such as antibiotic medicine, take it as told by your health care provider. Do not stop taking the medicine even if you start to feel better.  Rest as told by your health care provider. Ask your health care provider what activities are safe for you.  Keep all follow-up visits. This is  important.  Contact a health care provider if:  You have chest discomfort that worsens when you lie down.  You have swelling in your legs or ankles.  You have a cough or hoarse voice that does not go away.  Get help right away if:  You have chest pain.  You have trouble breathing.  You have fast or irregular heartbeats, or palpitations.  You feel dizzy or light-headed.  You faint.  These symptoms may be an emergency. Get help right away. Call 911.  Do not wait to see if the symptoms will go away.  Do not drive yourself to the hospital.  Summary  Pericardial effusion is a buildup of fluid around the heart. The fluid can eventually prevent the heart from pumping enough blood (cardiac tamponade), which can be life-threatening.  Pericardial effusion may not cause symptoms at first.  Treatment for pericardial effusion depends on the cause of your condition, the amount of fluid, and how severe your symptoms are. In severe cases, hospital treatment or surgery may be required.  Rest as told by your health care provider. Ask your health care provider what activities are safe for you.  This information is not intended to replace advice given to you by your health care provider. Make sure you discuss any questions you have with your health care provider.  Document Revised: 08/23/2022 Document Reviewed: 08/23/2022  Elsevier Patient Education © 2023 [employer]

## 2024-01-09 NOTE — CARE PLAN
The patient is Stable - Low risk of patient condition declining or worsening    Shift Goals  Clinical Goals: mobilize. pain control  Patient Goals: Comfort  Family Goals: N/A    Progress made toward(s) clinical / shift goals:  unable to mobilize. +dizziness. Pain controlled    Patient is not progressing towards the following goals:    Problem: Pain - Standard  Goal: Alleviation of pain or a reduction in pain to the patient’s comfort goal  Outcome: Progressing     Problem: Knowledge Deficit - Standard  Goal: Patient and family/care givers will demonstrate understanding of plan of care, disease process/condition, diagnostic tests and medications  Outcome: Progressing

## 2024-01-09 NOTE — PROGRESS NOTES
Surgical Progress Note    Author: HILDA Grant Date & Time created: 2024   8:47 AM     Interval Events:  S/p VATS left pericardial window - POD#1, doing well; c/o of some dizziness/lightheadedness with ambulation, but pain well controlled with tylenol at this point; claire po well; using IS    Review of Systems   Constitutional:  Negative for chills and fever.   Respiratory:  Negative for shortness of breath.         + incisional/surgical pain   Gastrointestinal:  Positive for nausea. Negative for vomiting.   Skin:  Negative for itching and rash.     Hemodynamics:  Temp (24hrs), Av.4 °C (97.5 °F), Min:36.1 °C (97 °F), Max:37 °C (98.6 °F)  Temperature: 37 °C (98.6 °F)  Pulse  Av.5  Min: 60  Max: 91   Blood Pressure : 120/69     Respiratory:    Respiration: 18, Pulse Oximetry: 96 %     Work Of Breathing / Effort: Within Normal Limits  RUL Breath Sounds: Clear, RML Breath Sounds: Clear, RLL Breath Sounds: Diminished, CYNTHIA Breath Sounds: Diminished, LLL Breath Sounds: Diminished  Neuro:  GCS       Fluids:    Intake/Output Summary (Last 24 hours) at 2024 0847  Last data filed at 2024 0801  Gross per 24 hour   Intake 1839.31 ml   Output 10 ml   Net 1829.31 ml     Weight: 78.2 kg (172 lb 6.4 oz)  Current Diet Order   Procedures    Diet Order Diet: Regular     Physical Exam  Vitals and nursing note reviewed.   Constitutional:       General: She is not in acute distress.  Cardiovascular:      Rate and Rhythm: Normal rate.   Pulmonary:      Effort: Pulmonary effort is normal. No respiratory distress.      Comments: Wounds c/d/i  Abdominal:      Palpations: Abdomen is soft.   Skin:     General: Skin is warm and dry.   Neurological:      Mental Status: She is alert and oriented to person, place, and time.   Psychiatric:         Mood and Affect: Mood normal.       Labs:  Recent Results (from the past 24 hour(s))   Histology Request    Collection Time: 24  1:05 PM   Result Value Ref Range     Pathology Request Sent to Histo    POCT glucose device results    Collection Time: 01/09/24  3:17 AM   Result Value Ref Range    POC Glucose, Blood 133 (H) 65 - 99 mg/dL     Medical Decision Making, by Problem:  There are no active hospital problems to display for this patient.    Plan:  Continue to observe for now, allow lightheadedness to improve/resolve before sending home.  Pt counseled.  Will re-evaluate.    Quality Measures:  Quality-Core Measures   Reviewed items::  Radiology images reviewed, Labs reviewed and Medications reviewed  Mcknight catheter::  No Mcknight  DVT prophylaxis pharmacological::  Enoxaparin (Lovenox)  DVT prophylaxis - mechanical:  SCDs  Ulcer Prophylaxis::  Yes      Discussed patient condition with nursing, Patient and Dr. Ganser    *pt re-evaluated @ 1440 hours: she reports feeling better, less lightheadedness.   Exam unchanged.    1.  Discharge home today when alert, comfortable, ambulatory, and tolerating PO well  2. Pt counseled re: diet , activity, wound care, I.S., and home med's  3. May shower today.  4. No baths, hot tubs, soaks for 14 days.  5. No lifting >15 lbs for 2 wks  6. No driving for 4-5 days   7. F/U with Dr. Ganser in 1-2 weeks  8. Path d/w pt.  9. Pt declined any narcotic analgesics for home. We discussed staggering ibuprofen with tylenol for any discomfort.      CALL IF YOU:   (1) fevers greater than 101.0 degrees F.  (2) Unusual chest or leg pain, redness or swelling behind the knee or in the calf muscle.  (3) Drainage or fluid from incision that may be foul smelling, increased tenderness or soreness at the wound or the wound edges are no longer together, redness or swelling at the incision site.  (4) Severe, and/or progressively worsening shortness of breath.    (5) Please do not hesitate to call with any other questions. ([phone])

## 2024-01-09 NOTE — CARE PLAN
Problem: Pain - Standard  Goal: Alleviation of pain or a reduction in pain to the patient’s comfort goal  Outcome: Progressing     Problem: Knowledge Deficit - Standard  Goal: Patient and family/care givers will demonstrate understanding of plan of care, disease process/condition, diagnostic tests and medications  Outcome: Progressing   The patient is Stable - Low risk of patient condition declining or worsening    Shift Goals  Clinical Goals: Pain Control, Ambulation  Patient Goals: Comfort  Family Goals: N/A    Progress made toward(s) clinical / shift goals:  Patient denies pain at this time, Educated patient on plan of care this shift, Patient Ambulated x1 this shift with family, Pending discharge     Patient is not progressing towards the following goals:

## 2024-01-09 NOTE — PROGRESS NOTES
Assumed care at 1845. Pt resting in bed. A&ox 4  O2 weaned to RA. denies SOB  Pain controlled  Tolerating some diet  Pt attempted to ambulate. Pt immediately become flushed and dizzy. Pt feels like passing out. No LOC. Assisted back in bed. Dr. Ganser aware.  Pt feels better this AM. Able to get up to use BSC. Still c/o some lightheadedness.

## 2024-01-12 ENCOUNTER — OFFICE VISIT (OUTPATIENT)
Dept: MEDICAL GROUP | Facility: MEDICAL CENTER | Age: 69
End: 2024-01-12
Payer: MEDICARE

## 2024-01-12 VITALS
DIASTOLIC BLOOD PRESSURE: 60 MMHG | HEART RATE: 71 BPM | SYSTOLIC BLOOD PRESSURE: 112 MMHG | BODY MASS INDEX: 27.92 KG/M2 | HEIGHT: 65 IN | WEIGHT: 167.55 LBS | TEMPERATURE: 97.4 F | RESPIRATION RATE: 16 BRPM | OXYGEN SATURATION: 99 %

## 2024-01-12 DIAGNOSIS — I31.39 IDIOPATHIC PERICARDIAL EFFUSION: ICD-10-CM

## 2024-01-12 DIAGNOSIS — N18.31 CHRONIC KIDNEY DISEASE, STAGE 3A: ICD-10-CM

## 2024-01-12 DIAGNOSIS — D70.9 NEUTROPENIA, UNSPECIFIED TYPE (HCC): ICD-10-CM

## 2024-01-12 PROCEDURE — 3078F DIAST BP <80 MM HG: CPT | Performed by: FAMILY MEDICINE

## 2024-01-12 PROCEDURE — 3074F SYST BP LT 130 MM HG: CPT | Performed by: FAMILY MEDICINE

## 2024-01-12 PROCEDURE — 99214 OFFICE O/P EST MOD 30 MIN: CPT | Performed by: FAMILY MEDICINE

## 2024-01-12 ASSESSMENT — ENCOUNTER SYMPTOMS
COUGH: 0
CHILLS: 0
WHEEZING: 0
SPUTUM PRODUCTION: 0
SHORTNESS OF BREATH: 1
FEVER: 0

## 2024-01-12 ASSESSMENT — FIBROSIS 4 INDEX: FIB4 SCORE: 1.15

## 2024-01-12 NOTE — ASSESSMENT & PLAN NOTE
Chronic problem, unstable, recommend patient to drink more water.  Recheck labs in 3-week.  Discussed caution with NSAIDs

## 2024-01-12 NOTE — ASSESSMENT & PLAN NOTE
Chronic problem, stable, discussed rest and recovery period for this procedure.  Follow-up with cardiology.  She is scheduled for echocardiogram.  Will recheck her CBC and BMP in 3 weeks.  She is also scheduled to see surgeon.

## 2024-01-12 NOTE — ASSESSMENT & PLAN NOTE
Chronic problem, mildly low white cell count which could be likely due to procedure done recently, recheck labs in 3-week.

## 2024-01-12 NOTE — PROGRESS NOTES
FAMILY MEDICINE VISIT                                                               Chief complaint::Diagnoses of Neutropenia, unspecified type (HCC), Idiopathic pericardial effusion, and Chronic kidney disease, stage 3a (HCC) were pertinent to this visit.    History of present illness: Tita Pitts is a 68 y.o. female who presented for hospital follow-up      Problem   Idiopathic Pericardial Effusion    Chronic condition. Followed by cardiology .Was on colchicine indomethacin before.    Pericardial effusion seen on echocardiogram, had pericardiocentesis and was diagnosed with pericarditis.  Was recently treated with indomethacin.      Recently hospitalized for left thoracoscopy, pericardial window.  She is recovering from this procedure, she had procedure done 4 days ago.  She has fatigue symptoms and  shortness of breath on exertion     Neutropenia (Hcc)    Has history of neutropenia, currently following with Dr. Oleary hematology and oncology.  Had blood work done including vitamin B12, folic acid, SANJIV, HIV and hepatitis B which came back negative.  Last CBC showed mildly low white cell count at 4.1, she recently had procedure done.  Differential was not done, recheck labs in 3 weeks    Component      Latest Ref Rng 4/6/2022 8/3/2022 10/25/2022 12/27/2022 3/17/2023 4/7/2023 1/4/2024   WBC      4.8 - 10.8 K/uL 3.7 (L)  4.1 (L)  3.6 (L)  5.8  5.5  5.7  4.1 (L)       Legend:  (L) Low     Chronic Kidney Disease, Stage 3a (Hcc)    Recent GFR came back at 55 which got lowered from 58%.    Component      Latest Ref Rng 5/16/2023 9/12/2023 1/4/2024   GFR (CKD-EPI)      >60 mL/min/1.73 m 2 62  58 !  55 !       Lab Results   Component Value Date/Time    CREATININE 1.10 01/04/2024 09:39 AM                   Review of systems:     Review of Systems   Constitutional:  Positive for malaise/fatigue. Negative for chills and fever.   Respiratory:  Positive for shortness of breath. Negative for cough, sputum production and  "wheezing.    Cardiovascular:  Negative for chest pain and leg swelling.        Medications and Allergies:     Current Outpatient Medications   Medication Sig Dispense Refill    colchicine (COLCRYS) 0.6 MG Tab Take 0.6 mg by mouth every morning Monday through Friday.      lisinopril (PRINIVIL) 10 MG Tab Take 15 mg by mouth 2 times a day. 1.5 tablets (15 mg), twice daily      famotidine (PEPCID) 40 MG Tab TAKE 1 TABLET BY MOUTH EVERY  Tablet 3    Magnesium 400 MG Cap Take 400 mg by mouth every day.      calcium carbonate (OS-RADHA 500) 1250 (500 Ca) MG Tab Take 500 mg by mouth every day.      Ascorbic Acid (VITAMIN C PO) Take 1 Tablet by mouth every day.       No current facility-administered medications for this visit.          Vitals:    /60   Pulse 71   Temp 36.3 °C (97.4 °F)   Resp 16   Ht 1.651 m (5' 5\")   Wt 76 kg (167 lb 8.8 oz)   SpO2 99%  Body mass index is 27.88 kg/m².    Physical Exam:     Physical Exam  Constitutional:       Appearance: Normal appearance. She is well-developed and well-groomed.   HENT:      Head: Normocephalic and atraumatic.   Eyes:      General:         Right eye: No discharge.         Left eye: No discharge.      Conjunctiva/sclera: Conjunctivae normal.   Cardiovascular:      Rate and Rhythm: Normal rate and regular rhythm.      Heart sounds: Normal heart sounds. No murmur heard.     No friction rub. No gallop.   Pulmonary:      Effort: Pulmonary effort is normal. No respiratory distress.      Breath sounds: Normal breath sounds. No wheezing or rales.   Neurological:      General: No focal deficit present.      Mental Status: She is alert. Mental status is at baseline.      Gait: Gait is intact.   Psychiatric:         Mood and Affect: Mood and affect normal.         Behavior: Behavior normal.          Labs:  I reviewed with patient recent labs resulted on 1/8/2024.    Assessment/Plan:    [facility]    Diagnosis to address: D70.9 - Neutropenia, unspecified type " (HCC)  Assessment and plan: Chronic, stable. Continue with current defined treatment plan: Has history of neutropenia, currently following with Dr. Oleary hematology and oncology.  Had blood work done including vitamin B12, folic acid, SANJIV, HIV and hepatitis B which came back negative.  Last CBC showed mildly low white cell count at 4.1, she recently had procedure done.  Differential was not done, recheck labs in 3 weeks  Follow-up at least annually.  Diagnosis: N18.31 - Chronic kidney disease, stage 3a (HCC)  Assessment and plan: Chronic, exacerbated.  Recent filtration rate came back at 55% which reduced from 58%.  Treatment and follow up: Drink more water, recheck labs in 3-week  Last edited 01/12/24 11:08 PST by Bassam Marinelli M.D.          Problem List Items Addressed This Visit       Neutropenia (HCC)     Chronic problem, mildly low white cell count which could be likely due to procedure done recently, recheck labs in 3-week.         Relevant Orders    CBC WITH DIFFERENTIAL    Chronic kidney disease, stage 3a (HCC)     Chronic problem, unstable, recommend patient to drink more water.  Recheck labs in 3-week.  Discussed caution with NSAIDs         Relevant Orders    Basic Metabolic Panel    Idiopathic pericardial effusion     Chronic problem, stable, discussed rest and recovery period for this procedure.  Follow-up with cardiology.  She is scheduled for echocardiogram.  Will recheck her CBC and BMP in 3 weeks.  She is also scheduled to see surgeon.             Please note that this dictation was created using voice recognition software. I have made every reasonable attempt to correct obvious errors, but I expect that there are errors of grammar and possibly content that I did not discover before finalizing the note.    Follow up in 1/31/2024 for lab follow up.

## 2024-01-19 ENCOUNTER — APPOINTMENT (OUTPATIENT)
Dept: CARDIOLOGY | Facility: MEDICAL CENTER | Age: 69
End: 2024-01-19
Attending: INTERNAL MEDICINE
Payer: MEDICARE

## 2024-01-22 ENCOUNTER — OFFICE VISIT (OUTPATIENT)
Dept: BEHAVIORAL HEALTH | Facility: CLINIC | Age: 69
End: 2024-01-22
Payer: MEDICARE

## 2024-01-22 DIAGNOSIS — F43.29 GRIEF REACTION WITH PROLONGED BEREAVEMENT: ICD-10-CM

## 2024-01-22 PROCEDURE — 90837 PSYTX W PT 60 MINUTES: CPT | Performed by: MARRIAGE & FAMILY THERAPIST

## 2024-01-22 NOTE — LETTER
The following plan is in draft form.  Please refer to the current version for the most up-to-date information.                [facility]   Effective from: 2024  Effective to: 2024    Draft  Plan ID: [id number]               Participants       You: Tita    Your team: Therapist JOSE J Fournier (Therapist)          Patient Demographics       Patient Name  Tita Pitts Legal Sex  Female   1955 N  [id number] Address  [address] [facility] [place] [address] Phone  [phone] (Home)  [phone] (Mobile) *Preferred*          Problem List as of 2024 Date Reviewed: 2024            ICD-10-CM Priority Class Noted - Resolved    Grief F43.21   2022 - Present    Other fatigue R53.83   2022 - Present    Hormone replacement therapy Z79.890   2022 - Present    Overview Signed 2022  9:32 AM by Bassam Marinelli M.D.     She is currently on estradiol 0. 5 mg tablet which she takes half tablet daily for hormone replacement.  She has history of hysterectomy done in her 30s and she has been on just hormone replacement since then.  Has family history of breast cancer in her mother and grandmother         RESOLVED: BMI 28.0-28.9,adult Z68.28   2022 - 2022    Neutropenia (HCC) D70.9   2022 - Present    Overview Addendum 2024 11:08 AM by Bassam Marinelli M.D.     Has history of neutropenia, currently following with Dr. Oleary hematology and oncology.  Had blood work done including vitamin B12, folic acid, SANJIV, HIV and hepatitis B which came back negative.  Last CBC showed mildly low white cell count at 4.1, she recently had procedure done.  Differential was not done, recheck labs in 3 weeks    Component      Latest Ref Rng 2022 8/3/2022 10/25/2022 2022 3/17/2023 2023 2024   WBC      4.8 - 10.8 K/uL 3.7 (L)  4.1 (L)  3.6 (L)  5.8  5.5  5.7  4.1 (L)       Legend:  (L) Low         Chronic kidney disease, stage 3a (HCC)  N18.31   4/8/2022 - Present    Overview Addendum 1/12/2024 11:06 AM by Bassam Marinelli M.D.     Recent GFR came back at 55 which got lowered from 58%.    Component      Latest Ref Rng 5/16/2023 9/12/2023 1/4/2024   GFR (CKD-EPI)      >60 mL/min/1.73 m 2 62  58 !  55 !       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     Has family history of breast cancer in her mother and her grandmother.  She is currently on estrogen which she has been taking when she was in her 30s when she had a hysterectomy done.         BMI 26.0-26.9,adult Z68.26   11/2/2022 - Present    Osteopenia of multiple sites M85.89   11/2/2022 - Present    Overview Signed 11/2/2022  9:29 AM by Bassam Marinelli M.D.     Bone density scan on 8/23/2022 showed According to the World Health Organization classification, bone mineral density of this patient is osteopenic in the lumbar spine and left femur.  She is taking calcium and vitamin D supplementation.  No recent falls or fractures.         SOB (shortness of breath) R06.02   11/2/2022 - Present    Overview Signed 11/2/2022  9:23 AM by Bassam Marinelli M.D.     She reports that she got COVID in January and since then she has not been feeling to her normal self.  She has been experiencing shortness of breath on exertion and fatigue symptoms.  She has family history of congestive heart failure, coronary artery disease in his father.  Her last cholesterol numbers were normal.  Has been taking vitamin D and calcium supplementation.  No chest pain, palpitations, lower leg swelling.          Cardiomegaly I51.7   11/15/2022 - Present    Overview Signed 1/24/2023 12:38 PM by Bassam Marinelli M.D.     Last echo on 12/23/2022 showed Compared to the prior study on 11/21/22, pericardial effusion still   present but slightly smaller in size.  Normal LV systolic function  Normal RV size and systolic function  No significant valvular abnormalities  Small-Moderate pericardial effusion without echocardiographic evidence   of hemodynamic compromise.  Normal IVC size.    She is scheduled for another echocardiogram, currently following with cardiology.         Idiopathic pericardial effusion I31.39   11/15/2022 - Present    Overview Addendum 1/12/2024 11:10 AM by Bassam Marinelli M.D.     Chronic condition. Followed by cardiology .Was on colchicine indomethacin before.    Pericardial effusion seen on echocardiogram, had pericardiocentesis and was diagnosed with pericarditis.  Was recently treated with indomethacin.      Recently hospitalized for left thoracoscopy, pericardial window.  She is recovering from this procedure, she had procedure done 4 days ago.  She has fatigue symptoms and  shortness of breath on exertion         Primary hypertension I10   12/28/2022 - Present    Overview Addendum 10/18/2023 10:21 AM by Bassam Marinelli M.D.     Blood pressure today came back at 118/66.  She is on lisinopril 10 mg 2 times daily.  No side effects with this medication.  No symptoms.  Home blood pressure readings has been in 140s to 150s/70s to 90s range sometimes.         Breast pain, left N64.4   1/24/2023 - Present    Overview Signed 1/24/2023 12:36 PM by Bassam Marinelli M.D.     She has been experiencing pain on left side of her breast since last month.  She had pericardiocentesis recently.  Fluid was checked and that came back negative for malignancy.  She reports that 1 day she had sharp pain on left side of her breast as well as on her back which resolved on its own.  She is currently trying to taper her  off her Estrace.         Tinnitus of both ears H93.13   1/24/2023 - Present    Overview Signed 1/24/2023 12:40 PM by Bassam Marinelli M.D.     She has been experiencing tinnitus in both ears.  No hearing loss.  Tinnitus has been getting more worse         Acute streptococcal pharyngitis J02.0   3/17/2023 - Present    Overview Addendum 3/22/2023  8:09 AM by Reynaldo Way D.O.     Acute condition. She was recently seen on 3/17/23 and tested positive for strep. She was started on amoxicillin and has been taking it since. She has not felt much improvement in her symptoms so far.         Acute cough R05.1   3/21/2023 - Present    Overview Addendum 3/22/2023  8:16 AM by Reynaldo Way D.O.     Acute condition.    Character: dry cough  Onset: Leonel 3/19.23  Location: n/a  Duration: constant  Exacerbating factors: unknown  Relieving factors: none  Associated symptoms: none  Severity: persistent         Overweight with body mass index (BMI) of 26 to 26.9 in adult E66.3, Z68.26   4/24/2023 - Present    PVC (premature ventricular contraction) I49.3   5/5/2023 - Present    Premature atrial contractions I49.1   5/5/2023 - Present    Hypotension I95.9   5/5/2023 - Present    Irregular heart beat I49.9   5/5/2023 - Present    Thyroid antibody positive R76.8   9/12/2023 - Present     Current Medications            colchicine (COLCRYS) 0.6 MG Tab    lisinopril (PRINIVIL) 10 MG Tab    famotidine (PEPCID) 40 MG Tab    Magnesium 400 MG Cap    calcium carbonate (OS-RADHA 500) 1250 (500 Ca) MG Tab    Ascorbic Acid (VITAMIN C PO)          Treatment Plan Note       Therapist FELICITAS Fournier. Last edited by Therapist JOSE J Fournier on 1/22/2024  6:06 PM         Discussed therapeutic goals and course of treatment with patient to develop treatment plan.         Care Plan: Grief and Loss       Problem: Grief and Loss       Long-Range Goal: Patient will report and demonstrate resolving grief and loss       Start Date: 1/22/2024  "Expected End Date: 7/22/2024    Priority: High      Short-Term Goal: Increased understanding of grief and loss       Start Date: 1/22/2024 Expected End Date: 7/22/2024    Priority: High      Intervention: Assist client with identifying steps towards managing grief       Responsible User: JOSE J Fournier              Intervention: Encourage client to talk with others who have suffered losses as to how they resolved their grief       Responsible User: JOSE J Fournier              Intervention: Assist client with gaining awareness and accept that grief and loss are causing problems       Responsible User: JOSE J Fournier              Intervention: Assign/offer reading materials on grief and loss appropriate to stage of grief, type of loss, reading level, age and/or spiritual beliefs       Responsible User: JOSE J Fournier                      Short-Term Goal: Accept responsibility for change       Start Date: 1/22/2024 Expected End Date: 7/22/2024    Priority: Medium      Intervention: Assist client to deal with feelings about grief, survivor guilt, \"should haves\", \"if onlys\"       Responsible User: JOSE J Fournier                      Short-Term Goal: Correct irrational thinking relating to past grief and loss       Start Date: 1/22/2024 Expected End Date: 7/22/2024    Priority: Medium      Intervention: Assist client to identify issues of grief and loss from the past and resolve or let go       Responsible User: JOSE J Fournier                      Short-Term Goal: Deal with uncomfortable feelings resulting from grief or loss       Start Date: 1/22/2024 Expected End Date: 7/22/2024    This Visit's Progress: On track    Priority: Medium      Intervention: Assist client in dealing with feelings of sadness       Responsible User: JOSE J Fournier              Intervention: Encourage client to bring pictures or mementos linked with the loss and talk about them       " Responsible User: JOSE J Fournier              Intervention: Assist client in identifying and list how he/she depended upon significant other, verbalizing and resolving accompanying feelings of abandonment and being left alone       Responsible User: JOSE J Fournier              Intervention: Teach client coping techniques to address feelings resulting from grief       Responsible User: JOSE J Fournier              Intervention: Encourage client to rely upon their spiritual hunter and fellowship as a source of support       Responsible User: JOSE J Fournier                                        Signatures             Patient:  Tita Stephenson Hong  Date              Parent / Legal Guardian Signature                    Please Print Name  Relationship to Patient                   Signature  Date
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
## 2024-01-26 ENCOUNTER — HOSPITAL ENCOUNTER (OUTPATIENT)
Dept: LAB | Facility: MEDICAL CENTER | Age: 69
End: 2024-01-26
Attending: FAMILY MEDICINE
Payer: MEDICARE

## 2024-01-26 DIAGNOSIS — N18.31 CHRONIC KIDNEY DISEASE, STAGE 3A: ICD-10-CM

## 2024-01-26 DIAGNOSIS — D70.9 NEUTROPENIA, UNSPECIFIED TYPE (HCC): ICD-10-CM

## 2024-01-26 LAB
ANION GAP SERPL CALC-SCNC: 9 MMOL/L (ref 7–16)
BASOPHILS # BLD AUTO: 2.9 % (ref 0–1.8)
BASOPHILS # BLD: 0.12 K/UL (ref 0–0.12)
BUN SERPL-MCNC: 15 MG/DL (ref 8–22)
CALCIUM SERPL-MCNC: 9.4 MG/DL (ref 8.5–10.5)
CHLORIDE SERPL-SCNC: 104 MMOL/L (ref 96–112)
CO2 SERPL-SCNC: 26 MMOL/L (ref 20–33)
CREAT SERPL-MCNC: 1.04 MG/DL (ref 0.5–1.4)
EOSINOPHIL # BLD AUTO: 0.14 K/UL (ref 0–0.51)
EOSINOPHIL NFR BLD: 3.4 % (ref 0–6.9)
ERYTHROCYTE [DISTWIDTH] IN BLOOD BY AUTOMATED COUNT: 43.9 FL (ref 35.9–50)
GFR SERPLBLD CREATININE-BSD FMLA CKD-EPI: 59 ML/MIN/1.73 M 2
GLUCOSE SERPL-MCNC: 93 MG/DL (ref 65–99)
HCT VFR BLD AUTO: 38.7 % (ref 37–47)
HGB BLD-MCNC: 13.1 G/DL (ref 12–16)
IMM GRANULOCYTES # BLD AUTO: 0 K/UL (ref 0–0.11)
IMM GRANULOCYTES NFR BLD AUTO: 0 % (ref 0–0.9)
LYMPHOCYTES # BLD AUTO: 1.46 K/UL (ref 1–4.8)
LYMPHOCYTES NFR BLD: 35.9 % (ref 22–41)
MCH RBC QN AUTO: 32.1 PG (ref 27–33)
MCHC RBC AUTO-ENTMCNC: 33.9 G/DL (ref 32.2–35.5)
MCV RBC AUTO: 94.9 FL (ref 81.4–97.8)
MONOCYTES # BLD AUTO: 0.34 K/UL (ref 0–0.85)
MONOCYTES NFR BLD AUTO: 8.4 % (ref 0–13.4)
NEUTROPHILS # BLD AUTO: 2.01 K/UL (ref 1.82–7.42)
NEUTROPHILS NFR BLD: 49.4 % (ref 44–72)
NRBC # BLD AUTO: 0 K/UL
NRBC BLD-RTO: 0 /100 WBC (ref 0–0.2)
PLATELET # BLD AUTO: 347 K/UL (ref 164–446)
PMV BLD AUTO: 10.7 FL (ref 9–12.9)
POTASSIUM SERPL-SCNC: 3.9 MMOL/L (ref 3.6–5.5)
RBC # BLD AUTO: 4.08 M/UL (ref 4.2–5.4)
SODIUM SERPL-SCNC: 139 MMOL/L (ref 135–145)
WBC # BLD AUTO: 4.1 K/UL (ref 4.8–10.8)

## 2024-01-26 PROCEDURE — 36415 COLL VENOUS BLD VENIPUNCTURE: CPT

## 2024-01-26 PROCEDURE — 85025 COMPLETE CBC W/AUTO DIFF WBC: CPT

## 2024-01-26 PROCEDURE — 80048 BASIC METABOLIC PNL TOTAL CA: CPT

## 2024-01-31 ENCOUNTER — OFFICE VISIT (OUTPATIENT)
Dept: MEDICAL GROUP | Facility: MEDICAL CENTER | Age: 69
End: 2024-01-31
Payer: MEDICARE

## 2024-01-31 VITALS
SYSTOLIC BLOOD PRESSURE: 110 MMHG | HEIGHT: 65 IN | OXYGEN SATURATION: 97 % | DIASTOLIC BLOOD PRESSURE: 64 MMHG | RESPIRATION RATE: 16 BRPM | HEART RATE: 74 BPM | WEIGHT: 171.96 LBS | BODY MASS INDEX: 28.65 KG/M2 | TEMPERATURE: 96.7 F

## 2024-01-31 DIAGNOSIS — D70.9 NEUTROPENIA, UNSPECIFIED TYPE (HCC): ICD-10-CM

## 2024-01-31 DIAGNOSIS — M79.602 LEFT ARM PAIN: ICD-10-CM

## 2024-01-31 DIAGNOSIS — N18.31 CHRONIC KIDNEY DISEASE, STAGE 3A: ICD-10-CM

## 2024-01-31 DIAGNOSIS — I10 PRIMARY HYPERTENSION: ICD-10-CM

## 2024-01-31 PROBLEM — R05.1 ACUTE COUGH: Status: RESOLVED | Noted: 2023-03-21 | Resolved: 2024-01-31

## 2024-01-31 PROBLEM — J02.0 ACUTE STREPTOCOCCAL PHARYNGITIS: Status: RESOLVED | Noted: 2023-03-17 | Resolved: 2024-01-31

## 2024-01-31 PROBLEM — F43.29 GRIEF REACTION WITH PROLONGED BEREAVEMENT: Status: RESOLVED | Noted: 2022-09-01 | Resolved: 2024-01-31

## 2024-01-31 PROCEDURE — 3074F SYST BP LT 130 MM HG: CPT | Performed by: FAMILY MEDICINE

## 2024-01-31 PROCEDURE — 3078F DIAST BP <80 MM HG: CPT | Performed by: FAMILY MEDICINE

## 2024-01-31 PROCEDURE — 99214 OFFICE O/P EST MOD 30 MIN: CPT | Performed by: FAMILY MEDICINE

## 2024-01-31 ASSESSMENT — ENCOUNTER SYMPTOMS
FEVER: 0
CHILLS: 0
PALPITATIONS: 0

## 2024-01-31 ASSESSMENT — FIBROSIS 4 INDEX: FIB4 SCORE: 1.08

## 2024-01-31 NOTE — PROGRESS NOTES
FAMILY MEDICINE VISIT                                                               Chief complaint::Diagnoses of Neutropenia, unspecified type (HCC), Chronic kidney disease, stage 3a (HCC), Primary hypertension, and Left arm pain were pertinent to this visit.    History of present illness: Tita Pitts is a 68 y.o. female who presented for lab follow-up.    Problem   Left Arm Pain    She has been experiencing left arm pain since she was having pericardial effusion.  Pain is at junction of biceps and triceps tendon.  Pain is much better now but still has some pain.  There is no redness or swelling at this area.  No trauma.  No aggravating or relieving factors.     Primary Hypertension    Blood pressure today came back at 110/64.  She is on lisinopril 10 mg 2 times daily.  No side effects with this medication.  No symptoms.    She recently had procedure done for pericardial effusion.  She reports that she was feeling good and started gardening and felt pain afterwards.  Pain is now getting better.      Neutropenia (Hcc)    Has history of neutropenia, currently following with Dr. Oleary hematology and oncology.  Had blood work done including vitamin B12, folic acid, SANJIV, HIV and hepatitis B which came back negative.  Recent CBC showed mildly low white cell count at 4.1, she recently had procedure done.  Neutrophil count is normal.  Mild elevation in basophil levels.    Component      Latest Ref Rng 1/4/2024 1/26/2024   WBC      4.8 - 10.8 K/uL 4.1 (L)  4.1 (L)    RBC      4.20 - 5.40 M/uL 4.16 (L)  4.08 (L)    Hemoglobin      12.0 - 16.0 g/dL 13.2  13.1    Hematocrit      37.0 - 47.0 % 38.9  38.7    MCV      81.4 - 97.8 fL 93.5  94.9    MCH      27.0 - 33.0 pg 31.7  32.1    MCHC      32.2 - 35.5 g/dL 33.9  33.9    RDW      35.9 - 50.0 fL 43.1  43.9    Platelet Count      164 - 446 K/uL 326  347    MPV      9.0 - 12.9 fL 10.3  10.7    Neutrophils-Polys      44.00 - 72.00 %  49.40    Lymphocytes      22.00 - 41.00  "%  35.90    Monocytes      0.00 - 13.40 %  8.40    Eosinophils      0.00 - 6.90 %  3.40    Basophils      0.00 - 1.80 %  2.90 (H)    Immature Granulocytes      0.00 - 0.90 %  0.00    Nucleated RBC      0.00 - 0.20 /100 WBC  0.00    Neutrophils (Absolute)      1.82 - 7.42 K/uL  2.01    Lymphs (Absolute)      1.00 - 4.80 K/uL  1.46    Monos (Absolute)      0.00 - 0.85 K/uL  0.34    Eos (Absolute)      0.00 - 0.51 K/uL  0.14    Baso (Absolute)      0.00 - 0.12 K/uL  0.12    Immature Granulocytes (abs)      0.00 - 0.11 K/uL  0.00    NRBC (Absolute)      K/uL  0.00       Legend:  (L) Low  (H) High     Chronic Kidney Disease, Stage 3a (Hcc)    Recent GFR improved to 59%.    Lab Results   Component Value Date/Time    CREATININE 1.04 01/26/2024 10:46 AM            Review of Systems   Constitutional:  Negative for chills and fever.   Cardiovascular:  Negative for chest pain, palpitations and leg swelling.   Musculoskeletal:         Left upper arm pain        Medications and Allergies:     Current Outpatient Medications   Medication Sig Dispense Refill    colchicine (COLCRYS) 0.6 MG Tab Take 0.6 mg by mouth every morning Monday through Friday.      lisinopril (PRINIVIL) 10 MG Tab Take 15 mg by mouth 2 times a day. 1.5 tablets (15 mg), twice daily      famotidine (PEPCID) 40 MG Tab TAKE 1 TABLET BY MOUTH EVERY  Tablet 3    Magnesium 400 MG Cap Take 400 mg by mouth every day.      calcium carbonate (OS-RADHA 500) 1250 (500 Ca) MG Tab Take 500 mg by mouth every day.      Ascorbic Acid (VITAMIN C PO) Take 1 Tablet by mouth every day.       No current facility-administered medications for this visit.          Vitals:    /64   Pulse 74   Temp 35.9 °C (96.7 °F)   Resp 16   Ht 1.651 m (5' 5\")   Wt 78 kg (171 lb 15.3 oz)   SpO2 97%  Body mass index is 28.62 kg/m².    Physical Exam:     Physical Exam  Constitutional:       Appearance: Normal appearance. She is well-developed and well-groomed.   HENT:      Head: " Normocephalic and atraumatic.      Right Ear: External ear normal.      Left Ear: External ear normal.   Eyes:      General:         Right eye: No discharge.         Left eye: No discharge.      Conjunctiva/sclera: Conjunctivae normal.   Cardiovascular:      Rate and Rhythm: Normal rate.   Pulmonary:      Effort: Pulmonary effort is normal. No respiratory distress.   Musculoskeletal:      Cervical back: Neck supple.      Comments: Tenderness on palpation at biceps tendon, full range of motion at left arm   Skin:     Findings: No rash.   Neurological:      Mental Status: She is alert.   Psychiatric:         Mood and Affect: Mood and affect normal.         Behavior: Behavior normal.          Labs:  I reviewed with patient recent labs resulted on 1/26/2024.    Assessment/Plan:         Problem List Items Addressed This Visit       Neutropenia (HCC)     Chronic problem, overall stable, recheck labs in 3 to 4 months.         Relevant Orders    CBC WITH DIFFERENTIAL    Chronic kidney disease, stage 3a (HCC)     Chronic problem, mildly low GFR, filtration rate of kidneys is improving.  Recheck labs in 3 to 4-month.  Avoid nephrotoxic agents.  Drink plenty of water.         Relevant Orders    Comp Metabolic Panel    Primary hypertension     Chronic problem, stable, continue lisinopril 10 mg twice daily.  Scheduled for echocardiogram.  Follow-up with cardiology.           Relevant Orders    Lipid Profile    Left arm pain     New problem, unstable, mild tenderness on palpation at biceps tendon.  She is recovering from cardiac surgery.  Will monitor the symptoms closely.  Once she fully gets recovered from the surgery then we will send her to physical therapy for tendinitis.             Please note that this dictation was created using voice recognition software. I have made every reasonable attempt to correct obvious errors, but I expect that there are errors of grammar and possibly content that I did not discover before  finalizing the note.    Follow up in 3 to 4 months for lab follow up.

## 2024-01-31 NOTE — ASSESSMENT & PLAN NOTE
Chronic problem, mildly low GFR, filtration rate of kidneys is improving.  Recheck labs in 3 to 4-month.  Avoid nephrotoxic agents.  Drink plenty of water.

## 2024-01-31 NOTE — ASSESSMENT & PLAN NOTE
Chronic problem, stable, continue lisinopril 10 mg twice daily.  Scheduled for echocardiogram.  Follow-up with cardiology.

## 2024-01-31 NOTE — ASSESSMENT & PLAN NOTE
New problem, unstable, mild tenderness on palpation at biceps tendon.  She is recovering from cardiac surgery.  Will monitor the symptoms closely.  Once she fully gets recovered from the surgery then we will send her to physical therapy for tendinitis.

## 2024-02-05 ENCOUNTER — HOSPITAL ENCOUNTER (OUTPATIENT)
Dept: CARDIOLOGY | Facility: MEDICAL CENTER | Age: 69
End: 2024-02-05
Attending: INTERNAL MEDICINE
Payer: MEDICARE

## 2024-02-05 DIAGNOSIS — I31.39 PERICARDIAL EFFUSION: ICD-10-CM

## 2024-02-05 LAB
LV EJECT FRACT  99904: 60
LV EJECT FRACT MOD 2C 99903: 66.34
LV EJECT FRACT MOD 4C 99902: 57.63
LV EJECT FRACT MOD BP 99901: 61.58

## 2024-02-05 PROCEDURE — 93306 TTE W/DOPPLER COMPLETE: CPT | Mod: 26 | Performed by: INTERNAL MEDICINE

## 2024-02-05 PROCEDURE — 93306 TTE W/DOPPLER COMPLETE: CPT

## 2024-02-06 ENCOUNTER — APPOINTMENT (OUTPATIENT)
Dept: BEHAVIORAL HEALTH | Facility: CLINIC | Age: 69
End: 2024-02-06
Payer: MEDICARE

## 2024-02-13 ENCOUNTER — OFFICE VISIT (OUTPATIENT)
Dept: MEDICAL GROUP | Facility: PHYSICIAN GROUP | Age: 69
End: 2024-02-13
Attending: FAMILY MEDICINE
Payer: MEDICARE

## 2024-02-13 VITALS
HEIGHT: 65 IN | WEIGHT: 174 LBS | BODY MASS INDEX: 28.99 KG/M2 | SYSTOLIC BLOOD PRESSURE: 120 MMHG | DIASTOLIC BLOOD PRESSURE: 80 MMHG

## 2024-02-13 DIAGNOSIS — I31.39 IDIOPATHIC PERICARDIAL EFFUSION: ICD-10-CM

## 2024-02-13 DIAGNOSIS — M85.89 OSTEOPENIA OF MULTIPLE SITES: ICD-10-CM

## 2024-02-13 DIAGNOSIS — F33.0 MILD EPISODE OF RECURRENT MAJOR DEPRESSIVE DISORDER (HCC): ICD-10-CM

## 2024-02-13 DIAGNOSIS — I10 PRIMARY HYPERTENSION: ICD-10-CM

## 2024-02-13 DIAGNOSIS — N18.31 CHRONIC KIDNEY DISEASE, STAGE 3A: ICD-10-CM

## 2024-02-13 PROBLEM — F32.0 CURRENT MILD EPISODE OF MAJOR DEPRESSIVE DISORDER WITHOUT PRIOR EPISODE (HCC): Status: ACTIVE | Noted: 2024-02-13

## 2024-02-13 PROCEDURE — 1126F AMNT PAIN NOTED NONE PRSNT: CPT

## 2024-02-13 PROCEDURE — 3074F SYST BP LT 130 MM HG: CPT

## 2024-02-13 PROCEDURE — 3079F DIAST BP 80-89 MM HG: CPT

## 2024-02-13 PROCEDURE — G0439 PPPS, SUBSEQ VISIT: HCPCS

## 2024-02-13 RX ORDER — IBUPROFEN 200 MG
CAPSULE ORAL
COMMUNITY

## 2024-02-13 SDOH — ECONOMIC STABILITY: FOOD INSECURITY: WITHIN THE PAST 12 MONTHS, YOU WORRIED THAT YOUR FOOD WOULD RUN OUT BEFORE YOU GOT MONEY TO BUY MORE.: NEVER TRUE

## 2024-02-13 SDOH — ECONOMIC STABILITY: INCOME INSECURITY: IN THE LAST 12 MONTHS, WAS THERE A TIME WHEN YOU WERE NOT ABLE TO PAY THE MORTGAGE OR RENT ON TIME?: NO

## 2024-02-13 SDOH — ECONOMIC STABILITY: INCOME INSECURITY: HOW HARD IS IT FOR YOU TO PAY FOR THE VERY BASICS LIKE FOOD, HOUSING, MEDICAL CARE, AND HEATING?: NOT VERY HARD

## 2024-02-13 SDOH — ECONOMIC STABILITY: FOOD INSECURITY: WITHIN THE PAST 12 MONTHS, THE FOOD YOU BOUGHT JUST DIDN'T LAST AND YOU DIDN'T HAVE MONEY TO GET MORE.: NEVER TRUE

## 2024-02-13 SDOH — ECONOMIC STABILITY: HOUSING INSECURITY: IN THE LAST 12 MONTHS, HOW MANY PLACES HAVE YOU LIVED?: 1

## 2024-02-13 ASSESSMENT — ACTIVITIES OF DAILY LIVING (ADL): BATHING_REQUIRES_ASSISTANCE: 0

## 2024-02-13 ASSESSMENT — PATIENT HEALTH QUESTIONNAIRE - PHQ9
CLINICAL INTERPRETATION OF PHQ2 SCORE: 3
SUM OF ALL RESPONSES TO PHQ QUESTIONS 1-9: 6
5. POOR APPETITE OR OVEREATING: 0 - NOT AT ALL

## 2024-02-13 ASSESSMENT — FIBROSIS 4 INDEX: FIB4 SCORE: 1.08

## 2024-02-13 ASSESSMENT — PAIN SCALES - GENERAL: PAINLEVEL: NO PAIN

## 2024-02-13 ASSESSMENT — ENCOUNTER SYMPTOMS: GENERAL WELL-BEING: FAIR

## 2024-02-13 NOTE — ASSESSMENT & PLAN NOTE
Chronic, stable. Last DEXA 2022 revealed lumbar spine T score of -2.3 and proximal left femur T score of -1.7. She does take calcium and vitamin D supplementation, but has stopped recently. Does engage in weightbearing exercise. No hx of fractures. Continue to follow up with PCP as previously scheduled.

## 2024-02-13 NOTE — PROGRESS NOTES
Comprehensive Health Assessment Program     Tita Pitts is a 68 y.o. here for her comprehensive health assessment.    Patient Active Problem List    Diagnosis Date Noted    Mild episode of recurrent major depressive disorder (HCC) 02/13/2024    Left arm pain 01/31/2024    Thyroid antibody positive 09/12/2023    PVC (premature ventricular contraction) 05/05/2023    Premature atrial contractions 05/05/2023    Hypotension 05/05/2023    Irregular heart beat 05/05/2023    Overweight with body mass index (BMI) of 26 to 26.9 in adult 04/24/2023    Breast pain, left 01/24/2023    Tinnitus of both ears 01/24/2023    Primary hypertension 12/28/2022    Cardiomegaly 11/15/2022    Idiopathic pericardial effusion 11/15/2022    Osteopenia of multiple sites 11/02/2022    SOB (shortness of breath) 11/02/2022    Neutropenia ([facility]) 04/08/2022    Chronic kidney disease, stage 3a ([facility]) 04/08/2022    Grief 04/05/2022    Other fatigue 04/05/2022    Hormone replacement therapy 04/05/2022    Family history of breast cancer 06/27/2019       Current Outpatient Medications   Medication Sig Dispense Refill    colchicine (COLCRYS) 0.6 MG Tab Take 0.6 mg by mouth every morning Monday through Friday.      lisinopril (PRINIVIL) 10 MG Tab Take 15 mg by mouth 2 times a day. 1.5 tablets (15 mg), twice daily      famotidine (PEPCID) 40 MG Tab TAKE 1 TABLET BY MOUTH EVERY  Tablet 3    Ascorbic Acid (VITAMIN C PO) Take 1 Tablet by mouth every day.      Calcium Carb-Cholecalciferol (CALCIUM 500/VITAMIN D) 500-3.125 MG-MCG Tab Take  by mouth. (Patient not taking: Reported on 2/13/2024)      Magnesium 400 MG Cap Take 400 mg by mouth every day. (Patient not taking: Reported on 2/13/2024)       No current facility-administered medications for this visit.          Current supplements as per medication list.     Allergies:   Meclizine, Other drug, and Macrodantin [nitrofurantoin]  Social History     Tobacco Use    Smoking status: Never      Passive exposure: Past    Smokeless tobacco: Never   Vaping Use    Vaping Use: Never used   Substance Use Topics    Alcohol use: Not Currently     Alcohol/week: 0.6 oz     Types: 1 Glasses of wine per week     Comment: Some alcohol socially over the years.  Not regular use    Drug use: Never     Family History   Problem Relation Age of Onset    Breast Cancer Mother     Diabetes Mother         Developed in 70s, at 90 Kidney failure    Hypertension Mother         diagnosed in 70s    Hyperlipidemia Mother         diagnosed in 70s    Kidney Disease Mother     Heart Disease Father          at 64, CHF, CAD    Heart Disease Brother         Heart Attack at 72    Stroke Brother         occurred at 78    Diabetes Maternal Uncle          at 68     Tita  has a past medical history of Allergy, Anesthesia, Breath shortness (), Bronchitis, Cataract, Gynecological disorder (), Hypertension, Pericarditis (2023), Pneumonia, PONV (postoperative nausea and vomiting), and Psychiatric problem (2022).   Past Surgical History:   Procedure Laterality Date    DC THORACOSCOPY,DX NO BX Left 2024    Procedure: LEFT THORACOSCOPY, PERICARDIAL WINDOW;  Surgeon: John H Ganser, M.D.;  Location: SURGERY [facility];  Service: General    PERICARDIAL WINDOW Left 2024    Procedure: CREATION, PERICARDIAL WINDOW;  Surgeon: John H Ganser, M.D.;  Location: SURGERY [facility];  Service: General    APPENDECTOMY      ABDOMINAL HYSTERECTOMY TOTAL      GYN SURGERY      laparoscopy for endometriosis    OTHER CARDIAC SURGERY         Screening:  In the last six months have you experienced any leakage of urine? No    Depression Screening  Little interest or pleasure in doing things?  1 - several days  Feeling down, depressed , or hopeless? 2 - more than half the days  Trouble falling or staying asleep, or sleeping too much?  1 - several days  Feeling tired or having little energy?  2 - more than half the  days  Poor appetite or overeating?  0 - not at all  Feeling bad about yourself - or that you are a failure or have let yourself or your family down? 0 - not at all  Trouble concentrating on things, such as reading the newspaper or watching television? 0 - not at all  Moving or speaking so slowly that other people could have noticed.  Or the opposite - being so fidgety or restless that you have been moving around a lot more than usual?  0 - not at all  Thoughts that you would be better off dead, or of hurting yourself?  0 - not at all  Patient Health Questionnaire Score: 6    If depressive symptoms identified deferred to follow up visit unless specifically addressed in assessment and plan.    Interpretation of PHQ-9 Total Score   Score Severity   1-4 No Depression   5-9 Mild Depression   10-14 Moderate Depression   15-19 Moderately Severe Depression   20-27 Severe Depression    Screening for Cognitive Impairment  Do you or any of your friends or family members have any concern about your memory? No  Three Minute Recall (Banana, Sunrise, Chair) 3/3    Sebastian clock face with all 12 numbers and set the hands to show 20 past 8.  Yes    Cognitive concerns identified deferred for follow up unless specifically addressed in assessment and plan.    Fall Risk Assessment  Has the patient had two or more falls in the last year or any fall with injury in the last year?  No    Safety Assessment  Do you always wear your seatbelt?  Yes  Any changes to home needed to function safely? No  Difficulty hearing.  Yes, she does not wear hearing aids. Has had her hearing checked but was not ready for hearing aids.   Patient counseled about all safety risks that were identified.    Functional Assessment ADLs  Are there any barriers preventing you from cooking for yourself or meeting nutritional needs?  No.    Are there any barriers preventing you from driving safely or obtaining transportation?  No.    Are there any barriers preventing you  from using a telephone or calling for help?  No    Are there any barriers preventing you from shopping?  No.    Are there any barriers preventing you from taking care of your own finances?  No    Are there any barriers preventing you from managing your medications?  No    Are there any barriers preventing you from showering, bathing or dressing yourself? No    Are there any barriers preventing you from doing housework or laundry? No    Are there any barriers preventing you from using the toilet?No    Are you currently engaging in any exercise or physical activity?  No.  She has been dealing with cardio problems. Had a cardiac window procedure Jan 8th. Recovered well.    Self-Assessment of Health  What is your perception of your health? Fair    Do you sleep more than six hours a night? Yes    In the past 7 days, how much did pain keep you from doing your normal work? Some Right hip pain may be arthritis   Tendonitis left arm   Do you spend quality time with family or friends (virtually or in person)? Yes    Do you usually eat a heart healthy diet that constists of a variety of fruits, vegetables, whole grains and fiber? Yes    Do you eat foods high in fat and/or Fast Food more than three times per week? Yes    How concerned are you that your medical conditions are not being well managed? Not at all    Are you worried that in the next 2 months, you may not have stable housing that you own, rent, or stay in as part of a household? No      Advance Care Planning  Do you have an Advance Directive, Living Will, Durable Power of , or POLST? Yes  Advance Directive Living Will Durable Power of    is not on file - instructed patient to bring in a copy to scan into their chart      Health Maintenance Summary            Annual Wellness Visit (Yearly) Next due on 2/13/2025 02/13/2024  Level of Service: ANNUAL WELLNESS VISIT-INCLUDES PPPS SUBSEQUE*    04/24/2023  Level of Service: ND ANNUAL WELLNESS  VISIT-INCLUDES PPPS SUBSEQUE*    2022  Level of Service: AK ANNUAL WELLNESS VISIT-INCLUDES PPPS SUBSEQUE*              Mammogram (Every 2 Years) Next due on 2023  MA-SCREENING MAMMO BILAT W/TOMOSYNTHESIS W/CAD    2023  MA-DIAGNOSTIC MAMMO LEFT W/TOMOSYNTHESIS W/CAD    2022  MA-SCREENING MAMMO BILAT W/TOMOSYNTHESIS W/CAD    2011  MA-SCREEING MAMMOGRAM W/ CAD              Colorectal Cancer Screening (Colonoscopy - Every 5 Years) Next due on 10/23/2025      10/23/2020  REFERRAL TO GI FOR COLONOSCOPY              IMM DTaP/Tdap/Td Vaccine (2 - Td or Tdap) Next due on 2016  Imm Admin: Tdap Vaccine              Bone Density Scan (Every 5 Years) Next due on 2022  DS-BONE DENSITY STUDY (DEXA)              Pneumococcal Vaccine: 65+ Years (Series Information) Completed      08/10/2022  Imm Admin: Pneumococcal Conjugate Vaccine (PCV20)    2017  Imm Admin: Pneumococcal polysaccharide vaccine (PPSV-23)              Hepatitis C Screening  Tentatively Complete      10/25/2022  Hepatitis C Antibody component of HEPATITIS PANEL ACUTE(4 COMPONENTS)    2022  Hepatitis C Antibody component of HCV Scrn ( 3883-2062 1xLife)              Influenza Vaccine (Series Information) Completed      10/16/2023  Imm Admin: Influenza Vaccine Adult HD    10/03/2022  Imm Admin: Influenza Vaccine Adult HD    10/15/2021  Imm Admin: Influenza Vaccine Quad Inj (Pf)    2020  Imm Admin: Influenza Vac Subunit Quad Inj (Pf)    10/17/2019  Imm Admin: Influenza Vaccine Quad Inj (Pf)    Only the first 5 history entries have been loaded, but more history exists.              COVID-19 Vaccine (Series Information) Completed      2023  Imm Admin: Comirnaty (Covid-19 Vaccine, Mrna, [id number] Formula)    2021  Imm Admin: PFIZER PURPLE CAP SARS-COV-2 VACCINATION (12+)    2021  Imm Admin: PFIZER PURPLE CAP SARS-COV-2 VACCINATION (12+)    2021  " Imm Admin: PFIZER PURPLE CAP SARS-COV-2 VACCINATION (12+)              Zoster (Shingles) Vaccines (Series Information) Completed      02/09/2024  Outside Immunization: Zoster Julian (Shingrix)    10/30/2023  Outside Immunization: Zoster Julian (Shingrix)    09/30/2016  Imm Admin: Zoster Vaccine Live (ZVL) (Zostavax) - HISTORICAL DATA              Hepatitis A Vaccine (Hep A) (Series Information) Aged Out      No completion history exists for this topic.              Hepatitis B Vaccine (Hep B) (Series Information) Aged Out      No completion history exists for this topic.              HPV Vaccines (Series Information) Aged Out      No completion history exists for this topic.              Polio Vaccine (Inactivated Polio) (Series Information) Aged Out      No completion history exists for this topic.              Meningococcal Immunization (Series Information) Aged Out      No completion history exists for this topic.                    Patient Care Team:  Bassam Marinelli M.D. as PCP - General (Family Medicine)  Zee Rodgers M.D. as PCP - [facility] Paneled (Family Medicine)    Financial Resource Strain: Low Risk  (2/13/2024)    Overall Financial Resource Strain (CARDIA)     Difficulty of Paying Living Expenses: Not very hard      Transportation Needs: No Transportation Needs (2/13/2024)    PRAPARE - Transportation     Lack of Transportation (Medical): No     Lack of Transportation (Non-Medical): No      Food Insecurity: No Food Insecurity (2/13/2024)    Hunger Vital Sign     Worried About Running Out of Food in the Last Year: Never true     Ran Out of Food in the Last Year: Never true        Encounter Vitals  Blood Pressure : 120/80  Weight: 78.9 kg (174 lb)  Height: 165.1 cm (5' 5\")  BMI (Calculated): 28.96  Pain Score: No pain     Alert, oriented in no acute distress.  Eye contact is good, speech goal directed, affect calm.    Assessment and Plan. The following treatment and monitoring plan is " recommended:  Chronic kidney disease, stage 3a (HCC)  Chronic, stable. Last GFR in Jan 2024 was 59. We discussed avoiding nephrotoxic medication such as NSAIDs as well as maintaining adequate hydration. Follow up with PCP for continued monitoring.      Osteopenia of multiple sites  Chronic, stable. Last DEXA 2022 revealed lumbar spine T score of -2.3 and proximal left femur T score of -1.7. She does take calcium and vitamin D supplementation, but has stopped recently. Does engage in weightbearing exercise. No hx of fractures. Continue to follow up with PCP as previously scheduled.      Primary hypertension  Chronic, Stable. BP in office today is 120/80. She does check her BP at home. She currently takes lisinopril 10 mg BID. Follow up with cardiology for continued monitoring and management.      Mild episode of recurrent major depressive disorder (HCC)  Chronic, stable. PHQ-9 in office today is 6. States she has been dealing with this since her  passed away in Sep  2021. She is not on any pharmacotherapy. She has gone to counseling for a year and a half. Sees them [id number]. States simple life stuff can overwhelm her. Has tried paxil in the past and weaned herself off of it. Not interested in starting back on any medications. Follow up with PCP as needed.      Idiopathic pericardial effusion  Chronic, stable. Continue with current defined treatment plan: colchicine. She follows with cardiology; has an appointment with them tomorrow. Follow-up as previously scheduled.      Services suggested: No services needed at this time  Health Care Screening: Age-appropriate preventive services recommended by USPTF and ACIP covered by Medicare were discussed today. Services ordered if indicated and agreed upon by the patient.  Referrals offered: Community-based lifestyle interventions to reduce health risks and promote self-management and wellness, fall prevention, nutrition, physical activity, tobacco-use cessation, weight  loss, and mental health services as per orders if indicated.    Discussion today about general wellness and lifestyle habits:    Prevent falls and reduce trip hazards; Cautioned about securing or removing rugs.  Have a working fire alarm and carbon monoxide detector.  Engage in regular physical activity and social activities.    Follow-up: Return for appointment with Primary Care Provider as needed..

## 2024-02-13 NOTE — ASSESSMENT & PLAN NOTE
Chronic, Stable. BP in office today is 120/80. She does check her BP at home. She currently takes lisinopril 10 mg BID. Follow up with cardiology for continued monitoring and management.

## 2024-02-13 NOTE — ASSESSMENT & PLAN NOTE
Chronic, stable. Last GFR in Jan 2024 was 59. We discussed avoiding nephrotoxic medication such as NSAIDs as well as maintaining adequate hydration. Follow up with PCP for continued monitoring.

## 2024-02-13 NOTE — ASSESSMENT & PLAN NOTE
Chronic, stable. Continue with current defined treatment plan: colchicine. She follows with cardiology; has an appointment with them tomorrow. Follow-up as previously scheduled.

## 2024-02-13 NOTE — ASSESSMENT & PLAN NOTE
Chronic, stable. PHQ-9 in office today is 6. States she has been dealing with this since her  passed away in Sep  2021. She is not on any pharmacotherapy. She has gone to counseling for a year and a half. Sees them [id number]. States simple life stuff can overwhelm her. Has tried paxil in the past and weaned herself off of it. Not interested in starting back on any medications. Follow up with PCP as needed.

## 2024-02-14 ENCOUNTER — OFFICE VISIT (OUTPATIENT)
Dept: CARDIOLOGY | Facility: MEDICAL CENTER | Age: 69
End: 2024-02-14
Attending: INTERNAL MEDICINE
Payer: MEDICARE

## 2024-02-14 VITALS
OXYGEN SATURATION: 100 % | SYSTOLIC BLOOD PRESSURE: 110 MMHG | HEIGHT: 65 IN | RESPIRATION RATE: 16 BRPM | BODY MASS INDEX: 28.82 KG/M2 | DIASTOLIC BLOOD PRESSURE: 70 MMHG | WEIGHT: 173 LBS | HEART RATE: 69 BPM

## 2024-02-14 DIAGNOSIS — Z00.6 RESEARCH STUDY PATIENT: ICD-10-CM

## 2024-02-14 DIAGNOSIS — I10 PRIMARY HYPERTENSION: ICD-10-CM

## 2024-02-14 DIAGNOSIS — N18.31 CHRONIC KIDNEY DISEASE, STAGE 3A: ICD-10-CM

## 2024-02-14 DIAGNOSIS — I49.3 PVC (PREMATURE VENTRICULAR CONTRACTION): ICD-10-CM

## 2024-02-14 DIAGNOSIS — I31.39 PERICARDIAL EFFUSION: ICD-10-CM

## 2024-02-14 DIAGNOSIS — Z98.890 S/P PERICARDIAL WINDOW CREATION: Primary | ICD-10-CM

## 2024-02-14 PROCEDURE — 3078F DIAST BP <80 MM HG: CPT | Performed by: INTERNAL MEDICINE

## 2024-02-14 PROCEDURE — 99212 OFFICE O/P EST SF 10 MIN: CPT | Performed by: INTERNAL MEDICINE

## 2024-02-14 PROCEDURE — 99213 OFFICE O/P EST LOW 20 MIN: CPT | Performed by: INTERNAL MEDICINE

## 2024-02-14 PROCEDURE — 3074F SYST BP LT 130 MM HG: CPT | Performed by: INTERNAL MEDICINE

## 2024-02-14 RX ORDER — COLCHICINE 0.6 MG/1
0.6 TABLET ORAL
Qty: 20 TABLET | Refills: 0 | Status: SHIPPED | OUTPATIENT
Start: 2024-02-14

## 2024-02-14 RX ORDER — LISINOPRIL 20 MG/1
20 TABLET ORAL 2 TIMES DAILY
Qty: 180 TABLET | Refills: 3 | Status: SHIPPED | OUTPATIENT
Start: 2024-02-14 | End: 2024-03-06

## 2024-02-14 ASSESSMENT — FIBROSIS 4 INDEX: FIB4 SCORE: 1.08

## 2024-02-14 NOTE — PATIENT INSTRUCTIONS
Echo June 2024  BP log am and pm in 10 days  Yas 20mg AM and PM  Stop Colchicine as of 4/1/24  Tylenol and advil as needed, avoid advil > 400mg a day

## 2024-02-14 NOTE — PROGRESS NOTES
"CARDIOLOGY establiched PATIENT:    PCP: Bassam Marinelli M.D.    1. S/P pericardial window creation    2. Primary hypertension    3. Pericardial effusion    4. Chronic kidney disease, stage 3a (HCC)    5. PVC (premature ventricular contraction)        Tita Pitts here for pericardial effusion folow up    Chief Complaint   Patient presents with    Shortness of Breath    Premature Ventricular Contractions (PVCs)    Premature Atrial Contractions (PACs)       History: Tita Pitts is a 68 y.o. female with history of hypertension, PVCs, 0 calcium score  11/2022, here for idiopathic and recurrent pericardial effusion follow up post successful pericardial window 1/2024.  Her effusion at this point has been attributed to long COVID due to extensive reassuring negative workup for autoimmune disease, thyroid disease or malignancy.    Last saw Dr. Ganser mid Jan for follow up. Left shoulder pain improved ever since. Attributed to referred pain. Still with occasional left sided chest ache / pressure, sporadic, occasional, no clear triggers, not necessarily exertional. This am woke up with this discomfort. No \"dramatic\" symptoms per her report recurring since the surgery, more energy, but with some residual aches. Overall symptoms better. Pathology reassuring.    She used Advil and Tylenol prn ever since her surgery. She was nauseous with opioids during the hospital and was pain free then.    She has been taking it easy since the pericardial window Jan 2024.    TTE 2/5/24:  Compared to the prior study on 11/16/2023, patient is post pericardial   window 1/2024 with resolution of the pericardial effusion.  Normal echocardiogram findings  Normal estimated LVEF 60%  No pericardial effusion  Normal IVC size    cMRI 4/2023:  1.  Small pericardial effusion. Normal pericardial thickness. No MR evidence of constrictive pericarditis.  2.  Normal left ventricular size and function, with estimated LVEF of 60.6%. No delayed " "enhancement.  3.  Trivial aortic regurgitation.    PE:  /70 (BP Location: Left arm, Patient Position: Sitting, BP Cuff Size: Adult)   Pulse 69   Resp 16   Ht 1.651 m (5' 5\")   Wt 78.5 kg (173 lb)   LMP  (LMP Unknown)   SpO2 100%   BMI 28.79 kg/m²     Gen: well  HEENT: Symmetric face. Anicteric sclerae. Moist mucus membranes  NECK: No JVD.   CARDIAC: Regular, Normal S1, S2, No murmur  VASCULATURE: carotids are normal bilaterally without bruit  RESP: Clear to auscultation bilaterally   EXT: No edema, no clubbing or cyanosis  SKIN: Warm and dry  NEURO: No gross deficits  PSYCH: Appropriate affect, participates in conversation    The 10-year ASCVD risk score (Yo LINCOLN, et al., 2019) is: 5.4%    Past Medical History:   Diagnosis Date    Allergy     Anesthesia     ponv    Breath shortness 2022    Related to current pericarditis    Bronchitis     history of    Cataract     NOT surgically removed    Gynecological disorder 1986    Had Hysterectomy 1990    Hypertension     well controlled on medication    Pericarditis 04/07/2023    Pneumonia     history of, in 20's    PONV (postoperative nausea and vomiting)     Psychiatric problem 12/27/2022    grief - in counseling     Past Surgical History:   Procedure Laterality Date    RI THORACOSCOPY,DX NO BX Left 1/8/2024    Procedure: LEFT THORACOSCOPY, PERICARDIAL WINDOW;  Surgeon: John H Ganser, M.D.;  Location: SURGERY [facility];  Service: General    PERICARDIAL WINDOW Left 1/8/2024    Procedure: CREATION, PERICARDIAL WINDOW;  Surgeon: John H Ganser, M.D.;  Location: SURGERY [facility];  Service: General    APPENDECTOMY  1986    ABDOMINAL HYSTERECTOMY TOTAL      GYN SURGERY      laparoscopy for endometriosis    OTHER CARDIAC SURGERY       Allergies   Allergen Reactions    Meclizine Hives    Other Drug Unspecified     Ruvert = Other reaction(s): Welts on legs    Macrodantin [Nitrofurantoin]      nightmares     Outpatient Encounter Medications as of 2/14/2024 "   Medication Sig Dispense Refill    multivitamin Tab Take 1 Tablet by mouth every day.      lisinopril (PRINIVIL) 20 MG Tab Take 1 Tablet by mouth 2 times a day. 180 Tablet 3    colchicine (COLCRYS) 0.6 MG Tab Take 1 Tablet by mouth every morning Monday through Friday. 20 Tablet 0    Calcium Carb-Cholecalciferol (CALCIUM 500/VITAMIN D) 500-3.125 MG-MCG Tab Take  by mouth.      famotidine (PEPCID) 40 MG Tab TAKE 1 TABLET BY MOUTH EVERY  Tablet 3    Magnesium 400 MG Cap Take 400 mg by mouth every day.      Ascorbic Acid (VITAMIN C PO) Take 1 Tablet by mouth every day.      [DISCONTINUED] colchicine (COLCRYS) 0.6 MG Tab Take 0.6 mg by mouth every morning Monday through Friday.      [DISCONTINUED] lisinopril (PRINIVIL) 10 MG Tab Take 15 mg by mouth 2 times a day. 1.5 tablets (15 mg), twice daily       No facility-administered encounter medications on file as of 2/14/2024.     Social History     Socioeconomic History    Marital status:      Spouse name: Not on file    Number of children: Not on file    Years of education: Not on file    Highest education level: Bachelor's degree (e.g., BA, AB, BS)   Occupational History    Not on file   Tobacco Use    Smoking status: Never     Passive exposure: Past    Smokeless tobacco: Never   Vaping Use    Vaping Use: Never used   Substance and Sexual Activity    Alcohol use: Not Currently     Alcohol/week: 0.6 oz     Types: 1 Glasses of wine per week     Comment: Some alcohol socially over the years.  Not regular use    Drug use: Never    Sexual activity: Not Currently     Partners: Male     Birth control/protection: Surgical, Abstinence, None, Post-Menopausal     Comment: Had Endometriosis had hysterectomy.   Other Topics Concern    Not on file   Social History Narrative    Not on file     Social Determinants of Health     Financial Resource Strain: Low Risk  (2/13/2024)    Overall Financial Resource Strain (CARDIA)     Difficulty of Paying Living Expenses: Not very  hard   Food Insecurity: No Food Insecurity (2024)    Hunger Vital Sign     Worried About Running Out of Food in the Last Year: Never true     Ran Out of Food in the Last Year: Never true   Transportation Needs: No Transportation Needs (2024)    PRAPARE - Transportation     Lack of Transportation (Medical): No     Lack of Transportation (Non-Medical): No   Physical Activity: Insufficiently Active (4/3/2022)    Exercise Vital Sign     Days of Exercise per Week: 2 days     Minutes of Exercise per Session: 30 min   Stress: Stress Concern Present (4/3/2022)    [employer] - Occupational Stress Questionnaire     Feeling of Stress : Rather much   Social Connections: Moderately Integrated (4/3/2022)    Social Connection and Isolation Panel [NHANES]     Frequency of Communication with Friends and Family: Twice a week     Frequency of Social Gatherings with Friends and Family: Once a week     Attends Restorationism Services: 1 to 4 times per year     Active Member of Clubs or Organizations: Yes     Attends Club or Organization Meetings: 1 to 4 times per year     Marital Status:    Intimate Partner Violence: Not on file   Housing Stability: Low Risk  (2024)    Housing Stability Vital Sign     Unable to Pay for Housing in the Last Year: No     Number of Places Lived in the Last Year: 1     Unstable Housing in the Last Year: No     Family History   Problem Relation Age of Onset    Breast Cancer Mother     Diabetes Mother         Developed in 70s, at 90 Kidney failure    Hypertension Mother         diagnosed in 70s    Hyperlipidemia Mother         diagnosed in 70s    Kidney Disease Mother     Heart Disease Father          at 64, CHF, CAD    Heart Disease Brother         Heart Attack at 72    Stroke Brother         occurred at 78    Diabetes Maternal Uncle          at 68         Studies    Lab Results   Component Value Date/Time    TSHULTRASEN 1.610 2023  1358        Lab Results   Component Value Date/Time    FREET4 1.15 09/12/2023 1358      Lab Results   Component Value Date/Time    HBA1C 5.2 04/06/2022 08:19 AM     Lab Results   Component Value Date/Time    CHOLSTRLTOT 167 03/17/2023 09:21 AM    LDL 89 03/17/2023 09:21 AM    HDL 63 03/17/2023 09:21 AM    TRIGLYCERIDE 76 03/17/2023 09:21 AM       Lab Results   Component Value Date/Time    SODIUM 139 01/26/2024 10:46 AM    POTASSIUM 3.9 01/26/2024 10:46 AM    CHLORIDE 104 01/26/2024 10:46 AM    CO2 26 01/26/2024 10:46 AM    GLUCOSE 93 01/26/2024 10:46 AM    BUN 15 01/26/2024 10:46 AM    CREATININE 1.04 01/26/2024 10:46 AM     Lab Results   Component Value Date/Time    ALKPHOSPHAT 84 09/12/2023 01:58 PM    ASTSGOT 22 09/12/2023 01:58 PM    ALTSGPT 15 09/12/2023 01:58 PM    TBILIRUBIN 0.7 09/12/2023 01:58 PM        Echocardiogram:  No results found for this or any previous visit.        Assessment and Recommendations:    Problem List Items Addressed This Visit       Chronic kidney disease, stage 3a (HCC)    Pericardial effusion    Relevant Medications    lisinopril (PRINIVIL) 20 MG Tab    colchicine (COLCRYS) 0.6 MG Tab    Other Relevant Orders    EC-ECHOCARDIOGRAM LTD W/O CONT    Primary hypertension    Relevant Medications    lisinopril (PRINIVIL) 20 MG Tab    PVC (premature ventricular contraction)    Relevant Medications    lisinopril (PRINIVIL) 20 MG Tab    S/P pericardial window creation - Primary     Ms Hong is overall improving ever since the pericardial window with some ongoing mild persistent symptoms.  Advised her to try as needed Tylenol/Advil, to avoid taking more than 400 mg of Advil a day given her CKD 3a.  Her mildly decreased GFR can either be related to chronic hypertension or falsely low due to chronic lisinopril use.    Home BP log 130/70-80's, hence advised her to increase lisinopril to 20 mg twice daily with an updated log in 10 days before resuming prior amlodipine to achieve goal < 130/80mmHg.  Her AM BP seems higher overall.    Currently taking colchicine 5 days a week, recommend she stops it April 1st 2024 (3 months post pericardial window).    Plan to repeat limited echo in June 2024 to follow-up for any signs of recurrent effusion.    Thank you for the opportunity to be involved in Tita Pitts 's care; and please reach out with any questions or concerns.    Return in about 5 months (around 7/14/2024).    Chase Pena MD, MPH [facility]  Interventional Cardiologist  [facility]   of Clinical Internal Medicine - [facility]    ~ Portions of this note were completed using voice recognition software (Dragon Naturally speaking software) . Occasional transcription errors may have escaped proof reading. I have made every reasonable attempt to correct obvious errors, but I expect that there are errors of grammar and possibly content that I did not discover before finalizing the note. ~

## 2024-02-14 NOTE — RESEARCH NOTE
Confirmed with the participant which designated provider they would like study results shared with. Patient will have an opportunity to share the results with any providers of their choosing in the future by accessing their results from [employer].  Bassam Marinelli MD

## 2024-02-18 ENCOUNTER — PATIENT MESSAGE (OUTPATIENT)
Dept: CARDIOLOGY | Facility: MEDICAL CENTER | Age: 69
End: 2024-02-18
Payer: MEDICARE

## 2024-02-20 ENCOUNTER — PATIENT MESSAGE (OUTPATIENT)
Dept: CARDIOLOGY | Facility: MEDICAL CENTER | Age: 69
End: 2024-02-20

## 2024-02-20 ENCOUNTER — HOSPITAL ENCOUNTER (OUTPATIENT)
Dept: LAB | Facility: MEDICAL CENTER | Age: 69
End: 2024-02-20
Attending: FAMILY MEDICINE
Payer: MEDICARE

## 2024-02-20 DIAGNOSIS — Z00.6 RESEARCH STUDY PATIENT: ICD-10-CM

## 2024-02-23 LAB
ELF SCORE: 9.52 PPM (ref 9.8–11.3)
RELATIVE RISK: NORMAL
RISK GROUP: NORMAL
RISK: 3.3 %

## 2024-02-29 ENCOUNTER — OFFICE VISIT (OUTPATIENT)
Dept: BEHAVIORAL HEALTH | Facility: CLINIC | Age: 69
End: 2024-02-29
Payer: MEDICARE

## 2024-02-29 DIAGNOSIS — F33.0 MILD EPISODE OF RECURRENT MAJOR DEPRESSIVE DISORDER (HCC): ICD-10-CM

## 2024-02-29 PROCEDURE — 90837 PSYTX W PT 60 MINUTES: CPT | Performed by: MARRIAGE & FAMILY THERAPIST

## 2024-03-01 NOTE — PROGRESS NOTES
[facility]   Therapy Progress Note        Name: Tita Pitts  MRN: [id number]  : 1955  Age: 68 y.o.  Date of assessment: 2024  PCP: Bassam Marinelli M.D.  Persons in attendance: Patient  Total session time: 57 minutes      Topics addressed in psychotherapy include: Met with the patient in office for an individual counseling session.      Content of Therapy:   The patient discussed having difficulty with her son.  She often feels blamed by him.  Patient discussed that he switches topics and goes off on tangents at times.  Patient feels that this stress is contributing to her high blood pressure.      Therapeutic Intervention:   This session, the therapeutic focus, was on validating the patient's progress.  Discussed with the patient acceptance and commitment therapy (ACT) tenets of moving TOWARD or AWAY.  Discussed CHOICE POINT and assigned the patient to build an awareness of those TOWARD or AWAY choices.  Discussed a proper boundaries with her son.  This dictation has been created using voice recognition software and/or scribes. The accuracy of the dictation is limited by the abilities of the software and the expertise of the scribes. I expect there may be some errors of grammar and possibly content. I made every attempt to manually correct the errors within my dictation. However, errors related to voice recognition software and/or scribes may still exist and should be interpreted within the appropriate context.    Objective Observations:   Participation:Active verbal participation, Attentive, and Engaged   Grooming:Casual   Cognition:Alert and Fully Oriented   Eye Contact:Good   Mood:Anxious   Affect:Flexible, Full range, and Congruent with content   Thought Process:Logical and Goal-directed   Speech:Rate within normal limits and Volume within normal limits    Current Risk:   Suicide: low   Homicide: low   Self-Harm: low   Relapse: low   Safety Plan Reviewed: not applicable    Care  Plan Updated: No    Does patient express agreement with the above plan? Yes     Diagnosis:  1. Mild episode of recurrent major depressive disorder (HCC)        Referral appointment(s) scheduled? No       FELICITAS Fournier.

## 2024-03-04 ENCOUNTER — PATIENT MESSAGE (OUTPATIENT)
Dept: CARDIOLOGY | Facility: MEDICAL CENTER | Age: 69
End: 2024-03-04
Payer: MEDICARE

## 2024-03-06 ENCOUNTER — HOSPITAL ENCOUNTER (OUTPATIENT)
Facility: MEDICAL CENTER | Age: 69
End: 2024-03-06
Attending: PHYSICIAN ASSISTANT
Payer: MEDICARE

## 2024-03-06 ENCOUNTER — OFFICE VISIT (OUTPATIENT)
Dept: MEDICAL GROUP | Facility: MEDICAL CENTER | Age: 69
End: 2024-03-06
Payer: MEDICARE

## 2024-03-06 VITALS
RESPIRATION RATE: 16 BRPM | TEMPERATURE: 96.7 F | BODY MASS INDEX: 29.66 KG/M2 | WEIGHT: 178 LBS | DIASTOLIC BLOOD PRESSURE: 70 MMHG | HEIGHT: 65 IN | SYSTOLIC BLOOD PRESSURE: 120 MMHG | HEART RATE: 82 BPM | OXYGEN SATURATION: 95 %

## 2024-03-06 DIAGNOSIS — N30.00 ACUTE CYSTITIS WITHOUT HEMATURIA: ICD-10-CM

## 2024-03-06 DIAGNOSIS — F33.0 MILD EPISODE OF RECURRENT MAJOR DEPRESSIVE DISORDER (HCC): ICD-10-CM

## 2024-03-06 PROBLEM — M79.602 LEFT ARM PAIN: Status: RESOLVED | Noted: 2024-01-31 | Resolved: 2024-03-06

## 2024-03-06 PROBLEM — F43.21 GRIEF: Status: RESOLVED | Noted: 2022-04-05 | Resolved: 2024-03-06

## 2024-03-06 PROBLEM — N64.4 BREAST PAIN, LEFT: Status: RESOLVED | Noted: 2023-01-24 | Resolved: 2024-03-06

## 2024-03-06 PROBLEM — Z79.890 HORMONE REPLACEMENT THERAPY: Status: RESOLVED | Noted: 2022-04-05 | Resolved: 2024-03-06

## 2024-03-06 PROBLEM — I49.9 IRREGULAR HEART BEAT: Status: RESOLVED | Noted: 2023-05-05 | Resolved: 2024-03-06

## 2024-03-06 PROBLEM — R06.02 SOB (SHORTNESS OF BREATH): Status: RESOLVED | Noted: 2022-11-02 | Resolved: 2024-03-06

## 2024-03-06 PROBLEM — I95.9 HYPOTENSION: Status: RESOLVED | Noted: 2023-05-05 | Resolved: 2024-03-06

## 2024-03-06 PROBLEM — E66.3 OVERWEIGHT WITH BODY MASS INDEX (BMI) OF 26 TO 26.9 IN ADULT: Status: RESOLVED | Noted: 2023-04-24 | Resolved: 2024-03-06

## 2024-03-06 LAB
APPEARANCE UR: NORMAL
BILIRUB UR STRIP-MCNC: NEGATIVE MG/DL
COLOR UR AUTO: YELLOW
GLUCOSE UR STRIP.AUTO-MCNC: NEGATIVE MG/DL
KETONES UR STRIP.AUTO-MCNC: NEGATIVE MG/DL
LEUKOCYTE ESTERASE UR QL STRIP.AUTO: NEGATIVE
NITRITE UR QL STRIP.AUTO: NEGATIVE
PH UR STRIP.AUTO: 6.5 [PH] (ref 5–8)
PROT UR QL STRIP: NORMAL MG/DL
RBC UR QL AUTO: NORMAL
SP GR UR STRIP.AUTO: 1.01
UROBILINOGEN UR STRIP-MCNC: NORMAL MG/DL

## 2024-03-06 PROCEDURE — 87086 URINE CULTURE/COLONY COUNT: CPT

## 2024-03-06 PROCEDURE — 81002 URINALYSIS NONAUTO W/O SCOPE: CPT | Performed by: PHYSICIAN ASSISTANT

## 2024-03-06 PROCEDURE — 87077 CULTURE AEROBIC IDENTIFY: CPT

## 2024-03-06 PROCEDURE — 87186 SC STD MICRODIL/AGAR DIL: CPT

## 2024-03-06 PROCEDURE — 3074F SYST BP LT 130 MM HG: CPT | Performed by: PHYSICIAN ASSISTANT

## 2024-03-06 PROCEDURE — 3078F DIAST BP <80 MM HG: CPT | Performed by: PHYSICIAN ASSISTANT

## 2024-03-06 PROCEDURE — 99214 OFFICE O/P EST MOD 30 MIN: CPT | Performed by: PHYSICIAN ASSISTANT

## 2024-03-06 RX ORDER — AMLODIPINE BESYLATE 5 MG/1
5 TABLET ORAL DAILY
COMMUNITY
End: 2024-03-15 | Stop reason: SINTOL

## 2024-03-06 RX ORDER — LISINOPRIL 20 MG/1
20 TABLET ORAL DAILY
COMMUNITY
End: 2024-03-12

## 2024-03-06 RX ORDER — CIPROFLOXACIN 500 MG/1
500 TABLET, FILM COATED ORAL 2 TIMES DAILY
Qty: 14 TABLET | Refills: 0 | Status: SHIPPED | OUTPATIENT
Start: 2024-03-06 | End: 2024-03-08

## 2024-03-06 ASSESSMENT — FIBROSIS 4 INDEX: FIB4 SCORE: 1.08

## 2024-03-06 NOTE — ASSESSMENT & PLAN NOTE
This is a pleasant 68-year-old female complains of a 1 day history of a possible urinary tract infection.  Complains of burning urination.  No blood.  No fevers.  No back pain.  No pelvic pain.  History of recurrent UTIs.  Seen in the past by urology.  She is concerned about developing worsening symptoms.  She was treated in the past with Bactrim but the medication was not effective.  Ciprofloxacin has worked in the past.

## 2024-03-06 NOTE — PROGRESS NOTES
Subjective:   Tita Pitts is a 68 y.o. female here today for acute cystitis for 1 day.    Acute cystitis without hematuria  This is a pleasant 68-year-old female complains of a 1 day history of a possible urinary tract infection.  Complains of burning urination.  No blood.  No fevers.  No back pain.  No pelvic pain.  History of recurrent UTIs.  Seen in the past by urology.  She is concerned about developing worsening symptoms.  She was treated in the past with Bactrim but the medication was not effective.  Ciprofloxacin has worked in the past.    Mild episode of recurrent major depressive disorder (HCC)  Does have depression but it has been considered mild in the past.  She is not on any current medications.  She does follow with a therapist.       Current medicines (including changes today)  Current Outpatient Medications   Medication Sig Dispense Refill    ciprofloxacin (CIPRO) 500 MG Tab Take 1 Tablet by mouth 2 times a day for 7 days. 14 Tablet 0    amLODIPine (NORVASC) 5 MG Tab Take 5 mg by mouth every day.      lisinopril (PRINIVIL) 20 MG Tab Take 20 mg by mouth every day.      multivitamin Tab Take 1 Tablet by mouth every day.      colchicine (COLCRYS) 0.6 MG Tab Take 1 Tablet by mouth every morning Monday through Friday. 20 Tablet 0    Calcium Carb-Cholecalciferol (CALCIUM 500/VITAMIN D) 500-3.125 MG-MCG Tab Take  by mouth.      famotidine (PEPCID) 40 MG Tab TAKE 1 TABLET BY MOUTH EVERY  Tablet 3    Magnesium 400 MG Cap Take 400 mg by mouth every day.      Ascorbic Acid (VITAMIN C PO) Take 1 Tablet by mouth every day.       No current facility-administered medications for this visit.     She  has a past medical history of Allergy, Anesthesia, Breath shortness (2022), Bronchitis, Cataract, Gynecological disorder (1986), Hypertension, Pericarditis (04/07/2023), Pneumonia, PONV (postoperative nausea and vomiting), and Psychiatric problem (12/27/2022).    Social History and Family History were  "reviewed and updated.    ROS   No chest pain, no shortness of breath, no abdominal pain and all other systems were reviewed and are negative.       Objective:     /70 (BP Location: Left arm, Patient Position: Sitting, BP Cuff Size: Adult)   Pulse 82   Temp 35.9 °C (96.7 °F) (Temporal)   Resp 16   Ht 1.651 m (5' 5\")   Wt 80.7 kg (178 lb)   SpO2 95%  Body mass index is 29.62 kg/m².   Physical Exam:  Constitutional: Alert, no distress.  Skin: Warm, dry, good turgor, no rashes in visible areas.  Eye: Equal, round and reactive, conjunctiva clear, lids normal.  ENMT: Lips without lesions, good dentition, oropharynx clear.  Neck: Trachea midline, no masses.   Psych: Alert and oriented x3, normal affect and mood.        Assessment and Plan:   The following treatment plan was discussed    1. Acute cystitis without hematuria  Acute, new onset condition.  Urinalysis with large blood and moderate leukocytes noted.  No nitrates.  Urine was cloudy.  Prescribed Cipro for 7 days.  500 mg twice daily.  Await urine culture.  Advised to go to the ED with any worsening symptoms such as fever or back pain.  - POCT Urinalysis  - URINE CULTURE(NEW); Future  - ciprofloxacin (CIPRO) 500 MG Tab; Take 1 Tablet by mouth 2 times a day for 7 days.  Dispense: 14 Tablet; Refill: 0    2. Mild episode of recurrent major depressive disorder (HCC)  Chronic condition.  Stable.  Will continue to address.  Last PHQ-9 was at 6.  Continue to follow with therapy.         Followup: Return if symptoms worsen or fail to improve.    Please note that this dictation was created using voice recognition software. I have made every reasonable attempt to correct obvious errors, but I expect that there are errors of grammar and possibly content that I did not discover before finalizing the note.             "

## 2024-03-06 NOTE — ASSESSMENT & PLAN NOTE
Does have depression but it has been considered mild in the past.  She is not on any current medications.  She does follow with a therapist.

## 2024-03-08 ENCOUNTER — TELEPHONE (OUTPATIENT)
Dept: MEDICAL GROUP | Facility: MEDICAL CENTER | Age: 69
End: 2024-03-08
Payer: MEDICARE

## 2024-03-08 DIAGNOSIS — N30.00 ACUTE CYSTITIS WITHOUT HEMATURIA: ICD-10-CM

## 2024-03-08 LAB
BACTERIA UR CULT: ABNORMAL
BACTERIA UR CULT: ABNORMAL
SIGNIFICANT IND 70042: ABNORMAL
SITE SITE: ABNORMAL
SOURCE SOURCE: ABNORMAL

## 2024-03-08 RX ORDER — SULFAMETHOXAZOLE AND TRIMETHOPRIM 800; 160 MG/1; MG/1
1 TABLET ORAL 2 TIMES DAILY
Qty: 14 TABLET | Refills: 0 | Status: SHIPPED | OUTPATIENT
Start: 2024-03-08 | End: 2024-03-15

## 2024-03-08 NOTE — TELEPHONE ENCOUNTER
Phone Number Called: [phone]    Call outcome: Spoke to patient regarding message below.    Message: Pt was contacted in regards to results. Pt is aware of taking bactrim and will notify us for any adverse reactions.

## 2024-03-08 NOTE — TELEPHONE ENCOUNTER
----- Message from Micheal Schroeder P.A.-C. sent at 3/8/2024  9:54 AM PST -----  Please contact Pat.  She has a urinary tract infection but the medication I sent over will not work.  Bacteria is resistant to Cipro.  I sent over Bactrim.  7-day supply.   the new prescription at [facility].  Thank you.    Micheal

## 2024-03-10 ENCOUNTER — PATIENT MESSAGE (OUTPATIENT)
Dept: CARDIOLOGY | Facility: MEDICAL CENTER | Age: 69
End: 2024-03-10
Payer: MEDICARE

## 2024-03-11 ENCOUNTER — PATIENT MESSAGE (OUTPATIENT)
Dept: CARDIOLOGY | Facility: MEDICAL CENTER | Age: 69
End: 2024-03-11
Payer: MEDICARE

## 2024-03-11 ENCOUNTER — APPOINTMENT (OUTPATIENT)
Dept: RADIOLOGY | Facility: MEDICAL CENTER | Age: 69
End: 2024-03-11
Attending: EMERGENCY MEDICINE
Payer: MEDICARE

## 2024-03-11 ENCOUNTER — HOSPITAL ENCOUNTER (EMERGENCY)
Facility: MEDICAL CENTER | Age: 69
End: 2024-03-11
Attending: EMERGENCY MEDICINE
Payer: MEDICARE

## 2024-03-11 VITALS
BODY MASS INDEX: 29.66 KG/M2 | HEART RATE: 65 BPM | TEMPERATURE: 98 F | SYSTOLIC BLOOD PRESSURE: 112 MMHG | WEIGHT: 178 LBS | DIASTOLIC BLOOD PRESSURE: 63 MMHG | HEIGHT: 65 IN | RESPIRATION RATE: 12 BRPM | OXYGEN SATURATION: 97 %

## 2024-03-11 DIAGNOSIS — R53.81 MALAISE: ICD-10-CM

## 2024-03-11 DIAGNOSIS — R42 LIGHTHEADEDNESS: ICD-10-CM

## 2024-03-11 LAB
ALBUMIN SERPL BCP-MCNC: 4.4 G/DL (ref 3.2–4.9)
ALBUMIN/GLOB SERPL: 1.5 G/DL
ALP SERPL-CCNC: 84 U/L (ref 30–99)
ALT SERPL-CCNC: 15 U/L (ref 2–50)
ANION GAP SERPL CALC-SCNC: 13 MMOL/L (ref 7–16)
APPEARANCE UR: CLEAR
AST SERPL-CCNC: 22 U/L (ref 12–45)
BACTERIA #/AREA URNS HPF: NEGATIVE /HPF
BASOPHILS # BLD AUTO: 1.6 % (ref 0–1.8)
BASOPHILS # BLD: 0.06 K/UL (ref 0–0.12)
BILIRUB SERPL-MCNC: 0.5 MG/DL (ref 0.1–1.5)
BILIRUB UR QL STRIP.AUTO: NEGATIVE
BUN SERPL-MCNC: 14 MG/DL (ref 8–22)
CALCIUM ALBUM COR SERPL-MCNC: 9.2 MG/DL (ref 8.5–10.5)
CALCIUM SERPL-MCNC: 9.5 MG/DL (ref 8.5–10.5)
CHLORIDE SERPL-SCNC: 104 MMOL/L (ref 96–112)
CO2 SERPL-SCNC: 20 MMOL/L (ref 20–33)
COLOR UR: YELLOW
CREAT SERPL-MCNC: 1.17 MG/DL (ref 0.5–1.4)
EKG IMPRESSION: NORMAL
EOSINOPHIL # BLD AUTO: 0.11 K/UL (ref 0–0.51)
EOSINOPHIL NFR BLD: 2.9 % (ref 0–6.9)
EPI CELLS #/AREA URNS HPF: NEGATIVE /HPF
ERYTHROCYTE [DISTWIDTH] IN BLOOD BY AUTOMATED COUNT: 43.8 FL (ref 35.9–50)
GFR SERPLBLD CREATININE-BSD FMLA CKD-EPI: 51 ML/MIN/1.73 M 2
GLOBULIN SER CALC-MCNC: 3 G/DL (ref 1.9–3.5)
GLUCOSE SERPL-MCNC: 97 MG/DL (ref 65–99)
GLUCOSE UR STRIP.AUTO-MCNC: NEGATIVE MG/DL
HCT VFR BLD AUTO: 38.9 % (ref 37–47)
HGB BLD-MCNC: 13.2 G/DL (ref 12–16)
HYALINE CASTS #/AREA URNS LPF: NORMAL /LPF
IMM GRANULOCYTES # BLD AUTO: 0.01 K/UL (ref 0–0.11)
IMM GRANULOCYTES NFR BLD AUTO: 0.3 % (ref 0–0.9)
KETONES UR STRIP.AUTO-MCNC: NEGATIVE MG/DL
LEUKOCYTE ESTERASE UR QL STRIP.AUTO: NEGATIVE
LYMPHOCYTES # BLD AUTO: 1.59 K/UL (ref 1–4.8)
LYMPHOCYTES NFR BLD: 42.5 % (ref 22–41)
MCH RBC QN AUTO: 31.7 PG (ref 27–33)
MCHC RBC AUTO-ENTMCNC: 33.9 G/DL (ref 32.2–35.5)
MCV RBC AUTO: 93.5 FL (ref 81.4–97.8)
MICRO URNS: ABNORMAL
MONOCYTES # BLD AUTO: 0.39 K/UL (ref 0–0.85)
MONOCYTES NFR BLD AUTO: 10.4 % (ref 0–13.4)
NEUTROPHILS # BLD AUTO: 1.58 K/UL (ref 1.82–7.42)
NEUTROPHILS NFR BLD: 42.3 % (ref 44–72)
NITRITE UR QL STRIP.AUTO: NEGATIVE
NRBC # BLD AUTO: 0 K/UL
NRBC BLD-RTO: 0 /100 WBC (ref 0–0.2)
PH UR STRIP.AUTO: 5.5 [PH] (ref 5–8)
PLATELET # BLD AUTO: 342 K/UL (ref 164–446)
PMV BLD AUTO: 9.8 FL (ref 9–12.9)
POTASSIUM SERPL-SCNC: 4.8 MMOL/L (ref 3.6–5.5)
PROT SERPL-MCNC: 7.4 G/DL (ref 6–8.2)
PROT UR QL STRIP: NEGATIVE MG/DL
RBC # BLD AUTO: 4.16 M/UL (ref 4.2–5.4)
RBC # URNS HPF: NORMAL /HPF
RBC UR QL AUTO: ABNORMAL
SODIUM SERPL-SCNC: 137 MMOL/L (ref 135–145)
SP GR UR STRIP.AUTO: 1.01
TROPONIN T SERPL-MCNC: 11 NG/L (ref 6–19)
UROBILINOGEN UR STRIP.AUTO-MCNC: 0.2 MG/DL
WBC # BLD AUTO: 3.7 K/UL (ref 4.8–10.8)
WBC #/AREA URNS HPF: NORMAL /HPF

## 2024-03-11 PROCEDURE — 84484 ASSAY OF TROPONIN QUANT: CPT

## 2024-03-11 PROCEDURE — 36415 COLL VENOUS BLD VENIPUNCTURE: CPT

## 2024-03-11 PROCEDURE — 93005 ELECTROCARDIOGRAM TRACING: CPT

## 2024-03-11 PROCEDURE — 71045 X-RAY EXAM CHEST 1 VIEW: CPT

## 2024-03-11 PROCEDURE — 99284 EMERGENCY DEPT VISIT MOD MDM: CPT

## 2024-03-11 PROCEDURE — 93005 ELECTROCARDIOGRAM TRACING: CPT | Performed by: EMERGENCY MEDICINE

## 2024-03-11 PROCEDURE — 85025 COMPLETE CBC W/AUTO DIFF WBC: CPT

## 2024-03-11 PROCEDURE — 81001 URINALYSIS AUTO W/SCOPE: CPT

## 2024-03-11 PROCEDURE — 80053 COMPREHEN METABOLIC PANEL: CPT

## 2024-03-11 ASSESSMENT — FIBROSIS 4 INDEX: FIB4 SCORE: 1.08

## 2024-03-11 NOTE — ED TRIAGE NOTES
"Chief Complaint   Patient presents with    Dizziness     X3 hours      Pt to triage for above complaint. Pt states she woke up this morning and was so dizzy she could not get out of bed. Pts cardiologist has been adjusting her BP medication and most recent adjustment was on 3/4. Pts daughter states she noticed pt seemed to be SOB this morning. Pt states she started taking Bactrim for a UTI last week. Over the weekend she began to experience chest pressure and left arm pain. Protocol ordered.     Pt educated on triage process and placed in lobby.      BP (!) 142/80   Pulse 86   Temp 36.4 °C (97.5 °F) (Temporal)   Resp 16   Ht 1.651 m (5' 5\")   Wt 80.7 kg (178 lb)   SpO2 98%       "

## 2024-03-11 NOTE — ED PROVIDER NOTES
ER Provider Note    Scribed for Rg Crouch M.D. by Velasquez Oleary. 3/11/2024   9:18 AM    Primary Care Provider: Bassam Marinelli M.D.    CHIEF COMPLAINT  Chief Complaint   Patient presents with    Dizziness     X3 hours      EXTERNAL RECORDS REVIEWED  Outpatient Notes Patient was seen in clinic 5 days ago. Diagnosed with UTI and treated with Cipro for 7 days. Her urine culture showed bacteria resistant to Cipro and was switched to Bactrim for 7 days starting 03/08/2024.  Seen in Cardiology clinic 2/2024. History of pericardial effusion status post pericardial window. History of CKD. History of Hypertension maintained by Norvasc and Lisinopril.     HPI/ROS  LIMITATION TO HISTORY   Select: : None  OUTSIDE HISTORIAN(S):  Family daughter at bedside states the patient appeared short of breath this morning when she called her.    Tita Pitts is a 68 y.o. female who presents to the ED for evaluation of lightheadedness onset 3 hours ago. She denies feeling any symptoms last night and reports she felt so lightheaded this morning that she was unable to get out of bed. The second time she tried to get out of bed, she noticed she was not as lightheaded anymore. She adds that her Cardiologist has been adjusting her blood pressure medications with the most recent adjustment being 7 days ago. She is now taking Lisinopril once a day instead of two and is now also on Amlodipine. Patient is also currently taking Bactrim for a UTI last week. Patient denies having a fever from the UTI but did experience chills and body aches. The patient states she also has intermittent left arm pain that she believes is from her cardiac window procedure. She reports the pain had gone away but returned this past weekend. No alleviating or exacerbating factors were noted. Patient has a history of Pericarditis.    PAST MEDICAL HISTORY  Past Medical History:   Diagnosis Date    Allergy     Anesthesia     ponv    Breath shortness 2022     Related to current pericarditis    Bronchitis     history of    Cataract     NOT surgically removed    Gynecological disorder     Had Hysterectomy     Hypertension     well controlled on medication    Pericarditis 2023    Pneumonia     history of, in     PONV (postoperative nausea and vomiting)     Psychiatric problem 2022    grief - in counseling       SURGICAL HISTORY  Past Surgical History:   Procedure Laterality Date    AL THORACOSCOPY,DX NO BX Left 2024    Procedure: LEFT THORACOSCOPY, PERICARDIAL WINDOW;  Surgeon: John H Ganser, M.D.;  Location: SURGERY [facility];  Service: General    PERICARDIAL WINDOW Left 2024    Procedure: CREATION, PERICARDIAL WINDOW;  Surgeon: John H Ganser, M.D.;  Location: SURGERY [facility];  Service: General    APPENDECTOMY      ABDOMINAL HYSTERECTOMY TOTAL      GYN SURGERY      laparoscopy for endometriosis    OTHER CARDIAC SURGERY         FAMILY HISTORY  Family History   Problem Relation Age of Onset    Breast Cancer Mother     Diabetes Mother         Developed in 70s, at 90 Kidney failure    Hypertension Mother         diagnosed in 70s    Hyperlipidemia Mother         diagnosed in 70s    Kidney Disease Mother     Heart Disease Father          at 64, CHF, CAD    Heart Disease Brother         Heart Attack at 72    Stroke Brother         occurred at 78    Diabetes Maternal Uncle          at 68       SOCIAL HISTORY   reports that she has never smoked. She has been exposed to tobacco smoke. She has never used smokeless tobacco. She reports that she does not currently use alcohol after a past usage of about 0.6 oz of alcohol per week. She reports that she does not use drugs.    CURRENT MEDICATIONS  Discharge Medication List as of 3/11/2024 11:55 AM        CONTINUE these medications which have NOT CHANGED    Details   sulfamethoxazole-trimethoprim (BACTRIM DS) 800-160 MG tablet Take 1 Tablet by mouth 2 times a day for 7  "days., Disp-14 Tablet, R-0, Normal      amLODIPine (NORVASC) 5 MG Tab Take 5 mg by mouth every day., Historical Med      lisinopril (PRINIVIL) 20 MG Tab Take 20 mg by mouth every day., Historical Med      multivitamin Tab Take 1 Tablet by mouth every day., Historical Med      colchicine (COLCRYS) 0.6 MG Tab Take 1 Tablet by mouth every morning Monday through Friday., Disp-20 Tablet, R-0, Normal      Calcium Carb-Cholecalciferol (CALCIUM 500/VITAMIN D) 500-3.125 MG-MCG Tab Take  by mouth., Historical Med      famotidine (PEPCID) 40 MG Tab TAKE 1 TABLET BY MOUTH EVERY DAY, Disp-100 Tablet, R-3, Normal      Magnesium 400 MG Cap Take 400 mg by mouth every day., Historical Med      Ascorbic Acid (VITAMIN C PO) Take 1 Tablet by mouth every day., Historical Med             ALLERGIES  Allergies   Allergen Reactions    Meclizine Hives    Other Drug Unspecified     Ruvert = Other reaction(s): Welts on legs    Macrodantin [Nitrofurantoin]      nightmares        PHYSICAL EXAM  BP (!) 142/80   Pulse 86   Temp 36.4 °C (97.5 °F) (Temporal)   Resp 16   Ht 1.651 m (5' 5\")   Wt 80.7 kg (178 lb)   LMP  (LMP Unknown)   SpO2 98%   BMI 29.62 kg/m²      Nursing note and vitals reviewed.  Constitutional: Well-developed and well-nourished. No distress.   HENT: Head is normocephalic and atraumatic. Oropharynx is clear and moist without exudate or erythema.   Eyes: Pupils are equal, round, and reactive to light. Conjunctiva are normal.   Cardiovascular: Normal rate and regular rhythm. No murmur heard. Normal radial pulses.   Pulmonary/Chest: Breath sounds normal. No wheezes or rales. No chest wall tenderness.   Abdominal: Soft and non-tender. No distention   Musculoskeletal: Extremities exhibit normal range of motion without edema or tenderness. No calf tenderness or palpable cords.   Neurological: Awake, alert and oriented to person, place, and time. No focal deficits noted.  Skin: Skin is warm and dry. No rash.   Psychiatric: Normal " mood and affect. Appropriate for clinical situation    DIAGNOSTIC STUDIES    Labs:   Results for orders placed or performed during the hospital encounter of 03/11/24   Comp Metabolic Panel   Result Value Ref Range    Sodium 137 135 - 145 mmol/L    Potassium 4.8 3.6 - 5.5 mmol/L    Chloride 104 96 - 112 mmol/L    Co2 20 20 - 33 mmol/L    Anion Gap 13.0 7.0 - 16.0    Glucose 97 65 - 99 mg/dL    Bun 14 8 - 22 mg/dL    Creatinine 1.17 0.50 - 1.40 mg/dL    Calcium 9.5 8.5 - 10.5 mg/dL    Correct Calcium 9.2 8.5 - 10.5 mg/dL    AST(SGOT) 22 12 - 45 U/L    ALT(SGPT) 15 2 - 50 U/L    Alkaline Phosphatase 84 30 - 99 U/L    Total Bilirubin 0.5 0.1 - 1.5 mg/dL    Albumin 4.4 3.2 - 4.9 g/dL    Total Protein 7.4 6.0 - 8.2 g/dL    Globulin 3.0 1.9 - 3.5 g/dL    A-G Ratio 1.5 g/dL   CBC with Differential   Result Value Ref Range    WBC 3.7 (L) 4.8 - 10.8 K/uL    RBC 4.16 (L) 4.20 - 5.40 M/uL    Hemoglobin 13.2 12.0 - 16.0 g/dL    Hematocrit 38.9 37.0 - 47.0 %    MCV 93.5 81.4 - 97.8 fL    MCH 31.7 27.0 - 33.0 pg    MCHC 33.9 32.2 - 35.5 g/dL    RDW 43.8 35.9 - 50.0 fL    Platelet Count 342 164 - 446 K/uL    MPV 9.8 9.0 - 12.9 fL    Neutrophils-Polys 42.30 (L) 44.00 - 72.00 %    Lymphocytes 42.50 (H) 22.00 - 41.00 %    Monocytes 10.40 0.00 - 13.40 %    Eosinophils 2.90 0.00 - 6.90 %    Basophils 1.60 0.00 - 1.80 %    Immature Granulocytes 0.30 0.00 - 0.90 %    Nucleated RBC 0.00 0.00 - 0.20 /100 WBC    Neutrophils (Absolute) 1.58 (L) 1.82 - 7.42 K/uL    Lymphs (Absolute) 1.59 1.00 - 4.80 K/uL    Monos (Absolute) 0.39 0.00 - 0.85 K/uL    Eos (Absolute) 0.11 0.00 - 0.51 K/uL    Baso (Absolute) 0.06 0.00 - 0.12 K/uL    Immature Granulocytes (abs) 0.01 0.00 - 0.11 K/uL    NRBC (Absolute) 0.00 K/uL   Troponins NOW   Result Value Ref Range    Troponin T 11 6 - 19 ng/L   URINALYSIS,CULTURE IF INDICATED    Specimen: Urine, Clean Catch   Result Value Ref Range    Color Yellow     Character Clear     Specific Gravity 1.008 <1.035    Ph 5.5  5.0 - 8.0    Glucose Negative Negative mg/dL    Ketones Negative Negative mg/dL    Protein Negative Negative mg/dL    Bilirubin Negative Negative    Urobilinogen, Urine 0.2 Negative    Nitrite Negative Negative    Leukocyte Esterase Negative Negative    Occult Blood Trace (A) Negative    Micro Urine Req Microscopic    ESTIMATED GFR   Result Value Ref Range    GFR (CKD-EPI) 51 (A) >60 mL/min/1.73 m 2   URINE MICROSCOPIC (W/UA)   Result Value Ref Range    WBC 0-2 /hpf    RBC 0-2 /hpf    Bacteria Negative None /hpf    Epithelial Cells Negative /hpf    Hyaline Cast 0-2 /lpf   EKG   Result Value Ref Range    Report       [facility]    Test Date:  2024  Pt Name:    NICHOLE URENA                Department: ER  MRN:        [id number]                      Room:  Gender:     Female                       Technician: [id number]  :        1955                   Requested By:ER TRIAGE PROTOCOL  Order #:    [id number]                    Reading MD: JADEN BOX MD    Measurements  Intervals                                Axis  Rate:       68                           P:          72  CA:         149                          QRS:        51  QRSD:       88                           T:          26  QT:         418  QTc:        445    Interpretive Statements  Sinus rhythm  Atrial premature complex  Probable left atrial enlargement  Baseline wander in lead(s) V3  Compared to ECG 2024 09:44:17  Atrial premature complex(es) now present  Electronically Signed On 2024 09:32:18 PDT by JADEN BOX MD         EKG:   I have independently interpreted this EKG as detailed above.     Radiology:   This attending emergency physician has independently interpreted the diagnostic imaging associated with this visit and is awaiting the final reading from the radiologist.   Preliminary interpretation is a follows: no acute abnormalities    Radiologist interpretation:   DX-CHEST-PORTABLE (1  VIEW)   Final Result      No acute cardiopulmonary abnormality.           INITIAL ASSESSMENT AND PLAN    9:18 AM - Patient was evaluated at bedside. Ordered for DX-chest, UA with culture if indicated, CMP, CBC w diff, EKG, and Troponin to evaluate. Patient verbalizes understanding and support with my plan of care.  Differential diagnoses include but not limited to: ongoing UTI, electrolyte abnormality, renal dysfunction, medication side effect, arrhythmia, orthostatic hypertension.      ED Observation Status? No; Patient does not meet criteria for ED Observation.      COURSE AND MEDICAL DECISION MAKING    11:00 AM - Reevaluated the patient at bedside. She provided urine sample and was able to walk to the bathroom without difficulty. She also adds that she is feeling improved.    Orthostatic blood pressure drop does not meet criteria for orthostatic hypertension. Metabolic panel was normal. Normal renal function and troponin. WBC low at 3.7. Chest x-ray normal. UA shows no evidence of residual UTI.       HTN/IDDM FOLLOW UP:  The patient has known hypertension and is being followed by their primary care doctor      DISPOSITION AND DISCUSSIONS    I have discussed management of the patient with the following physicians and JULIETA's:  None    Discussion of management with other QHP or appropriate source(s): None     Escalation of care considered, and ultimately not performed: acute inpatient care management, however at this time, the patient is most appropriate for outpatient management.  However, emergency department evaluation is reassuring.  No significant renal dysfunction electrolyte abnormality or anemia.      The patient will return for new or worsening symptoms and is stable at the time of discharge.    The patient is referred to a primary physician for blood pressure management, diabetic screening, and for all other preventative health concerns.    DISPOSITION:  Patient will be discharged home in stable  condition.    FOLLOW UP:  Bassam Marinelli M.D.  [address]  [address]  [facility] [id number] [phone]    Schedule an appointment as soon as possible for a visit       [facility], Emergency Dept  [address] [facility] [address] [phone]    If symptoms worsen    FINAL DIAGNOSIS  1. Lightheadedness    2. Malaise      IVelasquez (Nikibe), am scribing for, and in the presence of, Rg Crouch M.D..    Electronically signed by: Velasquez Oleary (Rod), 3/11/2024    IRg M.D. personally performed the services described in this documentation, as scribed by Velasquez Oleary in my presence, and it is both accurate and complete.      The note accurately reflects work and decisions made by me.  Rg Crouch M.D.  3/11/2024  1:03 PM

## 2024-03-12 ENCOUNTER — PATIENT MESSAGE (OUTPATIENT)
Dept: CARDIOLOGY | Facility: MEDICAL CENTER | Age: 69
End: 2024-03-12
Payer: MEDICARE

## 2024-03-12 DIAGNOSIS — I10 PRIMARY HYPERTENSION: ICD-10-CM

## 2024-03-12 RX ORDER — LISINOPRIL 10 MG/1
10 TABLET ORAL DAILY
Qty: 90 TABLET | Refills: 3 | Status: SHIPPED | OUTPATIENT
Start: 2024-03-12

## 2024-03-14 ENCOUNTER — PATIENT MESSAGE (OUTPATIENT)
Dept: CARDIOLOGY | Facility: MEDICAL CENTER | Age: 69
End: 2024-03-14
Payer: MEDICARE

## 2024-03-14 ENCOUNTER — TELEPHONE (OUTPATIENT)
Dept: CARDIOLOGY | Facility: MEDICAL CENTER | Age: 69
End: 2024-03-14
Payer: MEDICARE

## 2024-03-14 DIAGNOSIS — I31.39 PERICARDIAL EFFUSION: ICD-10-CM

## 2024-03-14 DIAGNOSIS — I95.1 ORTHOSTASIS: ICD-10-CM

## 2024-03-14 LAB
APOB+LDLR+PCSK9 GENE MUT ANL BLD/T: NOT DETECTED
BRCA1+BRCA2 DEL+DUP + FULL MUT ANL BLD/T: NOT DETECTED
MLH1+MSH2+MSH6+PMS2 GN DEL+DUP+FUL M: NOT DETECTED

## 2024-03-14 NOTE — TELEPHONE ENCOUNTER
Finishing Bactrim today for recent UTI. Feeling better form the UTI.    Reviewed her recent BP log and progress with her ongoing almost daily orthostatic symptoms. SBP 's WITHOUT lisinopril or amlodipine for the last 3 days which is unusual to her given her HTN diagnosis.    We will arrange for a stat limited echocardiogram to ensure no recurrent pericardial effusion given her history (either today or tomorrow) and advised her to present to the ER with any worsening symptoms of lightheadedness/dizziness and if she notices SBP < 90mmHg due to concerns of possible sepsis from her recent UTI.    Advised her to stay well-hydrated (at least  ounces of fluids a day ) and liberate her salt intake for the time being; and to keep us updated.    She was appreciative of the call.

## 2024-03-15 ENCOUNTER — HOSPITAL ENCOUNTER (OUTPATIENT)
Dept: CARDIOLOGY | Facility: MEDICAL CENTER | Age: 69
End: 2024-03-15
Attending: INTERNAL MEDICINE
Payer: MEDICARE

## 2024-03-15 DIAGNOSIS — I31.39 PERICARDIAL EFFUSION: ICD-10-CM

## 2024-03-15 DIAGNOSIS — I95.1 ORTHOSTASIS: ICD-10-CM

## 2024-03-15 LAB
LV EJECT FRACT  99904: 65
LV EJECT FRACT MOD 2C 99903: 74.3
LV EJECT FRACT MOD 4C 99902: 75.35
LV EJECT FRACT MOD BP 99901: 75.36

## 2024-03-15 PROCEDURE — 93308 TTE F-UP OR LMTD: CPT | Mod: 26 | Performed by: INTERNAL MEDICINE

## 2024-03-15 PROCEDURE — 93325 DOPPLER ECHO COLOR FLOW MAPG: CPT | Mod: 26 | Performed by: INTERNAL MEDICINE

## 2024-03-15 PROCEDURE — 93321 DOPPLER ECHO F-UP/LMTD STD: CPT | Mod: 26 | Performed by: INTERNAL MEDICINE

## 2024-03-15 PROCEDURE — 93325 DOPPLER ECHO COLOR FLOW MAPG: CPT

## 2024-03-17 ENCOUNTER — PATIENT MESSAGE (OUTPATIENT)
Dept: CARDIOLOGY | Facility: MEDICAL CENTER | Age: 69
End: 2024-03-17
Payer: MEDICARE

## 2024-03-18 ENCOUNTER — PATIENT MESSAGE (OUTPATIENT)
Dept: CARDIOLOGY | Facility: MEDICAL CENTER | Age: 69
End: 2024-03-18
Payer: MEDICARE

## 2024-03-27 ENCOUNTER — PATIENT MESSAGE (OUTPATIENT)
Dept: CARDIOLOGY | Facility: MEDICAL CENTER | Age: 69
End: 2024-03-27
Payer: MEDICARE

## 2024-04-18 ENCOUNTER — HOSPITAL ENCOUNTER (OUTPATIENT)
Dept: LAB | Facility: MEDICAL CENTER | Age: 69
End: 2024-04-18
Attending: FAMILY MEDICINE
Payer: MEDICARE

## 2024-04-18 DIAGNOSIS — I10 PRIMARY HYPERTENSION: ICD-10-CM

## 2024-04-18 DIAGNOSIS — D70.9 NEUTROPENIA, UNSPECIFIED TYPE (HCC): ICD-10-CM

## 2024-04-18 DIAGNOSIS — N18.31 CHRONIC KIDNEY DISEASE, STAGE 3A: ICD-10-CM

## 2024-04-18 LAB
ALBUMIN SERPL BCP-MCNC: 4.3 G/DL (ref 3.2–4.9)
ALBUMIN/GLOB SERPL: 1.4 G/DL
ALP SERPL-CCNC: 86 U/L (ref 30–99)
ALT SERPL-CCNC: 14 U/L (ref 2–50)
ANION GAP SERPL CALC-SCNC: 10 MMOL/L (ref 7–16)
AST SERPL-CCNC: 20 U/L (ref 12–45)
BASOPHILS # BLD AUTO: 1.9 % (ref 0–1.8)
BASOPHILS # BLD: 0.07 K/UL (ref 0–0.12)
BILIRUB SERPL-MCNC: 0.4 MG/DL (ref 0.1–1.5)
BUN SERPL-MCNC: 18 MG/DL (ref 8–22)
CALCIUM ALBUM COR SERPL-MCNC: 9.2 MG/DL (ref 8.5–10.5)
CALCIUM SERPL-MCNC: 9.4 MG/DL (ref 8.5–10.5)
CHLORIDE SERPL-SCNC: 107 MMOL/L (ref 96–112)
CHOLEST SERPL-MCNC: 184 MG/DL (ref 100–199)
CO2 SERPL-SCNC: 24 MMOL/L (ref 20–33)
CREAT SERPL-MCNC: 0.98 MG/DL (ref 0.5–1.4)
EOSINOPHIL # BLD AUTO: 0.08 K/UL (ref 0–0.51)
EOSINOPHIL NFR BLD: 2.1 % (ref 0–6.9)
ERYTHROCYTE [DISTWIDTH] IN BLOOD BY AUTOMATED COUNT: 45.4 FL (ref 35.9–50)
GFR SERPLBLD CREATININE-BSD FMLA CKD-EPI: 63 ML/MIN/1.73 M 2
GLOBULIN SER CALC-MCNC: 3.1 G/DL (ref 1.9–3.5)
GLUCOSE SERPL-MCNC: 93 MG/DL (ref 65–99)
HCT VFR BLD AUTO: 38.5 % (ref 37–47)
HDLC SERPL-MCNC: 73 MG/DL
HGB BLD-MCNC: 13 G/DL (ref 12–16)
IMM GRANULOCYTES # BLD AUTO: 0.01 K/UL (ref 0–0.11)
IMM GRANULOCYTES NFR BLD AUTO: 0.3 % (ref 0–0.9)
LDLC SERPL CALC-MCNC: 98 MG/DL
LYMPHOCYTES # BLD AUTO: 1.09 K/UL (ref 1–4.8)
LYMPHOCYTES NFR BLD: 29.2 % (ref 22–41)
MCH RBC QN AUTO: 32.6 PG (ref 27–33)
MCHC RBC AUTO-ENTMCNC: 33.8 G/DL (ref 32.2–35.5)
MCV RBC AUTO: 96.5 FL (ref 81.4–97.8)
MONOCYTES # BLD AUTO: 0.61 K/UL (ref 0–0.85)
MONOCYTES NFR BLD AUTO: 16.4 % (ref 0–13.4)
NEUTROPHILS # BLD AUTO: 1.87 K/UL (ref 1.82–7.42)
NEUTROPHILS NFR BLD: 50.1 % (ref 44–72)
NRBC # BLD AUTO: 0 K/UL
NRBC BLD-RTO: 0 /100 WBC (ref 0–0.2)
PLATELET # BLD AUTO: 340 K/UL (ref 164–446)
PMV BLD AUTO: 10.8 FL (ref 9–12.9)
POTASSIUM SERPL-SCNC: 4.7 MMOL/L (ref 3.6–5.5)
PROT SERPL-MCNC: 7.4 G/DL (ref 6–8.2)
RBC # BLD AUTO: 3.99 M/UL (ref 4.2–5.4)
SODIUM SERPL-SCNC: 141 MMOL/L (ref 135–145)
TRIGL SERPL-MCNC: 63 MG/DL (ref 0–149)
WBC # BLD AUTO: 3.7 K/UL (ref 4.8–10.8)

## 2024-04-18 PROCEDURE — 36415 COLL VENOUS BLD VENIPUNCTURE: CPT

## 2024-04-18 PROCEDURE — 80053 COMPREHEN METABOLIC PANEL: CPT

## 2024-04-18 PROCEDURE — 80061 LIPID PANEL: CPT

## 2024-04-18 PROCEDURE — 85025 COMPLETE CBC W/AUTO DIFF WBC: CPT

## 2024-04-23 ENCOUNTER — OFFICE VISIT (OUTPATIENT)
Dept: MEDICAL GROUP | Facility: MEDICAL CENTER | Age: 69
End: 2024-04-23
Payer: MEDICARE

## 2024-04-23 VITALS
HEIGHT: 65 IN | BODY MASS INDEX: 29.66 KG/M2 | HEART RATE: 81 BPM | RESPIRATION RATE: 16 BRPM | TEMPERATURE: 96.5 F | OXYGEN SATURATION: 97 % | SYSTOLIC BLOOD PRESSURE: 110 MMHG | WEIGHT: 178 LBS | DIASTOLIC BLOOD PRESSURE: 70 MMHG

## 2024-04-23 DIAGNOSIS — I10 PRIMARY HYPERTENSION: ICD-10-CM

## 2024-04-23 DIAGNOSIS — J06.9 ACUTE URI: ICD-10-CM

## 2024-04-23 DIAGNOSIS — D70.9 NEUTROPENIA, UNSPECIFIED TYPE (HCC): ICD-10-CM

## 2024-04-23 DIAGNOSIS — N18.31 CHRONIC KIDNEY DISEASE, STAGE 3A: ICD-10-CM

## 2024-04-23 PROCEDURE — 3074F SYST BP LT 130 MM HG: CPT | Performed by: FAMILY MEDICINE

## 2024-04-23 PROCEDURE — 3078F DIAST BP <80 MM HG: CPT | Performed by: FAMILY MEDICINE

## 2024-04-23 PROCEDURE — 99214 OFFICE O/P EST MOD 30 MIN: CPT | Performed by: FAMILY MEDICINE

## 2024-04-23 RX ORDER — DOXYCYCLINE HYCLATE 100 MG
100 TABLET ORAL 2 TIMES DAILY
Qty: 14 TABLET | Refills: 0 | Status: SHIPPED | OUTPATIENT
Start: 2024-04-23 | End: 2024-04-30

## 2024-04-23 ASSESSMENT — ENCOUNTER SYMPTOMS
COUGH: 1
SHORTNESS OF BREATH: 1
SORE THROAT: 0
FEVER: 0
SPUTUM PRODUCTION: 1
CHILLS: 0

## 2024-04-23 ASSESSMENT — FIBROSIS 4 INDEX: FIB4 SCORE: 1.05

## 2024-04-23 NOTE — PROGRESS NOTES
Verbal consent was acquired by the patient to use [employer] ambient listening note generation during this visit.      Tita was seen today for follow-up, lab results, conjunctivitis and shortness of breath.    Diagnoses and all orders for this visit:    Acute URI  -     doxycycline (VIBRAMYCIN) 100 MG Tab; Take 1 Tablet by mouth 2 times a day for 7 days.    Primary hypertension    Chronic kidney disease, stage 3a  -     Comp Metabolic Panel; Future    Neutropenia, unspecified type (HCC)  -     CBC WITH DIFFERENTIAL; Future                  Assessment & Plan  1. Acute Upper Respiratory Infection (URI).  This is a new and stable condition for the patient. The patient will commence a regimen of doxycycline, taking one tablet twice daily for a duration of 7 days.    2. Primary Hypertension.  This is a chronic and stable condition for the patient. ASCVD risk is currently at 6 percent. The patient will continue her current regimen of Lisinopril 10 mg daily. A recheck of the lipid panel will be conducted in 1 year.    3. Chronic Kidney Disease, Stage 3a.  This is a chronic and stable condition for the patient. The filtration rate of kidneys is showing signs of improvement. A recheck of the labs will be conducted in 6 months.    4. Neutropenia.  This is a chronic  and stable condition for the patient. The neutrophil counts are showing signs of improvement. The white cell count is mildly low at 3.7. A recheck of the labs will be scheduled in 6 months.    Follow-up  The patient is scheduled for a follow-up visit in 6 months.          Chief complaint::Diagnoses of Acute URI, Primary hypertension, Chronic kidney disease, stage 3a, and Neutropenia, unspecified type (HCC) were pertinent to this visit.      History of Present Illness  The patient is a 69-year-old female who is here for blood test follow-up. Blood test was done on 04/18/2024 and chest congestion.    The patient has been experiencing chest congestion for  approximately one week, initially attributing it to seasonal allergies. Despite self-medication with allergy medications, her symptoms persist. She denies experiencing nasal congestion, but reports audible wheezing during speech. Her cough is severe, with occasional expectoration of clear mucus. She also reports dyspnea, particularly during physical exertion, such as walking from her car to a building, and immediately perspiration. She has not undergone a home COVID-19 test as she did not exhibit any fever.    The patient discontinued her colchicine regimen on 04/01/2024.    The patient has never taken any cholesterol-lowering medications.    She was referred to ENT a year ago. She went again this year for her annual checkup. She was given hearing aids. She just got them. She has noticed that her hearing has clearer. Her hearing was diminished in the 2000 to 4000 range, and it was down to about 45. She had constant buzzing and ringing in her ears.        Review of Systems   Constitutional:  Negative for chills and fever.   HENT:  Negative for congestion, ear discharge, ear pain and sore throat.    Respiratory:  Positive for cough, sputum production and shortness of breath.           Medications and Allergies:     Current Outpatient Medications   Medication Sig Dispense Refill    doxycycline (VIBRAMYCIN) 100 MG Tab Take 1 Tablet by mouth 2 times a day for 7 days. 14 Tablet 0    lisinopril (PRINIVIL) 10 MG Tab Take 1 Tablet by mouth every day. 90 Tablet 3    multivitamin Tab Take 1 Tablet by mouth every day.      Calcium Carb-Cholecalciferol (CALCIUM 500/VITAMIN D) 500-3.125 MG-MCG Tab Take  by mouth.      famotidine (PEPCID) 40 MG Tab TAKE 1 TABLET BY MOUTH EVERY  Tablet 3    Magnesium 400 MG Cap Take 400 mg by mouth every day.      Ascorbic Acid (VITAMIN C PO) Take 1 Tablet by mouth every day.       No current facility-administered medications for this visit.       /70   Pulse 81   Temp 35.8 °C (96.5  "°F)   Resp 16   Ht 1.651 m (5' 5\")   Wt 80.7 kg (178 lb)   SpO2 97% , Body mass index is 29.62 kg/m².      Physical Exam  Constitutional:       Appearance: Normal appearance. She is well-developed and well-groomed.   HENT:      Head: Normocephalic and atraumatic.      Nose: No congestion.      Mouth/Throat:      Pharynx: No oropharyngeal exudate or posterior oropharyngeal erythema.   Eyes:      General:         Right eye: No discharge.         Left eye: No discharge.      Conjunctiva/sclera: Conjunctivae normal.   Cardiovascular:      Rate and Rhythm: Normal rate and regular rhythm.      Heart sounds: Normal heart sounds. No murmur heard.     No friction rub. No gallop.   Pulmonary:      Effort: Pulmonary effort is normal. No respiratory distress.      Breath sounds: Rhonchi present. No wheezing or rales.   Neurological:      General: No focal deficit present.      Mental Status: She is alert. Mental status is at baseline.      Gait: Gait is intact.   Psychiatric:         Mood and Affect: Mood and affect normal.         Behavior: Behavior normal.              Results  Laboratory Studies  LDL cholesterol is 98. White cell count is mildly low at 3.7.          Please note that this dictation was created using voice recognition software. I have made every reasonable attempt to correct obvious errors, but I expect that there are errors of grammar and possibly content that I did not discover before finalizing the note.      "

## 2024-04-24 ENCOUNTER — APPOINTMENT (OUTPATIENT)
Dept: MEDICAL GROUP | Facility: MEDICAL CENTER | Age: 69
End: 2024-04-24
Payer: MEDICARE

## 2024-04-25 ENCOUNTER — OFFICE VISIT (OUTPATIENT)
Dept: BEHAVIORAL HEALTH | Facility: CLINIC | Age: 69
End: 2024-04-25
Payer: MEDICARE

## 2024-04-25 DIAGNOSIS — F33.0 MILD EPISODE OF RECURRENT MAJOR DEPRESSIVE DISORDER (HCC): ICD-10-CM

## 2024-04-25 DIAGNOSIS — R53.83 OTHER FATIGUE: ICD-10-CM

## 2024-04-25 PROCEDURE — 90837 PSYTX W PT 60 MINUTES: CPT | Performed by: MARRIAGE & FAMILY THERAPIST

## 2024-04-25 NOTE — PROGRESS NOTES
[facility]   Therapy Progress Note        Name: Tita Pitts  MRN: [id number]  : 1955  Age: 69 y.o.  Date of assessment: 2024  PCP: Bassam Marinelli M.D.  Persons in attendance: Patient  Total session time: 57 minutes      Topics addressed in psychotherapy include: Met with the patient in office for an individual counseling session.      Content of Therapy:   The patient reported that she has felt more tired recently.  Patient discussed that her mother in law who lives in [place] is not doing well and that she has been visiting and watching out for her daily.  She reports that the mother law has a son who lives with her however has some intellectual disabilities.  She reports that he has two brothers one of which reported that he will move to [place] wants his mother passes to help care for his brother.  The patient also discussed some increasing medical concerns of her own.  She discussed increased fatigue. Patient discussed poor sleep.      Therapeutic Intervention:   This session, the therapeutic focus was on validating the patient's positive care for her mother in law while emphasizing her need to do self care.  Discussed with patient her dedication to her mother in law and what appears to be the need for her to brothers in law to step it up, to ease her responsibility.  Discussed with the patient emotional stress resulting in fatigue.      This dictation has been created using voice recognition software and/or scribes. The accuracy of the dictation is limited by the abilities of the software and the expertise of the scribes. I expect there may be some errors of grammar and possibly content. I made every attempt to manually correct the errors within my dictation. However, errors related to voice recognition software and/or scribes may still exist and should be interpreted within the appropriate context.    Objective Observations:   Participation:Active verbal  participation, Attentive, and Engaged   Grooming:Casual   Cognition:Alert and Fully Oriented   Eye Contact:Good   Mood:Euthymic and Depressed   Affect:Flexible, Blunted, Congruent with content, and Sad   Thought Process:Logical and Goal-directed   Speech:Rate within normal limits and Volume within normal limits    Current Risk:   Suicide: low   Homicide: low   Self-Harm: low   Relapse: low   Safety Plan Reviewed: not applicable    Care Plan Updated: No    Does patient express agreement with the above plan? Yes     Diagnosis:  1. Mild episode of recurrent major depressive disorder (HCC)    2. Other fatigue        Referral appointment(s) scheduled? No       JOSE J Fournier

## 2024-05-10 ENCOUNTER — OFFICE VISIT (OUTPATIENT)
Dept: URGENT CARE | Facility: CLINIC | Age: 69
End: 2024-05-10
Payer: MEDICARE

## 2024-05-10 ENCOUNTER — HOSPITAL ENCOUNTER (OUTPATIENT)
Facility: MEDICAL CENTER | Age: 69
End: 2024-05-10
Attending: PHYSICIAN ASSISTANT
Payer: MEDICARE

## 2024-05-10 VITALS
OXYGEN SATURATION: 97 % | HEIGHT: 65 IN | DIASTOLIC BLOOD PRESSURE: 82 MMHG | WEIGHT: 183.2 LBS | BODY MASS INDEX: 30.52 KG/M2 | TEMPERATURE: 97.9 F | SYSTOLIC BLOOD PRESSURE: 132 MMHG | HEART RATE: 94 BPM | RESPIRATION RATE: 20 BRPM

## 2024-05-10 DIAGNOSIS — N30.00 ACUTE CYSTITIS WITHOUT HEMATURIA: ICD-10-CM

## 2024-05-10 LAB
APPEARANCE UR: ABNORMAL
BILIRUB UR STRIP-MCNC: NEGATIVE MG/DL
COLOR UR AUTO: YELLOW
GLUCOSE UR STRIP.AUTO-MCNC: NEGATIVE MG/DL
KETONES UR STRIP.AUTO-MCNC: ABNORMAL MG/DL
LEUKOCYTE ESTERASE UR QL STRIP.AUTO: ABNORMAL
NITRITE UR QL STRIP.AUTO: NEGATIVE
PH UR STRIP.AUTO: 5.5 [PH] (ref 5–8)
PROT UR QL STRIP: 30 MG/DL
RBC UR QL AUTO: ABNORMAL
SP GR UR STRIP.AUTO: 1.02
UROBILINOGEN UR STRIP-MCNC: 0.2 MG/DL

## 2024-05-10 PROCEDURE — 81002 URINALYSIS NONAUTO W/O SCOPE: CPT | Performed by: PHYSICIAN ASSISTANT

## 2024-05-10 PROCEDURE — 3079F DIAST BP 80-89 MM HG: CPT | Performed by: PHYSICIAN ASSISTANT

## 2024-05-10 PROCEDURE — 99213 OFFICE O/P EST LOW 20 MIN: CPT | Performed by: PHYSICIAN ASSISTANT

## 2024-05-10 PROCEDURE — 3075F SYST BP GE 130 - 139MM HG: CPT | Performed by: PHYSICIAN ASSISTANT

## 2024-05-10 RX ORDER — SULFAMETHOXAZOLE AND TRIMETHOPRIM 800; 160 MG/1; MG/1
1 TABLET ORAL 2 TIMES DAILY
Qty: 10 TABLET | Refills: 0 | Status: SHIPPED | OUTPATIENT
Start: 2024-05-10 | End: 2024-05-15

## 2024-05-10 ASSESSMENT — ENCOUNTER SYMPTOMS
NAUSEA: 0
DIARRHEA: 0
RESPIRATORY NEGATIVE: 1
ABDOMINAL PAIN: 0
CONSTITUTIONAL NEGATIVE: 1
VOMITING: 0
CARDIOVASCULAR NEGATIVE: 1
FLANK PAIN: 0

## 2024-05-10 ASSESSMENT — FIBROSIS 4 INDEX: FIB4 SCORE: 1.05

## 2024-05-10 NOTE — PROGRESS NOTES
"  Subjective:     Tita Pitts  is a 69 y.o. female who presents for UTI (X 3 days, urgency, pressure.)       She presents today with urinary urgency, urinary pressure and painful urination has been ongoing over the past 4 days.  Symptoms have been worsening since onset.  Notes a UTI occurring in March that was resistant to ciprofloxacin and she was switched to Bactrim for treatment at that time.  She has been increasing her fluid intake but has not take any medications for current symptoms.  No fevers, no flank pain.  No vaginal pain bleeding or discharge.       Review of Systems   Constitutional: Negative.    Respiratory: Negative.     Cardiovascular: Negative.    Gastrointestinal:  Negative for abdominal pain, diarrhea, nausea and vomiting.   Genitourinary:  Positive for dysuria, frequency and urgency. Negative for flank pain and hematuria.      Allergies   Allergen Reactions    Meclizine Hives    Other Drug Unspecified     Ruvert = Other reaction(s): Welts on legs    Macrodantin [Nitrofurantoin]      nightmares     Past Medical History:   Diagnosis Date    Allergy     Anesthesia     ponv    Breath shortness 2022    Related to current pericarditis    Bronchitis     history of    Cataract     NOT surgically removed    Gynecological disorder 1986    Had Hysterectomy 1990    Hypertension     well controlled on medication    Pericarditis 04/07/2023    Pneumonia     history of, in 20's    PONV (postoperative nausea and vomiting)     Psychiatric problem 12/27/2022    grief - in counseling        Objective:   /82   Pulse 94   Temp 36.6 °C (97.9 °F) (Temporal)   Resp 20   Ht 1.651 m (5' 5\")   Wt 83.1 kg (183 lb 3.2 oz)   LMP  (LMP Unknown)   SpO2 97%   BMI 30.49 kg/m²   Physical Exam  Vitals and nursing note reviewed.   Constitutional:       General: She is not in acute distress.     Appearance: She is not ill-appearing or toxic-appearing.   HENT:      Head: Normocephalic.   Eyes:      General: No " scleral icterus.     Conjunctiva/sclera: Conjunctivae normal.   Cardiovascular:      Rate and Rhythm: Normal rate and regular rhythm.   Pulmonary:      Effort: Pulmonary effort is normal. No respiratory distress.      Breath sounds: Normal breath sounds. No stridor.   Abdominal:      General: Abdomen is flat. Bowel sounds are normal. There is no distension.      Palpations: Abdomen is soft.      Tenderness: There is abdominal tenderness (Suprapubic). There is no right CVA tenderness, left CVA tenderness or guarding.   Musculoskeletal:      Cervical back: Neck supple.   Neurological:      Mental Status: She is alert and oriented to person, place, and time.   Psychiatric:         Mood and Affect: Mood normal.         Behavior: Behavior normal.         Thought Content: Thought content normal.         Judgment: Judgment normal.             Diagnostic testing:    POC urinalysis-trace ketones, moderate blood, positive protein, moderate leukocytes, all others within normal limits    Urine culture-pending    Assessment/Plan:     Encounter Diagnoses   Name Primary?    Acute cystitis without hematuria           Plan for care for today's complaint includes starting patient on Bactrim for suspected urinary tract infection based on symptom presentation and urinalysis results.  Due to recent urine culture that was resistant to antibiotics I did obtain a urine culture; will contact the patient via GivU message to discuss the results of the testing obtained today, will adjust treatment plan accordingly.  No evidence of pyelonephritis on exam today.  Vital signs were stable during today's office visit, patient was overall well-appearing. Continue to monitor symptoms and return to urgent care or follow-up with primary care provider if symptoms remain ongoing.  Follow-up in the emergency department if symptoms become severe, ER precautions discussed in office today..  Prescription for Bactrim provided.    See AVS Instructions below  for written guidance provided to patient on after-visit management and care in addition to our verbal discussion during the visit.    Please note that this dictation was created using voice recognition software. I have attempted to correct all errors, but there may be sound-alike, spelling, grammar and possibly content errors that I did not discover before finalizing the note.    Sha Cruz PA-C

## 2024-05-13 ENCOUNTER — APPOINTMENT (OUTPATIENT)
Dept: MEDICAL GROUP | Facility: MEDICAL CENTER | Age: 69
End: 2024-05-13
Payer: MEDICARE

## 2024-05-13 LAB
BACTERIA UR CULT: NORMAL
SIGNIFICANT IND 70042: NORMAL
SITE SITE: NORMAL
SOURCE SOURCE: NORMAL

## 2024-05-16 ENCOUNTER — PATIENT MESSAGE (OUTPATIENT)
Dept: CARDIOLOGY | Facility: MEDICAL CENTER | Age: 69
End: 2024-05-16
Payer: MEDICARE

## 2024-05-17 ENCOUNTER — APPOINTMENT (OUTPATIENT)
Dept: RADIOLOGY | Facility: MEDICAL CENTER | Age: 69
End: 2024-05-17
Attending: FAMILY MEDICINE
Payer: MEDICARE

## 2024-05-17 DIAGNOSIS — R07.89 OTHER CHEST PAIN: ICD-10-CM

## 2024-05-17 DIAGNOSIS — I47.29 NSVT (NONSUSTAINED VENTRICULAR TACHYCARDIA) (HCC): ICD-10-CM

## 2024-05-17 DIAGNOSIS — R06.02 SOB (SHORTNESS OF BREATH): ICD-10-CM

## 2024-05-17 DIAGNOSIS — I49.1 PREMATURE ATRIAL CONTRACTIONS: ICD-10-CM

## 2024-05-17 RX ORDER — METHYLPREDNISOLONE 4 MG/1
TABLET ORAL
Qty: 21 TABLET | Refills: 0 | Status: SHIPPED | OUTPATIENT
Start: 2024-05-17

## 2024-05-29 ENCOUNTER — APPOINTMENT (OUTPATIENT)
Dept: RADIOLOGY | Facility: MEDICAL CENTER | Age: 69
End: 2024-05-29
Attending: INTERNAL MEDICINE
Payer: MEDICARE

## 2024-05-29 ENCOUNTER — HOSPITAL ENCOUNTER (OUTPATIENT)
Dept: LAB | Facility: MEDICAL CENTER | Age: 69
End: 2024-05-29
Attending: INTERNAL MEDICINE
Payer: MEDICARE

## 2024-05-29 DIAGNOSIS — E04.2 NON-TOXIC MULTINODULAR GOITER: ICD-10-CM

## 2024-05-29 DIAGNOSIS — R76.8 THYROID ANTIBODY POSITIVE: ICD-10-CM

## 2024-05-29 LAB
ALBUMIN SERPL BCP-MCNC: 4 G/DL (ref 3.2–4.9)
ALBUMIN/GLOB SERPL: 1.5 G/DL
ALP SERPL-CCNC: 86 U/L (ref 30–99)
ALT SERPL-CCNC: 9 U/L (ref 2–50)
ANION GAP SERPL CALC-SCNC: 12 MMOL/L (ref 7–16)
AST SERPL-CCNC: 23 U/L (ref 12–45)
BILIRUB SERPL-MCNC: 0.4 MG/DL (ref 0.1–1.5)
BUN SERPL-MCNC: 19 MG/DL (ref 8–22)
CALCIUM ALBUM COR SERPL-MCNC: 9.2 MG/DL (ref 8.5–10.5)
CALCIUM SERPL-MCNC: 9.2 MG/DL (ref 8.5–10.5)
CHLORIDE SERPL-SCNC: 105 MMOL/L (ref 96–112)
CO2 SERPL-SCNC: 23 MMOL/L (ref 20–33)
CREAT SERPL-MCNC: 0.9 MG/DL (ref 0.5–1.4)
GFR SERPLBLD CREATININE-BSD FMLA CKD-EPI: 70 ML/MIN/1.73 M 2
GLOBULIN SER CALC-MCNC: 2.6 G/DL (ref 1.9–3.5)
GLUCOSE SERPL-MCNC: 85 MG/DL (ref 65–99)
POTASSIUM SERPL-SCNC: 4.5 MMOL/L (ref 3.6–5.5)
PROT SERPL-MCNC: 6.6 G/DL (ref 6–8.2)
SODIUM SERPL-SCNC: 140 MMOL/L (ref 135–145)
T4 FREE SERPL-MCNC: 1.03 NG/DL (ref 0.93–1.7)
TSH SERPL DL<=0.005 MIU/L-ACNC: 1.55 UIU/ML (ref 0.38–5.33)

## 2024-05-30 ENCOUNTER — OFFICE VISIT (OUTPATIENT)
Dept: BEHAVIORAL HEALTH | Facility: CLINIC | Age: 69
End: 2024-05-30
Payer: MEDICARE

## 2024-05-30 ENCOUNTER — HOSPITAL ENCOUNTER (OUTPATIENT)
Dept: RADIOLOGY | Facility: MEDICAL CENTER | Age: 69
End: 2024-05-30
Attending: INTERNAL MEDICINE
Payer: MEDICARE

## 2024-05-30 DIAGNOSIS — I49.1 PREMATURE ATRIAL CONTRACTIONS: ICD-10-CM

## 2024-05-30 DIAGNOSIS — F33.0 MILD EPISODE OF RECURRENT MAJOR DEPRESSIVE DISORDER (HCC): ICD-10-CM

## 2024-05-30 DIAGNOSIS — R07.89 OTHER CHEST PAIN: ICD-10-CM

## 2024-05-30 DIAGNOSIS — R06.02 SOB (SHORTNESS OF BREATH): ICD-10-CM

## 2024-05-30 DIAGNOSIS — I47.29 NSVT (NONSUSTAINED VENTRICULAR TACHYCARDIA) (HCC): ICD-10-CM

## 2024-05-30 DIAGNOSIS — I20.89 STABLE ANGINA (HCC): ICD-10-CM

## 2024-05-30 NOTE — PROGRESS NOTES
[facility]   Therapy Progress Note        Name: Tita Pitts  MRN: [id number]  : 1955  Age: 69 y.o.  Date of assessment: 2024  PCP: Bassam Marinelli M.D.  Persons in attendance: Patient  Total session time: 55 minutes      Topics addressed in psychotherapy include: Met with the patient in person for an individual counseling session.      Content of Therapy:   The patient discussed that she has been feeling flat recently.  She reported she is not felt phil.  Patient discussed seeing a high school friend whom she has not seen for over 30 years and while the visit was good.  He did not bring her an immense amount of phil.  Patient discussed that she will be going with her son, daughter and other family members to [facility] and that she has some anxiety about driving in [facility].    Therapeutic Intervention:   This session, the therapeutic focus was on supporting the patient and normalizing her feelings of anxiety while driving in [place].  Discussed with the patient accepting her level of enjoyment.  Worked with the patient understand several stress factors which are playing a part in her emotional blunting.  Patient is reportedly doing things that her  would have normally done and her children see her as being accomplished and do not offer help.  Discussed the double bind of which she is in.    This dictation has been created using voice recognition software and/or scribes. The accuracy of the dictation is limited by the abilities of the software and the expertise of the scribes. I expect there may be some errors of grammar and possibly content. I made every attempt to manually correct the errors within my dictation. However, errors related to voice recognition software and/or scribes may still exist and should be interpreted within the appropriate context.    Objective Observations:   Participation:Active verbal participation, Attentive, and  Engaged   Grooming:Casual   Cognition:Alert and Fully Oriented   Eye Contact:Good   Mood:Depressed   Affect:Flexible, Full range, and Congruent with content   Thought Process:Logical and Goal-directed   Speech:Rate within normal limits and Volume within normal limits    Current Risk:   Suicide: low   Homicide: low   Self-Harm: low   Relapse: low   Safety Plan Reviewed: not applicable    Care Plan Updated: No    Does patient express agreement with the above plan? Yes     Diagnosis:  1. Mild episode of recurrent major depressive disorder (HCC)        Referral appointment(s) scheduled? No       JOSE J Fournier

## 2024-06-05 ENCOUNTER — PATIENT MESSAGE (OUTPATIENT)
Dept: CARDIOLOGY | Facility: MEDICAL CENTER | Age: 69
End: 2024-06-05

## 2024-06-05 ENCOUNTER — HOSPITAL ENCOUNTER (OUTPATIENT)
Dept: CARDIOLOGY | Facility: MEDICAL CENTER | Age: 69
End: 2024-06-05
Attending: INTERNAL MEDICINE
Payer: MEDICARE

## 2024-06-05 DIAGNOSIS — I31.39 PERICARDIAL EFFUSION: ICD-10-CM

## 2024-06-05 LAB — LV EJECT FRACT  99904: 65

## 2024-06-05 PROCEDURE — 93321 DOPPLER ECHO F-UP/LMTD STD: CPT

## 2024-06-05 PROCEDURE — 93325 DOPPLER ECHO COLOR FLOW MAPG: CPT | Mod: 26 | Performed by: INTERNAL MEDICINE

## 2024-06-05 PROCEDURE — 93308 TTE F-UP OR LMTD: CPT | Mod: 26 | Performed by: INTERNAL MEDICINE

## 2024-06-05 PROCEDURE — 93321 DOPPLER ECHO F-UP/LMTD STD: CPT | Mod: 26 | Performed by: INTERNAL MEDICINE

## 2024-06-12 ENCOUNTER — OFFICE VISIT (OUTPATIENT)
Dept: ENDOCRINOLOGY | Facility: MEDICAL CENTER | Age: 69
End: 2024-06-12
Attending: INTERNAL MEDICINE
Payer: MEDICARE

## 2024-06-12 VITALS
HEIGHT: 65 IN | BODY MASS INDEX: 30.59 KG/M2 | SYSTOLIC BLOOD PRESSURE: 106 MMHG | OXYGEN SATURATION: 96 % | HEART RATE: 84 BPM | WEIGHT: 183.6 LBS | DIASTOLIC BLOOD PRESSURE: 68 MMHG

## 2024-06-12 DIAGNOSIS — E04.2 NON-TOXIC MULTINODULAR GOITER: ICD-10-CM

## 2024-06-12 DIAGNOSIS — R76.8 THYROID ANTIBODY POSITIVE: ICD-10-CM

## 2024-06-12 DIAGNOSIS — L65.0 TELOGEN EFFLUVIUM: ICD-10-CM

## 2024-06-12 DIAGNOSIS — I31.39 PERICARDIAL EFFUSION WITHOUT CARDIAC TAMPONADE: ICD-10-CM

## 2024-06-12 PROCEDURE — 3078F DIAST BP <80 MM HG: CPT | Performed by: INTERNAL MEDICINE

## 2024-06-12 PROCEDURE — 99211 OFF/OP EST MAY X REQ PHY/QHP: CPT | Performed by: INTERNAL MEDICINE

## 2024-06-12 PROCEDURE — 3074F SYST BP LT 130 MM HG: CPT | Performed by: INTERNAL MEDICINE

## 2024-06-12 PROCEDURE — 99214 OFFICE O/P EST MOD 30 MIN: CPT | Performed by: INTERNAL MEDICINE

## 2024-06-12 ASSESSMENT — FIBROSIS 4 INDEX: FIB4 SCORE: 1.51

## 2024-06-12 NOTE — PROGRESS NOTES
Chief Complaint:  Follow up for Hashimoto's and multiple thyroid nodules     HPI:     Tita Pitts is a 69 y.o. 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   She does have a history of Covid 19 infection in Jan 2022 and July 2023.  She was referred for evaluation as she was found to have elevated TPO antibodies of 119 to 130 on 5/2023 as part of the work up to determine the etiology of her pericardial effusion.  It is notable however that she never had severely uncontrolled hypothyroidism.  Her baseline TSH was 2.1 on 11/2022      Initial fluid analysis showed neg AFB, neg cytology and predominant lymphocytes     After her initial pericardiocentesis in 11/2022 she had recurrence of an effusion in April 2023.  She was treated with oral steroids which did not work.   She was then treated with Indomethacin followed by Colchicine.  She finally had a pericardial window on Jan 2024 done by Dr. Ganser.  She denies chest pain and shortness of breath       She denies a family history of Hypothyroidism and Hashimoto's disease with her parents, siblings.   She is currently not on  thyroid hormone.      She reports the hair loss is better    She is euthyroid on retesting     Latest Reference Range & Units 09/12/23 13:58 05/29/24 08:03   TSH 0.380 - 5.330 uIU/mL 1.610 1.550   Free T-4 0.93 - 1.70 ng/dL 1.15 1.03         US on 11/2023 showed two non suspicious solid nodules on the RML and left side of the isthmus measuring 1.0 cm           Patient's medications, allergies, and social histories were reviewed and updated as appropriate.      ROS:     CONS:     No fever, no chills, no weight loss, no fatigue   EYES:      No diplopia, no blurry vision, no redness of eyes, no swelling of eyelids   ENT:    No hearing loss, No ear pain, No sore throat, no dysphagia, no neck swelling   CV:     No chest pain, no palpitations, no claudication, no orthopnea, no PND   PULM:    No  SOB, no cough, no hemoptysis, no wheezing    GI:   No nausea, no vomiting, no diarrhea, no constipation, no bloody stools   :  Passing urine well, no dysuria, no hematuria   ENDO:   No polyuria, no polydipsia, no heat intolerance, no cold intolerance   NEURO: No headaches, no dizziness, no convulsions, no tremors   MUSC:  No joint swellings, no arthralgias, no myalgias, no weakness   SKIN:   No rash, no ulcers, no dry skin   PSYCH:   No depression, no anxiety, no difficulty sleeping       Past Medical History:  Patient Active Problem List    Diagnosis Date Noted    Acute cystitis without hematuria 03/06/2024    S/P pericardial window creation 02/14/2024    Mild episode of recurrent major depressive disorder (HCC) 02/13/2024    Thyroid antibody positive 09/12/2023    PVC (premature ventricular contraction) 05/05/2023    Premature atrial contractions 05/05/2023    Tinnitus of both ears 01/24/2023    Primary hypertension 12/28/2022    Cardiomegaly 11/15/2022    Pericardial effusion 11/15/2022    Osteopenia of multiple sites 11/02/2022    Neutropenia (HCC) 04/08/2022    Chronic kidney disease, stage 3a 04/08/2022    Other fatigue 04/05/2022    Family history of breast cancer 06/27/2019       Past Surgical History:  Past Surgical History:   Procedure Laterality Date    OR THORACOSCOPY,DX NO BX Left 1/8/2024    Procedure: LEFT THORACOSCOPY, PERICARDIAL WINDOW;  Surgeon: John H Ganser, M.D.;  Location: [facility];  Service: General    PERICARDIAL WINDOW Left 1/8/2024    Procedure: CREATION, PERICARDIAL WINDOW;  Surgeon: John H Ganser, M.D.;  Location: [facility];  Service: General    APPENDECTOMY  1986    ABDOMINAL HYSTERECTOMY TOTAL      GYN SURGERY      laparoscopy for endometriosis    OTHER CARDIAC SURGERY          Allergies:  Other drug and Macrodantin [nitrofurantoin]     Current Medications:    Current Outpatient Medications:     lisinopril (PRINIVIL) 10 MG Tab, Take 1 Tablet by mouth every day.,  Disp: 90 Tablet, Rfl: 3    multivitamin Tab, Take 1 Tablet by mouth every day., Disp: , Rfl:     Calcium Carb-Cholecalciferol (CALCIUM 500/VITAMIN D) 500-3.125 MG-MCG Tab, Take  by mouth., Disp: , Rfl:     Magnesium 400 MG Cap, Take 400 mg by mouth every day., Disp: , Rfl:     Ascorbic Acid (VITAMIN C PO), Take 1 Tablet by mouth every day., Disp: , Rfl:     methylPREDNISolone (MEDROL DOSEPAK) 4 MG Tablet Therapy Pack, As directed on the packaging label., Disp: 21 Tablet, Rfl: 0    famotidine (PEPCID) 40 MG Tab, TAKE 1 TABLET BY MOUTH EVERY DAY (Patient not taking: Reported on 6/12/2024), Disp: 100 Tablet, Rfl: 3    Social History:  Social History     Socioeconomic History    Marital status:      Spouse name: Not on file    Number of children: Not on file    Years of education: Not on file    Highest education level: Bachelor's degree (e.g., BA, AB, BS)   Occupational History    Not on file   Tobacco Use    Smoking status: Never     Passive exposure: Past    Smokeless tobacco: Never   Vaping Use    Vaping status: Never Used   Substance and Sexual Activity    Alcohol use: Not Currently     Alcohol/week: 0.6 oz     Types: 1 Glasses of wine per week     Comment: Some alcohol socially over the years.  Not regular use    Drug use: Never    Sexual activity: Not Currently     Partners: Male     Birth control/protection: Surgical, Abstinence, None, Post-Menopausal     Comment: Had Endometriosis had hysterectomy.   Other Topics Concern    Not on file   Social History Narrative    Not on file     Social Determinants of Health     Financial Resource Strain: Low Risk  (2/13/2024)    Overall Financial Resource Strain (CARDIA)     Difficulty of Paying Living Expenses: Not very hard   Food Insecurity: No Food Insecurity (2/13/2024)    Hunger Vital Sign     Worried About Running Out of Food in the Last Year: Never true     Ran Out of Food in the Last Year: Never true   Transportation Needs: No Transportation Needs (2/13/2024)  "   PRAPARE - Transportation     Lack of Transportation (Medical): No     Lack of Transportation (Non-Medical): No   Physical Activity: Insufficiently Active (4/3/2022)    Exercise Vital Sign     Days of Exercise per Week: 2 days     Minutes of Exercise per Session: 30 min   Stress: Stress Concern Present (4/3/2022)    [employer] - Occupational Stress Questionnaire     Feeling of Stress : Rather much   Social Connections: Moderately Integrated (4/3/2022)    Social Connection and Isolation Panel [NHANES]     Frequency of Communication with Friends and Family: Twice a week     Frequency of Social Gatherings with Friends and Family: Once a week     Attends [employer] Services: 1 to 4 times per year     Active Member of Clubs or Organizations: Yes     Attends Club or Organization Meetings: 1 to 4 times per year     Marital Status:    Intimate Partner Violence: Not on file   Housing Stability: Low Risk  (2024)    Housing Stability Vital Sign     Unable to Pay for Housing in the Last Year: No     Number of Places Lived in the Last Year: 1     Unstable Housing in the Last Year: No        Family History:   Family History   Problem Relation Age of Onset    Breast Cancer Mother     Diabetes Mother         Developed in 70s, at 90 Kidney failure    Hypertension Mother         diagnosed in 70s    Hyperlipidemia Mother         diagnosed in 70s    Kidney Disease Mother     Heart Disease Father          at 64, CHF, CAD    Heart Disease Brother         Heart Attack at 72    Stroke Brother         occurred at 78    Diabetes Maternal Uncle          at 68         PHYSICAL EXAM:   Vital signs: /68   Pulse 84   Ht 1.651 m (5' 5\") Comment: pt stated  Wt 83.3 kg (183 lb 9.6 oz)   LMP  (LMP Unknown)   SpO2 96%   BMI 30.55 kg/m²   GENERAL: Well-developed, well-nourished  in no apparent distress.   EYE: No ocular and eyelid asymmetry, Anicteric sclerae,  PERRL  HENT: " Hearing grossly intact, Normocephalic, atraumatic. Pink, moist mucous membranes, No exudate  NECK: Supple. Trachea midline. thyroid enlarged, smooth, nontender, no nodules  CARDIOVASCULAR: Regular rate and rhythm. No murmurs, rubs, or gallops.   LUNGS: Clear to auscultation bilaterally   ABDOMEN: Soft, nontender with positive bowel sounds.   EXTREMITIES: No clubbing, cyanosis, or edema.   NEUROLOGICAL: Cranial nerves II-XII are grossly intact   Symmetric reflexes at the patella no proximal muscle weakness  LYMPH: No cervical, supraclavicular,  adenopathy palpated.   SKIN: No rashes, lesions. Turgor is normal.  She has vertical ridges on her nails    Labs:  Lab Results   Component Value Date/Time    WBC 3.7 (L) 04/18/2024 09:15 AM    RBC 3.99 (L) 04/18/2024 09:15 AM    HEMOGLOBIN 13.0 04/18/2024 09:15 AM    MCV 96.5 04/18/2024 09:15 AM    MCH 32.6 04/18/2024 09:15 AM    MCHC 33.8 04/18/2024 09:15 AM    RDW 45.4 04/18/2024 09:15 AM    MPV 10.8 04/18/2024 09:15 AM       Lab Results   Component Value Date/Time    SODIUM 140 05/29/2024 08:03 AM    POTASSIUM 4.5 05/29/2024 08:03 AM    CHLORIDE 105 05/29/2024 08:03 AM    CO2 23 05/29/2024 08:03 AM    ANION 12.0 05/29/2024 08:03 AM    GLUCOSE 85 05/29/2024 08:03 AM    BUN 19 05/29/2024 08:03 AM    CREATININE 0.90 05/29/2024 08:03 AM    CALCIUM 9.2 05/29/2024 08:03 AM    ASTSGOT 23 05/29/2024 08:03 AM    ALTSGPT 9 05/29/2024 08:03 AM    TBILIRUBIN 0.4 05/29/2024 08:03 AM    ALBUMIN 4.0 05/29/2024 08:03 AM    ALBUMIN 1.6 04/10/2023 10:11 AM    TOTPROTEIN 6.6 05/29/2024 08:03 AM    GLOBULIN 2.6 05/29/2024 08:03 AM    AGRATIO 1.5 05/29/2024 08:03 AM       Lab Results   Component Value Date/Time    CHOLSTRLTOT 167 03/17/2023 0921    TRIGLYCERIDE 76 03/17/2023 0921    HDL 63 03/17/2023 0921    LDL 89 03/17/2023 0921       Lab Results   Component Value Date/Time    TSHULTRASEN 2.110 11/17/2022 0932     Lab Results   Component Value Date/Time    FREET4 1.35 05/18/2023 1001     No  "results found for: \"FREET3\"  No results found for: \"THYSTIMIG\"    Lab Results   Component Value Date/Time    MICROSOMALA <9.0 2023 1001         Imagin2023 7:45 AM     HISTORY/REASON FOR EXAM:  Follow-up for positive Hashimoto's antibody tests with normal thyroid function        TECHNIQUE/EXAM DESCRIPTION:  Ultrasound of the soft tissues of the head and neck.     COMPARISON:  None     FINDINGS:  The thyroid gland is heterogeneous.  Vascularity is normal.     The right lobe of the thyroid gland measures 5.11 cm x 1.73 cm x 3.37 cm cm.  The left lobe of the thyroid gland measures 5.40 cm x 1.85 cm x 2.06 cm cm.  The isthmus measures 0.66 cm cm.     Nodules >= 1cm:        Nodule #1  There is a(n) isoechoic wider than tall solid nodule with smooth margins and no echogenic foci measuring 1.10 x 0.67 x 1.08 cm located on the right mid lobe.     Nodule #2  There is a(n) isoechoic wider than tall solid nodule with ill-defined margins and no echogenic foci measuring 0.54 x 1.08 cm on transverse view located on the left side of the isthmus.              IMPRESSION:     Heterogenous thyroid gland with dominant: low suspicion isoechoic solid nodule in the right middle lobe and left side of the isthmus measuring approximately 1.0 cm     BE Recommendations  Observation - repeat US in 6 to 12 months in the office.              US report completed and dictated by  Mark Mathews MD, FACE, [facility]    ASSESSMENT/PLAN:     1. Thyroid antibody positive  Stable she is euthyroid on serial testing despite the positive antibodies and she denies hyperthyroid symptoms recommend observation and repeating thyroid labs annually    2. Non-toxic multinodular goiter  Stable she has 1 cm solid nodules in the right mid lobe and left side of the isthmus recommend observation      3. Pericardial effusion without cardiac tamponade  resolved patient is status post pericardial window and is no longer symptomatic continue monitoring with " cardiology    4. Telogen effluvium  Resolved this was the etiology of her hair loss and her hair hair loss has improved.  Continue observation       Return in about 6 months (around 12/12/2024).       Thank you kindly for allowing me to participate in the thyroid care plan for this patient.    Mark Mtahews MD, FACE, [facility]      CC:   Bassam Marinelli M.D.

## 2024-06-24 ENCOUNTER — NON-PROVIDER VISIT (OUTPATIENT)
Dept: CARDIOLOGY | Facility: MEDICAL CENTER | Age: 69
End: 2024-06-24
Payer: MEDICARE

## 2024-06-24 DIAGNOSIS — I20.89 CHRONIC STABLE ANGINA (HCC): Primary | ICD-10-CM

## 2024-06-24 NOTE — PROGRESS NOTES
Patient arrived for initial 1:1 Intensive Cardiac Rehabilitation Consultation and Education session with the Registered Nurse.  A total of 60 minutes was spent with the patient during which time a focused cardiovascular assessment was completed and musculoskeletal limitations were addressed in preparation to safely start the exercise portion of the program.  Education regarding the program was explained including exercise goals, precautions, and target heart rate during exercise.  Nutrition goals were reviewed and patient was introduced to the [facility] Program.  Stress management goals were discussed and patient concerns and questions were answered at this time. Patient arrived for education at 0800 and visit was concluded at 0900.  Exercise time was from 0900 to 1000.

## 2024-06-25 ENCOUNTER — NON-PROVIDER VISIT (OUTPATIENT)
Dept: CARDIOLOGY | Facility: MEDICAL CENTER | Age: 69
End: 2024-06-25
Payer: MEDICARE

## 2024-06-25 DIAGNOSIS — I20.89 CHRONIC STABLE ANGINA (HCC): ICD-10-CM

## 2024-06-25 NOTE — PROGRESS NOTES
Tita Pitts attended Intensive Cardiac Rehab today from 0900 to 1100. During her time she exercised and attended class. Her education today was a video titled: Metabolic Syndrome and Belly Fat. Patient received handouts and class discussion pertaining to the topic.

## 2024-06-26 ENCOUNTER — NON-PROVIDER VISIT (OUTPATIENT)
Dept: CARDIOLOGY | Facility: MEDICAL CENTER | Age: 69
End: 2024-06-26
Payer: MEDICARE

## 2024-06-26 DIAGNOSIS — I20.89 CHRONIC STABLE ANGINA (HCC): ICD-10-CM

## 2024-06-26 NOTE — PROGRESS NOTES
Tita Pitts attended Intensive Cardiac Rehab today from 0900 to 1100. During her time she exercised and attended class. Her education today was a cooking school titled: Breakfast. Patient received handouts and class discussion pertaining to the topic.

## 2024-06-27 ENCOUNTER — NON-PROVIDER VISIT (OUTPATIENT)
Dept: CARDIOLOGY | Facility: MEDICAL CENTER | Age: 69
End: 2024-06-27
Payer: MEDICARE

## 2024-06-27 ENCOUNTER — APPOINTMENT (OUTPATIENT)
Dept: BEHAVIORAL HEALTH | Facility: CLINIC | Age: 69
End: 2024-06-27
Payer: MEDICARE

## 2024-06-27 DIAGNOSIS — I20.89 CHRONIC STABLE ANGINA (HCC): ICD-10-CM

## 2024-06-27 NOTE — PROGRESS NOTES
Tita Pitts attended Intensive Cardiac Rehab today from 0900 to 1100. During her time she exercised and attended class. Her education today was a WORKSHOP titled: JOAN. Patient received handouts and class discussion pertaining to the topic.

## 2024-07-02 ENCOUNTER — APPOINTMENT (OUTPATIENT)
Dept: CARDIOLOGY | Facility: MEDICAL CENTER | Age: 69
End: 2024-07-02
Payer: MEDICARE

## 2024-07-02 DIAGNOSIS — I20.89 CHRONIC STABLE ANGINA (HCC): ICD-10-CM

## 2024-07-02 PROCEDURE — G0423 INTENS CARDIAC REHAB NO EXER: HCPCS | Mod: 59 | Performed by: INTERNAL MEDICINE

## 2024-07-02 PROCEDURE — G0422 INTENS CARDIAC REHAB W/EXERC: HCPCS | Performed by: INTERNAL MEDICINE

## 2024-07-03 ENCOUNTER — APPOINTMENT (OUTPATIENT)
Dept: CARDIOLOGY | Facility: MEDICAL CENTER | Age: 69
End: 2024-07-03
Payer: MEDICARE

## 2024-07-03 DIAGNOSIS — I20.89 CHRONIC STABLE ANGINA (HCC): ICD-10-CM

## 2024-07-03 PROCEDURE — G0423 INTENS CARDIAC REHAB NO EXER: HCPCS | Mod: 59 | Performed by: INTERNAL MEDICINE

## 2024-07-03 PROCEDURE — G0422 INTENS CARDIAC REHAB W/EXERC: HCPCS | Performed by: INTERNAL MEDICINE

## 2024-07-08 ENCOUNTER — PATIENT MESSAGE (OUTPATIENT)
Dept: CARDIOLOGY | Facility: MEDICAL CENTER | Age: 69
End: 2024-07-08
Payer: MEDICARE

## 2024-07-09 ENCOUNTER — NON-PROVIDER VISIT (OUTPATIENT)
Dept: CARDIOLOGY | Facility: MEDICAL CENTER | Age: 69
End: 2024-07-09
Payer: MEDICARE

## 2024-07-09 DIAGNOSIS — I20.89 CHRONIC STABLE ANGINA (HCC): ICD-10-CM

## 2024-07-09 PROCEDURE — G0422 INTENS CARDIAC REHAB W/EXERC: HCPCS | Performed by: INTERNAL MEDICINE

## 2024-07-09 PROCEDURE — G0423 INTENS CARDIAC REHAB NO EXER: HCPCS | Mod: 59 | Performed by: INTERNAL MEDICINE

## 2024-07-10 ENCOUNTER — NON-PROVIDER VISIT (OUTPATIENT)
Dept: CARDIOLOGY | Facility: MEDICAL CENTER | Age: 69
End: 2024-07-10
Payer: MEDICARE

## 2024-07-10 DIAGNOSIS — I20.89 CHRONIC STABLE ANGINA (HCC): ICD-10-CM

## 2024-07-10 PROCEDURE — G0423 INTENS CARDIAC REHAB NO EXER: HCPCS | Mod: 59 | Performed by: INTERNAL MEDICINE

## 2024-07-10 PROCEDURE — G0422 INTENS CARDIAC REHAB W/EXERC: HCPCS | Performed by: INTERNAL MEDICINE

## 2024-07-11 ENCOUNTER — OFFICE VISIT (OUTPATIENT)
Dept: CARDIOLOGY | Facility: MEDICAL CENTER | Age: 69
End: 2024-07-11
Attending: INTERNAL MEDICINE
Payer: MEDICARE

## 2024-07-11 VITALS
OXYGEN SATURATION: 97 % | RESPIRATION RATE: 14 BRPM | HEIGHT: 65 IN | WEIGHT: 175 LBS | BODY MASS INDEX: 29.16 KG/M2 | DIASTOLIC BLOOD PRESSURE: 40 MMHG | SYSTOLIC BLOOD PRESSURE: 92 MMHG | HEART RATE: 45 BPM

## 2024-07-11 DIAGNOSIS — I49.1 PREMATURE ATRIAL CONTRACTIONS: ICD-10-CM

## 2024-07-11 DIAGNOSIS — I10 PRIMARY HYPERTENSION: Primary | ICD-10-CM

## 2024-07-11 DIAGNOSIS — I49.3 PVC (PREMATURE VENTRICULAR CONTRACTION): ICD-10-CM

## 2024-07-11 DIAGNOSIS — R07.89 OTHER CHEST PAIN: ICD-10-CM

## 2024-07-11 DIAGNOSIS — Z98.890 S/P PERICARDIAL WINDOW CREATION: ICD-10-CM

## 2024-07-11 PROCEDURE — 3078F DIAST BP <80 MM HG: CPT | Performed by: INTERNAL MEDICINE

## 2024-07-11 PROCEDURE — 99213 OFFICE O/P EST LOW 20 MIN: CPT | Performed by: INTERNAL MEDICINE

## 2024-07-11 PROCEDURE — G2211 COMPLEX E/M VISIT ADD ON: HCPCS | Performed by: INTERNAL MEDICINE

## 2024-07-11 PROCEDURE — 3074F SYST BP LT 130 MM HG: CPT | Performed by: INTERNAL MEDICINE

## 2024-07-11 PROCEDURE — 99212 OFFICE O/P EST SF 10 MIN: CPT | Performed by: INTERNAL MEDICINE

## 2024-07-11 RX ORDER — ATENOLOL 25 MG/1
25 TABLET ORAL DAILY
Qty: 90 TABLET | Refills: 3 | Status: SHIPPED | OUTPATIENT
Start: 2024-07-11 | End: 2024-07-14 | Stop reason: SINTOL

## 2024-07-11 RX ORDER — LISINOPRIL 5 MG/1
5 TABLET ORAL DAILY
Qty: 90 TABLET | Refills: 3 | Status: SHIPPED | OUTPATIENT
Start: 2024-07-11

## 2024-07-11 ASSESSMENT — FIBROSIS 4 INDEX: FIB4 SCORE: 1.51

## 2024-07-12 ENCOUNTER — PATIENT MESSAGE (OUTPATIENT)
Dept: CARDIOLOGY | Facility: MEDICAL CENTER | Age: 69
End: 2024-07-12
Payer: MEDICARE

## 2024-07-14 ENCOUNTER — TELEPHONE (OUTPATIENT)
Dept: CARDIOLOGY | Facility: MEDICAL CENTER | Age: 69
End: 2024-07-14
Payer: MEDICARE

## 2024-07-15 ENCOUNTER — OFFICE VISIT (OUTPATIENT)
Dept: BEHAVIORAL HEALTH | Facility: CLINIC | Age: 69
End: 2024-07-15
Payer: MEDICARE

## 2024-07-15 DIAGNOSIS — F33.0 MILD EPISODE OF RECURRENT MAJOR DEPRESSIVE DISORDER (HCC): ICD-10-CM

## 2024-07-15 PROCEDURE — 90837 PSYTX W PT 60 MINUTES: CPT | Performed by: MARRIAGE & FAMILY THERAPIST

## 2024-07-16 ENCOUNTER — NON-PROVIDER VISIT (OUTPATIENT)
Dept: CARDIOLOGY | Facility: MEDICAL CENTER | Age: 69
End: 2024-07-16
Payer: MEDICARE

## 2024-07-16 DIAGNOSIS — I20.89 CHRONIC STABLE ANGINA (HCC): ICD-10-CM

## 2024-07-16 PROCEDURE — G0423 INTENS CARDIAC REHAB NO EXER: HCPCS | Mod: 59 | Performed by: FAMILY MEDICINE

## 2024-07-16 PROCEDURE — G0422 INTENS CARDIAC REHAB W/EXERC: HCPCS | Performed by: FAMILY MEDICINE

## 2024-07-17 ENCOUNTER — NON-PROVIDER VISIT (OUTPATIENT)
Dept: CARDIOLOGY | Facility: MEDICAL CENTER | Age: 69
End: 2024-07-17
Payer: MEDICARE

## 2024-07-17 DIAGNOSIS — I20.89 CHRONIC STABLE ANGINA (HCC): ICD-10-CM

## 2024-07-17 PROCEDURE — G0423 INTENS CARDIAC REHAB NO EXER: HCPCS | Mod: 59 | Performed by: FAMILY MEDICINE

## 2024-07-17 PROCEDURE — G0422 INTENS CARDIAC REHAB W/EXERC: HCPCS | Performed by: FAMILY MEDICINE

## 2024-07-18 ENCOUNTER — NON-PROVIDER VISIT (OUTPATIENT)
Dept: CARDIOLOGY | Facility: MEDICAL CENTER | Age: 69
End: 2024-07-18
Payer: MEDICARE

## 2024-07-18 DIAGNOSIS — I20.89 CHRONIC STABLE ANGINA (HCC): ICD-10-CM

## 2024-07-18 PROCEDURE — G0423 INTENS CARDIAC REHAB NO EXER: HCPCS | Mod: 59 | Performed by: FAMILY MEDICINE

## 2024-07-18 PROCEDURE — G0422 INTENS CARDIAC REHAB W/EXERC: HCPCS | Performed by: FAMILY MEDICINE

## 2024-07-19 ENCOUNTER — PATIENT MESSAGE (OUTPATIENT)
Dept: CARDIOLOGY | Facility: MEDICAL CENTER | Age: 69
End: 2024-07-19
Payer: MEDICARE

## 2024-07-23 ENCOUNTER — NON-PROVIDER VISIT (OUTPATIENT)
Dept: CARDIOLOGY | Facility: MEDICAL CENTER | Age: 69
End: 2024-07-23
Payer: MEDICARE

## 2024-07-23 DIAGNOSIS — I20.89 CHRONIC STABLE ANGINA (HCC): ICD-10-CM

## 2024-07-23 PROCEDURE — G0423 INTENS CARDIAC REHAB NO EXER: HCPCS | Mod: 59 | Performed by: INTERNAL MEDICINE

## 2024-07-23 PROCEDURE — G0422 INTENS CARDIAC REHAB W/EXERC: HCPCS | Performed by: INTERNAL MEDICINE

## 2024-07-24 ENCOUNTER — NON-PROVIDER VISIT (OUTPATIENT)
Dept: CARDIOLOGY | Facility: MEDICAL CENTER | Age: 69
End: 2024-07-24
Payer: MEDICARE

## 2024-07-24 DIAGNOSIS — I20.89 CHRONIC STABLE ANGINA (HCC): ICD-10-CM

## 2024-07-24 PROCEDURE — G0422 INTENS CARDIAC REHAB W/EXERC: HCPCS | Performed by: INTERNAL MEDICINE

## 2024-07-24 PROCEDURE — G0423 INTENS CARDIAC REHAB NO EXER: HCPCS | Mod: 59 | Performed by: INTERNAL MEDICINE

## 2024-07-25 ENCOUNTER — NON-PROVIDER VISIT (OUTPATIENT)
Dept: CARDIOLOGY | Facility: MEDICAL CENTER | Age: 69
End: 2024-07-25
Payer: MEDICARE

## 2024-07-25 DIAGNOSIS — I20.89 CHRONIC STABLE ANGINA (HCC): ICD-10-CM

## 2024-07-25 PROCEDURE — G0423 INTENS CARDIAC REHAB NO EXER: HCPCS | Mod: 59 | Performed by: INTERNAL MEDICINE

## 2024-07-25 PROCEDURE — G0422 INTENS CARDIAC REHAB W/EXERC: HCPCS | Performed by: INTERNAL MEDICINE

## 2024-07-30 ENCOUNTER — NON-PROVIDER VISIT (OUTPATIENT)
Dept: CARDIOLOGY | Facility: MEDICAL CENTER | Age: 69
End: 2024-07-30
Payer: MEDICARE

## 2024-07-30 DIAGNOSIS — I20.89 CHRONIC STABLE ANGINA (HCC): ICD-10-CM

## 2024-07-31 ENCOUNTER — NON-PROVIDER VISIT (OUTPATIENT)
Dept: CARDIOLOGY | Facility: MEDICAL CENTER | Age: 69
End: 2024-07-31
Payer: MEDICARE

## 2024-07-31 DIAGNOSIS — I20.89 CHRONIC STABLE ANGINA (HCC): ICD-10-CM

## 2024-08-01 ENCOUNTER — APPOINTMENT (OUTPATIENT)
Dept: CARDIOLOGY | Facility: MEDICAL CENTER | Age: 69
End: 2024-08-01
Payer: MEDICARE

## 2024-08-01 DIAGNOSIS — I20.89 CHRONIC STABLE ANGINA (HCC): ICD-10-CM

## 2024-08-01 PROCEDURE — G0422 INTENS CARDIAC REHAB W/EXERC: HCPCS | Performed by: INTERNAL MEDICINE

## 2024-08-01 PROCEDURE — G0423 INTENS CARDIAC REHAB NO EXER: HCPCS | Mod: 59 | Performed by: INTERNAL MEDICINE

## 2024-08-01 NOTE — PROGRESS NOTES
Tita Pitts attended Intensive Cardiac Rehab today from 0900 to 1100. During her time she exercised and attended class. Her education today was a lecture titled: Managing Heart Disease. Patient received handouts and class discussion pertaining to the topic.

## 2024-08-06 ENCOUNTER — NON-PROVIDER VISIT (OUTPATIENT)
Dept: CARDIOLOGY | Facility: MEDICAL CENTER | Age: 69
End: 2024-08-06
Payer: MEDICARE

## 2024-08-06 VITALS — BODY MASS INDEX: 29.76 KG/M2 | HEIGHT: 65 IN | WEIGHT: 178.6 LBS

## 2024-08-06 DIAGNOSIS — I20.89 CHRONIC STABLE ANGINA (HCC): ICD-10-CM

## 2024-08-06 PROCEDURE — G0423 INTENS CARDIAC REHAB NO EXER: HCPCS | Mod: 59 | Performed by: INTERNAL MEDICINE

## 2024-08-06 PROCEDURE — G0422 INTENS CARDIAC REHAB W/EXERC: HCPCS | Performed by: INTERNAL MEDICINE

## 2024-08-06 ASSESSMENT — FIBROSIS 4 INDEX: FIB4 SCORE: 1.51

## 2024-08-06 NOTE — PROGRESS NOTES
Nutrition Consult Note by:      Patient name: Tita Pitts  Date of visit:  08/06/24     Start Time:900a End Time: 955a  Referring MD: Avinash Man      Referring diagnosis: Angina          Diagnosis code: I20.89    Tita Pitts is here today for Intensive Cardiac Rehab. Today the patient had their one-on-one RD appointment. This program includes Nutrition education and counseling following the Pritikin guidelines, which promote whole foods-fruits, vegetables, beans/legumes, whole grains, reduced saturated fats with lean animal protein and reduced fat dairy-while avoiding added salt, processed foods with excessive added sugars/salt/fat, trans/saturated fat, added sugar, tropical oils and heavy use of added oils.     Past Medical History:   Neutropenia, osteopenia, HTN, Mild Depression, CKD3a, PE, Viral Angina, Fluid around heart    Nutrition-related Medications/Supplements:  Lisinpril,  MV, CA2+/d2, MG, VIT C     NUTRITION ASSESSMENT    Anthropometrics:  Male/Female:  FEMALE    Age: 69  Height: 65in     Weight: 178.5#      BMI: 29.72                BMI Class: overweight  Goal weight: 160-165#   Protein Needs 80g daily       ++BMI is NOT an accurate assessment tool for health++    Nutrition-Related Labs:                 Date: 05/29/24  Glucose: 85    Date:   HbA1c:                                                                                                                     Date: 04/18/24   Total Cholesterol 184                                                                                           Triglycerides: 63                                                                                                           HDL: 73                                                                                           LDL: 98                                                                                                    Date: 05/29/24      eGFR:  70            Calcium:  9.2        Vit D:   PO4:       B12:  319     K+: 4.5    Other: BUN-WNL, Cr-WNL     Digestive s/s:    Constipation, rec: take mg supplement at night    Social History:    Current tobacco use (Y/N): N    ETOH (Y/N): N        If yes, frequency & variety:    Typical hours of sleep per night: 4-6 hours   Primary Food  and/or preparer in the household: self     Diet Recall and Relevant Hx:    Patient reports lack of appetite    Breakfast: yogurt w/fruit; egg, oatmeal, egg white/veg/potato scarmble    Lunch: salad w/greens, blueberries/straw/apple, grilled chicken    Dinner: Stuffed squash with cheese, shredded wheat cereal, veggies    Snacks: yogurt/fruit    Beverages: NF Milk, water    Diet change recommendations: ensure getting adequate protein, increase water intake, increase whole grains intake     Current Exercise:  45-60mins at [facility] x3 days per week   OTHER: very little     Physical Activity:              Light (1-3x /wk)        Positive Changes Since Beginning the Program:   1. Nutrition education     Barriers to Change/Biggest Struggles:  1. fatigue     Nutrition SMART Goals:  1. Water intake Recommended: 88oz  2. Whole Grains/beans/legumes/seeds/starchy vegetables at 1/2 cup servings 5x day     Educational Handouts Provide  Pritikin Eating Plan w/ Healthy Plate method     Benefits of drinking water   Food Sources High in Omega 3 FA              Top 50 Foods high in Fiber         Heart healthy snack ideas              10 superstar foods   Protein Content of common foods       Portion Sizes  Take Action for your Heart (hearttruth.gov)    Nutrition Diagnosis:   Problem:  Nutrition-related knowledge deficit    Related to: cardiac function      As Evidenced by: diet recall     Follow Up PRN

## 2024-08-06 NOTE — PROGRESS NOTES
Tita Pitts attended Intensive Cardiac Rehab today from 0900 to 1100. During her time she exercised and attended a one on one with the RD. Her education today was a one on one with the RD. Patient received handouts and class discussion pertaining to the topic.

## 2024-08-07 ENCOUNTER — NON-PROVIDER VISIT (OUTPATIENT)
Dept: CARDIOLOGY | Facility: MEDICAL CENTER | Age: 69
End: 2024-08-07
Payer: MEDICARE

## 2024-08-07 DIAGNOSIS — I20.89 CHRONIC STABLE ANGINA (HCC): ICD-10-CM

## 2024-08-07 PROCEDURE — G0422 INTENS CARDIAC REHAB W/EXERC: HCPCS | Performed by: INTERNAL MEDICINE

## 2024-08-07 PROCEDURE — G0423 INTENS CARDIAC REHAB NO EXER: HCPCS | Mod: 59 | Performed by: INTERNAL MEDICINE

## 2024-08-07 NOTE — PROGRESS NOTES
Tita Pitts attended Intensive Cardiac Rehab today from 0900 to 1100. During her time she exercised and attended class. Her education today was a cooking school titled: [employer]. Patient received handouts and class discussion pertaining to the topic.

## 2024-08-08 ENCOUNTER — NON-PROVIDER VISIT (OUTPATIENT)
Dept: CARDIOLOGY | Facility: MEDICAL CENTER | Age: 69
End: 2024-08-08
Payer: MEDICARE

## 2024-08-08 DIAGNOSIS — I20.89 CHRONIC STABLE ANGINA (HCC): ICD-10-CM

## 2024-08-08 PROCEDURE — G0422 INTENS CARDIAC REHAB W/EXERC: HCPCS | Performed by: INTERNAL MEDICINE

## 2024-08-08 PROCEDURE — G0423 INTENS CARDIAC REHAB NO EXER: HCPCS | Mod: 59 | Performed by: INTERNAL MEDICINE

## 2024-08-08 NOTE — PROGRESS NOTES
Tita Pitts attended Intensive Cardiac Rehab today from 0900 to 1100. During her time she exercised and attended class. Her education today was a workshop titled: Label Reading. Patient received handouts and class discussion pertaining to the topic.

## 2024-08-09 ENCOUNTER — HOSPITAL ENCOUNTER (OUTPATIENT)
Dept: RADIOLOGY | Facility: MEDICAL CENTER | Age: 69
End: 2024-08-09
Attending: FAMILY MEDICINE
Payer: MEDICARE

## 2024-08-09 ENCOUNTER — OFFICE VISIT (OUTPATIENT)
Dept: MEDICAL GROUP | Facility: MEDICAL CENTER | Age: 69
End: 2024-08-09
Payer: MEDICARE

## 2024-08-09 VITALS
DIASTOLIC BLOOD PRESSURE: 66 MMHG | SYSTOLIC BLOOD PRESSURE: 112 MMHG | OXYGEN SATURATION: 97 % | BODY MASS INDEX: 30.45 KG/M2 | TEMPERATURE: 97.4 F | WEIGHT: 182.76 LBS | HEIGHT: 65 IN | HEART RATE: 71 BPM

## 2024-08-09 DIAGNOSIS — M25.512 CHRONIC LEFT SHOULDER PAIN: ICD-10-CM

## 2024-08-09 DIAGNOSIS — G89.29 CHRONIC LEFT SHOULDER PAIN: ICD-10-CM

## 2024-08-09 DIAGNOSIS — M12.812 ROTATOR CUFF ARTHROPATHY OF LEFT SHOULDER: ICD-10-CM

## 2024-08-09 DIAGNOSIS — N18.2 CKD (CHRONIC KIDNEY DISEASE) STAGE 2, GFR 60-89 ML/MIN: ICD-10-CM

## 2024-08-09 PROCEDURE — 3078F DIAST BP <80 MM HG: CPT | Performed by: FAMILY MEDICINE

## 2024-08-09 PROCEDURE — 99214 OFFICE O/P EST MOD 30 MIN: CPT | Performed by: FAMILY MEDICINE

## 2024-08-09 PROCEDURE — 73030 X-RAY EXAM OF SHOULDER: CPT | Mod: LT

## 2024-08-09 PROCEDURE — 3074F SYST BP LT 130 MM HG: CPT | Performed by: FAMILY MEDICINE

## 2024-08-09 ASSESSMENT — ENCOUNTER SYMPTOMS
FEVER: 0
CHILLS: 0

## 2024-08-09 ASSESSMENT — FIBROSIS 4 INDEX: FIB4 SCORE: 1.51

## 2024-08-09 NOTE — PROGRESS NOTES
Verbal consent was acquired by the patient to use [employer] ambient listening note generation during this visit.      Tita was seen today for shoulder pain.    Diagnoses and all orders for this visit:    Chronic left shoulder pain  -     DX-SHOULDER 2+ LEFT; Future    CKD (chronic kidney disease) stage 2, GFR 60-89 ml/min                  Assessment & Plan  1. Left shoulder pain.  This is a chronic condition, unstable. It appears muscle strain versus arthritic changes. I will check x-ray. I will refer to physical therapy based on x-ray results. She will take Tylenol as needed for pain.    2. Chronic kidney stage 3a.  This is a chronic condition, stable. The condition is improving. Last filtration rate was normal. She will continue to monitor.   I will recheck labs before the next visit.     She will continue to do cardiac rehab and follow up with cardiology.          Chief complaint::Diagnoses of Chronic left shoulder pain and CKD (chronic kidney disease) stage 2, GFR 60-89 ml/min were pertinent to this visit.      History of Present Illness  The patient is a 69-year-old female here for left shoulder pain.    She began experiencing intermittent left shoulder pain in either March or April of 2024. In January 2024, she underwent a cardiac window procedure, which took some time for the discomfort to subside. At that time, she was advised to allow more time for the discomfort to subside, leading her to rest the shoulder. Initially, she suspected the discomfort might be due to irritation, but the pain persisted. The intensity of the pain varies, with some days being more severe than others. Initially, the pain seemed to improve, but it has not completely subsided. On Tuesday afternoon, the pain intensified, characterized by throbbing and aching sensations in her entire arm, wrist, and under her arm. This discomfort persisted throughout the afternoon and evening, causing her to fall asleep with her shoulder in a  "certain position. She denies any neck pain. The pain had subsided on Wednesday morning, but returned after brushing her teeth. She is currently attending cardiac rehabilitation to increase her stamina, but has experienced difficulty returning to her feet, climbing stairs, and hiking on hills due to breathlessness. 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 strengthening exercises. She reports some improvement, but continues to experience challenges. Prior to this, she was physically active and did not experience any issues with stair climbing. She was not an athlete, but did have limitations. She felt unwell and unwell after climbing stairs a few hours ago, but felt better after drinking water and a half of a sandwich. She is unsure if overexertion was the cause of these symptoms. She has not taken any NSAIDs due to her kidney issues.      Review of Systems   Constitutional:  Negative for chills, fever and malaise/fatigue.   Musculoskeletal:         Left shoulder pain          Medications and Allergies:     Current Outpatient Medications   Medication Sig Dispense Refill    lisinopril (PRINIVIL) 5 MG Tab Take 1 Tablet by mouth every day. 90 Tablet 3    multivitamin Tab Take 1 Tablet by mouth every day.      Calcium Carb-Cholecalciferol (CALCIUM 500/VITAMIN D) 500-3.125 MG-MCG Tab Take  by mouth.      Magnesium 400 MG Cap Take 400 mg by mouth every day.      Ascorbic Acid (VITAMIN C PO) Take 1 Tablet by mouth every day.       No current facility-administered medications for this visit.       /66   Pulse 71   Temp 36.3 °C (97.4 °F) (Temporal)   Ht 1.651 m (5' 5\")   Wt 82.9 kg (182 lb 12.2 oz)   SpO2 97% , Body mass index is 30.41 kg/m².      Physical Exam  Constitutional:       Appearance: Normal appearance. She is well-developed and well-groomed.   HENT:      Head: Normocephalic and atraumatic.    "   Right Ear: External ear normal.      Left Ear: External ear normal.   Eyes:      General:         Right eye: No discharge.         Left eye: No discharge.      Conjunctiva/sclera: Conjunctivae normal.   Cardiovascular:      Rate and Rhythm: Normal rate.   Pulmonary:      Effort: Pulmonary effort is normal. No respiratory distress.   Musculoskeletal:      Cervical back: Neck supple.      Comments: Tenderness to palpation at left shoulder, full range of motion.  Mild decrease strength at left arm.   Skin:     Findings: No rash.   Neurological:      Mental Status: She is alert.   Psychiatric:         Mood and Affect: Mood and affect normal.         Behavior: Behavior normal.                Please note that this dictation was created using voice recognition software. I have made every reasonable attempt to correct obvious errors, but I expect that there are errors of grammar and possibly content that I did not discover before finalizing the note.

## 2024-08-13 ENCOUNTER — NON-PROVIDER VISIT (OUTPATIENT)
Dept: CARDIOLOGY | Facility: MEDICAL CENTER | Age: 69
End: 2024-08-13
Payer: MEDICARE

## 2024-08-13 DIAGNOSIS — M12.812 ROTATOR CUFF ARTHROPATHY OF LEFT SHOULDER: ICD-10-CM

## 2024-08-13 DIAGNOSIS — I20.89 CHRONIC STABLE ANGINA (HCC): ICD-10-CM

## 2024-08-13 PROCEDURE — G0423 INTENS CARDIAC REHAB NO EXER: HCPCS | Mod: 59 | Performed by: INTERNAL MEDICINE

## 2024-08-13 PROCEDURE — G0422 INTENS CARDIAC REHAB W/EXERC: HCPCS | Performed by: INTERNAL MEDICINE

## 2024-08-13 NOTE — PROGRESS NOTES
Tita Pitts attended Intensive Cardiac Rehab today from 0900 to 1100. During her time she exercised and attended class. Her education today was a video titled: Yoga. Patient received handouts and class discussion pertaining to the topic.

## 2024-08-14 ENCOUNTER — NON-PROVIDER VISIT (OUTPATIENT)
Dept: CARDIOLOGY | Facility: MEDICAL CENTER | Age: 69
End: 2024-08-14
Payer: MEDICARE

## 2024-08-14 DIAGNOSIS — I20.89 CHRONIC STABLE ANGINA (HCC): ICD-10-CM

## 2024-08-14 PROCEDURE — G0423 INTENS CARDIAC REHAB NO EXER: HCPCS | Mod: 59 | Performed by: INTERNAL MEDICINE

## 2024-08-14 PROCEDURE — G0422 INTENS CARDIAC REHAB W/EXERC: HCPCS | Performed by: INTERNAL MEDICINE

## 2024-08-14 NOTE — PROGRESS NOTES
Tita Pitts attended Intensive Cardiac Rehab today from 0900 to 1100. During her time she exercised and attended class. Her education today was a COOKING WORKSHOP titled: FAST EVENING MEALS. Patient received handouts and class discussion pertaining to the topic.

## 2024-08-15 ENCOUNTER — NON-PROVIDER VISIT (OUTPATIENT)
Dept: CARDIOLOGY | Facility: MEDICAL CENTER | Age: 69
End: 2024-08-15
Payer: MEDICARE

## 2024-08-15 DIAGNOSIS — I20.89 CHRONIC STABLE ANGINA (HCC): ICD-10-CM

## 2024-08-15 PROCEDURE — G0422 INTENS CARDIAC REHAB W/EXERC: HCPCS | Performed by: INTERNAL MEDICINE

## 2024-08-15 PROCEDURE — G0423 INTENS CARDIAC REHAB NO EXER: HCPCS | Mod: 59 | Performed by: INTERNAL MEDICINE

## 2024-08-15 NOTE — PROGRESS NOTES
Tita Pitts attended Intensive Cardiac Rehab today from 1000 to 1200. During her time she exercised and attended class. Her education today was a WORKSHOP titled: MENUS AND DINING OUT. Patient received handouts and class discussion pertaining to the topic.

## 2024-08-19 ENCOUNTER — OFFICE VISIT (OUTPATIENT)
Dept: BEHAVIORAL HEALTH | Facility: CLINIC | Age: 69
End: 2024-08-19
Payer: MEDICARE

## 2024-08-19 DIAGNOSIS — F33.0 MILD EPISODE OF RECURRENT MAJOR DEPRESSIVE DISORDER (HCC): ICD-10-CM

## 2024-08-19 PROCEDURE — 90837 PSYTX W PT 60 MINUTES: CPT | Performed by: MARRIAGE & FAMILY THERAPIST

## 2024-08-20 ENCOUNTER — NON-PROVIDER VISIT (OUTPATIENT)
Dept: CARDIOLOGY | Facility: MEDICAL CENTER | Age: 69
End: 2024-08-20
Payer: MEDICARE

## 2024-08-20 DIAGNOSIS — I20.89 CHRONIC STABLE ANGINA (HCC): ICD-10-CM

## 2024-08-20 PROCEDURE — G0422 INTENS CARDIAC REHAB W/EXERC: HCPCS | Performed by: INTERNAL MEDICINE

## 2024-08-20 PROCEDURE — G0423 INTENS CARDIAC REHAB NO EXER: HCPCS | Mod: 59 | Performed by: INTERNAL MEDICINE

## 2024-08-20 NOTE — PROGRESS NOTES
Tita Pitts attended Intensive Cardiac Rehab today from 0900 to 1100. During her time she exercised and attended class. Her education today was a video titled: Dining Out. Patient received handouts and class discussion pertaining to the topic.

## 2024-08-21 ENCOUNTER — NON-PROVIDER VISIT (OUTPATIENT)
Dept: CARDIOLOGY | Facility: MEDICAL CENTER | Age: 69
End: 2024-08-21
Payer: MEDICARE

## 2024-08-21 DIAGNOSIS — I20.89 CHRONIC STABLE ANGINA (HCC): ICD-10-CM

## 2024-08-21 PROCEDURE — G0422 INTENS CARDIAC REHAB W/EXERC: HCPCS | Performed by: INTERNAL MEDICINE

## 2024-08-21 PROCEDURE — G0423 INTENS CARDIAC REHAB NO EXER: HCPCS | Mod: 59 | Performed by: INTERNAL MEDICINE

## 2024-08-21 NOTE — PROGRESS NOTES
Tita Pitts attended Intensive Cardiac Rehab today from 0900 to 1100. During her time she exercised and attended class. Her education today was a cooking school titled: [employer]. Patient received handouts and class discussion pertaining to the topic.

## 2024-08-22 ENCOUNTER — NON-PROVIDER VISIT (OUTPATIENT)
Dept: CARDIOLOGY | Facility: MEDICAL CENTER | Age: 69
End: 2024-08-22
Payer: MEDICARE

## 2024-08-22 DIAGNOSIS — I20.89 CHRONIC STABLE ANGINA (HCC): ICD-10-CM

## 2024-08-22 PROCEDURE — G0422 INTENS CARDIAC REHAB W/EXERC: HCPCS | Performed by: INTERNAL MEDICINE

## 2024-08-22 PROCEDURE — G0423 INTENS CARDIAC REHAB NO EXER: HCPCS | Mod: 59 | Performed by: INTERNAL MEDICINE

## 2024-08-22 NOTE — PROGRESS NOTES
[facility]   Therapy Progress Note        Name: Tita Pitts  MRN: [id number]  : 1955  Age: 69 y.o.  Date of assessment: 2024  PCP: Bassam Marinelli M.D.  Persons in attendance: Patient  Total session time: 56 minutes      Topics addressed in psychotherapy include: Met with the patient in person for an individual counseling session.      Content of Therapy:   The patient reported that she is been working through her shoulder and cardiac physical therapy.  Patient discussed struggles with shoulder surgery and having to complete things that her  normally would have done.    Therapeutic Intervention:   This session, the therapeutic focus was on working with the patient in grief reactions.  Worked with the patient on working through her limitations.   Provided supportive psychotherapy.    This dictation has been created using voice recognition software and/or scribes. The accuracy of the dictation is limited by the abilities of the software and the expertise of the scribes. I expect there may be some errors of grammar and possibly content. I made every attempt to manually correct the errors within my dictation. However, errors related to voice recognition software and/or scribes may still exist and should be interpreted within the appropriate context.    Objective Observations:   Participation:Active verbal participation, Attentive, and Engaged   Grooming:Casual   Cognition:Alert and Fully Oriented   Eye Contact:Good   Mood:Euthymic   Affect:Flexible, Full range, Congruent with content, and Sad   Thought Process:Logical and Goal-directed   Speech:Rate within normal limits and Volume within normal limits    Current Risk:   Suicide: low   Homicide: low   Self-Harm: low   Relapse: low   Safety Plan Reviewed: not applicable    Care Plan Updated: No    Does patient express agreement with the above plan? Yes     Diagnosis:  1. Mild episode of recurrent major depressive disorder (HCC)         Referral appointment(s) scheduled? No       FELICITAS Fournier.

## 2024-08-22 NOTE — PROGRESS NOTES
Tita Pitts attended Intensive Cardiac Rehab today from 0900 to 1100. During her time she exercised and attended class. Her education today was a video titled: Healthy Minds, Healthy Bodies, Healthy Hearts. Patient received handouts and class discussion pertaining to the topic.

## 2024-08-27 ENCOUNTER — NON-PROVIDER VISIT (OUTPATIENT)
Dept: CARDIOLOGY | Facility: MEDICAL CENTER | Age: 69
End: 2024-08-27
Payer: MEDICARE

## 2024-08-27 DIAGNOSIS — I10 PRIMARY HYPERTENSION: ICD-10-CM

## 2024-08-27 DIAGNOSIS — I20.89 CHRONIC STABLE ANGINA (HCC): ICD-10-CM

## 2024-08-27 RX ORDER — LISINOPRIL 5 MG/1
5 TABLET ORAL DAILY
Qty: 100 TABLET | Refills: 1 | Status: SHIPPED | OUTPATIENT
Start: 2024-08-27

## 2024-08-27 NOTE — TELEPHONE ENCOUNTER
Refill X 6 months, sent to pharmacy.Pt. Seen in the last 6 months per protocol.   Lab Results   Component Value Date/Time    SODIUM 140 05/29/2024 08:03 AM    POTASSIUM 4.5 05/29/2024 08:03 AM    CHLORIDE 105 05/29/2024 08:03 AM    CO2 23 05/29/2024 08:03 AM    GLUCOSE 85 05/29/2024 08:03 AM    BUN 19 05/29/2024 08:03 AM    CREATININE 0.90 05/29/2024 08:03 AM       Pamela Rosario, Clinical Pharmacist, CDE, CACP  Managed Care Pharmacist for [facility] and [facility]

## 2024-08-28 ENCOUNTER — NON-PROVIDER VISIT (OUTPATIENT)
Dept: CARDIOLOGY | Facility: MEDICAL CENTER | Age: 69
End: 2024-08-28
Payer: MEDICARE

## 2024-08-28 DIAGNOSIS — I20.89 CHRONIC STABLE ANGINA (HCC): ICD-10-CM

## 2024-08-28 NOTE — PROGRESS NOTES
Tita Pitts attended Intensive Cardiac Rehab today from 0900 to 1100. During her time she exercised and attended class. Her education today was a cooking school titled: Adding Flavor. Patient received handouts and class discussion pertaining to the topic.

## 2024-08-29 ENCOUNTER — NON-PROVIDER VISIT (OUTPATIENT)
Dept: CARDIOLOGY | Facility: MEDICAL CENTER | Age: 69
End: 2024-08-29
Payer: MEDICARE

## 2024-08-29 DIAGNOSIS — I20.89 CHRONIC STABLE ANGINA (HCC): ICD-10-CM

## 2024-08-29 NOTE — PROGRESS NOTES
Tita Pitts attended Intensive Cardiac Rehab today from 0900 to 1100. During her time she exercised and attended class. Her education today was a video titled: Dec[employer]. Patient received handouts and class discussion pertaining to the topic.

## 2024-09-03 ENCOUNTER — APPOINTMENT (OUTPATIENT)
Dept: CARDIOLOGY | Facility: MEDICAL CENTER | Age: 69
End: 2024-09-03
Payer: MEDICARE

## 2024-09-03 DIAGNOSIS — I20.89 CHRONIC STABLE ANGINA (HCC): ICD-10-CM

## 2024-09-03 PROCEDURE — G0422 INTENS CARDIAC REHAB W/EXERC: HCPCS | Performed by: INTERNAL MEDICINE

## 2024-09-03 PROCEDURE — G0423 INTENS CARDIAC REHAB NO EXER: HCPCS | Mod: 59 | Performed by: INTERNAL MEDICINE

## 2024-09-03 NOTE — PROGRESS NOTES
Tita Pitts attended Intensive Cardiac Rehab today from 0900 to 1100. During her time she exercised and attended class. Her education today was a workshop titled: New Thoughts, New Behaviors. Patient received handouts and class discussion pertaining to the topic.

## 2024-09-04 ENCOUNTER — NON-PROVIDER VISIT (OUTPATIENT)
Dept: CARDIOLOGY | Facility: MEDICAL CENTER | Age: 69
End: 2024-09-04
Payer: MEDICARE

## 2024-09-04 DIAGNOSIS — I20.89 CHRONIC STABLE ANGINA (HCC): ICD-10-CM

## 2024-09-04 PROCEDURE — G0423 INTENS CARDIAC REHAB NO EXER: HCPCS | Mod: 59 | Performed by: INTERNAL MEDICINE

## 2024-09-04 PROCEDURE — G0422 INTENS CARDIAC REHAB W/EXERC: HCPCS | Performed by: INTERNAL MEDICINE

## 2024-09-04 NOTE — PROGRESS NOTES
Tita Pitts attended Intensive Cardiac Rehab today from 0900 to 1100. During her time she exercised and attended class. Her education today was a nutrition and cooking class titled: FAST BREAKFASTS. Patient received handouts and class discussion pertaining to the topic.

## 2024-09-05 ENCOUNTER — NON-PROVIDER VISIT (OUTPATIENT)
Dept: CARDIOLOGY | Facility: MEDICAL CENTER | Age: 69
End: 2024-09-05
Payer: MEDICARE

## 2024-09-05 DIAGNOSIS — I20.89 CHRONIC STABLE ANGINA (HCC): ICD-10-CM

## 2024-09-05 PROCEDURE — G0422 INTENS CARDIAC REHAB W/EXERC: HCPCS | Performed by: INTERNAL MEDICINE

## 2024-09-05 PROCEDURE — G0423 INTENS CARDIAC REHAB NO EXER: HCPCS | Mod: 59 | Performed by: INTERNAL MEDICINE

## 2024-09-05 NOTE — PROGRESS NOTES
Tita Pitts attended Intensive Cardiac Rehab today from 0900 to 1100. During her time she exercised and attended class. Her education today was a WORKOUT titled: FUELING A HEALTHY BODY. . Patient received handouts and class discussion pertaining to the topic.

## 2024-09-16 ENCOUNTER — OFFICE VISIT (OUTPATIENT)
Dept: DERMATOLOGY | Facility: IMAGING CENTER | Age: 69
End: 2024-09-16
Payer: MEDICARE

## 2024-09-16 DIAGNOSIS — L82.1 SK (SEBORRHEIC KERATOSIS): ICD-10-CM

## 2024-09-16 DIAGNOSIS — L91.8 ACROCHORDON: ICD-10-CM

## 2024-09-16 DIAGNOSIS — L73.8 SEBACEOUS HYPERPLASIA: ICD-10-CM

## 2024-09-16 DIAGNOSIS — D18.01 CHERRY ANGIOMA: ICD-10-CM

## 2024-09-16 DIAGNOSIS — D22.9 MULTIPLE NEVI: ICD-10-CM

## 2024-09-16 DIAGNOSIS — Z12.83 SKIN CANCER SCREENING: ICD-10-CM

## 2024-09-16 DIAGNOSIS — L57.0 ACTINIC KERATOSIS: ICD-10-CM

## 2024-09-16 DIAGNOSIS — Z85.828 HISTORY OF BASAL CELL CARCINOMA (BCC): ICD-10-CM

## 2024-09-16 DIAGNOSIS — L81.4 LENTIGINES: ICD-10-CM

## 2024-09-16 PROCEDURE — 17003 DESTRUCT PREMALG LES 2-14: CPT | Performed by: NURSE PRACTITIONER

## 2024-09-16 PROCEDURE — 99213 OFFICE O/P EST LOW 20 MIN: CPT | Mod: 25 | Performed by: NURSE PRACTITIONER

## 2024-09-16 PROCEDURE — 17000 DESTRUCT PREMALG LESION: CPT | Performed by: NURSE PRACTITIONER

## 2024-09-16 NOTE — PROGRESS NOTES
DERMATOLOGY NOTE  FOLLOW UP VISIT       Chief complaint: Annual Exam    Patient is developing skin tags under her bra line which hurts, also under Lt eye has a skin tag                        History of skin cancer: No  History of precancers/actinic keratoses: No  History of biopsies:Yes, Details: left leg  , results benign   History of blistering/severe sunburns:Yes, Details: child   Family history of skin cancer:No  Family history of atypical moles:No      Allergies   Allergen Reactions    Meclizine Hives    Other Drug Unspecified     Ruvert = Other reaction(s): Welts on legs    Atenolol      Very tired and slow, 25mg dose    Macrodantin [Nitrofurantoin]      nightmares        MEDICATIONS:  Medications relevant to specialty reviewed.     REVIEW OF SYSTEMS:   Positive for skin (see HPI)  Negative for fevers and chills       EXAM:  LMP  (LMP Unknown)   Constitutional: Well-developed, well-nourished, and in no distress.     A total body skin exam was performed excluding the genitals per patient preference and including the following areas: head (including face), neck, chest, abdomen, groin/buttocks, back, bilateral upper extremities, and bilateral lower extremities with the following pertinent findings listed below and/or in assessment/plan.       -sun exposed skin of trunk and b/l upper, lower extremities and face with scattered clinically benign light brown reticulated macules all of which were morphologically similar and none of which were suspicious for skin cancer today on exam  Several scattered 1-3mm bright red macules and thin papules on the trunk, scalp and extremities  Multiple tan medium brown skin-colored macules papules scattered over the trunk >> extremities--All with benign-appearing pigment network patterns on dermoscopy  Several medium brown skin-colored stuck-on waxy papules scattered on the trunk  Ill-defined erythematous gritty/scaly papule over the forehead , left cheek   Few scattered  yellowish/red papules with telangiectasias and central dell, face    IMPRESSION / PLAN:        Discussed risks associated with LN2 and shave bx, Patient verbalized understanding and agrees with plan regarding the above            Please note that this dictation was created using voice recognition software. I have made every reasonable attempt to correct obvious errors, but I expect that there are errors of grammar and possibly content that I did not discover before finalizing the note.      Return to clinic in: No follow-ups on file. and as needed for any new or changing skin lesions.

## 2024-09-16 NOTE — PROGRESS NOTES
DERMATOLOGY NOTE  NEW VISIT       Chief complaint: Annual Exam    Patient is developing skin tags under her bra line which hurts, also under Lt eye has a skin tag        History of skin cancer: Yes, Details: BCC upper forehead, MOHS, 10/23  History of precancers/actinic keratoses: No  History of biopsies:Yes, Details: left leg  , results benign   History of blistering/severe sunburns:Yes, Details: child   Family history of skin cancer:No  Family history of atypical moles:No      Allergies   Allergen Reactions    Meclizine Hives    Other Drug Unspecified     Ruvert = Other reaction(s): Welts on legs    Atenolol      Very tired and slow, 25mg dose    Macrodantin [Nitrofurantoin]      nightmares        MEDICATIONS:  Medications relevant to specialty reviewed.     REVIEW OF SYSTEMS:   Positive for skin (see HPI)  Negative for fevers and chills       EXAM:  LMP  (LMP Unknown)   Constitutional: Well-developed, well-nourished, and in no distress.     A total body skin exam was performed excluding the genitals per patient preference and including the following areas: head (including face), neck, chest, abdomen, groin/buttocks, back, bilateral upper extremities, and bilateral lower extremities with the following pertinent findings listed below and/or in assessment/plan.       -sun exposed skin of trunk and b/l upper, lower extremities and face with scattered clinically benign light brown reticulated macules all of which were morphologically similar and none of which were suspicious for skin cancer today on exam  Several scattered 1-3mm bright red macules and thin papules on the trunk, scalp and extremities  Multiple tan medium brown skin-colored macules papules scattered over the trunk >> extremities--All with benign-appearing pigment network patterns on dermoscopy  Several medium brown skin-colored stuck-on waxy papules scattered on the trunk  Ill-defined erythematous gritty/scaly papule over the forehead , R cheek   Few  "scattered yellowish/red papules with telangiectasias and central dell, face  1-2mm skin-colored to hyperpigmented, soft, pedunculated papules, L zygoma, neck line, few in bilateral axilla      IMPRESSION / PLAN:    1. Actinic keratosis  - NMSC/\"pre-cancer\" education/counseling   CRYOTHERAPY:  Risks (including, but not limited to: skin discoloration, redness, blister, blood blister, recurrence, need for further treatment, infection, scar) and benefits of cryotherapy discussed. Patient verbally agreed to proceed with treatment. 1 cryotherapy freeze thaw cycles of 10 seconds were applied to 4 lesions on face as noted on exam with cryac. Patient tolerated procedure well. Aftercare instructions given--no specific care needed unless irritated during healing process, can apply Vaseline with small band-aid if needed.      2. Acrochordon  - Benign-appearing nature of lesions discussed during exam.   - Courtesy LN2 applied to tag on L zygoma for cosmesis  - Advised to continue to monitor for any return to clinic for new or concerning changes.      3. Lentigines  - Benign-appearing nature of lesions discussed during exam.   - Advised to continue to monitor for any return to clinic for new or concerning changes.      4. Cherry angioma  - Benign-appearing nature of lesions discussed during exam.   - Advised to continue to monitor for any return to clinic for new or concerning changes.      5. SK (seborrheic keratosis)  - Benign-appearing nature of lesions discussed during exam.   - Advised to continue to monitor for any return to clinic for new or concerning changes.      6. Sebaceous hyperplasia  - Benign-appearing nature of lesions discussed during exam.   - Advised to continue to monitor for any return to clinic for new or concerning changes.      7. Multiple nevi  - Benign-appearing nature of lesions discussed during exam.   - Advised to continue to monitor for any return to clinic for new or concerning changes.  - ABCDE's " of melanoma discussed/handout given      8. History of basal cell carcinoma (BCC)  Q 6 mo TSEs    9. Skin cancer screening  Skin cancer education  discussed importance of sun protective clothing, eyewear in addition to the use of broad spectrum sunscreen with SPF 30 or greater, as well as need for reapplication ~every 2 hours when exposed to UVR/handout given  discussed importance following up for any new or changing lesions as noted in handout given, but every 6 months exams in clinic in the setting of dermatologic history  ABCDE's of melanoma discussed/handout given      Pt understands risks associated with LN2 and shave bx, Patient verbalized understanding and agrees with plan regarding the above            Please note that this dictation was created using voice recognition software. I have made every reasonable attempt to correct obvious errors, but I expect that there are errors of grammar and possibly content that I did not discover before finalizing the note.      Return to clinic in: Return in about 6 months (around 3/16/2025) for GAEL. and as needed for any new or changing skin lesions.

## 2024-09-17 ENCOUNTER — NON-PROVIDER VISIT (OUTPATIENT)
Dept: CARDIOLOGY | Facility: MEDICAL CENTER | Age: 69
End: 2024-09-17
Payer: MEDICARE

## 2024-09-17 ENCOUNTER — HOSPITAL ENCOUNTER (EMERGENCY)
Facility: MEDICAL CENTER | Age: 69
End: 2024-09-17
Attending: EMERGENCY MEDICINE
Payer: MEDICARE

## 2024-09-17 VITALS
DIASTOLIC BLOOD PRESSURE: 94 MMHG | OXYGEN SATURATION: 98 % | WEIGHT: 184.3 LBS | TEMPERATURE: 97.1 F | SYSTOLIC BLOOD PRESSURE: 142 MMHG | HEART RATE: 80 BPM | BODY MASS INDEX: 30.71 KG/M2 | HEIGHT: 65 IN | RESPIRATION RATE: 18 BRPM

## 2024-09-17 DIAGNOSIS — I20.89 CHRONIC STABLE ANGINA (HCC): ICD-10-CM
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
## 2024-09-17 PROCEDURE — 304217 HCHG IRRIGATION SYSTEM

## 2024-09-17 PROCEDURE — 90715 TDAP VACCINE 7 YRS/> IM: CPT | Performed by: EMERGENCY MEDICINE

## 2024-09-17 PROCEDURE — 99283 EMERGENCY DEPT VISIT LOW MDM: CPT

## 2024-09-17 PROCEDURE — 90471 IMMUNIZATION ADMIN: CPT

## 2024-09-17 PROCEDURE — A9270 NON-COVERED ITEM OR SERVICE: HCPCS | Performed by: EMERGENCY MEDICINE

## 2024-09-17 PROCEDURE — G0422 INTENS CARDIAC REHAB W/EXERC: HCPCS | Performed by: FAMILY MEDICINE

## 2024-09-17 PROCEDURE — 700102 HCHG RX REV CODE 250 W/ 637 OVERRIDE(OP): Performed by: EMERGENCY MEDICINE

## 2024-09-17 PROCEDURE — 700101 HCHG RX REV CODE 250: Performed by: EMERGENCY MEDICINE

## 2024-09-17 PROCEDURE — 700111 HCHG RX REV CODE 636 W/ 250 OVERRIDE (IP): Performed by: EMERGENCY MEDICINE

## 2024-09-17 PROCEDURE — G0423 INTENS CARDIAC REHAB NO EXER: HCPCS | Mod: 59 | Performed by: FAMILY MEDICINE

## 2024-09-17 RX ORDER — LIDOCAINE HYDROCHLORIDE 20 MG/ML
JELLY TOPICAL ONCE
Status: COMPLETED | OUTPATIENT
Start: 2024-09-17 | End: 2024-09-17

## 2024-09-17 RX ADMIN — AMOXICILLIN AND CLAVULANATE POTASSIUM 1 TABLET: 875; 125 TABLET, FILM COATED ORAL at 14:37

## 2024-09-17 RX ADMIN — LIDOCAINE HYDROCHLORIDE 1 APPLICATION: 20 JELLY TOPICAL at 14:37

## 2024-09-17 RX ADMIN — CLOSTRIDIUM TETANI TOXOID ANTIGEN (FORMALDEHYDE INACTIVATED), CORYNEBACTERIUM DIPHTHERIAE TOXOID ANTIGEN (FORMALDEHYDE INACTIVATED), BORDETELLA PERTUSSIS TOXOID ANTIGEN (GLUTARALDEHYDE INACTIVATED), BORDETELLA PERTUSSIS FILAMENTOUS HEMAGGLUTININ ANTIGEN (FORMALDEHYDE INACTIVATED), BORDETELLA PERTUSSIS PERTACTIN ANTIGEN, AND BORDETELLA PERTUSSIS FIMBRIAE 2/3 ANTIGEN 0.5 ML: 5; 2; 2.5; 5; 3; 5 INJECTION, SUSPENSION INTRAMUSCULAR at 14:42

## 2024-09-17 ASSESSMENT — FIBROSIS 4 INDEX: FIB4 SCORE: 1.51

## 2024-09-17 NOTE — ED NOTES
Brought back to rm 13, changed into gown.   Reports last tetanus 2016. Believes the cat is vaccinated properly and is exclusively an indoor feline.

## 2024-09-17 NOTE — ED TRIAGE NOTES
Pt comes in w/ friend  c/o being attacked by son's cat  multiple areas of bleeding noted  scratches and bite marks noted to her L arm and L L leg  last dT 2015  per believes cats has all her shots   she is an in door cat per pt

## 2024-09-17 NOTE — NON-PROVIDER
Tita Pitts attended Intensive Cardiac Rehab today from 0900 to 1100. During her time she exercised and attended class. Her education today was a VIDEO titled: METABOLIC SYNDROME. Patient received handouts and class discussion pertaining to the topic.

## 2024-09-17 NOTE — ED PROVIDER NOTES
ED Provider Note    CHIEF COMPLAINT  Chief Complaint   Patient presents with    Cat Bite     Comes in c/o multiple scratches and bites from her sons cat today   injury to L arm and leg       EXTERNAL RECORDS REVIEWED  Dermatology note completed yesterday for skin tags on her bra line to be removed    HPI/ROS      Tita Pitts is a 69 y.o. female who presents states that she went to open a door at her house for her brother-in-law and the cat jumped from out of nowhere and grabbed onto her leg and then up onto her arm.  She believes the cat might of bit her in the left upper arm.  She is bleeding profusely therefore she came to the hospital.  The cats immunizations are current.  Her last tetanus booster was in .    PAST MEDICAL HISTORY   has a past medical history of Allergy, Anesthesia, Breath shortness (), Bronchitis, Cataract, Gynecological disorder (), Hypertension, Pericarditis (2023), Pneumonia, PONV (postoperative nausea and vomiting), and Psychiatric problem (2022).    SURGICAL HISTORY   has a past surgical history that includes abdominal hysterectomy total; appendectomy (); gyn surgery; other cardiac surgery; thoracoscopy,dx no bx (Left, 2024); and pericardial window (Left, 2024).    FAMILY HISTORY  Family History   Problem Relation Age of Onset    Breast Cancer Mother     Diabetes Mother         Developed in 70s, at 90 Kidney failure    Hypertension Mother         diagnosed in 70s    Hyperlipidemia Mother         diagnosed in 70s    Kidney Disease Mother     Heart Disease Father          at 64, CHF, CAD    Heart Disease Brother         Heart Attack at 72    Stroke Brother         occurred at 78    Diabetes Maternal Uncle          at 68       SOCIAL HISTORY  Social History     Tobacco Use    Smoking status: Never     Passive exposure: Past    Smokeless tobacco: Never   Vaping Use    Vaping status: Never Used   Substance and Sexual Activity     "Alcohol use: Not Currently     Alcohol/week: 0.6 oz     Types: 1 Glasses of wine per week     Comment: Some alcohol socially over the years.  Not regular use    Drug use: Never    Sexual activity: Not Currently     Partners: Male     Birth control/protection: Surgical, Abstinence, None, Post-Menopausal     Comment: Had Endometriosis had hysterectomy.       CURRENT MEDICATIONS  Home Medications       Reviewed by Mariia Auguste R.N. (Registered Nurse) on 09/17/24 at 1358  Med List Status: Partial     Medication Last Dose Status   Ascorbic Acid (VITAMIN C PO)  Active   Calcium Carb-Cholecalciferol (CALCIUM 500/VITAMIN D) 500-3.125 MG-MCG Tab  Active   lisinopril (PRINIVIL) 5 MG Tab  Active   Magnesium 400 MG Cap  Active   multivitamin Tab  Active                    ALLERGIES  Allergies   Allergen Reactions    Meclizine Hives    Other Drug Unspecified     Ruvert = Other reaction(s): Welts on legs    Atenolol      Very tired and slow, 25mg dose    Macrodantin [Nitrofurantoin]      nightmares       PHYSICAL EXAM  VITAL SIGNS: BP (!) 142/94   Pulse 80   Temp 36.2 °C (97.1 °F) (Temporal)   Resp 18   Ht 1.651 m (5' 5\")   Wt 83.6 kg (184 lb 4.9 oz)   LMP  (LMP Unknown)   SpO2 98%   BMI 30.67 kg/m²      Nursing notes and vitals reviewed.  Constitutional: Well developed, Well nourished, No acute distress, Non-toxic appearance.   Eyes: PERRLA, EOMI, Conjunctiva normal, No discharge.   Skin: Left lower extremity multiple cat scratches and bite marks, left upper extremity multiple cat scratch and bite marks.  Please refer to picture  Extremities: Skin findings as above, distal pulses brisk left upper and lower extremities.   Neurologic: Alert & oriented x 3, no focal abnormalities noted, acting appropriately on examination    Left lower leg    Left lower leg    Left upper arm    Left forearm      Radiologist interpretation:  No orders to display       COURSE & MEDICAL DECISION MAKING    ASSESSMENT, COURSE AND " PLAN  Care Narrative: This is a pleasant 69-year-old female presents with isolated cat scratch and bite to the left upper and lower extremity.  The patient here has no evidence of gaping Gash require suture.  She does have multiple abrasions and skin tears but nothing that suturable.  The patient had the wounds cleaned.  Tetanus booster was updated she received Augmentin.  Prescription amount has been given.  She did not want any pain medication.  I did discuss the fact that these has become infected frequently and to watch for infection return to emerged part she has evidence of infection.      DISPOSITION AND DISCUSSIONS      Decision tools and prescription drugs considered including, but not limited to: Considered suturing but there is no significant laceration caused by the cat scratch by the require suture.    FINAL DIAGNOSIS  1. Cat bite, initial encounter    2. Cat scratch      DISPOSITION:  Patient will be discharged home in stable condition.    FOLLOW UP:  [facility], Emergency Dept  [address] [facility] [address]  [phone]    If symptoms worsen      OUTPATIENT MEDICATIONS:  Discharge Medication List as of 9/17/2024  3:30 PM        START taking these medications    Details   amoxicillin-clavulanate (AUGMENTIN) 875-125 MG Tab Take 1 Tablet by mouth 2 times a day for 10 days., Disp-20 Tablet, R-0, Normal             DISPOSITION:  Patient will be discharged home in stable condition.    FOLLOW UP:  [facility], Emergency Dept  [address] [facility] [address]  [phone]    If symptoms worsen      OUTPATIENT MEDICATIONS:  Discharge Medication List as of 9/17/2024  3:30 PM        START taking these medications    Details   amoxicillin-clavulanate (AUGMENTIN) 875-125 MG Tab Take 1 Tablet by mouth 2 times a day for 10 days., Disp-20 Tablet, R-0, Normal                 Electronically signed by: Mateo Loco D.O., 9/17/2024 2:26  PM

## 2024-09-17 NOTE — DISCHARGE INSTRUCTIONS
Please return to the emergency department you have severe pain, evidence of infection such as fever, red streaking up your arm or leg where the scratches and bites occurred.  We have ordered you antibiotics for outpatient use.  Utilize Tylenol and ibuprofen for pain control.

## 2024-09-18 ENCOUNTER — NON-PROVIDER VISIT (OUTPATIENT)
Dept: CARDIOLOGY | Facility: MEDICAL CENTER | Age: 69
End: 2024-09-18
Payer: MEDICARE

## 2024-09-18 DIAGNOSIS — I20.89 CHRONIC STABLE ANGINA (HCC): ICD-10-CM

## 2024-09-18 PROCEDURE — G0423 INTENS CARDIAC REHAB NO EXER: HCPCS | Mod: 59 | Performed by: FAMILY MEDICINE

## 2024-09-18 PROCEDURE — G0422 INTENS CARDIAC REHAB W/EXERC: HCPCS | Performed by: FAMILY MEDICINE

## 2024-09-18 NOTE — PROGRESS NOTES
Tita Pitts attended Intensive Cardiac Rehab today from 0900 to 1100. During her time she exercised and attended class. Her education today was a cooking school titled: [employer]. Patient received handouts and class discussion pertaining to the topic.

## 2024-09-19 ENCOUNTER — NON-PROVIDER VISIT (OUTPATIENT)
Dept: CARDIOLOGY | Facility: MEDICAL CENTER | Age: 69
End: 2024-09-19
Payer: MEDICARE

## 2024-09-19 DIAGNOSIS — I20.89 CHRONIC STABLE ANGINA (HCC): ICD-10-CM

## 2024-09-19 PROCEDURE — G0422 INTENS CARDIAC REHAB W/EXERC: HCPCS | Performed by: FAMILY MEDICINE

## 2024-09-19 PROCEDURE — G0423 INTENS CARDIAC REHAB NO EXER: HCPCS | Mod: 59 | Performed by: FAMILY MEDICINE

## 2024-09-19 NOTE — PROGRESS NOTES
Tita Pitts attended Intensive Cardiac Rehab today from 0900 to 1100. During her time she exercised and attended class. Her education today was a workshop titled: [employer]. Patient received handouts and class discussion pertaining to the topic.

## 2024-09-20 ENCOUNTER — OFFICE VISIT (OUTPATIENT)
Dept: BEHAVIORAL HEALTH | Facility: CLINIC | Age: 69
End: 2024-09-20
Payer: MEDICARE

## 2024-09-20 DIAGNOSIS — F33.0 MILD EPISODE OF RECURRENT MAJOR DEPRESSIVE DISORDER (HCC): ICD-10-CM

## 2024-09-20 PROCEDURE — 90837 PSYTX W PT 60 MINUTES: CPT | Performed by: MARRIAGE & FAMILY THERAPIST

## 2024-09-20 NOTE — PROGRESS NOTES
[facility]   Therapy Progress Note        Name: Tita Pitts  MRN: [id number]  : 1955  Age: 69 y.o.  Date of assessment: 2024  PCP: Bassam Marinelli M.D.  Persons in attendance: Patient  Total session time: 59 minutes      Topics addressed in psychotherapy include: Met with the patient in person for an individual counseling session.     Content of Therapy:   The patient discussed that she was recently seriously attacked by her sons cat.  The patient described the cat attack; while not meeting stitches, it did require a trip to the emergency department.  The patient discussed that she felt taken back by the fact that her son minimized the attack and appear to blame the patient asking what she could have done to provoke the animal.  Patient was further taken back as her son did not restrain the cat into his room.  The patient in fear of the cat and feels that it is time for her son to move.  Therapeutic Intervention:   This session, the therapeutic focus was on validating the patients concerns regarding her relationship with her son and animal attack.  Displayed empathy with the patient as she was visibly distraught describing the attack.  Discussed with the patient the impact of this attack on her feelings of safety.  Worked with the patient as we discussed setting boundaries.    This dictation has been created using voice recognition software and/or scribes. The accuracy of the dictation is limited by the abilities of the software and the expertise of the scribes. I expect there may be some errors of grammar and possibly content. I made every attempt to manually correct the errors within my dictation. However, errors related to voice recognition software and/or scribes may still exist and should be interpreted within the appropriate context.    Objective Observations:   Participation:Active verbal participation, Attentive, and Engaged   Grooming:Casual   Cognition:Alert and Fully  Oriented   Eye Contact:Good   Mood:Euthymic and Anxious   Affect:Flexible and Full range   Thought Process:Logical and Goal-directed   Speech:Rate within normal limits and Volume within normal limits    Current Risk:   Suicide: low   Homicide: low   Self-Harm: low   Relapse: low   Safety Plan Reviewed: not applicable    Care Plan Updated: No    Does patient express agreement with the above plan? Yes     Diagnosis:  1. Mild episode of recurrent major depressive disorder (HCC)        Referral appointment(s) scheduled? No       JOSE J Fournier

## 2024-09-23 ENCOUNTER — NON-PROVIDER VISIT (OUTPATIENT)
Dept: DERMATOLOGY | Facility: IMAGING CENTER | Age: 69
End: 2024-09-23
Payer: MEDICARE

## 2024-09-23 ENCOUNTER — TELEPHONE (OUTPATIENT)
Dept: DERMATOLOGY | Facility: IMAGING CENTER | Age: 69
End: 2024-09-23

## 2024-09-23 NOTE — TELEPHONE ENCOUNTER
Patient gave us a call stating she had came in last week to get some cryotherapy done on a couple of spots on her face. Patient stated all her spots had gone away but this one spot looked really red and swell was concern and wanted to get seen asap. Schedule patient for an MA visit will be coming in later on today.

## 2024-09-24 ENCOUNTER — NON-PROVIDER VISIT (OUTPATIENT)
Dept: CARDIOLOGY | Facility: MEDICAL CENTER | Age: 69
End: 2024-09-24
Payer: MEDICARE

## 2024-09-24 DIAGNOSIS — I20.89 CHRONIC STABLE ANGINA (HCC): ICD-10-CM

## 2024-09-24 NOTE — PROGRESS NOTES
Tita Pitts attended Intensive Cardiac Rehab today from 0900 to 1100. During her time she exercised and attended class. Her education today was a workshop titled: Reducing Stress. Patient received handouts and class discussion pertaining to the topic.

## 2024-10-01 ENCOUNTER — APPOINTMENT (OUTPATIENT)
Dept: CARDIOLOGY | Facility: MEDICAL CENTER | Age: 69
End: 2024-10-01
Payer: MEDICARE

## 2024-10-06 NOTE — PROGRESS NOTES
Tita Pitts attended Intensive Cardiac Rehab today from 0900 to 1100. During her time she exercised and attended class. Her education today was a WORKSHOP titled: SLEEP. Patient received handouts and class discussion pertaining to the topic.       06-Oct-2024 23:54 06-Oct-2024 23:41

## 2024-10-16 ENCOUNTER — HOSPITAL ENCOUNTER (OUTPATIENT)
Dept: LAB | Facility: MEDICAL CENTER | Age: 69
End: 2024-10-16
Attending: FAMILY MEDICINE
Payer: MEDICARE

## 2024-10-16 DIAGNOSIS — D70.9 NEUTROPENIA, UNSPECIFIED TYPE (HCC): ICD-10-CM

## 2024-10-16 DIAGNOSIS — N18.31 CHRONIC KIDNEY DISEASE, STAGE 3A: ICD-10-CM

## 2024-10-16 LAB
ALBUMIN SERPL BCP-MCNC: 4.2 G/DL (ref 3.2–4.9)
ALBUMIN/GLOB SERPL: 1.6 G/DL
ALP SERPL-CCNC: 92 U/L (ref 30–99)
ALT SERPL-CCNC: 12 U/L (ref 2–50)
ANION GAP SERPL CALC-SCNC: 12 MMOL/L (ref 7–16)
AST SERPL-CCNC: 21 U/L (ref 12–45)
BASOPHILS # BLD AUTO: 1.8 % (ref 0–1.8)
BASOPHILS # BLD: 0.07 K/UL (ref 0–0.12)
BILIRUB SERPL-MCNC: 0.6 MG/DL (ref 0.1–1.5)
BUN SERPL-MCNC: 18 MG/DL (ref 8–22)
CALCIUM ALBUM COR SERPL-MCNC: 9.5 MG/DL (ref 8.5–10.5)
CALCIUM SERPL-MCNC: 9.7 MG/DL (ref 8.5–10.5)
CHLORIDE SERPL-SCNC: 104 MMOL/L (ref 96–112)
CO2 SERPL-SCNC: 23 MMOL/L (ref 20–33)
CREAT SERPL-MCNC: 1.14 MG/DL (ref 0.5–1.4)
EOSINOPHIL # BLD AUTO: 0.09 K/UL (ref 0–0.51)
EOSINOPHIL NFR BLD: 2.3 % (ref 0–6.9)
ERYTHROCYTE [DISTWIDTH] IN BLOOD BY AUTOMATED COUNT: 44.8 FL (ref 35.9–50)
GFR SERPLBLD CREATININE-BSD FMLA CKD-EPI: 53 ML/MIN/1.73 M 2
GLOBULIN SER CALC-MCNC: 2.7 G/DL (ref 1.9–3.5)
GLUCOSE SERPL-MCNC: 88 MG/DL (ref 65–99)
HCT VFR BLD AUTO: 37.7 % (ref 37–47)
HGB BLD-MCNC: 12.9 G/DL (ref 12–16)
IMM GRANULOCYTES # BLD AUTO: 0.01 K/UL (ref 0–0.11)
IMM GRANULOCYTES NFR BLD AUTO: 0.3 % (ref 0–0.9)
LYMPHOCYTES # BLD AUTO: 1.45 K/UL (ref 1–4.8)
LYMPHOCYTES NFR BLD: 36.8 % (ref 22–41)
MCH RBC QN AUTO: 32.7 PG (ref 27–33)
MCHC RBC AUTO-ENTMCNC: 34.2 G/DL (ref 32.2–35.5)
MCV RBC AUTO: 95.7 FL (ref 81.4–97.8)
MONOCYTES # BLD AUTO: 0.36 K/UL (ref 0–0.85)
MONOCYTES NFR BLD AUTO: 9.1 % (ref 0–13.4)
NEUTROPHILS # BLD AUTO: 1.96 K/UL (ref 1.82–7.42)
NEUTROPHILS NFR BLD: 49.7 % (ref 44–72)
NRBC # BLD AUTO: 0 K/UL
NRBC BLD-RTO: 0 /100 WBC (ref 0–0.2)
PLATELET # BLD AUTO: 352 K/UL (ref 164–446)
PMV BLD AUTO: 10.4 FL (ref 9–12.9)
POTASSIUM SERPL-SCNC: 4.1 MMOL/L (ref 3.6–5.5)
PROT SERPL-MCNC: 6.9 G/DL (ref 6–8.2)
RBC # BLD AUTO: 3.94 M/UL (ref 4.2–5.4)
SODIUM SERPL-SCNC: 139 MMOL/L (ref 135–145)
WBC # BLD AUTO: 3.9 K/UL (ref 4.8–10.8)

## 2024-10-16 PROCEDURE — 85025 COMPLETE CBC W/AUTO DIFF WBC: CPT

## 2024-10-16 PROCEDURE — 36415 COLL VENOUS BLD VENIPUNCTURE: CPT

## 2024-10-16 PROCEDURE — 80053 COMPREHEN METABOLIC PANEL: CPT

## 2024-10-21 ENCOUNTER — OFFICE VISIT (OUTPATIENT)
Dept: BEHAVIORAL HEALTH | Facility: CLINIC | Age: 69
End: 2024-10-21
Payer: MEDICARE

## 2024-10-21 DIAGNOSIS — F33.0 MILD EPISODE OF RECURRENT MAJOR DEPRESSIVE DISORDER (HCC): ICD-10-CM

## 2024-10-21 PROCEDURE — 90837 PSYTX W PT 60 MINUTES: CPT | Performed by: MARRIAGE & FAMILY THERAPIST

## 2024-10-23 ENCOUNTER — OFFICE VISIT (OUTPATIENT)
Dept: MEDICAL GROUP | Facility: MEDICAL CENTER | Age: 69
End: 2024-10-23
Payer: MEDICARE

## 2024-10-23 VITALS
SYSTOLIC BLOOD PRESSURE: 106 MMHG | HEIGHT: 65 IN | DIASTOLIC BLOOD PRESSURE: 84 MMHG | HEART RATE: 73 BPM | OXYGEN SATURATION: 97 % | TEMPERATURE: 97.3 F | BODY MASS INDEX: 30.05 KG/M2 | WEIGHT: 180.34 LBS

## 2024-10-23 DIAGNOSIS — N18.31 STAGE 3A CHRONIC KIDNEY DISEASE: ICD-10-CM

## 2024-10-23 DIAGNOSIS — D72.819 LEUKOPENIA, UNSPECIFIED TYPE: ICD-10-CM

## 2024-10-23 DIAGNOSIS — I10 PRIMARY HYPERTENSION: ICD-10-CM

## 2024-10-23 PROBLEM — N30.00 ACUTE CYSTITIS WITHOUT HEMATURIA: Status: RESOLVED | Noted: 2024-03-06 | Resolved: 2024-10-23

## 2024-10-23 PROCEDURE — 3079F DIAST BP 80-89 MM HG: CPT | Performed by: FAMILY MEDICINE

## 2024-10-23 PROCEDURE — 3074F SYST BP LT 130 MM HG: CPT | Performed by: FAMILY MEDICINE

## 2024-10-23 PROCEDURE — 99214 OFFICE O/P EST MOD 30 MIN: CPT | Performed by: FAMILY MEDICINE

## 2024-10-23 ASSESSMENT — ENCOUNTER SYMPTOMS
FEVER: 0
PALPITATIONS: 0
CHILLS: 0

## 2024-10-23 ASSESSMENT — FIBROSIS 4 INDEX: FIB4 SCORE: 1.15

## 2024-11-07 ENCOUNTER — PHYSICAL THERAPY (OUTPATIENT)
Dept: PHYSICAL THERAPY | Facility: MEDICAL CENTER | Age: 69
End: 2024-11-07
Attending: FAMILY MEDICINE
Payer: MEDICARE

## 2024-11-07 DIAGNOSIS — M12.812 ROTATOR CUFF ARTHROPATHY OF LEFT SHOULDER: ICD-10-CM

## 2024-11-07 PROCEDURE — 97162 PT EVAL MOD COMPLEX 30 MIN: CPT

## 2024-11-07 PROCEDURE — 97110 THERAPEUTIC EXERCISES: CPT

## 2024-11-07 ASSESSMENT — ENCOUNTER SYMPTOMS
PAIN SCALE AT LOWEST: 2
PAIN SCALE AT HIGHEST: 7
PAIN SCALE: 2
QUALITY: ACHING

## 2024-11-07 NOTE — OP THERAPY EVALUATION
"  Outpatient Physical Therapy  INITIAL EVALUATION    [facility] Outpatient Physical Therapy  [address]  [place] [address]  Phone:  [phone]  Fax:  [phone]    Date of Evaluation: 11/07/2024    Patient: Tita Pitts  YOB: 1955  MRN: [id number]     Referring Provider: Bassam Marinelli M.D.  [address]  [address]  [place] [address]   Referring Diagnosis No admission diagnoses are documented for this encounter.     Time Calculation  Start time: 1501              Chief Complaint: Shoulder Problem    Visit Diagnoses     ICD-10-CM   1. Rotator cuff arthropathy of left shoulder  M12.812       Date of onset of impairment: No data found    Subjective:   History of Present Illness:     Mechanism of injury:  Patient is a 69 year old female with a PMH including: Stage III CKD, osteopenia, PVC, recurrent major depressive disorder, leukopenia, hysterectomy, pericarditis (2023). No prior surgeries to shoulder.     Pt presents to therapy with complaints of L shoulder pain. Pt stating she has a hx of L shoulder pain 15 years ago; was unable to lift beyond 90 deg; it took a few years to improve over time. Pt stating L shoulder started hurting again in January 2024; she had a hard time differentiating shoulder vs. Heart-rated pain. Shoulder pain increased after pericardial window procedure 1/8/24. She was told to wait for healing but pain never improved. Pt stating pain is intermittent in L shoulder; will radiate into axillary region on L and can radiate into the hand and fingers intermittently. Most common at axilla. Pain is most common in the evenings/late afternoon. Pt completed 12 week cardiac rehab; she had a lot of difficulty with strength training due to L shoulder and weakness. Denies numbness/tingling. Is endorsing L sided weakness since the surgery; she is unsure if this was present prior to surgery.     Per PCP note on 8/9/24, \"She began " "experiencing intermittent left shoulder pain in either March or 2024. In 2024, she underwent a cardiac window procedure, which took some time for the discomfort to subside. At that time, she was advised to allow more time for the discomfort to subside, leading her to rest the shoulder. Initially, she suspected the discomfort might be due to irritation, but the pain persisted. The intensity of the pain varies, with some days being more severe than others. Initially, the pain seemed to improve, but it has not completely subsided. On Tuesday afternoon, the pain intensified, characterized by throbbing and aching sensations in her entire arm, wrist, and under her arm. This discomfort persisted throughout the afternoon and evening, causing her to fall asleep with her shoulder in a certain position. She denies any neck pain. The pain had subsided on Wednesday morning, but returned after brushing her teeth. She is currently attending cardiac rehabilitation to increase her stamina, but has experienced difficulty returning to her feet, climbing stairs, and hiking on hills due to breathlessness. 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 strengthening exercises.\"        Pain:     Current pain ratin    At best pain ratin    At worst pain ratin    Location:  Lateral L shoulder, L pec (\"sensation\")    Quality:  Aching (basline sensation, intermittent ache or sharp; \"it builds\")    Progression:  Stable    Pain Comments::  Aggravating: opening a jar, heavy household chores, carrying grocery bag, chest press and resisted rotational exercises    Relieving: holding arm close to the body with blankets in the evening, tylenol-mildly helpful (very infrequent), supine lying  Hand dominance:  Right  Diagnostic Tests:     Diagnostic Tests Comments:  24 L shoulder x ray:  FINDINGS:  Bone " mineralization is normal.  There is no evidence of fracture or dislocation.  There is some globular calcification overlying the humeral head consistent with calcific tendinitis.  Soft tissues are normal.     IMPRESSION:     1.  No evidence of fracture or arthropathy.     2.  Faint calcific tendinitis of the rotator cuff.    Treatments:     Treatment Comments:  Lidocaine patches-not helpful   Activities of Daily Living:     Patient reported ADL status: Patient's current daily routine includes:  Work: Retired  Exercise: Strengthening exercises at the gym; has  at the gym      Patient Goals:     Other patient goals:  Improve household chores and yardwork, work with  pain-free, improve strength      Past Medical History:   Diagnosis Date    Allergy     Anesthesia     ponv    Breath shortness 2022    Related to current pericarditis    Bronchitis     history of    Cataract     NOT surgically removed    Gynecological disorder 1986    Had Hysterectomy 1990    Hypertension     well controlled on medication    Pericarditis 04/07/2023    Pneumonia     history of, in 20's    PONV (postoperative nausea and vomiting)     Psychiatric problem 12/27/2022    grief - in counseling     Past Surgical History:   Procedure Laterality Date    [place] THORACOSCOPY,DX NO BX Left 1/8/2024    Procedure: LEFT THORACOSCOPY, PERICARDIAL WINDOW;  Surgeon: John H Ganser, M.D.;  Location: SURGERY [facility];  Service: General    PERICARDIAL WINDOW Left 1/8/2024    Procedure: CREATION, PERICARDIAL WINDOW;  Surgeon: John H Ganser, M.D.;  Location: SURGERY [facility];  Service: General    APPENDECTOMY  1986    ABDOMINAL HYSTERECTOMY TOTAL      GYN SURGERY      laparoscopy for endometriosis    OTHER CARDIAC SURGERY       Social History     Tobacco Use    Smoking status: Never     Passive exposure: Past    Smokeless tobacco: Never   Substance Use Topics    Alcohol use: Not Currently     Alcohol/week: 0.6 oz     Types: 1 Glasses of  wine per week     Comment: Some alcohol socially over the years.  Not regular use     Family and Occupational History     Socioeconomic History    Marital status:      Spouse name: Not on file    Number of children: Not on file    Years of education: Not on file    Highest education level: Bachelor's degree (e.g., BA, AB, BS)   Occupational History    Not on file       Objective     Postural Observations  Seated posture: fair  Correction of posture: makes symptoms better    Additional Postural Observation Details  Forward head and rounded shoulders    Neurological Testing     Reflexes   Left   Biceps (C5/C6): normal (2+)    Right   Biceps (C5/C6): normal (2+)    Dermatome testing   Cervical (left)   All left cervical dermatomes intact    Cervical (right)   All right cervical dermatomes intact    Palpation     Additional Palpation Details  TTP anterior scalene and pec L     No visible atrophy at thenar and hypothenar eminences Bilat  Hands warm to touch; normal capillary refill     Active Range of Motion     Cervical Spine   Flexion: within functional limits (chin to chest)  Extension: within functional limits  Retraction: decreased  Left lateral flexion: Active left cervical lateral flexion: 32 deg; improves slightly.  Right lateral flexion: Active right cervical lateral flexion: 30 deg; stiff in the neck; worse shoulder pain.  Left rotation: Active left cervical rotation: 52 deg.  Right rotation: Active right cervical rotation: 65 deg; better.    Tests     Left Shoulder   Positive ULTT2.         Therapeutic Exercises (CPT 64651):     1. pt education, re: PT exam findings; posture training, brachial plexus with visual aid    2. new hep as below      Therapeutic Exercise Summary: Access Code: [email or web address]  URL: [email or web address]  Date: 11/07/2024  Prepared by: Chris Bauman    Exercises  - Correct Seated Posture  - 7 x weekly  - Standing Median Nerve Glide  - 2 x daily - 7 x weekly - 1 sets - 10  reps    Pt performed these exercises with instruction and SPV.  Provided handout with these exercises for daily HEP.          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 No red flags reported. Imaging remarkable for: Faint calcific tendinitis of the rotator cuff; no fracture or arthropathy. Exam findings suggestive of cervical radiculopathy in addition to poor postural awareness, and +median nerve tension test. Therapist will formally assess  strength and myotomes in follow ups; unable today due to time constraints. Pt may benefit from skilled physical therapy in order to address above impairments in order to improve QOL and return to reported ADL's.     Prognosis: good    Goals:   Short Term Goals:   1. Pt will be independent with written HEP.  2. Pt will participate in formal  strength assessment and myotomes if tolerated.    Short term goal time span:  2-4 weeks      Long Term Goals:    1. Pt will be independent with written HEP.  2. Pt will have a sig improvement in Quickdash score (eval: 36.36)  3. Pt will be able to return to recreational activities and gym activities with min to no modifications at least 2-3x/week.   4. Pt will be able to complete normal household chores without increase in s/s at least 75% of the time or greater.     Long term goal time span:  6-8 weeks    Plan:   Therapy options:  Physical therapy treatment to continue  Planned therapy interventions:  Mechanical Traction (CPT 02588), Neuromuscular Re-education (CPT 19990), E Stim Unattended (CPT 12908), Manual Therapy (CPT 12855), Therapeutic Exercise (CPT 32810) and Therapeutic Activities (CPT  35563)  Frequency: 1-2x/wk.  Duration in weeks:  8  Discussed with:  Patient  Plan details:  UPOC: 1/3/24          Functional Assessment Used  Quickdash General Total Score: 36.36     Referring provider co-signature:  I have reviewed this plan of care and my co-signature certifies the need for services.    Certification Period: 11/07/2024 to  1/3/24    Physician Signature: ________________________________ Date: ______________

## 2024-11-11 ENCOUNTER — PROCEDURE VISIT (OUTPATIENT)
Dept: ENDOCRINOLOGY | Facility: MEDICAL CENTER | Age: 69
End: 2024-11-11
Attending: INTERNAL MEDICINE
Payer: MEDICARE

## 2024-11-11 DIAGNOSIS — E04.2 NON-TOXIC MULTINODULAR GOITER: ICD-10-CM

## 2024-11-11 PROCEDURE — 76536 US EXAM OF HEAD AND NECK: CPT | Performed by: INTERNAL MEDICINE

## 2024-11-11 NOTE — PROGRESS NOTES
Thyroid US done on this procedure visit  We identified multiple isoechoic nodules on the right lobe and isthmus  Will schedule for FNA for the right sided isthmus nodule   Please see dictated POC US report completed by endocrinologist  Patient advised to follow up as planned with labs prior    Mark Mathews M.D.

## 2024-11-12 ENCOUNTER — PHYSICAL THERAPY (OUTPATIENT)
Dept: PHYSICAL THERAPY | Facility: MEDICAL CENTER | Age: 69
End: 2024-11-12
Attending: FAMILY MEDICINE
Payer: MEDICARE

## 2024-11-12 DIAGNOSIS — M12.812 ROTATOR CUFF ARTHROPATHY OF LEFT SHOULDER: ICD-10-CM

## 2024-11-12 PROCEDURE — 97110 THERAPEUTIC EXERCISES: CPT

## 2024-11-12 PROCEDURE — 97012 MECHANICAL TRACTION THERAPY: CPT

## 2024-11-12 PROCEDURE — 97140 MANUAL THERAPY 1/> REGIONS: CPT

## 2024-11-12 NOTE — OP THERAPY DAILY TREATMENT
Outpatient Physical Therapy  DAILY TREATMENT     [facility] Outpatient Physical Therapy  [address]  [place] [address]  Phone:  [phone]  Fax:  [phone]    Date: 11/12/2024    Patient: Tita Pitts  YOB: 1955  MRN: [id number]     Time Calculation    Start time: 0945  Stop time: 1044 Time Calculation (min): 59 minutes         Chief Complaint: Neck Problem and Shoulder Problem    Visit #: 2    SUBJECTIVE:  Pt stating she saw her . Has been noticing after activity that she may not be able to lift the leg into the car. It is not consistent; sometimes more difficulty than others. This has been going on for a few months.       OBJECTIVE:  Current objective measures:    strength -measured in pounds:  R: 20, 25, 31  L: 20, 21, 25       Active Range of Motion   Cervical Spine -sitting:   Flexion: within functional limits (chin to chest)  Extension: within functional limits  Retraction: decreased  Left lateral flexion: Active left cervical lateral flexion: 32 deg; improves slightly.  Right lateral flexion: Active right cervical lateral flexion: 30 deg; stiff in the neck; worse shoulder pain.  Left rotation: Active left cervical rotation: 52 deg.  Right rotation: Active right cervical rotation: 57 deg; better.    Shoulder AROM-sitting:  Flexion: 152 with ERP  Abduction: 100 with ERP    Neurological Testing     Myotome testing   Cervical (left)   C0-1 (flexion): 5  C1-2 (neck flexion): 5  C3 (neck sidebend): 5  C4 (shoulder shrug): 4  C5 (deltoid): 4  C6 (biceps): 4  C7 (triceps): 4  C8 (thumb extension): 4  T1 (intrinsics): 4    Cervical (right)   All right cervical myotomes within normal limits      Therapeutic Exercises (CPT 02391):     1. UBE, x5 min, cardio warm up    2. new hep as below    3. median nerve glide      Therapeutic Exercise Summary: Access Code: [email or web address]  URL: [email or web address]  Date: 11/07/2024  Prepared  by: Chris Bauman    Exercises  - Correct Seated Posture  - 7 x weekly  - Standing Median Nerve Glide  - 2 x daily - 7 x weekly - 1 sets - 10 reps    Pt performed these exercises with instruction and SPV.  Provided handout with these exercises for daily HEP.        Therapeutic Treatments and Modalities:     1. Manual Therapy (CPT 19719), see below    2. Mechanical Traction (CPT 45638), 14/8# [facility] traction c/s with mhp x15 min    Therapeutic Treatment and Modalities Summary: Manual:  CPA and rotational mobs to C7-T10 gr III in prone lying, followed by STM and gentle c/s traction in supine.  Then scapular inf and abd mobs gr III with scapular release x 5 in SL (pillow barrier)      Time-based treatments/modalities:    Physical Therapy Timed Treatment Charges  Manual therapy minutes (CPT 34336): 28 minutes  Therapeutic exercise minutes (CPT 77279): 16 minutes      Pain rating (1-10) before treatment:  1/10 at rest globally L shoulder  Pain rating (1-10) after treatment:    Pain Comments::  Aggravating: opening a jar, heavy household chores, carrying grocery bag, chest press and resisted rotational exercises     ASSESSMENT:   Response to treatment:   Pt demonstrating some global myotomal weakness at LUE; fortunately,  strength equal bilat. Uncertain, whether component of TOS present. Will emphasize treatment to both neck and shoulder. Trial of traction today; will assess response to conservative parameters.      PLAN/RECOMMENDATIONS:   Plan for treatment: therapy treatment to continue next visit.  Planned interventions for next visit: continue with current treatment.

## 2024-11-19 ENCOUNTER — HOSPITAL ENCOUNTER (OUTPATIENT)
Facility: MEDICAL CENTER | Age: 69
End: 2024-11-19
Attending: NURSE PRACTITIONER
Payer: MEDICARE

## 2024-11-19 ENCOUNTER — OFFICE VISIT (OUTPATIENT)
Dept: URGENT CARE | Facility: CLINIC | Age: 69
End: 2024-11-19
Payer: MEDICARE

## 2024-11-19 ENCOUNTER — APPOINTMENT (OUTPATIENT)
Dept: PHYSICAL THERAPY | Facility: MEDICAL CENTER | Age: 69
End: 2024-11-19
Attending: FAMILY MEDICINE
Payer: MEDICARE

## 2024-11-19 VITALS
WEIGHT: 181 LBS | SYSTOLIC BLOOD PRESSURE: 112 MMHG | BODY MASS INDEX: 30.16 KG/M2 | HEART RATE: 80 BPM | OXYGEN SATURATION: 96 % | DIASTOLIC BLOOD PRESSURE: 76 MMHG | TEMPERATURE: 97.4 F | HEIGHT: 65 IN

## 2024-11-19 DIAGNOSIS — N30.01 ACUTE CYSTITIS WITH HEMATURIA: ICD-10-CM

## 2024-11-19 DIAGNOSIS — R30.0 DYSURIA: ICD-10-CM

## 2024-11-19 DIAGNOSIS — N18.31 STAGE 3A CHRONIC KIDNEY DISEASE: ICD-10-CM

## 2024-11-19 LAB
APPEARANCE UR: NORMAL
BILIRUB UR STRIP-MCNC: NORMAL MG/DL
COLOR UR AUTO: YELLOW
GLUCOSE UR STRIP.AUTO-MCNC: NORMAL MG/DL
KETONES UR STRIP.AUTO-MCNC: NORMAL MG/DL
LEUKOCYTE ESTERASE UR QL STRIP.AUTO: NORMAL
NITRITE UR QL STRIP.AUTO: NORMAL
PH UR STRIP.AUTO: 6 [PH] (ref 5–8)
PROT UR QL STRIP: 30 MG/DL
RBC UR QL AUTO: NORMAL
SP GR UR STRIP.AUTO: 1.01
UROBILINOGEN UR STRIP-MCNC: 0.2 MG/DL

## 2024-11-19 PROCEDURE — 3074F SYST BP LT 130 MM HG: CPT | Performed by: NURSE PRACTITIONER

## 2024-11-19 PROCEDURE — 81002 URINALYSIS NONAUTO W/O SCOPE: CPT | Performed by: NURSE PRACTITIONER

## 2024-11-19 PROCEDURE — 99214 OFFICE O/P EST MOD 30 MIN: CPT | Performed by: NURSE PRACTITIONER

## 2024-11-19 PROCEDURE — 3078F DIAST BP <80 MM HG: CPT | Performed by: NURSE PRACTITIONER

## 2024-11-19 PROCEDURE — 87086 URINE CULTURE/COLONY COUNT: CPT

## 2024-11-19 RX ORDER — CEFDINIR 300 MG/1
300 CAPSULE ORAL 2 TIMES DAILY
Qty: 10 CAPSULE | Refills: 0 | Status: SHIPPED | OUTPATIENT
Start: 2024-11-19 | End: 2024-11-24

## 2024-11-19 ASSESSMENT — FIBROSIS 4 INDEX: FIB4 SCORE: 1.15

## 2024-11-19 NOTE — PROGRESS NOTES
Tita Pitts is a 69 y.o. female who presents for UTI (Burning pain to pee x 1 day)      HPI  This is a new problem. Tita Pitts is a 69 y.o. patient who presents to urgent care with c/o: Burning with urination for 1 day.  Feels like she has a bladder infection.  Overall generally not feeling well.  She reports having a bad taste in her mouth and having some low back discomfort as well as some suprapubic discomfort.  She has been drinking a lot of water.  No other aggravating alleviating factors.  She denies fever, chills, nausea, vomiting.  No recent diarrhea.    ROS See HPI    Allergies:       Allergies   Allergen Reactions    Meclizine Hives    Other Drug Unspecified     Ruvert = Other reaction(s): Welts on legs    Atenolol      Very tired and slow, 25mg dose    Macrodantin [Nitrofurantoin]      nightmares       PMSFS Hx:  Past Medical History:   Diagnosis Date    Allergy     Anesthesia     ponv    Breath shortness 2022    Related to current pericarditis    Bronchitis     history of    Cataract     NOT surgically removed    Gynecological disorder 1986    Had Hysterectomy 1990    Hypertension     well controlled on medication    Pericarditis 04/07/2023    Pneumonia     history of, in 20's    PONV (postoperative nausea and vomiting)     Psychiatric problem 12/27/2022    grief - in counseling     Past Surgical History:   Procedure Laterality Date    VT THORACOSCOPY,DX NO BX Left 1/8/2024    Procedure: LEFT THORACOSCOPY, PERICARDIAL WINDOW;  Surgeon: John H Ganser, M.D.;  Location: SURGERY [facility];  Service: General    PERICARDIAL WINDOW Left 1/8/2024    Procedure: CREATION, PERICARDIAL WINDOW;  Surgeon: John H Ganser, M.D.;  Location: SURGERY [facility];  Service: General    APPENDECTOMY  1986    ABDOMINAL HYSTERECTOMY TOTAL      GYN SURGERY      laparoscopy for endometriosis    OTHER CARDIAC SURGERY       Family History   Problem Relation Age of Onset    Breast Cancer Mother     Diabetes Mother   "       Developed in 70s, at 90 Kidney failure    Hypertension Mother         diagnosed in 70s    Hyperlipidemia Mother         diagnosed in 70s    Kidney Disease Mother     Heart Disease Father          at 64, CHF, CAD    Heart Disease Brother         Heart Attack at 72    Stroke Brother         occurred at 78    Diabetes Maternal Uncle          at 68     Social History     Tobacco Use    Smoking status: Never     Passive exposure: Past    Smokeless tobacco: Never   Substance Use Topics    Alcohol use: Not Currently     Alcohol/week: 0.6 oz     Types: 1 Glasses of wine per week     Comment: Some alcohol socially over the years.  Not regular use         Problems:   Patient Active Problem List   Diagnosis    Other fatigue    Leukopenia    Stage 3a chronic kidney disease    Family history of breast cancer    Osteopenia of multiple sites    Cardiomegaly    Pericardial effusion    Primary hypertension    Tinnitus of both ears    PVC (premature ventricular contraction)    Premature atrial contractions    Thyroid antibody positive    Mild episode of recurrent major depressive disorder (HCC)    S/P pericardial window creation       Medications:   Current Outpatient Medications on File Prior to Visit   Medication Sig Dispense Refill    B Complex Vitamins (VITAMIN B COMPLEX PO) Take  by mouth.      lisinopril (PRINIVIL) 5 MG Tab Take 1 Tablet by mouth every day. 100 Tablet 1    multivitamin Tab Take 1 Tablet by mouth every day.      Calcium Carb-Cholecalciferol (CALCIUM 500/VITAMIN D) 500-3.125 MG-MCG Tab Take  by mouth.      Magnesium 400 MG Cap Take 400 mg by mouth every day.      Ascorbic Acid (VITAMIN C PO) Take 1 Tablet by mouth every day.       No current facility-administered medications on file prior to visit.        Objective:     /76 (BP Location: Right arm, Patient Position: Sitting, BP Cuff Size: Large adult)   Pulse 80   Temp 36.3 °C (97.4 °F) (Temporal)   Ht 1.651 m (5' 5\")   Wt " 82.1 kg (181 lb)   LMP  (LMP Unknown)   SpO2 96%   BMI 30.12 kg/m²     Physical Exam  Vitals and nursing note reviewed.   Constitutional:       General: She is not in acute distress.     Appearance: Normal appearance. She is well-developed. She is not ill-appearing or toxic-appearing.   HENT:      Mouth/Throat:      Mouth: Mucous membranes are moist.   Cardiovascular:      Rate and Rhythm: Normal rate.      Pulses: Normal pulses.   Pulmonary:      Effort: Pulmonary effort is normal.   Abdominal:      Palpations: Abdomen is soft. Abdomen is not rigid.      Tenderness: There is no right CVA tenderness or left CVA tenderness.   Musculoskeletal:      Lumbar back: Normal.   Skin:     General: Skin is warm and dry.      Capillary Refill: Capillary refill takes less than 2 seconds.   Neurological:      Mental Status: She is alert and oriented to person, place, and time.   Psychiatric:         Mood and Affect: Mood normal.         Behavior: Behavior normal. Behavior is cooperative.       Results for orders placed or performed in visit on 11/19/24   POCT Urinalysis    Collection Time: 11/19/24 11:01 AM   Result Value Ref Range    POC Color yellow Negative    POC Appearance cloudy Negative    POC Glucose neg Negative mg/dL    POC Bilirubin neg Negative mg/dL    POC Ketones neg Negative mg/dL    POC Specific Gravity 1.010 <1.005 - >1.030    POC Blood large Negative    POC Urine PH 6.0 5.0 - 8.0    POC Protein 30 Negative mg/dL    POC Urobiligen 0.2 Negative (0.2) mg/dL    POC Nitrites neg Negative    POC Leukocyte Esterase small Negative     *Note: Due to a large number of results and/or encounters for the requested time period, some results have not been displayed. A complete set of results can be found in Results Review.     EGFR   Component  Ref Range & Units 1 mo ago 5 mo ago 7 mo ago       GFR (CKD-EPI)  >60 mL/min/1.73 m 2 53 Abnormal  70 CM 63 CM           Assessment /Associated Orders:      1. Dysuria  POCT  Urinalysis            Medical Decision Making:    Tita Pitts is a very pleasant 69 y.o. female who is clinically stable at today's acute urgent care visit.    No acute distress is noted.  VSS. Appropriate for outpatient care at this time.   Acute problem today with uncertain prognosis.  Urinalysis is suggestive of acute cystitis.  No renal dosing adjustment required today.  Her estimated glomerular filtration rate was 53 ml/min 1 month ago.    Educated in proper administration of  prescription medication(s) ordered today including safety, possible SE, risks, benefits, rationale and alternatives to therapy.   Keep well hydrated  Urine culture: pending     Through shared decision making a discussion of the Dx and DDx, management options (risks,benefits, and alternatives to planned treatment), natural progression and supportive care.  Expressed understanding and the treatment plan was agreed upon.   Questions were encouraged and answered   Return to urgent care prn if new or worsening sx or if there is no improvement in condition prn.    Educated in Red flags and indications to immediately call 911 or present to the Emergency Department.             Please note that this dictation was created using voice recognition software. I have worked with consultants from the vendor as well as technical experts from [facility] to optimize the interface. I have made every reasonable attempt to correct obvious errors, but I expect that there are errors of grammar and possibly content that I did not discover before finalizing the note.  This note was electronically signed by provider

## 2024-11-19 NOTE — OP THERAPY DAILY TREATMENT
Outpatient Physical Therapy  DAILY TREATMENT     [facility] Outpatient Physical Therapy  [address]  [place] [address]  Phone:  [phone]  Fax:  [phone]    Date: 11/19/2024    Patient: Tita Pitts  YOB: 1955  MRN: [id number]     Time Calculation                   Chief Complaint: No chief complaint on file.    Visit #: 3    SUBJECTIVE:  Pt stating she saw her . Has been noticing after activity that she may not be able to lift the leg into the car. It is not consistent; sometimes more difficulty than others. This has been going on for a few months.       OBJECTIVE:  Current objective measures:    strength -measured in pounds:  R: 20, 25, 31  L: 20, 21, 25       Active Range of Motion   Cervical Spine -sitting:   Flexion: within functional limits (chin to chest)  Extension: within functional limits  Retraction: decreased  Left lateral flexion: Active left cervical lateral flexion: 32 deg; improves slightly.  Right lateral flexion: Active right cervical lateral flexion: 30 deg; stiff in the neck; worse shoulder pain.  Left rotation: Active left cervical rotation: 52 deg.  Right rotation: Active right cervical rotation: 57 deg; better.    Shoulder AROM-sitting:  Flexion: 152 with ERP  Abduction: 100 with ERP    Neurological Testing     Myotome testing   Cervical (left)   C0-1 (flexion): 5  C1-2 (neck flexion): 5  C3 (neck sidebend): 5  C4 (shoulder shrug): 4  C5 (deltoid): 4  C6 (biceps): 4  C7 (triceps): 4  C8 (thumb extension): 4  T1 (intrinsics): 4    Cervical (right)   All right cervical myotomes within normal limits      Therapeutic Exercises (CPT 16928):     1. UBE, x5 min, cardio warm up    2. new hep as below    3. median nerve glide    4. FR sequence: Open books, alt GH flexion, SA punches, t/s extensions    5. SL t/s rotations      Therapeutic Exercise Summary: Access Code: [email or web address]  URL:  [email or web address]  Date: 11/07/2024  Prepared by: Chris Bauman    Exercises  - Correct Seated Posture  - 7 x weekly  - Standing Median Nerve Glide  - 2 x daily - 7 x weekly - 1 sets - 10 reps    Pt performed these exercises with instruction and SPV.  Provided handout with these exercises for daily HEP.        Therapeutic Treatments and Modalities:     1. Manual Therapy (CPT 88262), see below    2. Mechanical Traction (CPT 34368), 14/8# [facility] traction c/s with mhp x15 min    Therapeutic Treatment and Modalities Summary: Manual:  CPA and rotational mobs to C7-T10 gr III in prone lying, followed by STM and gentle c/s traction in supine.  Then scapular inf and abd mobs gr III with scapular release x 5 in SL (pillow barrier)      Time-based treatments/modalities:           Pain rating (1-10) before treatment:  1/10 at rest globally L shoulder  Pain rating (1-10) after treatment:    Pain Comments::  Aggravating: opening a jar, heavy household chores, carrying grocery bag, chest press and resisted rotational exercises     ASSESSMENT:   Response to treatment:       Pt demonstrating some global myotomal weakness at LUE; fortunately,  strength equal bilat. Uncertain, whether component of TOS present. Will emphasize treatment to both neck and shoulder. Trial of traction today; will assess response to conservative parameters.      PLAN/RECOMMENDATIONS:   Plan for treatment: therapy treatment to continue next visit.  Planned interventions for next visit: continue with current treatment.

## 2024-11-21 ENCOUNTER — PATIENT MESSAGE (OUTPATIENT)
Dept: MEDICAL GROUP | Facility: MEDICAL CENTER | Age: 69
End: 2024-11-21
Payer: MEDICARE

## 2024-11-21 ENCOUNTER — OFFICE VISIT (OUTPATIENT)
Dept: BEHAVIORAL HEALTH | Facility: CLINIC | Age: 69
End: 2024-11-21
Payer: MEDICARE

## 2024-11-21 DIAGNOSIS — R30.0 DYSURIA: ICD-10-CM

## 2024-11-21 DIAGNOSIS — F33.0 MILD EPISODE OF RECURRENT MAJOR DEPRESSIVE DISORDER (HCC): ICD-10-CM

## 2024-11-21 LAB
BACTERIA UR CULT: NORMAL
SIGNIFICANT IND 70042: NORMAL
SITE SITE: NORMAL
SOURCE SOURCE: NORMAL

## 2024-11-21 PROCEDURE — 90837 PSYTX W PT 60 MINUTES: CPT | Performed by: MARRIAGE & FAMILY THERAPIST

## 2024-11-21 NOTE — PROGRESS NOTES
[facility]   Therapy Progress Note        Name: Tita Pitts  MRN: [id number]  : 1955  Age: 69 y.o.  Date of assessment: 2024  PCP: Bassam Marinelli M.D.  Persons in attendance: {[facility] HEALTH ATTENDEES:43531357}  Total session time: *** minutes      Topics addressed in psychotherapy include: ***    Objective Observations:   Participation:{[facility] PARTICIPATION MEASURES:[id number]}   Grooming:{[facility] GROOMIN}   Cognition:{[facility] ORIENTATION:96877048}   Eye Contact:{[facility] EYE CONTACT:92005937}   Mood:{[facility] MOOD:80868933}   Affect:{[facility] AFFECT:73670239}   Thought Process:{[facility] THOUGHT PROCESS:89192598}   Speech:{[facility] SPEECH:79098546}    Current Risk:   Suicide: {Desc; low/moderate/high:023273}   Homicide: {Desc; low/moderate/high:836167}   Self-Harm: {Desc; low/moderate/high:340369}   Relapse: {Desc; low/moderate/high:356429}   Safety Plan Reviewed: {Response; yes/no/na:92583}    Care Plan Updated: {Yes/No/WC:663550}    Does patient express agreement with the above plan? {Yes/No/WC:211455}     Diagnosis:  No diagnosis found.    Referral appointment(s) scheduled? {Yes/No/WC:729431}       JOSE J Fournier     applicable    Care Plan Updated: No    Does patient express agreement with the above plan? Yes     Diagnosis:  1. Mild episode of recurrent major depressive disorder (HCC)        Referral appointment(s) scheduled? No       FELICITAS Fournier.

## 2024-11-26 ENCOUNTER — HOSPITAL ENCOUNTER (OUTPATIENT)
Facility: MEDICAL CENTER | Age: 69
End: 2024-11-26
Attending: FAMILY MEDICINE
Payer: MEDICARE

## 2024-11-26 ENCOUNTER — PHYSICAL THERAPY (OUTPATIENT)
Dept: PHYSICAL THERAPY | Facility: MEDICAL CENTER | Age: 69
End: 2024-11-26
Attending: FAMILY MEDICINE
Payer: MEDICARE

## 2024-11-26 DIAGNOSIS — R30.0 DYSURIA: ICD-10-CM

## 2024-11-26 DIAGNOSIS — M12.812 ROTATOR CUFF ARTHROPATHY OF LEFT SHOULDER: ICD-10-CM

## 2024-11-26 PROCEDURE — 97014 ELECTRIC STIMULATION THERAPY: CPT

## 2024-11-26 PROCEDURE — 87491 CHLMYD TRACH DNA AMP PROBE: CPT

## 2024-11-26 PROCEDURE — 97110 THERAPEUTIC EXERCISES: CPT

## 2024-11-26 PROCEDURE — 87591 N.GONORRHOEAE DNA AMP PROB: CPT

## 2024-11-26 PROCEDURE — 97140 MANUAL THERAPY 1/> REGIONS: CPT

## 2024-11-26 PROCEDURE — 87449 NOS EACH ORGANISM AG IA: CPT

## 2024-11-26 NOTE — OP THERAPY DAILY TREATMENT
Outpatient Physical Therapy  DAILY TREATMENT     [facility] Outpatient Physical Therapy  [address]  [place] [address]  Phone:  [phone]  Fax:  [phone]    Date: 11/26/2024    Patient: Tita Pitts  YOB: 1955  MRN: [id number]     Time Calculation    Start time: 0945  Stop time: 1047 Time Calculation (min): 62 minutes         Chief Complaint: Shoulder Problem    Visit #: 3    SUBJECTIVE:  Pt stating she was sick last week. Pt stating she feels pain when she overexerts herself. Housework will set off the shoulder. Notices some soreness with moving the neck to the left.       OBJECTIVE:  Current objective measures:       Active Range of Motion   Cervical Spine -sitting pre txt:   Left rotation: Active left cervical rotation: 51 deg. ERP   Right rotation: Active right cervical rotation: 65 deg; stiff     Shoulder AROM-sitting pre txt:  Flexion: 140 with ERP and pain with return   Abduction: 125 with ERP      After thoracic mobilizations: 152 deg shoulder flexion   After scapular mobilizations: 146 deg     Cervical rotation to L: 51 deg without ERP     Neurological Testing     Myotome testing   Cervical (left)   C0-1 (flexion): 5  C1-2 (neck flexion): 5  C3 (neck sidebend): 5  C4 (shoulder shrug): 4  C5 (deltoid): 4  C6 (biceps): 4  C7 (triceps): 4  C8 (thumb extension): 4  T1 (intrinsics): 4    Cervical (right)   All right cervical myotomes within normal limits      Therapeutic Exercises (CPT 73157):     1. UBE, x5 min, alt GH CW and CCW, cardio warm up    2. new hep as below    3. median nerve glide, verbal review    4. open books in hooklying, x15, hep    5. t/s rotations in SL, x10, ERP; hep to tolerance only    6. hooklying flasher, x10 orange TB; 10-20 sec, hep      Therapeutic Exercise Summary: Access Code: [email or web address]  URL: [email or web address]  Date: 11/07/2024  Prepared by: Chris Bauman    Exercises  - Correct Seated Posture  - 7  "x weekly  - Standing Median Nerve Glide  - 2 x daily - 7 x weekly - 1 sets - 10 reps    Pt performed these exercises with instruction and SPV.  Provided handout with these exercises for daily HEP.        Therapeutic Treatments and Modalities:     1. Manual Therapy (CPT 67149), see below    2. E Stim Unattended (CPT 08405), IFC and mhp x 15 min L shoulder, pain management    Therapeutic Treatment and Modalities Summary: Manual:  CPA and rotational mobs to C7-T10 gr III in prone lying. STM to levator scap and rhomboids. Then scapular inf and abd mobs gr III with scapular release x 5 in SL (pillow barrier)      Time-based treatments/modalities:    Physical Therapy Timed Treatment Charges  Manual therapy minutes (CPT 74302): 15 minutes  Therapeutic exercise minutes (CPT 23911): 32 minutes    Pain rating (1-10) before treatment:  0/10 at rest  Pain rating (1-10) after treatment: \"throbbing\" at L shoulder  Pain Comments: Aggravating: opening a jar, heavy household chores, carrying grocery bag, chest press and resisted rotational exercises       ASSESSMENT:   Response to treatment:   Pt demonstrating sig improvement in shoulder ROM following mobilization to t/s region. She may benefit from further attention to this area. Discomfort with flasher - modified to hooklying to decrease postural demands. Will review next visits and progress slowly due to easily irritable shoulder with strength progression.    Trial of estim due to discomfort     PLAN/RECOMMENDATIONS:   Plan for treatment: therapy treatment to continue next visit.  Planned interventions for next visit: continue with current treatment.         "

## 2024-11-27 LAB
C TRACH DNA SPEC QL NAA+PROBE: NEGATIVE
N GONORRHOEA DNA SPEC QL NAA+PROBE: NEGATIVE
SPECIMEN SOURCE: NORMAL

## 2024-11-28 LAB — L PNEUMO AG UR QL IA: NEGATIVE

## 2024-12-03 ENCOUNTER — PHYSICAL THERAPY (OUTPATIENT)
Dept: PHYSICAL THERAPY | Facility: MEDICAL CENTER | Age: 69
End: 2024-12-03
Attending: FAMILY MEDICINE
Payer: MEDICARE

## 2024-12-03 DIAGNOSIS — M12.812 ROTATOR CUFF ARTHROPATHY OF LEFT SHOULDER: ICD-10-CM

## 2024-12-03 PROCEDURE — 97140 MANUAL THERAPY 1/> REGIONS: CPT

## 2024-12-03 PROCEDURE — 97110 THERAPEUTIC EXERCISES: CPT

## 2024-12-03 NOTE — OP THERAPY DAILY TREATMENT
Outpatient Physical Therapy  DAILY TREATMENT     [facility] Outpatient Physical Therapy  [address]  [place] [address]  Phone:  [phone]  Fax:  [phone]    Date: 12/03/2024    Patient: Tita Pitts  YOB: 1955  MRN: [id number]     Time Calculation    Start time: 0945  Stop time: 1037 Time Calculation (min): 52 minutes         Chief Complaint: Shoulder Problem    Visit #: 4    SUBJECTIVE:  Pt stating she did okay but had some achiness the rest of the day leading into the next day. Stating she did not feel the same discomfort with t/s rotations.       OBJECTIVE:  Current objective measures:       Active Range of Motion   Shoulder AROM-sitting pre txt:  Flexion: 153 with ERP   Abduction: 159 with ERP    Shoulder AROM-sitting post txt:  Flexion: 150 with ERP   Abduction: 120 (discomfort throughout range)     Therapeutic Exercises (CPT 76914):     1. UBE, x5 min, alt GH CW and CCW, cardio warm up    2. new hep as below    3. median nerve glide, verbal review    4. open books in hooklying, x15, verbal review; performed in sitting    5. t/s rotations in SL, x10, verbal review    6. hooklying flasher, x10 orange TB; 10-20 sec, verbal review    8. pull downs, pink TB; x10 with 2 sec holds, hep    9. shoulder extensions with dowel, 15x, hep      Therapeutic Exercise Summary: Access Code: [email or web address]  URL: [email or web address]  Date: 11/07/2024  Prepared by: Chris Bauman    Exercises  - Correct Seated Posture  - 7 x weekly  - Standing Median Nerve Glide  - 2 x daily - 7 x weekly - 1 sets - 10 reps    Pt performed these exercises with instruction and SPV.  Provided handout with these exercises for daily HEP.        Therapeutic Treatments and Modalities:     1. Manual Therapy (CPT 08016), see below    2. E Stim Unattended (CPT 00320), IFC and mhp x 15 min L shoulder, pain management    Therapeutic Treatment and Modalities Summary: Manual:  CPA and  rotational mobs to C7-T10 gr III in prone lying. STM to levator scap and rhomboids. Then scapular inf and abd mobs gr III with scapular release x 5 in SL (pillow barrier)      Time-based treatments/modalities:    Physical Therapy Timed Treatment Charges  Manual therapy minutes (CPT 41896): 12 minutes  Therapeutic exercise minutes (CPT 00048): 40 minutes    Pain rating (1-10) before treatment:  0/10 at rest  Pain rating (1-10) after treatment: mild soreness at L shoulder  Pain Comments: Aggravating: opening a jar, heavy household chores, carrying grocery bag, chest press and resisted rotational exercises       ASSESSMENT:   Response to treatment:   Pt maintaining all gains into elevation achieved from previous session. Fatigue with increased soreness at end of session, likely explaining decrease in motion observed.   Will explore soft tissue restrictions in next session and assess response to hep.    PLAN/RECOMMENDATIONS:   Plan for treatment: therapy treatment to continue next visit.  Planned interventions for next visit: continue with current treatment.

## 2024-12-05 ENCOUNTER — APPOINTMENT (OUTPATIENT)
Dept: PHYSICAL THERAPY | Facility: MEDICAL CENTER | Age: 69
End: 2024-12-05
Attending: FAMILY MEDICINE
Payer: MEDICARE

## 2024-12-05 ENCOUNTER — PATIENT MESSAGE (OUTPATIENT)
Dept: HEALTH INFORMATION MANAGEMENT | Facility: OTHER | Age: 69
End: 2024-12-05

## 2024-12-05 ENCOUNTER — PATIENT OUTREACH (OUTPATIENT)
Dept: HEALTH INFORMATION MANAGEMENT | Facility: OTHER | Age: 69
End: 2024-12-05
Payer: MEDICARE

## 2024-12-05 DIAGNOSIS — I10 PRIMARY HYPERTENSION: ICD-10-CM

## 2024-12-05 DIAGNOSIS — N18.31 STAGE 3A CHRONIC KIDNEY DISEASE: ICD-10-CM

## 2024-12-10 ENCOUNTER — PHYSICAL THERAPY (OUTPATIENT)
Dept: PHYSICAL THERAPY | Facility: MEDICAL CENTER | Age: 69
End: 2024-12-10
Attending: FAMILY MEDICINE
Payer: MEDICARE

## 2024-12-10 DIAGNOSIS — M12.812 ROTATOR CUFF ARTHROPATHY OF LEFT SHOULDER: ICD-10-CM

## 2024-12-10 PROCEDURE — 97014 ELECTRIC STIMULATION THERAPY: CPT

## 2024-12-10 PROCEDURE — 97110 THERAPEUTIC EXERCISES: CPT

## 2024-12-10 PROCEDURE — 97140 MANUAL THERAPY 1/> REGIONS: CPT

## 2024-12-10 NOTE — OP THERAPY DAILY TREATMENT
Outpatient Physical Therapy  DAILY TREATMENT     [facility] Outpatient Physical Therapy  [address]  [place] [address]  Phone:  [phone]  Fax:  [phone]    Date: 12/10/2024    Patient: Tita Pitts  YOB: 1955  MRN: [id number]     Time Calculation    Start time: 0945  Stop time: 1042 Time Calculation (min): 57 minutes         Chief Complaint: Shoulder Problem    Visit #: 5    SUBJECTIVE:  Pt stating she had general achiness in the shoulder from the last PT session; has been pretty consistently sore in the shoulder following.     OBJECTIVE:  Current objective measures:       Active Range of Motion   Shoulder AROM-sitting pre txt:  Flexion: 153 with ERP   Abduction: 159 with ERP    Ulnar, median, and radial tension bilaterally      Therapeutic Exercises (CPT 88450):     1. UBE, x5 min, alt GH CW and CCW, cardio warm up    2. new hep as below    3. median nerve glide, verbal review    4. open books in hooklying, x15, verbal review; performed in sitting    5. t/s rotations in SL, x10, verbal review    6. hooklying flasher, x10 orange TB; 10-20 sec, verbal review    8. pull downs, pink TB; x10 with 2 sec holds, NT    9. shoulder extensions with dowel, 15x, NT    11. median nerve glides, x10, modified positioning on L; hep      Therapeutic Exercise Summary: Access Code: [email or web address]  URL: [email or web address]  Date: 11/07/2024  Prepared by: Chris Bauman    Exercises  - Correct Seated Posture  - 7 x weekly  - Standing Median Nerve Glide  - 2 x daily - 7 x weekly - 1 sets - 10 reps    Pt performed these exercises with instruction and SPV.  Provided handout with these exercises for daily HEP.        Therapeutic Treatments and Modalities:     1. Manual Therapy (CPT 02343), see below    2. E Stim Unattended (CPT 73384), IFC and mhp to L shoulder x15 min    Therapeutic Treatment and Modalities Summary: Manual:  CPA and rotational mobs to C7-T10 gr  III in prone lying.        Time-based treatments/modalities:    Physical Therapy Timed Treatment Charges  Manual therapy minutes (CPT 54606): 8 minutes  Therapeutic exercise minutes (CPT 16226): 34 minutes    Pain rating (1-10) before treatment:  1/10 at rest L shoulder; 2/10 with movement   Pain rating (1-10) after treatment: mild soreness at L shoulder  Pain Comments: Aggravating: opening a jar, heavy household chores, carrying grocery bag, chest press and resisted rotational exercises       ASSESSMENT:   Response to treatment:   Ulnar, median, and radial tension bilaterally with some reproduction of posterior shoulder pain during tensioning on LUE. Additional findings for slight atrophy at L thenar eminence. Will inform pt's PCP. Continue to suspect that c/s and nerves are contributing to pt's shoulder complaints-messaged pt's . Will progress gently as s/s are currently quite irritable overall.       PLAN/RECOMMENDATIONS:   Plan for treatment: therapy treatment to continue next visit.  Planned interventions for next visit: continue with current treatment.  Assess scalenes, first rib, and pecs

## 2024-12-12 ENCOUNTER — APPOINTMENT (OUTPATIENT)
Dept: PHYSICAL THERAPY | Facility: MEDICAL CENTER | Age: 69
End: 2024-12-12
Attending: FAMILY MEDICINE
Payer: MEDICARE

## 2024-12-12 SDOH — HEALTH STABILITY: PHYSICAL HEALTH: ON AVERAGE, HOW MANY MINUTES DO YOU ENGAGE IN EXERCISE AT THIS LEVEL?: 30 MIN

## 2024-12-12 SDOH — ECONOMIC STABILITY: INCOME INSECURITY: IN THE LAST 12 MONTHS, WAS THERE A TIME WHEN YOU WERE NOT ABLE TO PAY THE MORTGAGE OR RENT ON TIME?: NO

## 2024-12-12 SDOH — ECONOMIC STABILITY: FOOD INSECURITY: WITHIN THE PAST 12 MONTHS, THE FOOD YOU BOUGHT JUST DIDN'T LAST AND YOU DIDN'T HAVE MONEY TO GET MORE.: NEVER TRUE

## 2024-12-12 SDOH — ECONOMIC STABILITY: FOOD INSECURITY: WITHIN THE PAST 12 MONTHS, YOU WORRIED THAT YOUR FOOD WOULD RUN OUT BEFORE YOU GOT MONEY TO BUY MORE.: NEVER TRUE

## 2024-12-12 SDOH — HEALTH STABILITY: PHYSICAL HEALTH: ON AVERAGE, HOW MANY DAYS PER WEEK DO YOU ENGAGE IN MODERATE TO STRENUOUS EXERCISE (LIKE A BRISK WALK)?: 5 DAYS

## 2024-12-12 ASSESSMENT — SOCIAL DETERMINANTS OF HEALTH (SDOH)
DO YOU BELONG TO ANY CLUBS OR ORGANIZATIONS SUCH AS CHURCH GROUPS UNIONS, FRATERNAL OR ATHLETIC GROUPS, OR SCHOOL GROUPS?: YES
HOW OFTEN DO YOU GET TOGETHER WITH FRIENDS OR RELATIVES?: ONCE A WEEK
HOW HARD IS IT FOR YOU TO PAY FOR THE VERY BASICS LIKE FOOD, HOUSING, MEDICAL CARE, AND HEATING?: NOT VERY HARD
IN A TYPICAL WEEK, HOW MANY TIMES DO YOU TALK ON THE PHONE WITH FAMILY, FRIENDS, OR NEIGHBORS?: TWICE A WEEK
HOW OFTEN DO YOU ATTEND CHURCH OR RELIGIOUS SERVICES?: 1 TO 4 TIMES PER YEAR
IN THE PAST 12 MONTHS, HAS THE ELECTRIC, GAS, OIL, OR WATER COMPANY THREATENED TO SHUT OFF SERVICE IN YOUR HOME?: NO
HOW OFTEN DO YOU ATTENT MEETINGS OF THE CLUB OR ORGANIZATION YOU BELONG TO?: 1 TO 4 TIMES PER YEAR

## 2024-12-12 ASSESSMENT — LIFESTYLE VARIABLES
AUDIT-C TOTAL SCORE: 0
HOW MANY STANDARD DRINKS CONTAINING ALCOHOL DO YOU HAVE ON A TYPICAL DAY: PATIENT DOES NOT DRINK
HOW OFTEN DO YOU HAVE A DRINK CONTAINING ALCOHOL: NEVER
SKIP TO QUESTIONS 9-10: 1
HOW OFTEN DO YOU HAVE SIX OR MORE DRINKS ON ONE OCCASION: NEVER

## 2024-12-12 ASSESSMENT — PATIENT HEALTH QUESTIONNAIRE - PHQ9
SUM OF ALL RESPONSES TO PHQ QUESTIONS 1-9: 6
5. POOR APPETITE OR OVEREATING: 1 - SEVERAL DAYS
CLINICAL INTERPRETATION OF PHQ2 SCORE: 2

## 2024-12-12 NOTE — PROGRESS NOTES
"INITIAL CARE MANAGEMENT CARE PLAN/ASSESSMENT     Tita is a 69 year old that meets all criteria to qualify for the PCM program. She has verbalized her full name and  and has given verbal consent to enroll in the PCM program.    Hx: Pt has a history of HTN, CKD, tinnitus of both ears, and depression. Pt also has a history of pericardial effusion and pericardial window creation procedure. Pt states that prior to pericardial window procedure she experienced 2 years of feeling \"unwell\".     Pt states that today she feels \"good\". Pt states she has a history of depression and sees a counselor for management. This RN completed PHQ2 and PHQ9 with the pt. Pt states depressive symptoms are situational and related to the passing of her  3 years ago and her health status. Pt states she has \"long grief\" related to the passing of her . Pt denies SI or thoughts of self-harm at this time.   24 PHQ2: 2  24 PHQ9: 6    Pt states she is currently seeing physical therapy for L shoulder pain and is unsure if pain is decreasing. Pt states 6 months ago she started having hand tremors, which worsen with activity and are more noticeable in the L hand. Pt states for  approx. 2 months she has had L leg pain, particularly when using stairs. Pt has had 2 falls in the last 12 months, both occurring outside in her yard. Pt states she is unsure of the cause of falling and states \"I tripped on my feet\". Pt states she did not hit her head either time she fell. Pt states she has near falls when inside her home but that she is always able to catch herself before falling to the ground.    Pt states she went to urgent care on 24 for urinary symptoms of burning, urgency, and painful urination. Pt states she was prescribed 5 days worth of antibiotics and completed the 5 days. Pt states that she was told the urine culture result was negative. PCP requested the urine culture to be checked again and it was negative. Pt " "states urinary symptoms have resolved.     While speaking to pt, this RN scheduled pt for PCP appointment on 12/20/24 at 0800 (arrival 0745). Pt expressed gratitude for sooner appointment. At 12/20/24 PCP appointment, pt would like to discuss shoulder and leg pain, hand tremors, urinary symptoms, BP. Pt is curious if taking Lisinopril in the morning would decrease nocturia.     Pt states she has no other needs, questions, or concerns at this time.     Discussion Points:  HTN  BP: pt checks BP twice daily at home, and takes it when she is relaxed and resting.  Pt states typically her BP is normal.   Pt recorded values:   12/11/24: 132/91 pulse: 78, 135/88 pulse: 73  12/12/24: 128/82 pulse: 64  Pt states she thinks that her BP decreases after exercise, and states she occasionally experiences lightheadedness and back pain which she attributes to low BP.  Medication: Pt states she takes prescribed 5mg Lisinopril in the evening. Pt states she wakes up 2x each night from sleep to urinate, but that this is a decrease from previously waking up 3-4x per night to urinate.  CKD   Pt states urine is typically light yellow and clear  Pt states she does not take NSAIDS and instead uses Tylenol for pain as needed  Pt would like to discuss CKD at next visit with this RN   Diet:   Pt states food does not taste good nor is it pleasurable to her anymore. Pt states she remembers how food tastes, and only eats because of boredom or because she know she needs to eat.  Pt states she is eating \"too much fast food\" lately.   Pt states typically she has yogurt for breakfast, a salad with protein for lunch, frozen meals or soups for dinner. Pt states that in the summer she likes to eat raw vegetables, but in the winter prefers warm foods.   Pt states she tries to be conscious about sodium intake  Exercise:  Pt states her goal is to exercise 5x per week. Pt states she typically goes to the gym twice weekly for weight training and walks outside " "3x weekly for exercise. Pt states that since it is getting cold, she relies on the gym more often than going for outdoor walks.  Falls:  Pt states she has fallen twice in her backyard in the last 12 months. Pt states she is unsure of the cause and states \"I tripped on my feet\". Pt states she did not hit her head at either fall.   Pt states she has near falls when inside her home but that she is always able to catch herself before falling to the ground.  Pt states she feels steady when walking   Housing:   Pt states she lives in a single story home without stairs.   Pt states that there are some rugs on top of carpeting.   Pt lives with her son, but states that due to his long working hours they do not see each other very often.   Pt states there are grab bars in her shower and both bathrooms.  Sleep:  Pt states \"sleep is not my friend\".  Pt states she used to wake up 3-4x/night to urinate, and now she wakes up 2x/night to urinate.   Pain:  Pt endorses L shoulder pain and is seeing PT for management  Pt endorses newly onset L leg pain that increases when using stairs   Hearing:  Pt states she uses hearing aids   Pt states she has tinnitus in both ears, and that the hearing aids help with this   Vision:  Pt states her vision is \"good\"  Pt states she had an incident in which there was a \"tear in the lining\" of her eye and that \"black spots appeared\" in her vision at that time. Pt states this has resolved and that she sees an ophthalmologist once yearly to manage. Pt states that due to this incident she now has a significant number of eye floaters in her vision. Pt will discuss with ophthalmologist.  Socialization:  Pt states she sees her friends and family 1-2 times per week  Pt states she sees her daughter who lives nearby on the weekends and that she has a close friend nearby who she sees  Pt states she is a member of the [employer] and attends services   Medication management:  Pt states she takes all " medications as indicated except for Magnesium  Pt states she had significant GI upset when taking 400 mg of Magnesium Oxide, and is now taking 200mg of Magnesium Glycinate which she states she is tolerating well. Pt states she may increase up to 400 mg Magnesium Glycinate.   Healthcare Goals:  Pt will continue to check BP twice daily and record a log for PCP and cardiologist  Pt will follow fall risk precautions to manage fall risk     Medication Self-Management Goals:     Reviewed medications listed below with patient.      Current Outpatient Medications:     B Complex Vitamins (VITAMIN B COMPLEX PO), Take  by mouth., Disp: , Rfl:     lisinopril (PRINIVIL) 5 MG Tab, Take 1 Tablet by mouth every day., Disp: 100 Tablet, Rfl: 1    multivitamin Tab, Take 1 Tablet by mouth every day., Disp: , Rfl:     Calcium Carb-Cholecalciferol (CALCIUM 500/VITAMIN D) 500-3.125 MG-MCG Tab, Take  by mouth., Disp: , Rfl:     Magnesium 400 MG Cap, Take 400 mg by mouth every day., Disp: , Rfl:     Ascorbic Acid (VITAMIN C PO), Take 1 Tablet by mouth every day., Disp: , Rfl:     Pt is taking 200 mg of Magnesium Glycinate PO QD instead of Magnesium Oxide 400 mg PO QD     Goal:  Take medications as indicated and alert PCP and/or RN to any changes       Physical/Functional/Environmental Status:     Activities of Daily Living:  Bathing: independent   Dressing: independent  Grooming: independent  Mouth Care: independent  Toileting: independent  Climbing a Flight of Stairs: independent    Independent Activities of Daily Living:  Shopping: independent  Cooking: independent  Managing Medications: independent  Using the phone and looking up numbers: independent  Driving or using public transportation: independent  Managing Finances: independent        12/12/2024    10:01 AM 12/12/2024    10:24 AM 12/12/2024    10:32 AM   STEADI Fall Risk   STEADI Risk for Falling Score   6   One or more falls in the last year  Yes Yes   Advised to use a cane or  walker to get around safely No  No   Feels unsteady when walking No  No   Steadies self on furniture while walking at home No  No   Worried about falling Yes  Yes   Needs to push with hands when rising from a chair No  No   Has trouble stepping up onto a curb / using stairs No  No   Often has to rush to the toilet Yes  Yes   Has lost some feeling in feet No  No   Takes medicine that makes him/her feel lightheaded or more tired than usual Yes  Yes   Takes medicine to sleep or improve mood No  No   Often feels sad or depressed Yes  Yes         Goal:  Pt will practice fall safety precautions to avoid falls     Social Determinants of Health       Financial Status:      Financial Resource Strain: Low Risk  (12/12/2024)    Overall Financial Resource Strain (CARDIA)     Difficulty of Paying Living Expenses: Not very hard         Referred to CHW/SW:  NA       Transportation Status:      Transportation Needs: No Transportation Needs (12/12/2024)    PRAPARE - Transportation     Lack of Transportation (Medical): No     Lack of Transportation (Non-Medical): No        Referred to CHW/SW:  NA      Food Insecurity:      Food Insecurity: No Food Insecurity (12/12/2024)    Hunger Vital Sign     Worried About Running Out of Food in the Last Year: Never true     Ran Out of Food in the Last Year: Never true        Referred to CHW/SW:  NA       Housing Status:     Housing Stability: Low Risk  (12/12/2024)    Housing Stability Vital Sign     Unable to Pay for Housing in the Last Year: No     Number of Times Moved in the Last Year: 0     Homeless in the Last Year: No        Referred to CHW/SW:  NA      Social Connections:     Social Connections: Moderately Integrated (12/12/2024)    Social Connection and Isolation Panel [NHANES]     Frequency of Communication with Friends and Family: Twice a week     Frequency of Social Gatherings with Friends and Family: Once a week     Attends Denominational Services: 1 to 4 times per year     Active Member  of Clubs or Organizations: Yes     Attends Club or Organization Meetings: 1 to 4 times per year     Marital Status:         Referred to CHW/SW:  MARJ      Mental/Behavioral/Psychosocial Status:        1/8/2024     6:48 PM 2/13/2024     9:40 AM 12/5/2024     9:55 AM   Depression Screen (PHQ-2/PHQ-9)   PHQ-2 Total Score 0     PHQ-2 Total Score  3 2   PHQ-9 Total Score  6 6       Interpretation of PHQ-9 Total Score   Score Severity   1-4 No Depression   5-9 Mild Depression   10-14 Moderate Depression   15-19 Moderately Severe Depression   20-27 Severe Depression      12/12/24 PHQ2 Score: 2  12/12/24 PHQ9 Score: 6    Pt denies any SI or thoughts of self harm at this time.    Goal:  Pt will continue to work with mental health counselor to maintain mental health and alert PCP and/or RN to any changes     Review of Benefits    This service is an included benefit with your insurance plan, for all other benefits a copy of the website and phone number have been provided for any questions.    Phone Number and Website provided for questions:    [facility]:  [phone]   [email or web address]    Advance Planning    Do you have an Advance Directive?  Yes  Is there one on file? No (If No, advise patient to bring a copy to appointment.)      Pt states she will bring in a copy of her Advance Directive to next appointment with PCP     (If no, a copy can be provided for patient.)    Would you like an Advance Directive Packet sent to you? NA      Chronic Care Management Care Plan         Care Plans       Chronic Care Management (CMS)    Met: 0 of 7 Met: 0 of 7      Progressing (7)       Reduce the Risk of Falls and Fall Related Injuries- Long term (Risk of Falls)  Disciplines:  Nurse, Interdisciplinary  Expected end:  -      Outcome: Progressing Documented by Margarita Pereira R.N. on 12/12/24 1035     Demonstrate ability to verbalize fall safety concerns- Long term (Knowledge surrounding fall safety)  Disciplines:   Nurse, Interdisciplinary  Expected end:  -      Outcome: Progressing Documented by Margarita Pereira R.N. on 12/12/24 1035     Demonstrate Knowledge of Hypertension- Long term (Knowledge of hypertension)  Disciplines:  Interdisciplinary  Expected end:  -      Outcome: Progressing Documented by Margarita Pereira R.N. on 12/12/24 1035     Demonstrate factors that can contribute to high blood pressure- Long term (Knowledge of factors contributing to hypertension)  Disciplines:  Interdisciplinary  Expected end:  -      Outcome: Progressing Documented by Margarita Pereira R.N. on 12/12/24 1035     Identify which modifiable health habits can be modifed (Recognize current health habits that may contribute to hypertension)  Disciplines:  Interdisciplinary  Expected end:  -      Outcome: Progressing Documented by Margarita Pereira R.N. on 12/12/24 1035     Identify barriers to managing blood pressure (Patient barriers to managing high blood pressure)  Disciplines:  Interdisciplinary  Expected end:  -      Outcome: Progressing Documented by Margarita Pereira R.N. on 12/12/24 1035     Demonstrate the elements of self-monitoring blood pressure- Long term (Knowledge of self-monitoring blood pressure)  Disciplines:  Interdisciplinary  Expected end:  -      Outcome: Progressing Documented by Margarita Pereira R.N. on 12/12/24 1035                                      Discussion of Self Management of Care:    Pt will reach out to RN and/or PCP as needed  Pt will attend upcoming appointments with PCP and specialists  Pt will continue to exercise regularly   Pt will continue to monitor BP twice daily and keep a log of values    Barriers to Goals:   Chronic Conditions    Resources Provided:    Pt given RN phone number: [phone]  Scheduled PCP appointment for 12/20/24 at 0800    Next Scheduled patient outreach:   January 2025

## 2024-12-12 NOTE — CARE PLAN
Problem: Risk of Falls  Goal: Reduce the Risk of Falls and Fall Related Injuries- Long term  Outcome: Progressing     Problem: Knowledge surrounding fall safety  Goal: Demonstrate ability to verbalize fall safety concerns- Long term  Outcome: Progressing     Problem: Knowledge of hypertension  Goal: Demonstrate Knowledge of Hypertension- Long term  Outcome: Progressing     Problem: Knowledge of factors contributing to hypertension  Goal: Demonstrate factors that can contribute to high blood pressure- Long term  Outcome: Progressing  Note: Patient has verbalized understanding of factors contributing to high blood pressure      Problem: Recognize current health habits that may contribute to hypertension  Goal: Identify which modifiable health habits can be modifed  Outcome: Progressing  Note: Patient has verbalized willingness to change modfiable health habits      Problem: Patient barriers to managing high blood pressure  Goal: Identify barriers to managing blood pressure  Outcome: Progressing  Note: Patient has vocalized commitment to lessening the barriers to managing hypertension     Problem: Knowledge of self-monitoring blood pressure  Goal: Demonstrate the elements of self-monitoring blood pressure- Long term  Outcome: Progressing  Note:   -Patient has verbalized commitment to self-monitoring blood pressure at least once a week.    -Patient will log home blood pressures and bring the log to medical provider appointments.    Intervention: Educate on importance of self-monitoring blood pressure  Intervention: Educate on importance of keeping a home blood pressure log  Intervention: Educate on bringing log to medical provider appointments

## 2024-12-17 ENCOUNTER — PHYSICAL THERAPY (OUTPATIENT)
Dept: PHYSICAL THERAPY | Facility: MEDICAL CENTER | Age: 69
End: 2024-12-17
Attending: FAMILY MEDICINE
Payer: MEDICARE

## 2024-12-17 DIAGNOSIS — M12.812 ROTATOR CUFF ARTHROPATHY OF LEFT SHOULDER: ICD-10-CM

## 2024-12-17 PROCEDURE — 97110 THERAPEUTIC EXERCISES: CPT

## 2024-12-17 PROCEDURE — 97140 MANUAL THERAPY 1/> REGIONS: CPT

## 2024-12-17 NOTE — OP THERAPY DAILY TREATMENT
Outpatient Physical Therapy  DAILY TREATMENT     [facility] Outpatient Physical Therapy  [address]  [place] [address]  Phone:  [phone]  Fax:  [phone]    Date: 12/17/2024    Patient: Tita Pitts  YOB: 1955  MRN: [id number]     Time Calculation    Start time: 0946  Stop time: 1044 Time Calculation (min): 58 minutes         Chief Complaint: Shoulder Problem    Visit #: 6    SUBJECTIVE:  Pt stating she felt better after last session but unable to determine whether it was due to new PT exercises or other reason. She is concerned re: palpating her shoulder/arm muscles and noticing decreased girth at her L arm and hand. She also concerns re: recent incident of feeling extreme fatigue and near fainting while working out with her . Has a cardiologist but uncertain if this warrants follow up.    OBJECTIVE:  Current objective measures:       Active Range of Motion   Shoulder AROM-sitting post txt:  Flexion: 153 with ERP   Abduction: 130 with ERP    Ulnar, median, and radial tension bilaterally    Shoulder flexion to 160 with less discomfort with manual c/s traction while seated      Therapeutic Exercises (CPT 84945):     1. UBE, x5 min, alt GH CW and CCW, cardio warm up    2. new hep as below    4. open books in hooklying, x15, verbal review    5. t/s rotations in SL, x10, verbal review    6. hooklying flasher, x10 orange TB; 10-20 sec, verbal review    8. pull downs, pink TB; x10 with 2 sec holds, NT    9. shoulder extensions with dowel, 15x, NT    11. median nerve glides, x10, verbal review    12. pt education, re: nerve physiology and anatomy using visual aides      Therapeutic Exercise Summary: Access Code: [email or web address]  URL: [email or web address]  Date: 11/07/2024  Prepared by: Chris Bauman    Exercises  - Correct Seated Posture  - 7 x weekly  - Standing Median Nerve Glide  - 2 x daily - 7 x weekly - 1 sets - 10 reps    Pt  performed these exercises with instruction and SPV.  Provided handout with these exercises for daily HEP.        Therapeutic Treatments and Modalities:     1. Manual Therapy (CPT 81500), see below    2. E Stim Unattended (CPT 18544), IFC and mhp to L shoulder x15 min    Therapeutic Treatment and Modalities Summary: Manual:  CPA and rotational mobs to C7-T10 gr III in prone lying.    STM to B UT, STM L pec,  to B c/s paraspinals and suboccipitals with gentle traction      Time-based treatments/modalities:    Physical Therapy Timed Treatment Charges  Manual therapy minutes (CPT 50698): 10 minutes  Therapeutic exercise minutes (CPT 21512): 33 minutes    Pain rating (1-10) before treatment: 2/10 anterior and posterior L shoulder   Pain rating (1-10) after treatment: 2/10 anterior L shoulder  Pain Comments: Aggravating: opening a jar, heavy household chores, carrying grocery bag, chest press and resisted rotational exercises       ASSESSMENT:   Response to treatment:   Discussion today re: follow up with Dr. Marinelli (12/20). Will address recent exhaustion/fatigue and near syncope episode. Additionally, will address concerns re: cont'd weakness and pain -therapist prev reached out to inform PCP of findings from PT visits. Pt demo-ing improved shoulder ROM and less pain with concurrent manual c/s traction while pt seated. Will provide additional attention to Cervical spine and repeat traction next visit.     PLAN/RECOMMENDATIONS:   Plan for treatment: therapy treatment to continue next visit.  Planned interventions for next visit: continue with current treatment.  Follow up on visit with Dr. Marinelli

## 2024-12-19 ENCOUNTER — OFFICE VISIT (OUTPATIENT)
Dept: BEHAVIORAL HEALTH | Facility: CLINIC | Age: 69
End: 2024-12-19
Payer: MEDICARE

## 2024-12-19 DIAGNOSIS — F33.0 MILD EPISODE OF RECURRENT MAJOR DEPRESSIVE DISORDER (HCC): ICD-10-CM

## 2024-12-19 PROCEDURE — 90837 PSYTX W PT 60 MINUTES: CPT | Performed by: MARRIAGE & FAMILY THERAPIST

## 2024-12-19 NOTE — PROGRESS NOTES
[facility]   Therapy Progress Note        Name: Tita Pitts  MRN: [id number]  : 1955  Age: 69 y.o.  Date of assessment: 2024  PCP: Bassam Marinelli M.D.  Persons in attendance: Patient  Total session time: 56 minutes      Topics addressed in psychotherapy include: Met with the patient in person for an individual counseling session. The patient was last seen on 2024.  Content of Therapy:   Met with the patient for an individual counseling session.  The patient discussed that she has taken this clinicians advice and with a friend explored the [facility].  The patient discuss that it was a bit more stressful than she expected.  Patient discuss continuing having up and down days has she worked on cell perceptions and identity.  Therapeutic Intervention:   This session, the therapeutic focus was on assisting the patient in continued growth to an acceptance of self and others.  Patient appear to adapt to new situations and environments as she developed strategies to manage unexpected stressors.  Plan:  Work with patient on treatment goals of managing emotions.    Impression:    Mild episode of recurrent major depressive disorder (HCC) - managing         This dictation has been created using voice recognition software and/or scribes. The accuracy of the dictation is limited by the abilities of the software and the expertise of the scribes. I expect there may be some errors of grammar and possibly content. I made every attempt to manually correct the errors within my dictation. However, errors related to voice recognition software and/or scribes may still exist and should be interpreted within the appropriate context.(Clinician) will work with patient to identify thoughts leading to depression (Clinician) will work with the patient to identify present stressors     Objective Observations:   Participation:Active verbal participation, Attentive, and  Engaged   Grooming:Casual   Cognition:Alert and Fully Oriented   Eye Contact:Good   Mood:Euthymic and Anxious   Affect:Flexible, Full range, and Sad   Thought Process:Logical and Goal-directed   Speech:Rate within normal limits and Volume within normal limits    Current Risk:   Suicide: low   Homicide: low   Self-Harm: low   Relapse: low   Safety Plan Reviewed: not applicable    Care Plan Updated: No    Does patient express agreement with the above plan? Yes     Diagnosis:  1. Mild episode of recurrent major depressive disorder (HCC)        Referral appointment(s) scheduled? No       JOSE J Fournier

## 2024-12-20 ENCOUNTER — HOSPITAL ENCOUNTER (OUTPATIENT)
Dept: RADIOLOGY | Facility: MEDICAL CENTER | Age: 69
End: 2024-12-20
Attending: FAMILY MEDICINE
Payer: MEDICARE

## 2024-12-20 ENCOUNTER — OFFICE VISIT (OUTPATIENT)
Dept: MEDICAL GROUP | Facility: MEDICAL CENTER | Age: 69
End: 2024-12-20
Payer: MEDICARE

## 2024-12-20 VITALS
OXYGEN SATURATION: 99 % | SYSTOLIC BLOOD PRESSURE: 112 MMHG | HEIGHT: 65 IN | WEIGHT: 184.53 LBS | BODY MASS INDEX: 30.74 KG/M2 | TEMPERATURE: 97.7 F | DIASTOLIC BLOOD PRESSURE: 84 MMHG | HEART RATE: 66 BPM

## 2024-12-20 DIAGNOSIS — R25.1 TREMORS OF NERVOUS SYSTEM: ICD-10-CM

## 2024-12-20 DIAGNOSIS — R53.1 LEFT-SIDED WEAKNESS: ICD-10-CM

## 2024-12-20 DIAGNOSIS — M75.102 ROTATOR CUFF SYNDROME OF LEFT SHOULDER: ICD-10-CM

## 2024-12-20 DIAGNOSIS — M50.30 DEGENERATIVE DISC DISEASE, CERVICAL: ICD-10-CM

## 2024-12-20 DIAGNOSIS — R29.898 LEFT LEG WEAKNESS: ICD-10-CM

## 2024-12-20 DIAGNOSIS — M51.361 DEGENERATION OF INTERVERTEBRAL DISC OF LUMBAR REGION WITH LOWER EXTREMITY PAIN: ICD-10-CM

## 2024-12-20 DIAGNOSIS — R06.02 SHORTNESS OF BREATH: ICD-10-CM

## 2024-12-20 DIAGNOSIS — R29.898 LEFT ARM WEAKNESS: ICD-10-CM

## 2024-12-20 PROCEDURE — 3074F SYST BP LT 130 MM HG: CPT | Performed by: FAMILY MEDICINE

## 2024-12-20 PROCEDURE — 72100 X-RAY EXAM L-S SPINE 2/3 VWS: CPT

## 2024-12-20 PROCEDURE — 99214 OFFICE O/P EST MOD 30 MIN: CPT | Performed by: FAMILY MEDICINE

## 2024-12-20 PROCEDURE — 3079F DIAST BP 80-89 MM HG: CPT | Performed by: FAMILY MEDICINE

## 2024-12-20 PROCEDURE — 72040 X-RAY EXAM NECK SPINE 2-3 VW: CPT

## 2024-12-20 ASSESSMENT — ENCOUNTER SYMPTOMS
CHILLS: 0
WHEEZING: 0
FEVER: 0
SHORTNESS OF BREATH: 1
TREMORS: 1
COUGH: 0

## 2024-12-20 ASSESSMENT — FIBROSIS 4 INDEX: FIB4 SCORE: 1.15

## 2024-12-20 NOTE — PROGRESS NOTES
Verbal consent was acquired by the patient to use [email or web address] ambient listening note generation during this visit.      Tita was seen today for follow-up.    Diagnoses and all orders for this visit:    Rotator cuff syndrome of left shoulder  -     MR-SHOULDER-W/O LEFT; Future    Left arm weakness  -     MR-SHOULDER-W/O LEFT; Future  -     DX-CERVICAL SPINE-2 OR 3 VIEWS; Future  -     Referral to Neurodiagnostics (EEG,EP,EMG/NCS/DBS)    Left leg weakness  -     Referral to Neurodiagnostics (EEG,EP,EMG/NCS/DBS)    Tremors of nervous system  -     MR-BRAIN-WITH & W/O; Future  -     DX-LUMBAR SPINE-2 OR 3 VIEWS; Future    Left-sided weakness  -     MR-BRAIN-WITH & W/O; Future  -     DX-LUMBAR SPINE-2 OR 3 VIEWS; Future  -     Referral to Neurodiagnostics (EEG,EP,EMG/NCS/DBS)    Shortness of breath  -     EC-ECHOCARDIOGRAM COMPLETE W/O CONT; Future                  Assessment & Plan  1. Left-sided weakness, left arm weakness, left leg weakness:  New condition, unstable  - Reports weakness predominantly on the left side, affecting both upper and lower extremities  - Difficulty lifting left leg, especially when getting into the 's seat  - Episodes of stumbling and falling  - Noted atrophy in the thenar eminence and upper extremities  - X-ray of the lower back and neck to check for nerve compressions  - MRI of the shoulder to evaluate for structural issues  - MRI of the brain to rule out neurological causes  - EMG test on the left side to assess for nerve damage or compression.    2. Tremors  New condition, unstable  - Reports tremors primarily in the left hand, affecting ability to perform tasks such as picking up a water bottle  - MRI of the brain to investigate potential neurological causes  - EMG test on the left side to determine if there is any nerve involvement    3. Rotator cuff syndrome  Chronic condition, unstable  - History of calcium deposits in the shoulder, indicative of rotator cuff tendinitis  -  MRI of the shoulder to further evaluate the extent of the rotator cuff syndrome    4. Exertional dyspnea  New condition, unstable  - Experiences shortness of breath during exercise, requiring rest  - No history of asthma or allergies  - Echocardiogram to assess cardiac function  - Advised to contact the office to schedule the echocardiogram if symptoms persist or worsen over the next 2 to 3 weeks      Follow-up in January for blood test follow-up.          Chief complaint::Diagnoses of Rotator cuff syndrome of left shoulder, Left arm weakness, Left leg weakness, Tremors of nervous system, Left-sided weakness, and Shortness of breath were pertinent to this visit.      History of Present Illness  The patient is a 69-year-old female who presents for evaluation of shoulder pain, tremors, and physical therapy.    Shoulder Pain and Weakness  - Reports persistent left-sided weakness, more pronounced than on the right side  - Noticeable during physical activities such as long walks, experiences foot dragging on the left side  - Hand tremors when lifting objects with her left hand, not present with the right hand  - Symptoms slightly better during the day but worsen with activity, causing fatigue and pain  - Difficulty lifting her leg, particularly when entering the 's seat of her vehicle  - Experienced a few falls outdoors and near-falls indoors  - Physical therapist observed symptoms and recommended further medical evaluation  - Engaging in strength training at the gym, significant weakness in the left side compared to the right  - Loses control of her left arm after a few repetitions of an overhead dumbbell press, resulting in the dumbbell falling on her head    Shortness of Breath  - Experiencing shortness of breath during exercise, necessitating frequent rest periods  - Persistent symptom despite efforts to maintain a regular exercise regimen  - Nearly fainted after performing a squat exercise at the gym  -  "Scheduled follow-up with her cardiologist in 02/2025    No pacemaker, no history of asthma or allergies. Previous cat attack in 09/2024 resulted in injuries to her left arm and leg, with the arm healing within a month and the leg taking approximately 3 months to heal, leaving permanent scars. Reports no signs of infection or soreness.        Review of Systems   Constitutional:  Negative for chills and fever.   Respiratory:  Positive for shortness of breath. Negative for cough and wheezing.    Musculoskeletal:         Left arm weakness, left shoulder pain, left leg weakness   Neurological:  Positive for tremors.          Medications and Allergies:     Current Outpatient Medications   Medication Sig Dispense Refill    B Complex Vitamins (VITAMIN B COMPLEX PO) Take  by mouth.      lisinopril (PRINIVIL) 5 MG Tab Take 1 Tablet by mouth every day. 100 Tablet 1    multivitamin Tab Take 1 Tablet by mouth every day.      Calcium Carb-Cholecalciferol (CALCIUM 500/VITAMIN D) 500-3.125 MG-MCG Tab Take  by mouth.      Magnesium 400 MG Cap Take 400 mg by mouth every day.      Ascorbic Acid (VITAMIN C PO) Take 1 Tablet by mouth every day.       No current facility-administered medications for this visit.       /84 (BP Location: Left arm, Patient Position: Sitting, BP Cuff Size: Adult)   Pulse 66   Temp 36.5 °C (97.7 °F) (Temporal)   Ht 1.651 m (5' 5\")   Wt 83.7 kg (184 lb 8.4 oz)   SpO2 99% , Body mass index is 30.71 kg/m².      Physical Exam  Constitutional:       Appearance: Normal appearance. She is well-developed and well-groomed.   HENT:      Head: Normocephalic and atraumatic.      Right Ear: External ear normal.      Left Ear: External ear normal.   Eyes:      General:         Right eye: No discharge.         Left eye: No discharge.      Conjunctiva/sclera: Conjunctivae normal.   Cardiovascular:      Rate and Rhythm: Normal rate.   Pulmonary:      Effort: Pulmonary effort is normal. No respiratory distress. "   Musculoskeletal:      Cervical back: Neck supple.   Skin:     Findings: No rash.   Neurological:      Mental Status: She is alert.      Motor: Weakness present.      Comments: Left arm weakness and left leg weakness, strength 4/5, left hand thenar atrophy   Psychiatric:         Mood and Affect: Mood and affect normal.         Behavior: Behavior normal.              Please note that this dictation was created using voice recognition software. I have made every reasonable attempt to correct obvious errors, but I expect that there are errors of grammar and possibly content that I did not discover before finalizing the note.

## 2024-12-23 ENCOUNTER — PHYSICAL THERAPY (OUTPATIENT)
Dept: PHYSICAL THERAPY | Facility: MEDICAL CENTER | Age: 69
End: 2024-12-23
Attending: INTERNAL MEDICINE
Payer: MEDICARE

## 2024-12-23 ENCOUNTER — PATIENT MESSAGE (OUTPATIENT)
Dept: MEDICAL GROUP | Facility: MEDICAL CENTER | Age: 69
End: 2024-12-23

## 2024-12-23 DIAGNOSIS — M12.812 ROTATOR CUFF ARTHROPATHY OF LEFT SHOULDER: ICD-10-CM

## 2024-12-23 PROCEDURE — 97110 THERAPEUTIC EXERCISES: CPT

## 2024-12-23 NOTE — OP THERAPY DAILY TREATMENT
Outpatient Physical Therapy  DAILY TREATMENT     [facility] Outpatient Physical Therapy  [address]  [place] [address]  Phone:  [phone]  Fax:  [phone]    Date: 12/23/2024    Patient: Tita Pitts  YOB: 1955  MRN: [id number]     Time Calculation    Start time: 0818  Stop time: 0904 Time Calculation (min): 46 minutes         Chief Complaint: Shoulder Problem, Arm Problem, and Neck Problem    Visit #: 7    SUBJECTIVE:  Pt stating she had her follow up with Dr. Marinelli and will have additional workup. Feels the shoulder discomfort is less than it was. Stating she has headaches in the morning everyday; reports these are severe. Noticed some numbness and tingling in ulnar distribution and wonders if she has experienced this before. Has noticed difficulty lifting her L leg and feels weakness-mentioned all this to her PCP.             OBJECTIVE:  Current objective measures:   See{           Therapeutic Exercises (CPT 28437):     1. UBE, x5 min, alt GH CW and CCW, cardio warm up    2. review of hep    3. pt care management    4. objective measures      Therapeutic Exercise Summary: Access Code: [email or web address]  URL: [email or web address]  Date: 11/07/2024  Prepared by: Chris Bauman    Exercises  - Correct Seated Posture  - 7 x weekly  - Standing Median Nerve Glide  - 2 x daily - 7 x weekly - 1 sets - 10 reps    Pt performed these exercises with instruction and SPV.  Provided handout with these exercises for daily HEP.        Therapeutic Treatments and Modalities:     2. E Stim Unattended (CPT 55468), IFC and mhp to L shoulder x15 min, NT    Time-based treatments/modalities:    Physical Therapy Timed Treatment Charges  Therapeutic exercise minutes (CPT 83847): 46 minutes    Pain rating (1-10) before treatment: 2/10 anterior L shoulder   Pain rating (1-10) after treatment: 2/10 anterior L shoulder  Pain Comments: Aggravating: opening a jar, heavy  household chores, carrying grocery bag, chest press and resisted rotational exercises       ASSESSMENT:   Response to treatment:   See PN      PLAN/RECOMMENDATIONS:   Plan for treatment: therapy treatment to continue next visit.  Planned interventions for next visit: continue with current treatment.

## 2024-12-23 NOTE — OP THERAPY PROGRESS SUMMARY
Outpatient Physical Therapy  PROGRESS SUMMARY NOTE      [facility] Outpatient Physical Therapy  [address]  [place] [address]  Phone:  [phone]  Fax:  [phone]    Date of Visit: 12/23/2024    Patient: Tita Ptits  YOB: 1955  MRN: [id number]     Referring Provider: Bassam Marinelli M.D.  [address]  [address]  [place] [address]   Referring Diagnosis Other specific arthropathies, not elsewhere classified, left shoulder [M12.812]     Visit Diagnoses     ICD-10-CM   1. Rotator cuff arthropathy of left shoulder  M12.812       Rehab Potential: good    Progress Report Period: 11/7/24-12/23/24    Functional Assessment Used  Neck Disability Total: 12  Quickdash General Total Score: 36.36       Objective Findings and Assessment:   Patient progression towards goals:   Pt has been seen for 7 visits. Pt stating she is about 20% improved and feels an overall decrease in shoulder pain complaints but no significant changes as far as weakness, limited motion secondary to pain complaints, and intermittent exhaustion at the gym. Pt stating she is more comfortable following PT, katia following manual treatments. Continues to be limited with the following: opening a jar, heavy household chores, carrying grocery bag, chest press and resisted rotational exercises.     Seen by Dr. Marinelli on 12/20; scheduled for additional imaging (MRI for shoulder and brain) and EMG to assess for concerns of UE weakness. Cervical x-ray completed remarkable for multiple areas of degenerative changes; is being referred to ortho based on atrophy found at LUE.    Based on improvements found in PT and new findings for cervical and neural dysfunction, recommending additional PT to maintain strength, decrease pain, work on posture and ADL management while awaiting additional tests/consults.      Objective findings and assessment details: Atrophy at thenar and hypothenar eminences  Bilat  Hands warm to touch; normal capillary refill      Active Range of Motion      Cervical Spine   Flexion: within functional limits (chin to chest)  Extension: within functional limits  Retraction: decreased  Left lateral flexion: Active left cervical lateral flexion: 32 deg; improves slightly.  Right lateral flexion: Active right cervical lateral flexion: 30 deg; stiff in the neck; worse shoulder pain.  Left rotation: Active left cervical rotation: 52 deg.  Right rotation: Active right cervical rotation: 65 deg; better.    Active Range of Motion -shoulder  Shoulder AROM-sitting post txt:  Flexion: 145 with pain throughout but worse at end range   Abduction: 155 with soreness throughout and worse at end range  Shoulder ER at 0 deg abd: 57 deg        Tests      Left Shoulder   Tension at ulnar, radial, and medial nn's L        Goals:   Short Term Goals:   1. Pt will be independent with written HEP. (Met)  2. Pt will participate in formal  strength assessment and myotomes if tolerated. (Met)     Short term goal time span:  2-4 weeks      Long Term Goals:    1. Pt will be independent with written HEP. (ongoing)  2. Pt will have a sig improvement in Quickdash score (eval: 36.36) (not met; ongoing)  3. Pt will be able to return to recreational activities and gym activities with min to no modifications at least 2-3x/week. (ongoing)  4. Pt will be able to complete normal household chores without increase in s/s at least 75% of the time or greater. (not met; ongoing)  5. Pt will have  a significant improvement in NDI (NEW)    Long term goal time span:  6-8 weeks    Plan:   Planned therapy interventions:  Neuromuscular Re-education (CPT 18650), E Stim Unattended (CPT 18073), Therapeutic Exercise (CPT 82773), Therapeutic Activities (CPT 50779), Mechanical Traction (CPT 82078) and Manual Therapy (CPT 50674)  Frequency:  1x week  Duration in weeks:  6  Plan details:  UPOC: 2/7/25      Referring provider co-signature:  LAI  have reviewed this plan of care and my co-signature certifies the need for services.     Certification Period: 12/23/2024 to 2/7/25    Physician Signature: ________________________________ Date: ______________

## 2024-12-30 ENCOUNTER — APPOINTMENT (OUTPATIENT)
Dept: PHYSICAL THERAPY | Facility: MEDICAL CENTER | Age: 69
End: 2024-12-30
Payer: MEDICARE

## 2025-01-02 ENCOUNTER — APPOINTMENT (OUTPATIENT)
Dept: PHYSICAL THERAPY | Facility: MEDICAL CENTER | Age: 70
End: 2025-01-02
Attending: FAMILY MEDICINE
Payer: MEDICARE

## 2025-01-06 ENCOUNTER — PATIENT OUTREACH (OUTPATIENT)
Dept: HEALTH INFORMATION MANAGEMENT | Facility: OTHER | Age: 70
End: 2025-01-06
Payer: MEDICARE

## 2025-01-06 DIAGNOSIS — N18.31 STAGE 3A CHRONIC KIDNEY DISEASE: ICD-10-CM

## 2025-01-06 DIAGNOSIS — I10 PRIMARY HYPERTENSION: ICD-10-CM

## 2025-01-10 NOTE — CARE PLAN
Problem: Risk of Falls  Goal: Reduce the Risk of Falls and Fall Related Injuries- Long term  Outcome: Progressing     Problem: Knowledge surrounding fall safety  Goal: Demonstrate ability to verbalize fall safety concerns- Long term  Outcome: Progressing     Problem: Knowledge of hypertension  Goal: Demonstrate Knowledge of Hypertension- Long term  Outcome: Progressing     Problem: Knowledge of factors contributing to hypertension  Goal: Demonstrate factors that can contribute to high blood pressure- Long term  Outcome: Progressing  Note: Patient has verbalized understanding of factors contributing to high blood pressure   Intervention: Educate patient on role of sodium in diet     Problem: Patient barriers to managing high blood pressure  Goal: Identify barriers to managing blood pressure  Outcome: Progressing  Note: Patient has vocalized commitment to lessening the barriers to managing HTN  Intervention: Identify patient barriers to managing high blood pressure     Problem: Knowledge of self-monitoring blood pressure  Goal: Demonstrate the elements of self-monitoring blood pressure- Long term  Outcome: Met  Note:   -Patient has verbalized commitment to self-monitoring blood pressure at least once a week.    -Patient will log home blood pressures and bring the log to medical provider appointments.    Intervention: Educate on proper technique of self-monitoring blood pressure

## 2025-01-10 NOTE — PROGRESS NOTES
"Reason for Follow-Up:   Monthly Personal Care Management Program Outreach     Current Health Status:    Medical Updates:  Pt states she tested positive for Covid-19 about 2 weeks ago. Pt states that for about 9 days she was \"sick in bed\" and then for the following 5 days had exertional fatigue. Pt states she is finally feeling better. Pt denies CP, SOB, nausea, vomiting, confusion, dizziness, lightheadedness at this time.     Pt states that she had an MRI yesterday 1/9/25 and states that she had a panic attack. Pt states that she did not expect to have this reaction to the MRI imaging, but that she felt anxious for the rest of the day. Pt states she feels ok at this time. Pt states she will mention this at next appointment with PCP on 1/28/25 because she is scheduled for another MRI in Feb.     Medication Updates:    RN reviewed all medications, allergies and upcoming appointments with patient.  Pt states she is taking all medications as indicated     Symptoms:  pt states she is feeling better since recovering from Covid-19 infection. Pt states she will rest as needed.     Plan of Care Goals and Progress:    Priority 1. HTN     Progress:  Pt states she takes her BP twice daily, but notes that the values have been elevated. Pt reports a recent BP value of 146/88. Pt denies symptoms at this time.     Barriers:  Pt states she does not add salt to her food, but some of the foods she eats are pre-made which limit the ability to control the sodium content.      Interventions:  Recommended pt to continue checking BP twice daily and keeping a log to share with PCP and cardiologist. Discussed limiting salt intake, and to watch for high BP values. Advised pt to call RN with any questions or concerns.      Education:  Topics discussed with pt during this outreach: medication overview, BP check frequency, BP check technique, role of sodium in HTN management, discussing situational anxiety with PCP     Priority 2. CKD " "     Progress:  Pt states urination habits are \"normal\" and denies symptoms at this time      Barriers:  Elevated BP values, disease processes      Interventions:  Pt states she will get blood work ordered by PCP prior to upcoming appointment, pt states she tries to stay well hydrated     Education:  Topics discussed with pt during this outreach: medication overview, hydration, exercise tolerance, resting as needed      Patient's Concerns and Feedback with Self-Management of Care:    Pt states she appreciates PCM services. Pt states she will complete ordered blood work, and go to upcoming appointments. Pt states she will continue to check BP and log values for provider feedback.    Next Steps:     Follow-Up Plan:  Call pt for monthly outreach in ~4 weeks February 2025      Appointments:  1/13/25- Endocrinology, 1/16/25- Behavioral Health, 1/28/25- PCP and Neurology     Contact Information:  Call [phone] with any questions or concerns.  "

## 2025-01-13 ENCOUNTER — PROCEDURE VISIT (OUTPATIENT)
Dept: ENDOCRINOLOGY | Facility: MEDICAL CENTER | Age: 70
End: 2025-01-13
Attending: INTERNAL MEDICINE
Payer: MEDICARE

## 2025-01-13 ENCOUNTER — HOSPITAL ENCOUNTER (OUTPATIENT)
Facility: MEDICAL CENTER | Age: 70
End: 2025-01-13
Attending: INTERNAL MEDICINE
Payer: MEDICARE

## 2025-01-13 DIAGNOSIS — E04.2 NON-TOXIC MULTINODULAR GOITER: ICD-10-CM

## 2025-01-13 DIAGNOSIS — E55.9 VITAMIN D DEFICIENCY: ICD-10-CM

## 2025-01-13 DIAGNOSIS — R76.8 THYROID ANTIBODY POSITIVE: ICD-10-CM

## 2025-01-13 LAB — PATHOLOGY CONSULT NOTE: NORMAL

## 2025-01-13 PROCEDURE — 88173 CYTOPATH EVAL FNA REPORT: CPT | Mod: 26 | Performed by: STUDENT IN AN ORGANIZED HEALTH CARE EDUCATION/TRAINING PROGRAM

## 2025-01-13 PROCEDURE — 88173 CYTOPATH EVAL FNA REPORT: CPT | Performed by: STUDENT IN AN ORGANIZED HEALTH CARE EDUCATION/TRAINING PROGRAM

## 2025-01-13 PROCEDURE — 10005 FNA BX W/US GDN 1ST LES: CPT | Performed by: INTERNAL MEDICINE

## 2025-01-13 NOTE — PROGRESS NOTES
POC US guided FNA procedure was completed today for the 2.5 cm isoechoic solid nodule on the isthmus  Please see endocrinologist procedure note  Patient tolerated procedure well without complaints.  Patient was advised that she will be contacted regarding the results and next plan  Patient was advised to follow up as planned with labs prior   Patient was advised to take tylenol or ibuprofen for pain  No restrictions were advised post procedure   Patient was advised to call for any issues post biopsy       Mark Mathews M.D.

## 2025-01-14 ENCOUNTER — PATIENT MESSAGE (OUTPATIENT)
Dept: MEDICAL GROUP | Facility: MEDICAL CENTER | Age: 70
End: 2025-01-14
Payer: MEDICARE

## 2025-01-14 ENCOUNTER — PHYSICAL THERAPY (OUTPATIENT)
Dept: PHYSICAL THERAPY | Facility: MEDICAL CENTER | Age: 70
End: 2025-01-14
Attending: FAMILY MEDICINE
Payer: MEDICARE

## 2025-01-14 ENCOUNTER — PATIENT MESSAGE (OUTPATIENT)
Dept: MEDICAL GROUP | Facility: MEDICAL CENTER | Age: 70
End: 2025-01-14

## 2025-01-14 DIAGNOSIS — M12.812 ROTATOR CUFF ARTHROPATHY OF LEFT SHOULDER: ICD-10-CM

## 2025-01-14 PROCEDURE — 97110 THERAPEUTIC EXERCISES: CPT

## 2025-01-14 PROCEDURE — 97140 MANUAL THERAPY 1/> REGIONS: CPT

## 2025-01-14 NOTE — OP THERAPY DAILY TREATMENT
"  Outpatient Physical Therapy  DAILY TREATMENT     [facility] Outpatient Physical Therapy  [address]  [place] [address]  Phone:  [phone]  Fax:  [phone]    Date: 01/14/2025    Patient: Tita Pitts  YOB: 1955  MRN: [id number]     Time Calculation    Start time: 1500              Chief Complaint: Shoulder Problem    Visit #: 8    SUBJECTIVE:  Stating she needs to move her appts. Stating she had a few weeks that were \"pretty good.\" She then had a flare up in the shoulder. Pt stating she had a break from gym and PT due to COVID infection; was sick for 2 weeks.       OBJECTIVE:  Current objective measures:   Active Range of Motion -pre txt:     Cervical Spine -AROM:   Left rotation: Active left cervical rotation: 51 deg. \"Tight\"   Right rotation: Active right cervical rotation: 60 deg; better.    Active Range of Motion -shoulder L   Shoulder AROM-sitting post txt:  Flexion: 137 with pain throughout but worse at end range   Abduction: 120 with soreness throughout and worse at end range  Shoulder ER at 0 deg abd: 45 deg     Active Range of Motion-post txt:  Cervical Spine -AROM:   Left rotation: Active left cervical rotation: 55 deg. \"Tight\"   Right rotation: Active right cervical rotation: 65 deg; better.    Active Range of Motion -shoulder L   Shoulder AROM-sitting post txt:  Flexion: 147 with pain throughout but worse at end range   Abduction: 145 with soreness throughout and worse at end range  Shoulder ER at 0 deg abd: 54 deg    Therapeutic Exercises (CPT 93605):     1. UBE, x5 min, alt GH CW and CCW, cardio warm up    2. review of hep    20. UPOC: 1/23/25      Therapeutic Exercise Summary: Access Code: [email or web address]  URL: [email or web address]  Date: 11/07/2024  Prepared by: Chris Bauman    Exercises  - Correct Seated Posture  - 7 x weekly  - Standing Median Nerve Glide  - 2 x daily - 7 x weekly - 1 sets - 10 reps    Pt performed these " "exercises with instruction and SPV.  Provided handout with these exercises for daily HEP.        Therapeutic Treatments and Modalities:     2. E Stim Unattended (CPT 40496), IFC and mhp to L shoulder x15 min, NT    3. Manual Therapy (CPT 00017), see below    Therapeutic Treatment and Modalities Summary: Manual:  CPA and rotational mobs to C7-T10 gr III in prone lying.    AP and inf mobs to L GHJ in supine lying   STM to supra L     Time-based treatments/modalities:         Pain rating (1-10) before treatment: 2/10 anterior L shoulder \"aching\" and \"uncomfortable\" at rest and with movement  Pain rating (1-10) after treatment: 2/10 anterior L shoulder  Pain Comments: Aggravating: opening a jar, heavy household chores, carrying grocery bag, chest press and resisted rotational exercises       ASSESSMENT:   Response to treatment:   Pt seen by orthopedist on 1/9/25; cervical MRI remarkable for mod degenerative changes including foraminal narrowing bilat, recommendations for follow up for additional imaging and neuro consult.     Pt does respond well to t/s mobilizations as demonstrated by consistent increases in shoulder elevation. Pain unfortunately, not decreased by end of session. Pt will contemplate whether she would like to continue with PT or wait until completing the remainder of her imaging.       PLAN/RECOMMENDATIONS:   Plan for treatment: therapy treatment to continue next visit.  Planned interventions for next visit: continue with current treatment.         "

## 2025-01-16 ENCOUNTER — OFFICE VISIT (OUTPATIENT)
Dept: BEHAVIORAL HEALTH | Facility: CLINIC | Age: 70
End: 2025-01-16
Payer: MEDICARE

## 2025-01-16 DIAGNOSIS — F33.0 MILD EPISODE OF RECURRENT MAJOR DEPRESSIVE DISORDER (HCC): ICD-10-CM

## 2025-01-16 PROCEDURE — 90837 PSYTX W PT 60 MINUTES: CPT | Performed by: MARRIAGE & FAMILY THERAPIST

## 2025-01-16 NOTE — PROGRESS NOTES
[facility]   Therapy Progress Note        Name: Tita Pitts  MRN: [id number]  : 1955  Age: 69 y.o.  Date of assessment: 2025  PCP: Bassam Marinelli M.D.  Persons in attendance: Patient  Total session time: 57 minutes      Topics addressed in psychotherapy include: Met with the patient in person for an individual counseling session. The patient was last seen by this clinician on 2024  Content of Therapy:   Met with the patient for an individual counseling session.  The patient discussed that she has been feeling a significant amount of anxiety a recently.  Patient reports feeling on edge with a lot of things going on.  Patient discussed having multiple medical appointments coming up soon, including a brain scan and a shoulder scan.  The patient discussed having a biopsy done on her throat which turned out to be benign.  Patient discussed experiencing significant panic while having an MRI.  Patient also discussed that her ex daughter log and grandchildren will be moving to [place] and she will have limited contact with them.  Patient feels that there are many things in her life that she cannot control of this moment.    Therapeutic Intervention:   This session, the therapeutic focus was on validating the patient's recent experiences.  Assisted the patient in conceptualizing her stressors as web pages open on a phone.  With too many web page is open, is working memory is reduced.  Worked with the patient on plans to journal this information without feeling the need to process in everything at once.  Discussed with the patient her fight or flight which kicked in during the MRI and the subsequent release of adrenaline.  The patient had discussed feeling calm during her cardiac evaluation.  Worked with the patient to understand her mindfulness it that time and not allowing other worries to intrude.  Worked with the patient on understanding repetitive thoughts.  Challenged the  patient to explore alternative options where she has control.  Discussed with the patient taking the time to visit her grandchildren.  Plan:  Continue working with the patient on managing stressors and anxiety.  Impression:   Major depressive disorder - maintaining   Anxiety - increased  This dictation has been created using voice recognition software and/or scribes. The accuracy of the dictation is limited by the abilities of the software and the expertise of the scribes. I expect there may be some errors of grammar and possibly content. I made every attempt to manually correct the errors within my dictation. However, errors related to voice recognition software and/or scribes may still exist and should be interpreted within the appropriate context.    Objective Observations:   Participation:Active verbal participation, Attentive, and Engaged   Grooming:Casual   Cognition:Alert and Fully Oriented   Eye Contact:Good   Mood:Euthymic and Anxious   Affect:Flexible and Full range   Thought Process:Logical and Goal-directed   Speech:Rate within normal limits and Volume within normal limits    Current Risk:   Suicide: low   Homicide: low   Self-Harm: low   Relapse: low   Safety Plan Reviewed: not applicable    Care Plan Updated: No    Does patient express agreement with the above plan? Yes     Diagnosis:  1. Mild episode of recurrent major depressive disorder (HCC)        Referral appointment(s) scheduled? No       FELICITAS Fournier.

## 2025-01-21 ENCOUNTER — HOSPITAL ENCOUNTER (OUTPATIENT)
Dept: LAB | Facility: MEDICAL CENTER | Age: 70
End: 2025-01-21
Attending: FAMILY MEDICINE
Payer: MEDICARE

## 2025-01-21 DIAGNOSIS — I10 PRIMARY HYPERTENSION: ICD-10-CM

## 2025-01-21 DIAGNOSIS — D72.819 LEUKOPENIA, UNSPECIFIED TYPE: ICD-10-CM

## 2025-01-21 DIAGNOSIS — N18.31 STAGE 3A CHRONIC KIDNEY DISEASE: ICD-10-CM

## 2025-01-21 LAB
ALBUMIN SERPL BCP-MCNC: 4.2 G/DL (ref 3.2–4.9)
ALBUMIN/GLOB SERPL: 1.4 G/DL
ALP SERPL-CCNC: 90 U/L (ref 30–99)
ALT SERPL-CCNC: 11 U/L (ref 2–50)
ANION GAP SERPL CALC-SCNC: 10 MMOL/L (ref 7–16)
AST SERPL-CCNC: 22 U/L (ref 12–45)
BASOPHILS # BLD AUTO: 1.6 % (ref 0–1.8)
BASOPHILS # BLD: 0.07 K/UL (ref 0–0.12)
BILIRUB SERPL-MCNC: 0.5 MG/DL (ref 0.1–1.5)
BUN SERPL-MCNC: 18 MG/DL (ref 8–22)
CALCIUM ALBUM COR SERPL-MCNC: 9.5 MG/DL (ref 8.5–10.5)
CALCIUM SERPL-MCNC: 9.7 MG/DL (ref 8.5–10.5)
CHLORIDE SERPL-SCNC: 104 MMOL/L (ref 96–112)
CHOLEST SERPL-MCNC: 203 MG/DL (ref 100–199)
CO2 SERPL-SCNC: 25 MMOL/L (ref 20–33)
CREAT SERPL-MCNC: 1.15 MG/DL (ref 0.5–1.4)
EOSINOPHIL # BLD AUTO: 0.13 K/UL (ref 0–0.51)
EOSINOPHIL NFR BLD: 2.9 % (ref 0–6.9)
ERYTHROCYTE [DISTWIDTH] IN BLOOD BY AUTOMATED COUNT: 44.3 FL (ref 35.9–50)
FASTING STATUS PATIENT QL REPORTED: NORMAL
GFR SERPLBLD CREATININE-BSD FMLA CKD-EPI: 52 ML/MIN/1.73 M 2
GLOBULIN SER CALC-MCNC: 3.1 G/DL (ref 1.9–3.5)
GLUCOSE SERPL-MCNC: 87 MG/DL (ref 65–99)
HCT VFR BLD AUTO: 39.5 % (ref 37–47)
HDLC SERPL-MCNC: 67 MG/DL
HGB BLD-MCNC: 12.9 G/DL (ref 12–16)
IMM GRANULOCYTES # BLD AUTO: 0.02 K/UL (ref 0–0.11)
IMM GRANULOCYTES NFR BLD AUTO: 0.5 % (ref 0–0.9)
LDLC SERPL CALC-MCNC: 118 MG/DL
LYMPHOCYTES # BLD AUTO: 1.63 K/UL (ref 1–4.8)
LYMPHOCYTES NFR BLD: 36.8 % (ref 22–41)
MCH RBC QN AUTO: 31.4 PG (ref 27–33)
MCHC RBC AUTO-ENTMCNC: 32.7 G/DL (ref 32.2–35.5)
MCV RBC AUTO: 96.1 FL (ref 81.4–97.8)
MONOCYTES # BLD AUTO: 0.35 K/UL (ref 0–0.85)
MONOCYTES NFR BLD AUTO: 7.9 % (ref 0–13.4)
NEUTROPHILS # BLD AUTO: 2.23 K/UL (ref 1.82–7.42)
NEUTROPHILS NFR BLD: 50.3 % (ref 44–72)
NRBC # BLD AUTO: 0 K/UL
NRBC BLD-RTO: 0 /100 WBC (ref 0–0.2)
PLATELET # BLD AUTO: 362 K/UL (ref 164–446)
PMV BLD AUTO: 10.5 FL (ref 9–12.9)
POTASSIUM SERPL-SCNC: 4.4 MMOL/L (ref 3.6–5.5)
PROT SERPL-MCNC: 7.3 G/DL (ref 6–8.2)
RBC # BLD AUTO: 4.11 M/UL (ref 4.2–5.4)
SODIUM SERPL-SCNC: 139 MMOL/L (ref 135–145)
T4 FREE SERPL-MCNC: 1 NG/DL (ref 0.93–1.7)
TRIGL SERPL-MCNC: 89 MG/DL (ref 0–149)
TSH SERPL-ACNC: 1.36 UIU/ML (ref 0.35–5.5)
WBC # BLD AUTO: 4.4 K/UL (ref 4.8–10.8)

## 2025-01-21 PROCEDURE — 84439 ASSAY OF FREE THYROXINE: CPT

## 2025-01-21 PROCEDURE — 85025 COMPLETE CBC W/AUTO DIFF WBC: CPT

## 2025-01-21 PROCEDURE — 36415 COLL VENOUS BLD VENIPUNCTURE: CPT

## 2025-01-21 PROCEDURE — 80061 LIPID PANEL: CPT

## 2025-01-21 PROCEDURE — 80053 COMPREHEN METABOLIC PANEL: CPT

## 2025-01-21 PROCEDURE — 84443 ASSAY THYROID STIM HORMONE: CPT

## 2025-01-28 ENCOUNTER — NON-PROVIDER VISIT (OUTPATIENT)
Dept: NEUROLOGY | Facility: MEDICAL CENTER | Age: 70
End: 2025-01-28
Attending: SPECIALIST
Payer: MEDICARE

## 2025-01-28 ENCOUNTER — OFFICE VISIT (OUTPATIENT)
Dept: MEDICAL GROUP | Facility: MEDICAL CENTER | Age: 70
End: 2025-01-28
Payer: MEDICARE

## 2025-01-28 VITALS
TEMPERATURE: 98.1 F | BODY MASS INDEX: 30.38 KG/M2 | OXYGEN SATURATION: 97 % | HEART RATE: 60 BPM | SYSTOLIC BLOOD PRESSURE: 96 MMHG | HEIGHT: 65 IN | WEIGHT: 182.32 LBS | DIASTOLIC BLOOD PRESSURE: 72 MMHG

## 2025-01-28 DIAGNOSIS — M75.102 ROTATOR CUFF SYNDROME OF LEFT SHOULDER: ICD-10-CM

## 2025-01-28 DIAGNOSIS — R53.1 LEFT-SIDED WEAKNESS: ICD-10-CM

## 2025-01-28 DIAGNOSIS — M47.22 OSTEOARTHRITIS OF SPINE WITH RADICULOPATHY, CERVICAL REGION: ICD-10-CM

## 2025-01-28 DIAGNOSIS — M51.362 DEGENERATION OF INTERVERTEBRAL DISC OF LUMBAR REGION WITH DISCOGENIC BACK PAIN AND LOWER EXTREMITY PAIN: ICD-10-CM

## 2025-01-28 DIAGNOSIS — E78.2 MIXED HYPERLIPIDEMIA: ICD-10-CM

## 2025-01-28 DIAGNOSIS — N18.31 STAGE 3A CHRONIC KIDNEY DISEASE: ICD-10-CM

## 2025-01-28 DIAGNOSIS — F41.9 ANXIETY: ICD-10-CM

## 2025-01-28 DIAGNOSIS — F33.0 MILD EPISODE OF RECURRENT MAJOR DEPRESSIVE DISORDER (HCC): ICD-10-CM

## 2025-01-28 DIAGNOSIS — R29.898 LEFT ARM WEAKNESS: ICD-10-CM

## 2025-01-28 DIAGNOSIS — R29.898 LEFT LEG WEAKNESS: ICD-10-CM

## 2025-01-28 DIAGNOSIS — M85.89 OSTEOPENIA OF MULTIPLE SITES: ICD-10-CM

## 2025-01-28 DIAGNOSIS — Z12.31 ENCOUNTER FOR SCREENING MAMMOGRAM FOR BREAST CANCER: ICD-10-CM

## 2025-01-28 PROCEDURE — 95910 NRV CNDJ TEST 7-8 STUDIES: CPT | Mod: 26 | Performed by: SPECIALIST

## 2025-01-28 PROCEDURE — 95886 MUSC TEST DONE W/N TEST COMP: CPT | Performed by: SPECIALIST

## 2025-01-28 PROCEDURE — 95886 MUSC TEST DONE W/N TEST COMP: CPT | Mod: 26 | Performed by: SPECIALIST

## 2025-01-28 PROCEDURE — 3078F DIAST BP <80 MM HG: CPT | Performed by: FAMILY MEDICINE

## 2025-01-28 PROCEDURE — 95910 NRV CNDJ TEST 7-8 STUDIES: CPT | Performed by: SPECIALIST

## 2025-01-28 PROCEDURE — 3074F SYST BP LT 130 MM HG: CPT | Performed by: FAMILY MEDICINE

## 2025-01-28 PROCEDURE — 99214 OFFICE O/P EST MOD 30 MIN: CPT | Performed by: FAMILY MEDICINE

## 2025-01-28 RX ORDER — HYDROXYZINE HYDROCHLORIDE 25 MG/1
25 TABLET, FILM COATED ORAL 3 TIMES DAILY PRN
Qty: 30 TABLET | Refills: 0 | Status: SHIPPED | OUTPATIENT
Start: 2025-01-28

## 2025-01-28 ASSESSMENT — ENCOUNTER SYMPTOMS
DEPRESSION: 1
BACK PAIN: 1
FEVER: 0
WEAKNESS: 1
CHILLS: 0

## 2025-01-28 ASSESSMENT — PATIENT HEALTH QUESTIONNAIRE - PHQ9
CLINICAL INTERPRETATION OF PHQ2 SCORE: 3
SUM OF ALL RESPONSES TO PHQ QUESTIONS 1-9: 10
5. POOR APPETITE OR OVEREATING: 0 - NOT AT ALL

## 2025-01-28 ASSESSMENT — FIBROSIS 4 INDEX: FIB4 SCORE: 1.23

## 2025-01-28 NOTE — PROCEDURES
NERVE CONDUCTION STUDIES AND ELECTROMYOGRAPHY REPORT        01/28/25      Referring provider: Bassam Marinelli M.D.      SUMMARY OF PATIENT'S CLINICAL HISTORY,PHYSICAL EXAM, AND RATIONALE FOR TESTING:    Ms. Tita Pitts 69 y.o. presenting with intermittent numbness in the left hand, subjective weakness in the left leg.    Past Medical History is significant for :   Past Medical History:   Diagnosis Date    Allergy     Anesthesia     ponv    Breath shortness 2022    Related to current pericarditis    Bronchitis     history of    Cataract     NOT surgically removed    Gynecological disorder 1986    Had Hysterectomy 1990    Hypertension     well controlled on medication    Pericarditis 04/07/2023    Pneumonia     history of, in 20's    PONV (postoperative nausea and vomiting)     Psychiatric problem 12/27/2022    grief - in counseling       The electrodiagnostic studies were performed to evaluate for possible peripheral neuropathy versus radiculopathy.      ELECTRODIAGNOSTIC EXAMINATION:  Nerve conduction studies (NCS) and electromyography (EMG) are utilized to evaluate direct or indirect damage to the peripheral nervous system. NCS are performed to measure the nerve(s) response(s) to electrostimulation across a given nerve segment. EMG evaluates the passive and active electrical activity of the muscle(s) in question.  Muscles are innervated by specific peripheral nerves and roots. Often times, several nerves the muscle to be examined in order to determine the presence or absence of the disease process. Furthermore, nerves and muscles may need to be tested in a [id number] comparison, as well as in additional extremities, as this may be crucial in characterizing the extent of the disease process, which may be diffuse or isolated and of varying degree of severity. The extent of the neurodiagnostic exam is justified as it may help arrive to a proper diagnosis, which ultimately may contribute to better  management of the patient. Therefore, the nerves to muscles examined during the study were medically necessary.    Unless otherwise noted, temperature of the extremity(s) study was monitored before and during the examination and remained between 32 and 36 degrees C for the upper extremities, and between 30 and 36 degrees C for the lower extremities.      NERVE CONDUCTION STUDIES:  Sensory nerves:   -Left median sensory nerve was examined.The response was within normal limits.  -Left ulnar sensory nerve was examined. The response was within normal limits.  -Left sural nerve was tested. The response was within normal limits.    Motor nerves:   -Left median motor nerve was examined. Recording electrodes placed at the Abductor Pollicis Brevis muscles. The response was within normal limits.  -Left ulnar motor nerve was examined. Recording electrodes placed at the Abductor Digiti Minimi muscles. The response was within normal limits.  -Left tibial nerve was examined. Recording electrodes placed at the Abductor     Hallucis muscles. The response was within normal limits.  -Left deep Peroneal motor nerve was examined. Recording electrodes were placed at the Extensor Digitorum Brevis muscles.The response was within normal limits.     No temporal dispersion or conduction block observed in any of the motor nerves examined.          ELECTROMYOGRAPHY:  The study was performed the concentric needle electrode. Fibrillation and fasciculation activity is graded by convention from none (0) to continuous (4+).  Needle electrode examination was performed in the following muscles: Left vastus lateralis, tibialis anterior, gastrocnemius, extensor digitorum brevis, abductor hallucis, deltoid, biceps, triceps, first dorsal interosseous, abductor pollicis brevis.  The muscles tested demonstrated normal insertional activity, normal motor unit morphology and recruitment. There were no elements suggestive of active denervation.      Nerve  Conduction Studies     Stim Site NR Onset (ms) Norm Onset (ms) O-P Amp (µV) Norm O-P Amp Site1 Site2 Delta-P (ms) Dist (cm) Cuco (m/s) Norm Cuco (m/s)   Left Median Anti Sensory (2nd Digit)   Wrist    2.3 <3.8 51.5 >10 Wrist 2nd Digit 2.9 14.0 *48 >50   Left Sural Anti Sensory (Lat Mall)   Calf    2.9 <4.6 3.1 >3 Calf Lat Mall 4.1 14.0 *34 >40   Left Ulnar Anti Sensory (5th Digit)   Wrist    2.2 <3.2 24.1 >5 Wrist 5th Digit 3.2 14.0 *44 >50        Stim Site NR Onset (ms) Norm Onset (ms) O-P Amp (mV) Norm O-P Amp Site1 Site2 Delta-0 (ms) Dist (cm) Cuco (m/s) Norm Cuco (m/s)   Left Median Motor (Abd Poll Brev)   Wrist    2.8 <4 8.3 >5 Elbow Wrist 4.2 26.0 62 >50   Elbow    7.0  7.7          Left Peroneal EDB Motor (Ext Dig Brev)   Ankle    3.7 <6 7.6 >2.5 B Fib Ankle 6.7 33.0 49 >40   B Fib    10.4  4.4  Poplt B Fib 1.7 10.0 59    Poplt    12.1  3.0          Left Tibial Motor (Abd Grier Brev)   Ankle    3.2 <6 11.8 >4 Knee Ankle 8.1 33.0 41 >40   Knee    11.3  4.1          Left Ulnar Motor (Abd Dig Min)   Wrist    2.6 <3.1 8.7 >7 B Elbow Wrist 2.9 19.0 66 >50   B Elbow    5.5  7.6  A Elbow B Elbow 1.2 10.0 83    A Elbow    6.7  4.6                               Electromyography     Side Muscle Nerve Root Ins Act Fibs Psw Amp Dur Poly Recrt Int Pat Comment   Left VastusLat Femoral L2-4 Nml Nml Nml Nml Nml 0 Nml Nml    Left AntTibialis Dp Br Fibular L4-5 Nml Nml Nml Nml Nml 0 Nml Nml    Left Gastroc Tibial S1-2 Nml Nml Nml Nml Nml 0 Nml Nml    Left Ext Dig Brev Dp Br Fibular L5, S1 Nml Nml Nml Nml Nml 0 Nml Nml    Left AbdHallucis MedPlantar S1-2 Nml Nml Nml Nml Nml 0 Nml Nml    Left Deltoid Axillary C5-6 Nml Nml Nml Nml Nml 0 Nml Nml    Left Biceps Musculocut C5-6 Nml Nml Nml Nml Nml 0 Nml Nml    Left Triceps Radial C6-7-8 Nml Nml Nml Nml Nml 0 Nml Nml    Left 1stDorInt Ulnar C8-T1 Nml Nml Nml Nml Nml 0 Nml Nml    Left Abd Poll Brev Median C8-T1 Nml Nml Nml Nml Nml 0 Nml Nml            DIAGNOSTIC INTERPRETATION:   Extensive  electrodiagnostic studies were performed to the left upper extremity and left lower extremity.  The results are as follows:    1.  Normal EMG and nerve conduction studies left upper extremity.  Specifically left median and ulnar motor and sensory conduction studies were within normal limits and there were no acute or chronic denervation changes noted in selected muscles studied in the left upper extremity.  There was no evidence of left upper extremity peripheral neuropathy, plexopathy or radiculopathy.    2.  Normal EMG and nerve conduction studies left lower extremity.  Specifically left tibial and peroneal motor and sural sensory conduction studies are within normal limits, there were no acute or chronic denervation changes in selected muscles studied in the left upper extremity.  There was no evidence of left upper extremity peripheral neuropathy, plexopathy or radiculopathy.  Clinical correlation is suggested.          AMANDA Jaquez M.D.(No note.)

## 2025-01-28 NOTE — PROGRESS NOTES
Verbal consent was acquired by the patient to use [employer] ambient listening note generation during this visit.      Tita was seen today for follow-up.    Diagnoses and all orders for this visit:    Stage 3a chronic kidney disease  -     US-RENAL; Future  -     Comp Metabolic Panel; Future    Encounter for screening mammogram for breast cancer  -     MA-SCREENING MAMMO BILAT W/TOMOSYNTHESIS W/CAD; Future    Osteopenia of multiple sites  -     Patient identified as fall risk.  Appropriate orders and counseling given.    Mixed hyperlipidemia  -     Lipid Profile; Future    Mild episode of recurrent major depressive disorder (HCC)  -     Patient has been identified as having a positive depression screening. Appropriate orders and counseling have been given.    Anxiety  -     hydrOXYzine HCl (ATARAX) 25 MG Tab; Take 1 Tablet by mouth 3 times a day as needed for Anxiety.           HCC Gap Form    Diagnosis to address: N18.31 - Stage 3a chronic kidney disease  Assessment and plan: Chronic, exacerbated. Treatment and follow up: Check renal ultrasound and recheck kidney function test in 3-month if not getting better we will place referral to nephrology.  Last edited 01/28/25 09:37 PST by Bassam Marinelli M.D.            Assessment & Plan  1. Hypercholesterolemia:  New condition, unstable  - Cholesterol levels have increased compared to previous readings  - Limit intake of saturated fats, simple carbohydrates, sugars, processed foods, and red meat  - Consume lean meats and restrict egg yolk intake to one per day  - Repeat blood test to monitor cholesterol levels in 3-month  - Heart scan recommended if no improvement    2. Chronic kidney disease, stage 3A:  Chronic condition, unstable  - Slight decline in kidney function compared to previous readings  - History of acute kidney injury  - Ensure adequate hydration and avoid NSAIDs  - Order kidney ultrasound to rule out kidney stones or cysts  - Repeat blood test in 3  months to monitor kidney function  - Referral to nephrologist if no improvement or abnormalities detected on ultrasound    3. Rotator cuff syndrome:  - Symptoms suggest weak tendon in rotator cuff causing pain and weakness  - Continue physical therapy focusing on shoulder  - Begin therapy for neck and lower back after shoulder therapy is complete    4. Multilevel degenerative arthritic changes in lumbar and cervical region  Chronic condition, unstable  - Symptoms likely due to nerve compression from disc bulging in neck  - Continue physical therapy for shoulder and later for neck and lower back  -Continue to follow-up with Ortho    5. Left leg pain and weakness:  - Likely due to lower back issues  - Continue current exercise regimen  - Consult  for modifications to avoid aggravating symptoms    6. Anxiety:  New condition, unstable  - Experiences anxiety, dizziness, and lightheadedness during MRIs  - Take Atarax 25 mg before MRI to help calm down  - Try half a pill first to see effects and adjust dose accordingly  - Consider Xanax if Atarax is not effective    7. Lightheadedness and fainting:  - Symptoms may be due to low blood pressure  - Discuss possibility of reducing blood pressure medication with cardiologist  - Schedule echocardiogram to further investigate symptoms and MRI for further evaluation for the symptoms.    8. Health maintenance:  - Due for a mammogram, which will be ordered today    9. Pain management:  - Take Tylenol 1 g every 8 hours as needed for pain relief  - Use Icy Hot or heat compresses for joint pain    Continue to follow-up with therapist for depression symptoms.    Follow-up in 3 months for lab follow-up.        Chief complaint::Diagnoses of Stage 3a chronic kidney disease, Encounter for screening mammogram for breast cancer, Osteopenia of multiple sites, Mixed hyperlipidemia, Mild episode of recurrent major depressive disorder (HCC), and Anxiety were pertinent to this  visit.      History of Present Illness  The patient is a 69-year-old female who presents for a blood test follow-up.    Blood Pressure Issues  - She has been on lisinopril for approximately 2 years and is not currently taking any diuretics.  - She typically experiences blood pressure readings in the 130s during exercise, but these have recently dropped to the 90s.  - She has a scheduled appointment with her cardiologist next week.    Anxiety and Dizziness  - She reports experiencing anxiety, dizziness, and lightheadedness during her previous back MRI.  - She has rescheduled her upcoming brain MRI and is seeking advice on how to manage her anxiety.  - She has not previously taken any medications for this purpose.  - She has a shoulder MRI scheduled for 02/21/2025.    Lightheadedness and Fainting  - She reports frequent lightheadedness during exercise and a sensation of breathlessness, although she does not feel out of breath.  - She experienced a fainting episode at the gym in 12/2024 and has since scheduled an echocardiogram for 02/28/2025.  - She has not had any further fainting episodes but continues to experience the sensation of needing to stop.    Shoulder Pain  - She has been experiencing shoulder pain, which is exacerbated by certain movements.  - She has modified her activities to avoid triggering the pain.  - Her  has adjusted her workout routine to include more stretching and less weightlifting.  - She can perform certain movements without discomfort, but others cause pain.  - She reports persistent tremors and numbness in her fingers when holding objects.  - She has undergone MRIs and is aware of spinal issues but has not been informed of any specific problems.  - She is currently receiving physical therapy for her shoulder and neck.    Leg Pain and Weakness  - She reports difficulty lifting her left leg and experiences pain in a specific spot.  - Her gym  has noted weakness on her  "left side.    Pain Management  - She has not taken NSAIDs since 04/2024 and reports experiencing aches and pains, including headaches.  - She manages her pain with Tylenol, taking 1 g every 8 hours as needed.  - She is considering the use of topical creams for her joint pain.    Supplemental information: She is not currently on any cholesterol-lowering medications. She maintains a diet rich in vegetables and greens and engages in daily exercise. She ensures adequate hydration, particularly during exercise, and consumes decaffeinated tea.    MEDICATIONS  - Current: lisinopril, Tylenol  - Past: indomethacin      Review of Systems   Constitutional:  Negative for chills and fever.   Musculoskeletal:  Positive for back pain and joint pain.   Neurological:  Positive for weakness.   Psychiatric/Behavioral:  Positive for depression.           Medications and Allergies:     Current Outpatient Medications   Medication Sig Dispense Refill    hydrOXYzine HCl (ATARAX) 25 MG Tab Take 1 Tablet by mouth 3 times a day as needed for Anxiety. 30 Tablet 0    B Complex Vitamins (VITAMIN B COMPLEX PO) Take  by mouth.      lisinopril (PRINIVIL) 5 MG Tab Take 1 Tablet by mouth every day. 100 Tablet 1    multivitamin Tab Take 1 Tablet by mouth every day.      Calcium Carb-Cholecalciferol (CALCIUM 500/VITAMIN D) 500-3.125 MG-MCG Tab Take  by mouth.      Magnesium 400 MG Cap Take 400 mg by mouth every day.      Ascorbic Acid (VITAMIN C PO) Take 1 Tablet by mouth every day.       No current facility-administered medications for this visit.       BP 96/72 (BP Location: Left arm, Patient Position: Sitting, BP Cuff Size: Adult)   Pulse 60   Temp 36.7 °C (98.1 °F) (Temporal)   Ht 1.651 m (5' 5\")   Wt 82.7 kg (182 lb 5.1 oz)   SpO2 97% , Body mass index is 30.34 kg/m².      Physical Exam  Constitutional:       Appearance: Normal appearance. She is well-developed and well-groomed.   HENT:      Head: Normocephalic and atraumatic.      Right Ear: " External ear normal.      Left Ear: External ear normal.   Eyes:      General:         Right eye: No discharge.         Left eye: No discharge.      Conjunctiva/sclera: Conjunctivae normal.   Cardiovascular:      Rate and Rhythm: Normal rate.   Pulmonary:      Effort: Pulmonary effort is normal. No respiratory distress.   Musculoskeletal:      Cervical back: Neck supple.   Skin:     Findings: No rash.   Neurological:      Mental Status: She is alert.   Psychiatric:         Mood and Affect: Mood and affect normal.         Behavior: Behavior normal.            I reviewed with patient lab results resulted on 1/21/2025.        Please note that this dictation was created using voice recognition software. I have made every reasonable attempt to correct obvious errors, but I expect that there are errors of grammar and possibly content that I did not discover before finalizing the note.

## 2025-02-05 ENCOUNTER — PATIENT OUTREACH (OUTPATIENT)
Dept: HEALTH INFORMATION MANAGEMENT | Facility: OTHER | Age: 70
End: 2025-02-05
Payer: MEDICARE

## 2025-02-05 DIAGNOSIS — N18.31 STAGE 3A CHRONIC KIDNEY DISEASE: ICD-10-CM

## 2025-02-05 DIAGNOSIS — I10 PRIMARY HYPERTENSION: ICD-10-CM

## 2025-02-05 NOTE — PROGRESS NOTES
Reason for Follow-Up:   Monthly Personal Care Management Program Outreach     Current Health Status  Qualifying diagnoses for PCM: CKD 3A, HTN    Medical Updates:    Pt states that overall she is doing ok. Pt states that she has made recommended appointments for kidney ultrasound, brain MRI, mammogram, and plans to get requested labs in April.     Pt states that she will also be seeing the cardiologist soon, regarding syncopal episodes. Pt states she has not fainted in the last month, and that she knows when she starts to feel faint/dizzy/lightheaded to sit down to avoid fainting. Pt states that she stands up from a seated position slowly, as to avoid lightheaded/dizziness.     Pt endorses ongoing shoulder pain and she is seeing PT for this issue. Pt states that she takes Tylenol as advised by PCP, and uses a heating pad which she states helps somewhat.     Pt denies any falls in the last month, however, she states that this morning she had a near fall when she slipped on some ice. Pt states she caught herself before falling, which did exacerbate her shoulder pain. Aside from shoulder pain, pt denies other symptoms from near fall.    Pt reports no other recent health concerns at this time.     Medication Updates:   Pt states she is taking all medications as indicated   Pt denies any difficulty obtaining medications at this time    Symptoms:    Pt endorses ongoing lightheaded/dizziness when active, and will be seeing the cardiologist for further evaluation. Pt also endorses ongoing shoulder pain which she is working on with PT.    RN reviewed upcoming appointments with patient. Pt states she has no other questions, concerns, or needs at this time.     Plan of Care Goals and Progress    See updated care plan note    Priority 1. HTN    Progress:  Pt is looking forward to cardiology appointment in the hopes that she can get some more information as to what is causing her syncopal episodes.   Pt states she checks her BP  twice daily (morning/evening) and it usually hovers around 130/85.   Pt states that when she is able to, she checks her BP while feeling dizzy/lightheaded. Pt states that when she has checked while experiencing those symptoms, her BP hovers around 100-95/65-70.   Pt states she drinks water throughout the day for her kidneys and also to mitigate low BP  Pt states she will bring in her BP log to the cardiologist  Pt states she tries to be active through working out at the gym and through ADLs (raking leaves for example), but that she is cautious because of the symptoms she sometimes experiences when active    Barriers:  Chronic disease process, reduced exercise tolerance due to symptoms    Interventions: Pt will demonstrate continued BP checks, continue to exercise as is recommended and safely tolerated, continue to hydrate as appropriate    Education:  Topics discussed with pt during this outreach: brief medication overview, adequate hydration, slowly rising from a seated position, checking BP when symptomatic    Priority 2. CKD 3A    Progress:  Pt states that she will be getting a kidney ultrasound, as her GFR did not improve as she had hoped it would  GFR 1/21/25: 52  GFR 10/15/24: 53   Pt states that she is avoiding NSAIDS, has not used any since April of 2024  Pt states she is staying well hydrated as advised   Pt denies symptoms at this time    Barriers:  Chronic disease process    Interventions:  Pt will demonstrate continued adequate hydration, avoiding NSAIDS, and will get the kidney scan that was requested    Education: Topics discussed with pt during this outreach: brief medication overview, avoiding NSAIDS      Patient's Concerns and Feedback with Self-Management of Care:  Pt states they will call this RN and/or PCP with any questions, concerns, needs that may arise  Pt states they will attend upcoming appointments    Next Steps:    Follow-Up Plan:  Next outreach in ~4 weeks; March 2025     Appointments:   2/6- PT, 2/6- Cardiology, 2/11- Radiology, 2/20- Behavioral Health    Contact Information:  Call [phone] with any questions or concerns.

## 2025-02-05 NOTE — CARE PLAN
Problem: Risk of Falls  Goal: Reduce the Risk of Falls and Fall Related Injuries- Long term  Outcome: Progressing  Intervention: Assess the patient's history of falls, including frequency, circumstances, and any resulting injuries  Intervention: Evaluate intrinsic risk factors (e.g., age, chronic conditions, medications, balance issues)  Intervention: Review all medications to identify those that may increase fall risk  Intervention: Recommend exercises to improve strength and balance (physical therapy); pt is currently going to PT      Problem: Knowledge of hypertension  Goal: Demonstrate Knowledge of Hypertension- Long term  Outcome: Progressing  Intervention: Educate patient on understanding what the blood pressure reading means  Description: provide handout on understanding blood pressure readings     Problem: Knowledge of factors contributing to hypertension  Goal: Demonstrate factors that can contribute to high blood pressure- Long term  Outcome: Progressing  Note: Patient has verbalized understanding of factors contributing to high blood pressure   Intervention: Educate patient on role of physical activity  Description: Refer to community wellness programs

## 2025-02-06 ENCOUNTER — PHYSICAL THERAPY (OUTPATIENT)
Dept: PHYSICAL THERAPY | Facility: MEDICAL CENTER | Age: 70
End: 2025-02-06
Attending: FAMILY MEDICINE
Payer: MEDICARE

## 2025-02-06 ENCOUNTER — OFFICE VISIT (OUTPATIENT)
Dept: CARDIOLOGY | Facility: MEDICAL CENTER | Age: 70
End: 2025-02-06
Attending: INTERNAL MEDICINE
Payer: MEDICARE

## 2025-02-06 VITALS
OXYGEN SATURATION: 98 % | DIASTOLIC BLOOD PRESSURE: 70 MMHG | HEIGHT: 65 IN | BODY MASS INDEX: 30.66 KG/M2 | RESPIRATION RATE: 16 BRPM | WEIGHT: 184 LBS | SYSTOLIC BLOOD PRESSURE: 102 MMHG | HEART RATE: 71 BPM

## 2025-02-06 DIAGNOSIS — R55 SYNCOPE AND COLLAPSE: ICD-10-CM

## 2025-02-06 DIAGNOSIS — I95.1 ORTHOSTASIS: Primary | ICD-10-CM

## 2025-02-06 DIAGNOSIS — I10 PRIMARY HYPERTENSION: ICD-10-CM

## 2025-02-06 DIAGNOSIS — I49.3 PVC (PREMATURE VENTRICULAR CONTRACTION): ICD-10-CM

## 2025-02-06 DIAGNOSIS — M12.812 ROTATOR CUFF ARTHROPATHY OF LEFT SHOULDER: ICD-10-CM

## 2025-02-06 DIAGNOSIS — Z98.890 S/P PERICARDIAL WINDOW CREATION: ICD-10-CM

## 2025-02-06 DIAGNOSIS — N18.31 STAGE 3A CHRONIC KIDNEY DISEASE: ICD-10-CM

## 2025-02-06 DIAGNOSIS — E78.00 HYPERCHOLESTEROLEMIA: ICD-10-CM

## 2025-02-06 PROCEDURE — 97014 ELECTRIC STIMULATION THERAPY: CPT

## 2025-02-06 PROCEDURE — 97140 MANUAL THERAPY 1/> REGIONS: CPT

## 2025-02-06 PROCEDURE — 99214 OFFICE O/P EST MOD 30 MIN: CPT | Performed by: INTERNAL MEDICINE

## 2025-02-06 PROCEDURE — 3074F SYST BP LT 130 MM HG: CPT | Performed by: INTERNAL MEDICINE

## 2025-02-06 PROCEDURE — 3078F DIAST BP <80 MM HG: CPT | Performed by: INTERNAL MEDICINE

## 2025-02-06 PROCEDURE — 97110 THERAPEUTIC EXERCISES: CPT

## 2025-02-06 PROCEDURE — 99212 OFFICE O/P EST SF 10 MIN: CPT | Performed by: INTERNAL MEDICINE

## 2025-02-06 ASSESSMENT — FIBROSIS 4 INDEX: FIB4 SCORE: 1.23

## 2025-02-06 NOTE — PROGRESS NOTES
CARDIOLOGY established PATIENT:    PCP: Bassam Marinelli M.D.    1. Orthostasis    2. S/P pericardial window creation    3. Primary hypertension    4. PVC (premature ventricular contraction)    5. Syncope and collapse    6. Hypercholesterolemia    7. Stage 3a chronic kidney disease        Tita Pitts is here for follow-up regarding orthostasis concerns      Chief Complaint   Patient presents with    Hypertension    Premature Ventricular Contractions (PVCs)    Other     Pericardial effusion       History:     Tita Pitts is a 69 y.o. female with history of hypertension, PVCs, 0 calcium score  11/2022,  recurrent pericardial effusion post successful pericardial window 1/2024 Dr. Ganser - Her effusion at this point has been attributed to long COVID due to extensive reassuring negative workup for autoimmune disease, thyroid disease or malignancy -.      Phone call 3/14/24: Had issues of hypotension while she was sick, UTI.     Treadmill EKG reassuring , referred to [facility] 5/2024    Clinic 7/2024: palpitations, tried atenolol, did not tolerate. Decreased leeanna to 5mg.    In Dec 2024 was at the gym and had a syncopal episode after  gluteal extension and squat exercise. Saw PCP afterwards, TTE ordered.    Ongoing exertional presyncope or LH, sometimes checks BP with episodes SBP 90's. Mainly positional. Stays very well hydrated otherwise.    Occasional chest aches, better overall compared to prior.    Occasional palpitations    Not much CARDENAS concerns.    Left arm and leg weaker per PT: brain MRI coming up.    COVID illness 12/2024-1/2025, recovered.    BP home machine: 138/90 mmHg  BP clinic: 130/80 mmHg    Tries to exercise 5-7 days a week usually    Normal thyroid studies 1/2025    TTE 6/2024: Personally viewed  No effusion, normal LVEF     Treadmill EKG 5/30/24: Personally reviewed  Average exercise capacity for age and gender achieving target heart rate    and 7.1 METS on the treadmill Oli protocol for 5  "min (Good is >=8METS)   Normal blood pressure response to stress: peak /84mmHg   Adequate workload > 29,000 bpm.mmHg   Negative stress EKG for ischemia   Occasional PVCs and rare PACs noted, no VT or NSVT detected   Shortness of breath during the study was reported     TTE 3/15/24: Personally reviewed  /90's at time of study  Frequent PVCs noted during the study  No pericardial effusion  Normal biventricular systolic functions  LVEF 65-70%  Normal IVC size  Mild TR  Estimated RVSP 30-35mmHg     Monitor 1/2023: personally reviewed  Rare PACs   Occasional PVCs 6%  Patient triggered event correlated with sinus rhythm, PAC and PVC   1 episode of asymptomatic 5 beats run of NSVT was noted      cMRI 4/2023:  1.  Small pericardial effusion. Normal pericardial thickness. No MR evidence of constrictive pericarditis.  2.  Normal left ventricular size and function, with estimated LVEF of 60.6%. No delayed enhancement.  3.  Trivial aortic regurgitation.     CAC score 11/2022:  LMA - 0.0  LCX - 0.0  LAD - 0.0  RCA - 0.0  PDA - 0.0  Total Calcium Score: 0.0  Percentile: Calcium score is below the 25th percentile for the patient's age and sex.  Pericardial effusion noted  PE:  /70 (BP Location: Left arm, Patient Position: Sitting, BP Cuff Size: Adult)   Pulse 71   Resp 16   Ht 1.651 m (5' 5\")   Wt 83.5 kg (184 lb)   LMP  (LMP Unknown)   SpO2 98%   BMI 30.62 kg/m²     Gen: well  HEENT: Symmetric face.  NECK: No JVD.   CARDIAC: Regular, Normal S1, S2, No murmur  VASCULATURE: carotids are normal bilaterally without bruit  RESP: Clear to auscultation bilaterally   EXT: No edema  SKIN: Warm and dry  NEURO: No gross deficits  PSYCH: Appropriate affect, participates in conversation    The 10-year ASCVD risk score (Yo DK, et al., 2019) is: 5.6%    Past Medical History:   Diagnosis Date    Allergy     Anesthesia     ponv    Breath shortness 2022    Related to current pericarditis    Bronchitis     history of    " Cataract     NOT surgically removed    Gynecological disorder 1986    Had Hysterectomy 1990    Hypertension     well controlled on medication    Pericarditis 04/07/2023    Pneumonia     history of, in 20's    PONV (postoperative nausea and vomiting)     Psychiatric problem 12/27/2022    grief - in counseling     Past Surgical History:   Procedure Laterality Date    WA THORACOSCOPY,DX NO BX Left 1/8/2024    Procedure: LEFT THORACOSCOPY, PERICARDIAL WINDOW;  Surgeon: John H Ganser, M.D.;  Location: SURGERY [facility];  Service: General    PERICARDIAL WINDOW Left 1/8/2024    Procedure: CREATION, PERICARDIAL WINDOW;  Surgeon: John H Ganser, M.D.;  Location: SURGERY [facility];  Service: General    APPENDECTOMY  1986    ABDOMINAL HYSTERECTOMY TOTAL      GYN SURGERY      laparoscopy for endometriosis    OTHER CARDIAC SURGERY       Allergies   Allergen Reactions    Meclizine Hives    Other Drug Unspecified     Ruvert = Other reaction(s): Welts on legs    Atenolol      Very tired and slow, 25mg dose    Macrodantin [Nitrofurantoin]      nightmares     Outpatient Encounter Medications as of 2/6/2025   Medication Sig Dispense Refill    hydrOXYzine HCl (ATARAX) 25 MG Tab Take 1 Tablet by mouth 3 times a day as needed for Anxiety. 30 Tablet 0    B Complex Vitamins (VITAMIN B COMPLEX PO) Take  by mouth.      lisinopril (PRINIVIL) 5 MG Tab Take 1 Tablet by mouth every day. 100 Tablet 1    multivitamin Tab Take 1 Tablet by mouth every day.      Calcium Carb-Cholecalciferol (CALCIUM 500/VITAMIN D) 500-3.125 MG-MCG Tab Take  by mouth.      Magnesium 400 MG Cap Take 400 mg by mouth every day.      Ascorbic Acid (VITAMIN C PO) Take 1 Tablet by mouth every day.       No facility-administered encounter medications on file as of 2/6/2025.     Social History     Socioeconomic History    Marital status:      Spouse name: Not on file    Number of children: Not on file    Years of education: Not on file    Highest education level:  Bachelor's degree (e.g., BA, AB, BS)   Occupational History    Not on file   Tobacco Use    Smoking status: Never     Passive exposure: Past    Smokeless tobacco: Never   Vaping Use    Vaping status: Never Used   Substance and Sexual Activity    Alcohol use: Not Currently     Alcohol/week: 0.6 oz     Types: 1 Glasses of wine per week     Comment: Some alcohol socially over the years.  Not regular use    Drug use: Never    Sexual activity: Not Currently     Partners: Male     Birth control/protection: Surgical, Abstinence, None, Post-Menopausal     Comment: Had Endometriosis had hysterectomy.   Other Topics Concern    Not on file   Social History Narrative    Not on file     Social Drivers of Health     Financial Resource Strain: Low Risk  (12/12/2024)    Overall Financial Resource Strain (CARDIA)     Difficulty of Paying Living Expenses: Not very hard   Food Insecurity: No Food Insecurity (12/12/2024)    Hunger Vital Sign     Worried About Running Out of Food in the Last Year: Never true     Ran Out of Food in the Last Year: Never true   Transportation Needs: No Transportation Needs (12/12/2024)    PRAPARE - Transportation     Lack of Transportation (Medical): No     Lack of Transportation (Non-Medical): No   Physical Activity: Sufficiently Active (12/12/2024)    Exercise Vital Sign     Days of Exercise per Week: 5 days     Minutes of Exercise per Session: 30 min   Stress: Stress Concern Present (12/12/2024)    [employer] - Occupational Stress Questionnaire     Feeling of Stress : Rather much   Social Connections: Moderately Integrated (12/12/2024)    Social Connection and Isolation Panel [NHANES]     Frequency of Communication with Friends and Family: Twice a week     Frequency of Social Gatherings with Friends and Family: Once a week     Attends Gnosticist Services: 1 to 4 times per year     Active Member of Clubs or Organizations: Yes     Attends Club or Organization Meetings: 1 to 4  times per year     Marital Status:    Intimate Partner Violence: Not on file   Housing Stability: Low Risk  (2024)    Housing Stability Vital Sign     Unable to Pay for Housing in the Last Year: No     Number of Times Moved in the Last Year: 0     Homeless in the Last Year: No     Family History   Problem Relation Age of Onset    Breast Cancer Mother     Diabetes Mother         Developed in 70s, at 90 Kidney failure    Hypertension Mother         diagnosed in 70s    Hyperlipidemia Mother         diagnosed in 70s    Kidney Disease Mother     Heart Disease Father          at 64, CHF, CAD    Heart Disease Brother         Heart Attack at 72    Stroke Brother         occurred at 78    Diabetes Maternal Uncle          at 68         Studies    Lab Results   Component Value Date/Time    TSHULTRASEN 1.360 2025 0915        Lab Results   Component Value Date/Time    FREET4 1.00 2025 0915      Lab Results   Component Value Date/Time    HBA1C 5.2 2022 08:19 AM     Lab Results   Component Value Date/Time    CHOLSTRLTOT 203 (H) 2025 09:15 AM     (H) 2025 09:15 AM    HDL 67 2025 09:15 AM    TRIGLYCERIDE 89 2025 09:15 AM       Lab Results   Component Value Date/Time    SODIUM 139 2025 09:15 AM    POTASSIUM 4.4 2025 09:15 AM    CHLORIDE 104 2025 09:15 AM    CO2 25 2025 09:15 AM    GLUCOSE 87 2025 09:15 AM    BUN 18 2025 09:15 AM    CREATININE 1.15 2025 09:15 AM     Lab Results   Component Value Date/Time    ALKPHOSPHAT 90 2025 09:15 AM    ASTSGOT 22 2025 09:15 AM    ALTSGPT 11 2025 09:15 AM    TBILIRUBIN 0.5 2025 09:15 AM        Echocardiogram:  No results found for this or any previous visit.      Assessment and Recommendations:    Problem List Items Addressed This Visit          Cardiology Medicine Problems    Primary hypertension    Hypercholesterolemia       Other    Stage 3a  chronic kidney disease    Relevant Orders    SPEP W/REFLEX TO SALOMON, A, G, M    Monoclonal Protein and FLC, Serum    Monoclonal Protein, Ur (24 hr or Random)    PROTEIN ELECTROPHORESIS, UR RANDOM    PVC (premature ventricular contraction)    Orthostasis - Primary    Relevant Orders    EC-ECHOCARDIOGRAM COMPLETE W/O CONT    SPEP W/REFLEX TO SALOMON, A, G, M    Monoclonal Protein and FLC, Serum    Monoclonal Protein, Ur (24 hr or Random)    PROTEIN ELECTROPHORESIS, UR RANDOM    S/P pericardial window creation    Relevant Orders    EC-ECHOCARDIOGRAM COMPLETE W/O CONT    Syncope and collapse    Relevant Orders    EC-ECHOCARDIOGRAM COMPLETE W/O CONT     Given her ongoing orthostatic concerns with 1 episode of syncope 12/2024, and otherwise not very well-controlled hypertension on lisinopril 5 mg (-140's at home), before further adjusting her regimen, we will arrange for a sooner echocardiogram to exclude any recurrent pericardial effusion: if none, will arrange for tilt table test.      Also ordered urine and serum protein electrophoresis with monoclonal protein testing especially setting of CKD stage IIIa to assess for any underlying monoclonal gammopathy or amyloid which can contribute to orthostasis and CKD.  Accordingly will be referred to hematology and nephrology based on the results    Thank you for the opportunity to be involved in Tita Pitts 's  cardiovascular care; and please reach out with any questions or concerns.    Return in about 6 months (around 8/6/2025).    Chase Pena MD, MPH [facility]  Interventional Cardiologist  [facility]   of Clinical Internal Medicine - [facility]    ~ Portions of this note were completed using voice recognition software (Dragon Naturally speaking software) . Occasional transcription errors may have escaped proof reading. I have made every reasonable attempt to correct obvious errors, but I  expect that there are errors of grammar and possibly content that I did not discover before finalizing the note. ~

## 2025-02-06 NOTE — OP THERAPY PROGRESS SUMMARY
"  Outpatient Physical Therapy  PROGRESS SUMMARY NOTE      [facility] Outpatient Physical Therapy  [address]  [place] [address]  Phone:  [phone]  Fax:  [phone]    Date of Visit: 02/06/2025    Patient: Tita Pitts  YOB: 1955  MRN: [id number]     Referring Provider: Bassam Marinelli M.D.  [address]  [address]  [place] [address]   Referring Diagnosis Other specific arthropathies, not elsewhere classified, left shoulder [M12.812]     Visit Diagnoses     ICD-10-CM   1. Rotator cuff arthropathy of left shoulder  M12.812       Rehab Potential: fair/poor    Progress Report Period: 12/23/24-2/6/25    Functional Assessment Used          Objective Findings and Assessment:   Patient progression towards goals: Pt has been seen for 9 visits and stating s/s are about the \"same;\" with about 20% improvement. Feels things were improving prior cleanup after recent wind/rain supa. Feels some improvement with the hep and notices some improvements in ADL's; not noticing twinges. Throbbing in the shoulder is still intermittent. Neck is still stiff, especially with side bending to the L. Discussion with pt to complete a few additional sessions in PT as she has noticed subtle and slow improvements thus far. Pt has a brain and shoulder MRI scheduled in March 2025. EMG WFL. Will continue with skilled PT for a few additional sessions or if findings on MRI warrant other treatments.    Objective findings and assessment details: Tight nodule on L hypothenar eminence; pain-free, slight swelling noted at metatarsals 3rd and 4th    Active Range of Motion -post treatement:      Cervical Spine -AROM:   Left rotation: Active left cervical rotation: 51 deg. \"Tight\"   Right rotation: Active right cervical rotation: 60 deg; better.    Active Range of Motion -shoulder L   Shoulder AROM-sitting post txt:  Flexion: 124 deg     Goals:   Short Term Goals:   1. Pt will be " independent with written HEP. (Met)  2. Pt will participate in formal  strength assessment and myotomes if tolerated. (Met)     Short term goal time span:  2-4 weeks      Long Term Goals:    1. Pt will be independent with written HEP. (ongoing)  2. Pt will have a sig improvement in Quickdash score (eval: 36.36) (not met; ongoing)  3. Pt will be able to return to recreational activities and gym activities with min to no modifications at least 2-3x/week. (ongoing)  4. Pt will be able to complete normal household chores without increase in s/s at least 75% of the time or greater. (not met; ongoing)  5. Pt will have  a significant improvement in NDI (ongoing)    Long term goal time span:  6-8 weeks    Plan:   Planned therapy interventions:  Neuromuscular Re-education (CPT 01602), Therapeutic Exercise (CPT 22697), Therapeutic Activities (CPT 60753), Manual Therapy (CPT 35997) and E Stim Unattended (CPT 85059)  Frequency:  1x week  Duration in weeks:  4  Plan details:  UPOC: 3/7/25      Referring provider co-signature:  I have reviewed this plan of care and my co-signature certifies the need for services.     Certification Period: 02/06/2025 to 3/7/25    Physician Signature: ________________________________ Date: ______________

## 2025-02-06 NOTE — PATIENT INSTRUCTIONS
Echocardiogram sooner  Then tilt table test according to results  Additional blood and urine tests

## 2025-02-06 NOTE — OP THERAPY DAILY TREATMENT
"  Outpatient Physical Therapy  DAILY TREATMENT     [facility] Outpatient Physical Therapy  [address]  [place] [address]  Phone:  [phone]  Fax:  [phone]    Date: 02/06/2025    Patient: Tita Pitts  YOB: 1955  MRN: [id number]     Time Calculation    Start time: 0815  Stop time: 0914 Time Calculation (min): 59 minutes         Chief Complaint: Shoulder Problem    Visit #: 9    SUBJECTIVE:  Stating she needs to move her appts. Stating she had a few weeks that were \"pretty good.\" She then had a flare up in the shoulder. Pt stating she had a break from gym and PT due to COVID infection; was sick for 2 weeks.     OBJECTIVE:  Current objective measures:   See PN             Therapeutic Exercises (CPT 51942):     1. UBE, x5 min, alt GH CW and CCW, cardio warm up    2. review of hep    3. self first rib mobs, 2x10, hep; discomfort at L shoulder to maintain ; VC's to hold with RUE as well for OP    4. cervical retractions-> add extension, x5 ea, hypomobile; hep    20. UPOC: 1/23/25      Therapeutic Exercise Summary: Access Code: [email or web address]  URL: [email or web address]  Date: 11/07/2024  Prepared by: Chris Bauman    Exercises  - Correct Seated Posture  - 7 x weekly  - Standing Median Nerve Glide  - 2 x daily - 7 x weekly - 1 sets - 10 reps    Pt performed these exercises with instruction and SPV.  Provided handout with these exercises for daily HEP.        Therapeutic Treatments and Modalities:     2. E Stim Unattended (CPT 72780), IFC and mhp to L shoulder x15 min, NT    3. Manual Therapy (CPT 08438), see below    Therapeutic Treatment and Modalities Summary: Manual:  CPA and rotational mobs to C7-T10 gr III in prone lying.        Time-based treatments/modalities:    Physical Therapy Timed Treatment Charges  Manual therapy minutes (CPT 87858): 11 minutes  Therapeutic exercise minutes (CPT 31919): 33 minutes    Pain rating (1-10) before " "treatment: 3/10 anterior L shoulder \"aching\" and \"uncomfortable\" at rest and with movement  Pain rating (1-10) after treatment: 4/10 anterior L shoulder  Pain Comments: Aggravating: opening a jar, heavy household chores, carrying grocery bag, chest press and resisted rotational exercises       ASSESSMENT:   Response to treatment:   See PN       PLAN/RECOMMENDATIONS:   Plan for treatment: therapy treatment to continue next visit.  Planned interventions for next visit: continue with current treatment.         "

## 2025-02-11 ENCOUNTER — HOSPITAL ENCOUNTER (OUTPATIENT)
Dept: RADIOLOGY | Facility: MEDICAL CENTER | Age: 70
End: 2025-02-11
Attending: FAMILY MEDICINE
Payer: MEDICARE

## 2025-02-11 DIAGNOSIS — R53.1 LEFT-SIDED WEAKNESS: ICD-10-CM

## 2025-02-11 DIAGNOSIS — R25.1 TREMORS OF NERVOUS SYSTEM: ICD-10-CM

## 2025-02-11 PROCEDURE — 700117 HCHG RX CONTRAST REV CODE 255: Mod: JZ | Performed by: FAMILY MEDICINE

## 2025-02-11 PROCEDURE — A9579 GAD-BASE MR CONTRAST NOS,1ML: HCPCS | Mod: JZ | Performed by: FAMILY MEDICINE

## 2025-02-11 PROCEDURE — 70553 MRI BRAIN STEM W/O & W/DYE: CPT

## 2025-02-11 RX ADMIN — GADOTERIDOL 17 ML: 279.3 INJECTION, SOLUTION INTRAVENOUS at 15:23

## 2025-02-13 ENCOUNTER — RESULTS FOLLOW-UP (OUTPATIENT)
Dept: MEDICAL GROUP | Facility: MEDICAL CENTER | Age: 70
End: 2025-02-13
Payer: MEDICARE

## 2025-02-13 ENCOUNTER — PHYSICAL THERAPY (OUTPATIENT)
Dept: PHYSICAL THERAPY | Facility: MEDICAL CENTER | Age: 70
End: 2025-02-13
Attending: FAMILY MEDICINE
Payer: MEDICARE

## 2025-02-13 ENCOUNTER — OFFICE VISIT (OUTPATIENT)
Dept: MEDICAL GROUP | Facility: MEDICAL CENTER | Age: 70
End: 2025-02-13
Payer: MEDICARE

## 2025-02-13 VITALS
HEART RATE: 66 BPM | SYSTOLIC BLOOD PRESSURE: 126 MMHG | DIASTOLIC BLOOD PRESSURE: 78 MMHG | WEIGHT: 186.07 LBS | OXYGEN SATURATION: 98 % | BODY MASS INDEX: 31 KG/M2 | HEIGHT: 65 IN | TEMPERATURE: 97.4 F

## 2025-02-13 DIAGNOSIS — R90.89 ABNORMAL FINDING ON MRI OF BRAIN: ICD-10-CM

## 2025-02-13 DIAGNOSIS — M12.812 ROTATOR CUFF ARTHROPATHY OF LEFT SHOULDER: ICD-10-CM

## 2025-02-13 DIAGNOSIS — R25.1 TREMORS OF NERVOUS SYSTEM: ICD-10-CM

## 2025-02-13 PROCEDURE — 3078F DIAST BP <80 MM HG: CPT | Performed by: FAMILY MEDICINE

## 2025-02-13 PROCEDURE — 97140 MANUAL THERAPY 1/> REGIONS: CPT

## 2025-02-13 PROCEDURE — 97110 THERAPEUTIC EXERCISES: CPT

## 2025-02-13 PROCEDURE — 97014 ELECTRIC STIMULATION THERAPY: CPT

## 2025-02-13 PROCEDURE — 99214 OFFICE O/P EST MOD 30 MIN: CPT | Performed by: FAMILY MEDICINE

## 2025-02-13 PROCEDURE — 3074F SYST BP LT 130 MM HG: CPT | Performed by: FAMILY MEDICINE

## 2025-02-13 ASSESSMENT — FIBROSIS 4 INDEX: FIB4 SCORE: 1.23

## 2025-02-13 ASSESSMENT — ENCOUNTER SYMPTOMS
TREMORS: 1
FEVER: 0
CHILLS: 0

## 2025-02-13 NOTE — LETTER
February 13, 2025       Patient: Tita Pitts   YOB: 1955   Date of Visit: 2/13/2025         To Whom It May Concern:    Tita Pitts is under my medical care. Patient has multiple medical conditions that she is getting evaluated for. I request to excuse her from jury duty currently due to her multiple medical conditions.    If you have any questions or concerns, please don't hesitate to call [phone]          Sincerely,          Bassam Marinelli M.D.  Electronically Signed

## 2025-02-13 NOTE — Clinical Note
[employer]  Bassam Marinelli M.D.  [address]  [place] [address]  Fax: [phone]   Authorization for Release/Disclosure of   Protected Health Information   Name: TITA URENA : 1955 SSN: [id number]   Address: [address]  [place] [address] Phone:    [phone] (home)    I authorize the entity listed below to release/disclose the PHI below to:   [employer] [facility]/Bassam Marinelli M.D. and Bassam Marinelli M.D.   Provider or Entity Name:     Address   City, State, Zip   Phone:      Fax:     Reason for request: continuity of care   Information to be released:    [  ] LAST COLONOSCOPY,  including any PATH REPORT and follow-up  [  ] LAST FIT/COLOGUARD RESULT [  ] LAST DEXA  [  ] LAST MAMMOGRAM  [  ] LAST PAP  [  ] LAST LABS [  ] RETINA EXAM REPORT  [  ] IMMUNIZATION RECORDS  [  ] Release all info      [  ] Check here and initial the line next to each item to release ALL health information INCLUDING  _____ Care and treatment for drug and / or alcohol abuse  _____ HIV testing, infection status, or AIDS  _____ Genetic Testing    DATES OF SERVICE OR TIME PERIOD TO BE DISCLOSED: _____________  I understand and acknowledge that:  * This Authorization may be revoked at any time by you in writing, except if your health information has already been used or disclosed.  * Your health information that will be used or disclosed as a result of you signing this authorization could be re-disclosed by the recipient. If this occurs, your re-disclosed health information may no longer be protected by State or Federal laws.  * You may refuse to sign this Authorization. Your refusal will not affect your ability to obtain treatment.  * This Authorization becomes effective upon signing and will  on (date) __________.      If no date is indicated, this Authorization will  one (1) year from the signature date.    Name: Tita Urena  Signature: Date:   2025     PLEASE FAX  REQUESTED RECORDS BACK TO: [phone]

## 2025-02-13 NOTE — OP THERAPY PROGRESS SUMMARY
"  Outpatient Physical Therapy  PROGRESS SUMMARY NOTE      [facility] Outpatient Physical Therapy  [address]  [place] [address]  Phone:  [phone]  Fax:  [phone]    Date of Visit: 02/13/2025    Patient: Tita Pitts  YOB: 1955  MRN: [id number]     Referring Provider: Bassam Marinelli M.D.  [address]  [address]  [place] [address]   Referring Diagnosis Other specific arthropathies, not elsewhere classified, left shoulder [M12.812]     Visit Diagnoses     ICD-10-CM   1. Rotator cuff arthropathy of left shoulder  M12.812       Rehab Potential: fair    Progress Report Period: 2/6/25-2/13/25    Functional Assessment Used          Objective Findings and Assessment:   Patient progression towards goals: Pt has been seen for 10 visits without significant changes per report. While she romano noticed some improvements in ADL's, the improvements are minimal at best with overall slow progression. Pt recently completed brain MRI and will complete shoulder MRI and echo in upcoming weeks. After discussion with pt, will hold PT. This is secondary to plateau found in PT overall and in order for pt to complete additional testing. Will follow up with pt after completing additional testing to determine further PT needs.     Objective findings and assessment details: Cervical Spine -AROM:   Left rotation: Active left cervical rotation: 51 deg. \"Tight\"   Right rotation: Active right cervical rotation: 60 deg; better.    Active Range of Motion -shoulder L   Shoulder AROM-sitting post txt:  Flexion: 124 deg       Goals:   Short Term Goals:   1. Pt will be independent with written HEP. (Met)  2. Pt will participate in formal  strength assessment and myotomes if tolerated. (Met)     Short term goal time span:  2-4 weeks      Long Term Goals:    1. Pt will be independent with written HEP. (ongoing)  2. Pt will have a sig improvement in Quickdash score (eval: " 36.36) (not met; ongoing)  3. Pt will be able to return to recreational activities and gym activities with min to no modifications at least 2-3x/week. (ongoing)  4. Pt will be able to complete normal household chores without increase in s/s at least 75% of the time or greater. (not met; ongoing)  5. Pt will have  a significant improvement in NDI (ongoing)    Long term goal time span:  6-8 weeks    Plan:   Planned therapy interventions:  Neuromuscular Re-education (CPT 99945), Therapeutic Exercise (CPT 32633), Therapeutic Activities (CPT 18523), Manual Therapy (CPT 04367) and E Stim Unattended (CPT 28117)  Frequency: 1-2x/month.  Duration in weeks:  8  Plan details:  UPOC: 4/11/25      Referring provider co-signature:  I have reviewed this plan of care and my co-signature certifies the need for services.     Certification Period: 02/13/2025 to 4/11/25    Physician Signature: ________________________________ Date: ______________

## 2025-02-13 NOTE — OP THERAPY DAILY TREATMENT
"  Outpatient Physical Therapy  DAILY TREATMENT     [facility] Outpatient Physical Therapy  [address]  [place] [address]  Phone:  [phone]  Fax:  [phone]    Date: 02/13/2025    Patient: Tita Pitts  YOB: 1955  MRN: [id number]     Time Calculation    Start time: 0730  Stop time: 0826 Time Calculation (min): 56 minutes         Chief Complaint: Shoulder Problem    Visit #: 10    SUBJECTIVE:      Stating she needs to move her appts. Stating she had a few weeks that were \"pretty good.\" She then had a flare up in the shoulder. Pt stating she had a break from gym and PT due to COVID infection; was sick for 2 weeks.       OBJECTIVE:  Current objective measures:         Therapeutic Exercises (CPT 33950):     1. UBE, x5 min, alt GH CW and CCW, cardio warm up    2. review of hep    3. self first rib mobs, 2x10, hep; discomfort at L shoulder to maintain ; VC's to hold with RUE as well for OP    4. cervical retractions-> add extension, x5 ea, hypomobile; hep    5. shoulder slides    6. L stretch    7. shoulder extensions with dowel    20. UPOC: 1/23/25      Therapeutic Exercise Summary: Access Code: [email or web address]  URL: [email or web address]  Date: 11/07/2024  Prepared by: Chris Bauman    Exercises  - Correct Seated Posture  - 7 x weekly  - Standing Median Nerve Glide  - 2 x daily - 7 x weekly - 1 sets - 10 reps    Pt performed these exercises with instruction and SPV.  Provided handout with these exercises for daily HEP.        Therapeutic Treatments and Modalities:     2. E Stim Unattended (CPT 27421), IFC and mhp to L shoulder x15 min, NT    3. Manual Therapy (CPT 06990), see below    Therapeutic Treatment and Modalities Summary: Manual:  CPA and rotational mobs to C7-T10 gr III in prone lying.   C/s paraspinals       Time-based treatments/modalities:    Physical Therapy Timed Treatment Charges  Manual therapy minutes (CPT 53471): 8 " "minutes  Therapeutic exercise minutes (CPT 16887): 33 minutes    Pain rating (1-10) before treatment: 3/10 anterior L shoulder \"aching\" and \"uncomfortable\" at rest and with movement  Pain rating (1-10) after treatment: 4/10 anterior L shoulder  Pain Comments: Aggravating: opening a jar, heavy household chores, carrying grocery bag, chest press and resisted rotational exercises         ASSESSMENT:   Response to treatment:           PLAN/RECOMMENDATIONS:   Plan for treatment: therapy treatment to continue next visit.  Planned interventions for next visit: continue with current treatment.         "

## 2025-02-14 NOTE — PROGRESS NOTES
Verbal consent was acquired by the patient to use [employer] ambient listening note generation during this visit.      Tita was seen today for follow-up.    Diagnoses and all orders for this visit:    Tremors of nervous system    Abnormal finding on MRI of brain                  Assessment & Plan  1.  Abnormal brain MRI results  New condition, unstable  - MRI of the brain did not reveal acute abnormalities (stroke, infarct, hemorrhage)  - Cranial nerves and muscles appeared normal  - 5 mm mass identified along the ventral surface of the inferior tectal plate, protruding into the aqueduct, causing mild prominence of the duct  - No evidence of hydrocephalus  - Differential diagnosis: subependymoma, ependymoma, glioma  - Findings are incidental   -Her left-sided body weakness is likely due to arthritic changes in the neck and back causing nerve compression  - Referral to neurology for further evaluation, repeat imaging, and potential neurosurgical consultation    2. Tremors in the left hand  Chronic condition, unstable  - May be related to weakness caused by nerve compression and muscle atrophy  - Neurology to further evaluate and determine need for additional investigations    3. Multinodular goiter  - Biopsy in January 2025 came back normal    4. Jury duty exemption  - Letter provided to excuse from jury duty due to ongoing medical evaluations and conditions          Chief complaint::Diagnoses of Tremors of nervous system and Abnormal finding on MRI of brain were pertinent to this visit.      History of Present Illness  The patient is a 69-year-old female who presents for MRI brain results.    Lightheadedness, Fainting, and Dizziness  - She reports experiencing episodes of lightheadedness, fainting, and dizziness.    Tremors and Generalized Weakness  - She has been experiencing tremors in her left hand and generalized weakness on her left side.  - These symptoms have been corroborated by her physical therapist,  "who noted muscle atrophy in her hands.    Supplemental Information  - She has not undergone any previous brain scans but has had a CT scan of her heart.  - She has requested a letter to be excused from jury duty due to her ongoing medical evaluations.  - She has a multinodular goiter and had a biopsy in January 2025, which came back normal.    MEDICATIONS  - Atarax      Review of Systems   Constitutional:  Negative for chills and fever.   Neurological:  Positive for tremors.        Left-sided weakness of whole body          Medications and Allergies:     Current Outpatient Medications   Medication Sig Dispense Refill    hydrOXYzine HCl (ATARAX) 25 MG Tab Take 1 Tablet by mouth 3 times a day as needed for Anxiety. 30 Tablet 0    B Complex Vitamins (VITAMIN B COMPLEX PO) Take  by mouth.      lisinopril (PRINIVIL) 5 MG Tab Take 1 Tablet by mouth every day. 100 Tablet 1    multivitamin Tab Take 1 Tablet by mouth every day.      Calcium Carb-Cholecalciferol (CALCIUM 500/VITAMIN D) 500-3.125 MG-MCG Tab Take  by mouth.      Magnesium 400 MG Cap Take 400 mg by mouth every day.      Ascorbic Acid (VITAMIN C PO) Take 1 Tablet by mouth every day.       No current facility-administered medications for this visit.       /78 (BP Location: Left arm, Patient Position: Sitting, BP Cuff Size: Adult)   Pulse 66   Temp 36.3 °C (97.4 °F) (Temporal)   Ht 1.651 m (5' 5\")   Wt 84.4 kg (186 lb 1.1 oz)   SpO2 98% , Body mass index is 30.96 kg/m².      Physical Exam  Constitutional:       Appearance: Normal appearance. She is well-developed and well-groomed.   HENT:      Head: Normocephalic and atraumatic.      Right Ear: External ear normal.      Left Ear: External ear normal.   Eyes:      General:         Right eye: No discharge.         Left eye: No discharge.      Conjunctiva/sclera: Conjunctivae normal.   Cardiovascular:      Rate and Rhythm: Normal rate.   Pulmonary:      Effort: Pulmonary effort is normal. No respiratory " distress.   Musculoskeletal:      Cervical back: Neck supple.   Skin:     Findings: No rash.   Neurological:      Mental Status: She is alert.   Psychiatric:         Mood and Affect: Mood and affect normal.         Behavior: Behavior normal.                MR-BRAIN-WITH & W/O  Narrative: 2/11/2025 2:05 PM    HISTORY/REASON FOR EXAM:  Tremors.    TECHNIQUE/EXAM DESCRIPTION: MR brain with and without contrast.    Multiplanar multisequence MR examination of the brain with and without contrast done on 3 MRI scanner.    17 mL ProHance contrast was administered intravenously.    COMPARISON:  None.    FINDINGS:  There is an approximately 5 mm sized enhancing lesion noted along the ventral surface of the inferior tectal plate protruding into the aqueduct. The proximal aqueduct is mildly prominent. However there is no hydrocephalus.    There is no acute infarct. There is no acute or chronic parenchymal hemorrhage. There are few nonspecific T2 hyperintensities in the supratentorial white matter. There is no hydrocephalus. There is no extra-axial fluid collection, hemorrhage or mass. The   visualized portions of the cranial nerves are unremarkable. The visualized vascular flow voids are unremarkable. The skull bones are unremarkable. The paranasal sinuses are clear. The visualized muscles, adipose tissue and the glands are unremarkable.  Impression: 1.  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 The proximal aqueduct is mildly prominent. However there is no hydrocephalus. The differential diagnosis   includes incidental subependymoma, ependymoma and glioma. Follow-up is recommended to ensure the stability.  2.  No acute abnormality.         Please note that this dictation was created using voice recognition software. I have made every reasonable attempt to correct obvious errors, but I expect that there are errors of grammar  and possibly content that I did not discover before finalizing the note.

## 2025-02-17 NOTE — Clinical Note
REFERRAL APPROVAL NOTICE         Sent on February 17, 2025                   Tita Pitts  [address] [place] [address]                   Dear Ms. Pitts,    After a careful review of the medical information and benefit coverage, [facility] has processed your referral. See below for additional details.    If applicable, you must be actively enrolled with your insurance for coverage of the authorized service. If you have any questions regarding your coverage, please contact your insurance directly.    REFERRAL INFORMATION   Referral #:  [id number]  Referred-To Department    Referred-By Provider:  Neurology    Bassam Marinelli M.D.   [place] [facility]      [address]  [address]  [place] [id number] [phone] [address], [address]  [place] [address] [phone]    Referral Start Date:  02/13/2025  Referral End Date:   02/13/2026           SCHEDULING  If you do not already have an appointment, please call [phone] to make an appointment.   MORE INFORMATION  As a reminder, [facility] ownership has changed, meaning this location is now owned and operated by [facility]. As such, we want to clarify that our patients should expect to receive two separate bills for the services received at [facility] - one representing the [facility] facility fees as the owner of the establishment, and the other to represent the physician's services and subsequent fees. You can speak with your insurance carrier for a pricing estimate by calling the customer service number on the back of your card and ask about charges for a hospital outpatient visit.  If you do not already have a [employer] account, sign up at: [email or web address]  You can access your medical information, make appointments, see lab results, billing information, and more.  If you have questions regarding this referral, please contact  the [facility] Referrals department at:             [phone]. Monday -  Friday 7:30AM - 5:00PM.      Sincerely,  [facility]

## 2025-02-20 ENCOUNTER — APPOINTMENT (OUTPATIENT)
Dept: BEHAVIORAL HEALTH | Facility: CLINIC | Age: 70
End: 2025-02-20
Payer: MEDICARE

## 2025-02-21 ENCOUNTER — HOSPITAL ENCOUNTER (OUTPATIENT)
Dept: RADIOLOGY | Facility: MEDICAL CENTER | Age: 70
End: 2025-02-21
Attending: FAMILY MEDICINE
Payer: MEDICARE

## 2025-02-21 DIAGNOSIS — R29.898 LEFT ARM WEAKNESS: ICD-10-CM

## 2025-02-21 DIAGNOSIS — M75.102 ROTATOR CUFF SYNDROME OF LEFT SHOULDER: ICD-10-CM

## 2025-02-21 PROCEDURE — 73221 MRI JOINT UPR EXTREM W/O DYE: CPT | Mod: LT

## 2025-02-26 ENCOUNTER — RESULTS FOLLOW-UP (OUTPATIENT)
Dept: CARDIOLOGY | Facility: MEDICAL CENTER | Age: 70
End: 2025-02-26

## 2025-02-26 ENCOUNTER — HOSPITAL ENCOUNTER (OUTPATIENT)
Dept: CARDIOLOGY | Facility: MEDICAL CENTER | Age: 70
End: 2025-02-26
Attending: INTERNAL MEDICINE
Payer: MEDICARE

## 2025-02-26 DIAGNOSIS — I95.1 ORTHOSTASIS: ICD-10-CM

## 2025-02-26 DIAGNOSIS — R55 SYNCOPE AND COLLAPSE: ICD-10-CM

## 2025-02-26 DIAGNOSIS — Z98.890 S/P PERICARDIAL WINDOW CREATION: ICD-10-CM

## 2025-02-26 LAB
LV EJECT FRACT  99904: 62
LV EJECT FRACT MOD 2C 99903: 67.3
LV EJECT FRACT MOD 4C 99902: 56.64
LV EJECT FRACT MOD BP 99901: 62.13

## 2025-02-26 PROCEDURE — 93306 TTE W/DOPPLER COMPLETE: CPT

## 2025-03-03 ENCOUNTER — APPOINTMENT (OUTPATIENT)
Dept: RADIOLOGY | Facility: MEDICAL CENTER | Age: 70
End: 2025-03-03
Attending: FAMILY MEDICINE
Payer: MEDICARE

## 2025-03-03 ENCOUNTER — TELEPHONE (OUTPATIENT)
Dept: HEALTH INFORMATION MANAGEMENT | Facility: OTHER | Age: 70
End: 2025-03-03
Payer: MEDICARE

## 2025-03-03 ENCOUNTER — PATIENT OUTREACH (OUTPATIENT)
Dept: HEALTH INFORMATION MANAGEMENT | Facility: OTHER | Age: 70
End: 2025-03-03
Payer: MEDICARE

## 2025-03-03 DIAGNOSIS — Z12.31 ENCOUNTER FOR SCREENING MAMMOGRAM FOR BREAST CANCER: ICD-10-CM

## 2025-03-03 DIAGNOSIS — N18.31 STAGE 3A CHRONIC KIDNEY DISEASE: ICD-10-CM

## 2025-03-03 DIAGNOSIS — I10 PRIMARY HYPERTENSION: ICD-10-CM

## 2025-03-03 PROCEDURE — 76775 US EXAM ABDO BACK WALL LIM: CPT

## 2025-03-03 PROCEDURE — 77067 SCR MAMMO BI INCL CAD: CPT

## 2025-03-04 ENCOUNTER — RESULTS FOLLOW-UP (OUTPATIENT)
Dept: MEDICAL GROUP | Facility: MEDICAL CENTER | Age: 70
End: 2025-03-04
Payer: MEDICARE

## 2025-03-04 NOTE — PROGRESS NOTES
"Reason for Follow-Up:   Monthly Personal Care Management Program Outreach     Current Health Status  Qualifying diagnoses for PCM: CKD, HTN    Medical Updates:    Pt states she is doing \"ok\". Pt states that she has completed many diagnostic tests recently to figure out the cause of her elevated blood pressures are fainting episodes.     Pt states that she saw PCP to discuss MRI brain findings. Pt states she has a referral to neurology for follow-up.    Pt states that she feels more dehydrated lately and that she is needing to drink more water than usual, pt states that she occasionally feels a burning sensation when urinating. Pt states she completed her kidney ultrasound today.    Pt reports no falls in the last month.     Pt reports no other recent health concerns.   Medication Updates:   Pt states she is taking all medications as indicated   Pt denies any difficulty obtaining medications at this time    Symptoms:    Pt states she is still recording elevated blood pressures at home, and in general feels more dehydrated than usual.      Plan of Care Goals and Progress    See updated care plan note    Priority 1. HTN    Progress:  Pt states she is working closely with cardiology to determine cause of syncopal episodes and elevated BP  Pt has upcoming table tilt test   Pt states her BP this morning was 142/96- pt states it has been hovering around here for the last few weeks and that she told cardiology/shared her BP logs with cardiology  RN advised pt to call 911 or go to hospital for BP >180/100; pt verbalized understanding    Barriers:  Chronic disease processes     Interventions: Pt will demonstrate medication adherence, attend follow-up appointments with cardiology, alert care team to any changes    Education:  Topics discussed with pt during this outreach: brief medication overview, role of stress on BP    Priority 2. CKD    Progress:  Pt states that lately she feels more dehydrated and has occasional urinary " "burning sensation  Pt states that the burning is not related to an infection as it is intermittent and \"feels like dehydration\"   RN advised pt to go to urgent care if she has increased burning sensation, new or changed symptoms; pt verbalized understanding   Pt states she completed her kidney ultrasound today and is waiting for results    Barriers:  Chronic disease processes    Interventions:  Pt will demonstrate adequate hydration, alert care team to any changes     Education: Topics discussed with pt during this outreach: brief medication overview, hydration, signs/symptoms of UTI to watch for    Patient's Concerns and Feedback with Self-Management of Care:  RN reviewed upcoming appointments with patient. Pt states she has no other questions, concerns, or needs at this time.   Pt states they will call this RN and/or PCP with any questions, concerns, needs that may arise  Pt states they will attend upcoming appointments    Next Steps:    Follow-Up Plan:  Next outreach in ~4 weeks; April 2025     Appointments:  3/6/25- cardiology, 3/13/25- behavioral health, 3/17- dermatology     Contact Information:  Call [phone] with any questions or concerns.    Quarterly chart review completed. Pt continues to qualify for and benefit from Personal Care Management Program.    "

## 2025-03-04 NOTE — CARE PLAN
Problem: Risk of Falls  Goal: Reduce the Risk of Falls and Fall Related Injuries- Long term  Outcome: Progressing     Problem: Knowledge surrounding fall safety  Goal: Demonstrate ability to verbalize fall safety concerns- Long term  Outcome: Progressing     Problem: Knowledge of hypertension  Goal: Demonstrate Knowledge of Hypertension- Long term  Outcome: Progressing     Problem: Knowledge of factors contributing to hypertension  Goal: Demonstrate factors that can contribute to high blood pressure- Long term  Outcome: Progressing  Note: Patient has verbalized understanding of factors contributing to high blood pressure   Intervention: Educate patient on role of stress/anxiety     Problem: Recognize current health habits that may contribute to hypertension  Goal: Identify which modifiable health habits can be modifed  Outcome: Progressing  Note: Patient has verbalized willingness to change modifiable health habits   Intervention: Evaluate and identify modifiable health habits and readiness     Problem: Patient barriers to managing high blood pressure  Goal: Identify barriers to managing blood pressure  Outcome: Progressing  Note: Patient has vocalized commitment to lessening the barriers to managing HTN

## 2025-03-06 ENCOUNTER — HOSPITAL ENCOUNTER (OUTPATIENT)
Dept: CARDIOLOGY | Facility: MEDICAL CENTER | Age: 70
End: 2025-03-06
Attending: INTERNAL MEDICINE
Payer: MEDICARE

## 2025-03-06 DIAGNOSIS — R55 SYNCOPE AND COLLAPSE: ICD-10-CM

## 2025-03-06 DIAGNOSIS — I95.1 ORTHOSTASIS: ICD-10-CM

## 2025-03-06 PROCEDURE — 93660 TILT TABLE EVALUATION: CPT | Mod: 26 | Performed by: INTERNAL MEDICINE

## 2025-03-06 PROCEDURE — 93660 TILT TABLE EVALUATION: CPT | Mod: TC

## 2025-03-06 NOTE — PROGRESS NOTES
Patient had PASSIVE tilt table exam today.    Patient NPO for exam, has  home.   Patient was NEGATIVE for passing out.     Consent signed.   Patient given verbal instructions/education regarding exam.   Patient had 20 mins of passive phase, followed by 6:36 mins of recovery.    Patient vitals: HR   //38    Patient had self-reported symptoms, see scanned hard chart.   After recovery phase, patient states that they are ready to go home.    Patient offered water.    Patient vitals returned to baseline.    Patient given verbal discharge instructions per protocol.     Patient ambulated self to [facility], escorted by RN, met by family member to drive patient home.    Patient of Dr. Pena, who was notified via phone message regarding EKG tracings and findings.     RN placed EKG tracings and patient documents in box to be read.      CARDIOLOGY ADDENDUM:  Supine to standing: /87 --> 122/92mmHg and HR 63 --> 85bpm with lightheadedness, remained stable throughout ~ 15 min into standing position, HR 80's bpm  ~ 16 min into standing position, SBP increased to 193mmHg then 268mmHg with low DBP 38mmHg with chest tightness sensation with HR 99-100bpm. BP lowered to 130/71 mmHg 1-2 minutes into recovery. And at end of exam 135/90mmHg. HR range 60-80's bpm predominantly, increased to 100 towards the end of the tilt table with the increase in SBP and decrease in DBP.  No syncope  Upsloping ST depressions in the inferior leads were noted during the study, resolved in recovery  Rare PVCs     Impression:  - initial orthostatic hypotension from supine to standing that stabilizes for about 15 minutes of standing. No syncope.  - followed by a spike in systolic and drop in diastolic blood pressure  - no concerns for chronotropic incompetence  - Rare ectopy noted  - upsloping ST depressions noted, equivocal

## 2025-03-07 ENCOUNTER — PATIENT MESSAGE (OUTPATIENT)
Dept: CARDIOLOGY | Facility: MEDICAL CENTER | Age: 70
End: 2025-03-07
Payer: MEDICARE

## 2025-03-07 DIAGNOSIS — I10 PRIMARY HYPERTENSION: ICD-10-CM

## 2025-03-07 RX ORDER — LOSARTAN POTASSIUM 25 MG/1
25 TABLET ORAL EVERY MORNING
Qty: 90 TABLET | Refills: 3 | Status: SHIPPED | OUTPATIENT
Start: 2025-03-07 | End: 2025-03-10

## 2025-03-07 RX ORDER — LOSARTAN POTASSIUM 25 MG/1
12.5 TABLET ORAL EVERY EVENING
Qty: 45 TABLET | Refills: 3 | Status: SHIPPED | OUTPATIENT
Start: 2025-03-07 | End: 2025-03-10 | Stop reason: SDUPTHER

## 2025-03-07 NOTE — ADDENDUM NOTE
Encounter addended by: Chase Pena M.D. on: 3/7/2025 2:30 PM   Actions taken: Clinical Note Signed, Charge Capture section accepted

## 2025-03-10 RX ORDER — LOSARTAN POTASSIUM 25 MG/1
TABLET ORAL
Qty: 150 TABLET | Refills: 3 | Status: SHIPPED | OUTPATIENT
Start: 2025-03-10 | End: 2025-03-26

## 2025-03-10 RX ORDER — LOSARTAN POTASSIUM 25 MG/1
12.5 TABLET ORAL EVERY EVENING
Qty: 45 TABLET | Refills: 3 | OUTPATIENT
Start: 2025-03-10

## 2025-03-10 NOTE — PATIENT COMMUNICATION
Prescription updated to single order. Quantity also updated for 100 day supply for SCP pt.     To AL as FYI. Thank you!

## 2025-03-13 ENCOUNTER — OFFICE VISIT (OUTPATIENT)
Dept: BEHAVIORAL HEALTH | Facility: CLINIC | Age: 70
End: 2025-03-13
Payer: MEDICARE

## 2025-03-13 DIAGNOSIS — F33.0 MILD EPISODE OF RECURRENT MAJOR DEPRESSIVE DISORDER (HCC): ICD-10-CM

## 2025-03-13 PROCEDURE — 1126F AMNT PAIN NOTED NONE PRSNT: CPT | Performed by: MARRIAGE & FAMILY THERAPIST

## 2025-03-13 PROCEDURE — 90837 PSYTX W PT 60 MINUTES: CPT | Performed by: MARRIAGE & FAMILY THERAPIST

## 2025-03-13 NOTE — PROGRESS NOTES
[facility]   Therapy Progress Note        Name: Tita Pitts  MRN: [id number]  : 1955  Age: 69 y.o.  Date of assessment: 3/13/2025  PCP: Bassam Marinelli M.D.  Persons in attendance: Patient  Total session time: 56 minutes      Topics addressed in psychotherapy include:  Met with the patient in person for an individual counseling session. The patient was last seen by this clinician on 2024  Content of Therapy:   The patient for an individual counseling session.  The patient discussed that she has recently had some better days.  Patient also reported that there had been times where she has felt down.  The patient discussed that she feels she is having increased medical issues.  Patient discussed that she feels she is better when she is around others.  Patient discussed that she feels limited in her own house and that her son is taking it over.  Therapeutic Intervention:   This session, the therapeutic focus was on validating the patient's increase medical issues as reported in her my chart.  Patient discussed different instances and having to work through these herself.  Worked with the patient on reclaiming her home.  Discuss possibly rearranging and updating her living space.  Discussed the idea of selling a home and moving to a new place without him.  Plan:  Continue working with the patient on managing stressors and anxiety.  Impression:   Major depressive disorder - maintaining   Anxiety - increased  This dictation has been created using voice recognition software and/or scribes. The accuracy of the dictation is limited by the abilities of the software and the expertise of the scribes. I expect there may be some errors of grammar and possibly content. I made every attempt to manually correct the errors within my dictation. However, errors related to voice recognition software and/or scribes may still exist and should be interpreted within the appropriate  context.    Objective Observations:   Participation:Active verbal participation, Attentive, and Engaged   Grooming:Casual   Cognition:Alert and Fully Oriented   Eye Contact:Good   Mood:Euthymic, Depressed, and Anxious   Affect:Flexible, Full range, Congruent with content, Sad, and Anxious   Thought Process:Logical and Goal-directed   Speech:Rate within normal limits and Volume within normal limits    Current Risk:   Suicide: low   Homicide: low   Self-Harm: low   Relapse: low   Safety Plan Reviewed: not applicable    Care Plan Updated: No    Does patient express agreement with the above plan? Yes     Diagnosis:  1. Mild episode of recurrent major depressive disorder (HCC)        Referral appointment(s) scheduled? No       JOSE J Fournier

## 2025-03-17 ENCOUNTER — OFFICE VISIT (OUTPATIENT)
Dept: DERMATOLOGY | Facility: IMAGING CENTER | Age: 70
End: 2025-03-17
Payer: MEDICARE

## 2025-03-17 ENCOUNTER — PATIENT MESSAGE (OUTPATIENT)
Dept: CARDIOLOGY | Facility: MEDICAL CENTER | Age: 70
End: 2025-03-17

## 2025-03-17 DIAGNOSIS — Z12.83 SKIN CANCER SCREENING: ICD-10-CM

## 2025-03-17 DIAGNOSIS — L57.0 ACTINIC KERATOSIS: ICD-10-CM

## 2025-03-17 DIAGNOSIS — L91.8 ACROCHORDON: ICD-10-CM

## 2025-03-17 DIAGNOSIS — D22.9 MULTIPLE NEVI: ICD-10-CM

## 2025-03-17 DIAGNOSIS — L73.8 SEBACEOUS HYPERPLASIA: ICD-10-CM

## 2025-03-17 DIAGNOSIS — L82.1 SK (SEBORRHEIC KERATOSIS): ICD-10-CM

## 2025-03-17 DIAGNOSIS — L81.4 LENTIGINES: ICD-10-CM

## 2025-03-17 DIAGNOSIS — D18.01 CHERRY ANGIOMA: ICD-10-CM

## 2025-03-17 DIAGNOSIS — Z85.828 HISTORY OF BASAL CELL CARCINOMA (BCC): ICD-10-CM

## 2025-03-17 PROCEDURE — 17000 DESTRUCT PREMALG LESION: CPT | Performed by: NURSE PRACTITIONER

## 2025-03-17 PROCEDURE — 99213 OFFICE O/P EST LOW 20 MIN: CPT | Mod: 25 | Performed by: NURSE PRACTITIONER

## 2025-03-17 PROCEDURE — 17003 DESTRUCT PREMALG LES 2-14: CPT | Performed by: NURSE PRACTITIONER

## 2025-03-17 RX ORDER — TRETINOIN 0.25 MG/G
CREAM TOPICAL
Qty: 45 G | Refills: 1 | Status: SHIPPED | OUTPATIENT
Start: 2025-03-17

## 2025-03-17 NOTE — PROGRESS NOTES
DERMATOLOGY NOTE  FOLLOW UP VISIT       Chief complaint: Follow-Up (GAEL)    Denies new, growing, changing, itching or bleeding skin lesions today.         History of skin cancer: Yes, Details: BCC upper forehead, MOHS, 10/23  History of precancers/actinic keratoses: No  History of biopsies:Yes, Details: left leg  , results benign   History of blistering/severe sunburns:Yes, Details: child   Family history of skin cancer:No  Family history of atypical moles:No      Allergies   Allergen Reactions    Meclizine Hives    Other Drug Unspecified     Ruvert = Other reaction(s): Welts on legs    Atenolol      Very tired and slow, 25mg dose    Macrodantin [Nitrofurantoin]      nightmares        MEDICATIONS:  Medications relevant to specialty reviewed.     REVIEW OF SYSTEMS:   Positive for skin (see HPI)  Negative for fevers and chills       EXAM:  LMP  (LMP Unknown)   Constitutional: Well-developed, well-nourished, and in no distress.     A total body skin exam was performed excluding the genitals per patient preference and including the following areas: head (including face), neck, chest, abdomen, groin/buttocks, back, bilateral upper extremities, and bilateral lower extremities with the following pertinent findings listed below and/or in assessment/plan.       -sun exposed skin of trunk and b/l upper, lower extremities and face with scattered clinically benign light brown reticulated macules all of which were morphologically similar and none of which were suspicious for skin cancer today on exam  Several scattered 1-3mm bright red macules and thin papules on the trunk, scalp and extremities  Multiple tan medium brown skin-colored macules papules scattered over the trunk >> extremities--All with benign-appearing pigment network patterns on dermoscopy  Several medium brown skin-colored stuck-on waxy papules scattered on the trunk  Ill-defined erythematous gritty/scaly papule over the forehead , nose  Few scattered  "yellowish/red papules with telangiectasias and central dell, face  1-2mm skin-colored to hyperpigmented, soft, pedunculated papules, L zygoma, neck line, few in bilateral axilla      IMPRESSION / PLAN:    1. Actinic keratosis  - NMSC/\"pre-cancer\" education/counseling   CRYOTHERAPY:  Risks (including, but not limited to: skin discoloration, redness, blister, blood blister, recurrence, need for further treatment, infection, scar) and benefits of cryotherapy discussed. Patient verbally agreed to proceed with treatment. 1 cryotherapy freeze thaw cycles of 10 seconds were applied to 4 lesions on face as noted on exam with cryac. Patient tolerated procedure well. Aftercare instructions given--no specific care needed unless irritated during healing process, can apply Vaseline with small band-aid if needed.      2. Acrochordon  - Benign-appearing nature of lesions discussed during exam.   - Courtesy LN2 applied to tag on L zygoma for cosmesis  - Advised to continue to monitor for any return to clinic for new or concerning changes.      3. Lentigines  - Benign-appearing nature of lesions discussed during exam.   - Advised to continue to monitor for any return to clinic for new or concerning changes.    - tretinoin (RETIN-A) 0.025 % cream; AAA at bedtime, pea sized amount after moisturizer, start 2-3 times per week, increase as tolerated  Dispense: 45 g; Refill: 1      4. Cherry angioma  - Benign-appearing nature of lesions discussed during exam.   - Advised to continue to monitor for any return to clinic for new or concerning changes.      5. SK (seborrheic keratosis)  - Benign-appearing nature of lesions discussed during exam.   - Advised to continue to monitor for any return to clinic for new or concerning changes.      6. Sebaceous hyperplasia  - Benign-appearing nature of lesions discussed during exam.   - Advised to continue to monitor for any return to clinic for new or concerning changes.      7. Multiple nevi  - " Benign-appearing nature of lesions discussed during exam.   - Advised to continue to monitor for any return to clinic for new or concerning changes.  - ABCDE's of melanoma discussed/handout given      8. History of basal cell carcinoma (BCC)  Q 6 mo TSEs    9. Skin cancer screening  Skin cancer education  discussed importance of sun protective clothing, eyewear in addition to the use of broad spectrum sunscreen with SPF 30 or greater, as well as need for reapplication ~every 2 hours when exposed to UVR/handout given  discussed importance following up for any new or changing lesions as noted in handout given, but every 6 months exams in clinic in the setting of dermatologic history  ABCDE's of melanoma discussed/handout given      Pt understands risks associated with LN2, Patient verbalized understanding and agrees with plan regarding the above      I have performed a physical exam and reviewed and updated ROS and Plan today (3/17/2025). In review of dermatology visit (9/16/2024), there are no changes except as documented above.         Please note that this dictation was created using voice recognition software. I have made every reasonable attempt to correct obvious errors, but I expect that there are errors of grammar and possibly content that I did not discover before finalizing the note.      Return to clinic in: Return in about 6 months (around 9/17/2025) for GAEL. and as needed for any new or changing skin lesions.

## 2025-03-19 ASSESSMENT — PATIENT HEALTH QUESTIONNAIRE - PHQ9
1. LITTLE INTEREST OR PLEASURE IN DOING THINGS: SEVERAL DAYS
2. FEELING DOWN, DEPRESSED, IRRITABLE, OR HOPELESS: NOT AT ALL

## 2025-03-19 ASSESSMENT — ENCOUNTER SYMPTOMS: GENERAL WELL-BEING: FAIR

## 2025-03-19 ASSESSMENT — ACTIVITIES OF DAILY LIVING (ADL): BATHING_REQUIRES_ASSISTANCE: 0

## 2025-03-19 NOTE — ASSESSMENT & PLAN NOTE
Chronic, stable. PHQ today 0/2. Pt does not maintain on any medications but she does see a therapist routinely to help with this. She denies any local social support stating her friends are further away. Follow up with PCP at least annually for continued monitoring and management.

## 2025-03-19 NOTE — ASSESSMENT & PLAN NOTE
Chronic, ongoing. Continue to encourage adequate hydration and avoidance of nephrotoxins. Follow up with PCP at least annually for continued monitoring and management.   Latest Reference Range & Units 10/16/24 11:06 01/21/25 09:15   GFR (CKD-EPI) >60 mL/min/1.73 m 2 53 ! 52 !   !: Data is abnormal

## 2025-03-19 NOTE — ASSESSMENT & PLAN NOTE
Chronic, stable. BP today 112/72. Pt has been monitoring BP at home due to considerable fluctuations as high as 160/80. She endorses syncopal episode during exercise. She notes chest discomfort/pressure which she relates to low BP. Cardio is aware. Continue current treatment regime: losartan 25mg BID. Follow up with cardiology per routine for continued monitoring and management.

## 2025-03-19 NOTE — ASSESSMENT & PLAN NOTE
Chronic, ongoing. Most recent lipid panel from 1/2025 with LDL at 118. Recommend Mediterranean diet and regular physical activity. Follow up with PCP at least annually for continued monitoring and management.   Lab Results   Component Value Date/Time    CHOLSTRLTOT 203 (H) 01/21/2025 09:15 AM     (H) 01/21/2025 09:15 AM    HDL 67 01/21/2025 09:15 AM    TRIGLYCERIDE 89 01/21/2025 09:15 AM

## 2025-03-19 NOTE — ASSESSMENT & PLAN NOTE
Chronic, stable. Last DEXA 2022 revealed lumbar spine T score of -2.3 and proximal left femur T score of -1.7. She does take calcium and vitamin D supplementation, but has stopped recently. Does engage in weightbearing exercise. No hx of fractures. Discussed fall risk modification methods. Continue to follow up with PCP as previously scheduled.

## 2025-03-20 ENCOUNTER — OFFICE VISIT (OUTPATIENT)
Dept: FAMILY PLANNING/WOMEN'S HEALTH CLINIC | Facility: PHYSICIAN GROUP | Age: 70
End: 2025-03-20
Payer: MEDICARE

## 2025-03-20 VITALS
HEIGHT: 65 IN | BODY MASS INDEX: 30.32 KG/M2 | OXYGEN SATURATION: 97 % | SYSTOLIC BLOOD PRESSURE: 112 MMHG | WEIGHT: 182 LBS | RESPIRATION RATE: 14 BRPM | DIASTOLIC BLOOD PRESSURE: 72 MMHG | HEART RATE: 74 BPM

## 2025-03-20 DIAGNOSIS — N18.31 STAGE 3A CHRONIC KIDNEY DISEASE: ICD-10-CM

## 2025-03-20 DIAGNOSIS — E78.00 HYPERCHOLESTEROLEMIA: ICD-10-CM

## 2025-03-20 DIAGNOSIS — I10 PRIMARY HYPERTENSION: ICD-10-CM

## 2025-03-20 DIAGNOSIS — F33.0 MILD EPISODE OF RECURRENT MAJOR DEPRESSIVE DISORDER (HCC): ICD-10-CM

## 2025-03-20 DIAGNOSIS — M85.89 OSTEOPENIA OF MULTIPLE SITES: ICD-10-CM

## 2025-03-20 PROCEDURE — 3078F DIAST BP <80 MM HG: CPT | Performed by: PHYSICIAN ASSISTANT

## 2025-03-20 PROCEDURE — 3074F SYST BP LT 130 MM HG: CPT | Performed by: PHYSICIAN ASSISTANT

## 2025-03-20 PROCEDURE — G0439 PPPS, SUBSEQ VISIT: HCPCS | Performed by: PHYSICIAN ASSISTANT

## 2025-03-20 PROCEDURE — 1126F AMNT PAIN NOTED NONE PRSNT: CPT | Performed by: PHYSICIAN ASSISTANT

## 2025-03-20 SDOH — ECONOMIC STABILITY: TRANSPORTATION INSECURITY: IN THE PAST 12 MONTHS, HAS LACK OF TRANSPORTATION KEPT YOU FROM MEDICAL APPOINTMENTS OR FROM GETTING MEDICATIONS?: NO

## 2025-03-20 SDOH — ECONOMIC STABILITY: FOOD INSECURITY: WITHIN THE PAST 12 MONTHS, THE FOOD YOU BOUGHT JUST DIDN'T LAST AND YOU DIDN'T HAVE MONEY TO GET MORE.: NEVER TRUE

## 2025-03-20 SDOH — ECONOMIC STABILITY: FOOD INSECURITY: WITHIN THE PAST 12 MONTHS, YOU WORRIED THAT YOUR FOOD WOULD RUN OUT BEFORE YOU GOT THE MONEY TO BUY MORE.: NEVER TRUE

## 2025-03-20 SDOH — ECONOMIC STABILITY: FOOD INSECURITY: HOW HARD IS IT FOR YOU TO PAY FOR THE VERY BASICS LIKE FOOD, HOUSING, MEDICAL CARE, AND HEATING?: NOT HARD AT ALL

## 2025-03-20 ASSESSMENT — PAIN SCALES - GENERAL: PAINLEVEL_OUTOF10: NO PAIN

## 2025-03-20 ASSESSMENT — PATIENT HEALTH QUESTIONNAIRE - PHQ9: CLINICAL INTERPRETATION OF PHQ2 SCORE: 0

## 2025-03-20 ASSESSMENT — ACTIVITIES OF DAILY LIVING (ADL): LACK_OF_TRANSPORTATION: NO

## 2025-03-20 ASSESSMENT — FIBROSIS 4 INDEX: FIB4 SCORE: 1.23

## 2025-03-20 NOTE — PROGRESS NOTES
Comprehensive Health Assessment Program     Tita Pitts is a 69 y.o. here for her comprehensive health assessment.    Patient Active Problem List    Diagnosis Date Noted    Abnormal finding on MRI of brain 02/13/2025    Syncope and collapse 02/06/2025    Hypercholesterolemia 02/06/2025    Osteoarthritis of spine with radiculopathy, cervical region 01/28/2025    Degeneration of intervertebral disc of lumbar region with discogenic back pain and lower extremity pain 01/28/2025    Rotator cuff syndrome of left shoulder 12/20/2024    Left-sided weakness 12/20/2024    Tremors of nervous system 12/20/2024    S/P pericardial window creation 02/14/2024    Mild episode of recurrent major depressive disorder (HCC) 02/13/2024    Thyroid antibody positive 09/12/2023    PVC (premature ventricular contraction) 05/05/2023    Premature atrial contractions 05/05/2023    Orthostasis 05/05/2023    Tinnitus of both ears 01/24/2023    Primary hypertension 12/28/2022    Cardiomegaly 11/15/2022    Pericardial effusion 11/15/2022    Osteopenia of multiple sites 11/02/2022    Leukopenia 04/08/2022    Stage 3a chronic kidney disease 04/08/2022    Other fatigue 04/05/2022    Family history of breast cancer 06/27/2019       Current Outpatient Medications   Medication Sig Dispense Refill    losartan (COZAAR) 25 MG Tab Take 1 tablet by mouth in the morning and 0.5 tablet in the evening. (Patient taking differently: Take 25 mg by mouth 2 times a day. Take 1 tablet by mouth in the morning and 0.5 tablet in the evening.) 150 Tablet 3    Calcium Carb-Cholecalciferol (CALCIUM 500/VITAMIN D) 500-3.125 MG-MCG Tab Take  by mouth.      tretinoin (RETIN-A) 0.025 % cream AAA at bedtime, pea sized amount after moisturizer, start 2-3 times per week, increase as tolerated 45 g 1    hydrOXYzine HCl (ATARAX) 25 MG Tab Take 1 Tablet by mouth 3 times a day as needed for Anxiety. (Patient not taking: Reported on 3/20/2025) 30 Tablet 0    B Complex  Vitamins (VITAMIN B COMPLEX PO) Take  by mouth.      multivitamin Tab Take 1 Tablet by mouth every day.      Magnesium 400 MG Cap Take 400 mg by mouth every day.      Ascorbic Acid (VITAMIN C PO) Take 1 Tablet by mouth every day.       No current facility-administered medications for this visit.          Current supplements as per medication list.     Allergies:   Meclizine, Other drug, Atenolol, and Macrodantin [nitrofurantoin]  Social History     Tobacco Use    Smoking status: Never     Passive exposure: Past    Smokeless tobacco: Never   Vaping Use    Vaping status: Never Used   Substance Use Topics    Alcohol use: Not Currently     Alcohol/week: 0.6 oz     Types: 1 Glasses of wine per week     Comment: Some alcohol socially over the years.  Not regular use    Drug use: Never     Family History   Problem Relation Age of Onset    Breast Cancer Mother     Diabetes Mother         Developed in 70s, at 90 Kidney failure    Hypertension Mother         diagnosed in 70s    Hyperlipidemia Mother         diagnosed in 70s    Kidney Disease Mother     Heart Disease Father          at 64, CHF, CAD    Heart Disease Brother         Heart Attack at 72    Stroke Brother         occurred at 78    Diabetes Maternal Uncle          at 68     Tita  has a past medical history of Allergy, Anesthesia, Breath shortness (), Bronchitis, Cataract, Gynecological disorder (), Hypertension, Pericarditis (2023), Pneumonia, PONV (postoperative nausea and vomiting), and Psychiatric problem (2022).   Past Surgical History:   Procedure Laterality Date    AL THORACOSCOPY,DX NO BX Left 2024    Procedure: LEFT THORACOSCOPY, PERICARDIAL WINDOW;  Surgeon: John H Ganser, M.D.;  Location: SURGERY [facility];  Service: General    PERICARDIAL WINDOW Left 2024    Procedure: CREATION, PERICARDIAL WINDOW;  Surgeon: John H Ganser, M.D.;  Location: SURGERY [facility];  Service: General    APPENDECTOMY   1986    ABDOMINAL HYSTERECTOMY TOTAL      GYN SURGERY      laparoscopy for endometriosis    OTHER CARDIAC SURGERY         Screening:  In the last six months have you experienced any leakage of urine? No    Depression Screening  Little interest or pleasure in doing things?  0 - not at all  Feeling down, depressed , or hopeless? 0 - not at all  Patient Health Questionnaire Score: 0     If depressive symptoms identified deferred to follow up visit unless specifically addressed in assessment and plan.    Interpretation of PHQ-9 Total Score   Score Severity   1-4 No Depression   5-9 Mild Depression   10-14 Moderate Depression   15-19 Moderately Severe Depression   20-27 Severe Depression    Screening for Cognitive Impairment  Do you or any of your friends or family members have any concern about your memory? No  Three Minute Recall (Village, Kitchen, Baby) 3/3    Sebastian clock face with all 12 numbers and set the hands to show 10 minutes past 11.  Yes 5  Cognitive concerns identified deferred for follow up unless specifically addressed in assessment and plan.    Fall Risk Assessment  Has the patient had two or more falls in the last year or any fall with injury in the last year?  No    Safety Assessment  Do you always wear your seatbelt?  Yes  Any changes to home needed to function safely? No  Difficulty hearing.  Yes  Patient counseled about all safety risks that were identified.    Functional Assessment ADLs  Are there any barriers preventing you from cooking for yourself or meeting nutritional needs?  No.    Are there any barriers preventing you from driving safely or obtaining transportation?  No.    Are there any barriers preventing you from using a telephone or calling for help?  No    Are there any barriers preventing you from shopping?  No.    Are there any barriers preventing you from taking care of your own finances?  No    Are there any barriers preventing you from managing your medications?  No    Are there any  barriers preventing you from showering, bathing or dressing yourself? No    Are there any barriers preventing you from doing housework or laundry? No  Are there any barriers preventing you from using the toilet?No  Are you currently engaging in any exercise or physical activity?  Yes. Weight training.   Gym  Hiking    Self-Assessment of Health  What is your perception of your health? Fair    Do you sleep more than six hours a night? No Sleeps about 4 hours  In the past 7 days, how much did pain keep you from doing your normal work? Some DDD  Do you spend quality time with family or friends (virtually or in person)? Yes    Do you usually eat a heart healthy diet that constists of a variety of fruits, vegetables, whole grains and fiber? Yes    Do you eat foods high in fat and/or Fast Food more than three times per week? No    How concerned are you that your medical conditions are not being well managed? a little The timing of getting scheduled for an imaging appointment   Are you worried that in the next 2 months, you may not have stable housing that you own, rent, or stay in as part of a household? No      Advance Care Planning  Do you have an Advance Directive, Living Will, Durable Power of , or POLST? Yes  Advance Directive       is on file      Health Maintenance Summary            Current Care Gaps       COVID-19 Vaccine (5 - 2024-25 season) Overdue since 9/1/2024 11/17/2023  Imm Admin: Comirnaty (Covid-19 Vaccine, Mrna, 4661-2052 Formula)    11/18/2021  Imm Admin: PFIZER PURPLE CAP SARS-COV-2 VACCINATION (12+)    04/16/2021  Imm Admin: PFIZER PURPLE CAP SARS-COV-2 VACCINATION (12+)    03/23/2021  Imm Admin: PFIZER PURPLE CAP SARS-COV-2 VACCINATION (12+)                      Upcoming       Colorectal Cancer Screening (Colonoscopy - Every 5 Years) Next due on 10/23/2025      10/23/2020  REFERRAL TO GI FOR COLONOSCOPY              Mammogram (Yearly) Next due on 3/3/2026      03/03/2025  MA-SCREENING  MAMMO BILAT W/TOMOSYNTHESIS W/CAD    08/28/2023  MA-SCREENING MAMMO BILAT W/TOMOSYNTHESIS W/CAD    02/03/2023  MA-DIAGNOSTIC MAMMO LEFT W/TOMOSYNTHESIS W/CAD    08/25/2022  MA-SCREENING MAMMO BILAT W/TOMOSYNTHESIS W/CAD    02/17/2011  MA-SCREEING MAMMOGRAM W/ CAD     Only the first 5 history entries have been loaded, but more history exists.            Annual Wellness Visit (Yearly) Next due on 3/20/2026      03/20/2025  Level of Service: SD ANNUAL WELLNESS VISIT-INCLUDES PPPS SUBSEQUE*    02/13/2024  Level of Service: SD ANNUAL WELLNESS VISIT-INCLUDES PPPS SUBSEQUE*    04/24/2023  Level of Service: SD ANNUAL WELLNESS VISIT-INCLUDES PPPS SUBSEQUE*    05/09/2022  Level of Service: SD ANNUAL WELLNESS VISIT-INCLUDES PPPS SUBSEQUE*              Bone Density Scan (Every 5 Years) Next due on 8/23/2027 08/23/2022  DS-BONE DENSITY STUDY (DEXA)              IMM DTaP/Tdap/Td Vaccine (3 - Td or Tdap) Next due on 9/17/2034 09/17/2024  Imm Admin: Tdap Vaccine    09/30/2016  Imm Admin: Tdap Vaccine                      Completed or No Longer Recommended       Influenza Vaccine (Series Information) Completed      09/30/2024  Outside Immunization: Influenza Tri, Adj    10/16/2023  Imm Admin: Influenza Vaccine Adult HD    10/03/2022  Imm Admin: Influenza Vaccine Adult HD    10/15/2021  Imm Admin: Influenza Vaccine Quad Inj (Pf)    09/17/2020  Imm Admin: Influenza Vac Subunit Quad Inj (Pf)      Only the first 5 history entries have been loaded, but more history exists.              Zoster (Shingles) Vaccines (Series Information) Completed      02/09/2024  Imm Admin: Zoster Vaccine Recombinant (RZV) (SHINGRIX)    10/30/2023  Imm Admin: Zoster Vaccine Recombinant (RZV) (SHINGRIX)    09/30/2016  Imm Admin: Zoster Vaccine Live (ZVL) (Zostavax) - HISTORICAL DATA              Hepatitis C Screening  Completed      10/25/2022  Hepatitis C Antibody component of HEPATITIS PANEL ACUTE(4 COMPONENTS)    04/06/2022  Hepatitis C Antibody  "component of HCV Scrn ( 5539-9344 1xLife)              Pneumococcal Vaccine: 50+ Years (Series Information) Completed      08/10/2022  Imm Admin: Pneumococcal Conjugate Vaccine (PCV20)    2017  Imm Admin: Pneumococcal polysaccharide vaccine (PPSV-23)              Hepatitis A Vaccine (Hep A) (Series Information) Aged Out      No completion history exists for this topic.              Hepatitis B Vaccine (Hep B) (Series Information) Aged Out     No completion history exists for this topic.              HPV Vaccines (Series Information) Aged Out     No completion history exists for this topic.              Polio Vaccine (Inactivated Polio) (Series Information) Aged Out     No completion history exists for this topic.              Meningococcal Immunization (Series Information) Aged Out     No completion history exists for this topic.                            Patient Care Team:  Bassam Marinelli M.D. as PCP - General (Family Medicine)  Zee Rodgers M.D. as PCP - [facility] Paneled (Family Medicine)  Chris Bauman, PT (Physical Therapist)  Margarita Pereira R.N. as Registered Nurse      Financial Resource Strain: Low Risk  (3/20/2025)    Overall Financial Resource Strain (CARDIA)     Difficulty of Paying Living Expenses: Not hard at all      Transportation Needs: No Transportation Needs (3/20/2025)    PRAPARE - Transportation     Lack of Transportation (Medical): No     Lack of Transportation (Non-Medical): No      Food Insecurity: No Food Insecurity (3/20/2025)    Hunger Vital Sign     Worried About Running Out of Food in the Last Year: Never true     Ran Out of Food in the Last Year: Never true        Encounter Vitals  Blood Pressure : 112/72  Pulse: 74  Respiration: 14  Pulse Oximetry: 97 %  Weight: 82.6 kg (182 lb)  Height: 165.1 cm (5' 5\") (PT REPORTED)  BMI (Calculated): 30.29  Pain Score: No pain     Physical Exam:  Constitutional: NAD  HENMT: NC/AT, EOMI  Cardiovascular: RRR, No m/r/g  Lungs: " CTAB, no w/r/r  Extremities: No c/c/pe  Skin: No lesions notes  Neurologic: Alert & oriented x3, CN II-XII grossly intact    Assessment and Plan. The following treatment and monitoring plan is recommended:  Hypercholesterolemia  Chronic, ongoing. Most recent lipid panel from 1/2025 with LDL at 118. Recommend Mediterranean diet and regular physical activity. Follow up with PCP at least annually for continued monitoring and management.   Lab Results   Component Value Date/Time    CHOLSTRLTOT 203 (H) 01/21/2025 09:15 AM     (H) 01/21/2025 09:15 AM    HDL 67 01/21/2025 09:15 AM    TRIGLYCERIDE 89 01/21/2025 09:15 AM       Mild episode of recurrent major depressive disorder (HCC)  Chronic, stable. PHQ today 0/2. Pt does not maintain on any medications but she does see a therapist routinely to help with this. She denies any local social support stating her friends are further away. Follow up with PCP at least annually for continued monitoring and management.    Primary hypertension  Chronic, stable. BP today 112/72. Pt has been monitoring BP at home due to considerable fluctuations as high as 160/80. She endorses syncopal episode during exercise. She notes chest discomfort/pressure which she relates to low BP. Cardio is aware. Continue current treatment regime: losartan 25mg BID. Follow up with cardiology per routine for continued monitoring and management.       Stage 3a chronic kidney disease  Chronic, ongoing. Continue to encourage adequate hydration and avoidance of nephrotoxins. Follow up with PCP at least annually for continued monitoring and management.   Latest Reference Range & Units 10/16/24 11:06 01/21/25 09:15   GFR (CKD-EPI) >60 mL/min/1.73 m 2 53 ! 52 !   !: Data is abnormal    Osteopenia of multiple sites  Chronic, stable. Last DEXA 2022 revealed lumbar spine T score of -2.3 and proximal left femur T score of -1.7. She does take calcium and vitamin D supplementation, but has stopped recently. Does  engage in weightbearing exercise. No hx of fractures. Discussed fall risk modification methods. Continue to follow up with PCP as previously scheduled.     Services suggested: No services needed at this time  Health Care Screening: Age-appropriate preventive services recommended by USPTF and ACIP covered by Medicare were discussed today. Services ordered if indicated and agreed upon by the patient.  Referrals offered: Community-based lifestyle interventions to reduce health risks and promote self-management and wellness, fall prevention, nutrition, physical activity, tobacco-use cessation, weight loss, and mental health services as per orders if indicated.    Discussion today about general wellness and lifestyle habits:    Prevent falls and reduce trip hazards; Cautioned about securing or removing rugs.  Have a working fire alarm and carbon monoxide detector.  Engage in regular physical activity and social activities.    Follow-up: No follow-ups on file.

## 2025-03-26 ENCOUNTER — PATIENT MESSAGE (OUTPATIENT)
Dept: CARDIOLOGY | Facility: MEDICAL CENTER | Age: 70
End: 2025-03-26
Payer: MEDICARE

## 2025-03-26 DIAGNOSIS — I10 PRIMARY HYPERTENSION: ICD-10-CM

## 2025-03-26 RX ORDER — LOSARTAN POTASSIUM 50 MG/1
50 TABLET ORAL 2 TIMES DAILY
Qty: 180 TABLET | Refills: 3 | Status: SHIPPED | OUTPATIENT
Start: 2025-03-26 | End: 2025-03-26

## 2025-03-26 RX ORDER — LOSARTAN POTASSIUM 50 MG/1
TABLET ORAL
Qty: 180 TABLET | Refills: 3 | Status: SHIPPED | OUTPATIENT
Start: 2025-03-26

## 2025-04-01 ENCOUNTER — PATIENT OUTREACH (OUTPATIENT)
Dept: HEALTH INFORMATION MANAGEMENT | Facility: OTHER | Age: 70
End: 2025-04-01
Payer: MEDICARE

## 2025-04-01 DIAGNOSIS — N18.31 STAGE 3A CHRONIC KIDNEY DISEASE: ICD-10-CM

## 2025-04-01 DIAGNOSIS — I10 PRIMARY HYPERTENSION: ICD-10-CM

## 2025-04-03 ENCOUNTER — PATIENT MESSAGE (OUTPATIENT)
Dept: HEALTH INFORMATION MANAGEMENT | Facility: OTHER | Age: 70
End: 2025-04-03

## 2025-04-03 NOTE — TELEPHONE ENCOUNTER
Please schedule her sooner appointment for evaluation for the symptoms that she is having.  She will need to get tested for UTI.  If I do not have appointment then schedule with another provider for evaluation.

## 2025-04-03 NOTE — CARE PLAN
Problem: Risk of Falls  Goal: Reduce the Risk of Falls and Fall Related Injuries- Long term  Outcome: Progressing     Problem: Knowledge surrounding fall safety  Goal: Demonstrate ability to verbalize fall safety concerns- Long term  Outcome: Progressing     Problem: Knowledge of hypertension  Goal: Demonstrate Knowledge of Hypertension- Long term  Outcome: Progressing  Intervention: Define hypertension in lay terms  Intervention: In conjunction with their medical provider, identify goal blood pressure: 120/80     Problem: Knowledge of factors contributing to hypertension  Goal: Demonstrate factors that can contribute to high blood pressure- Long term  Outcome: Progressing  Note: Patient has verbalized understanding of factors contributing to high blood pressure      Problem: Recognize current health habits that may contribute to hypertension  Goal: Identify which modifiable health habits can be modifed  Outcome: Progressing  Note: Patient has verbalized willingness to change modifiable health habits      Problem: Patient barriers to managing high blood pressure  Goal: Identify barriers to managing blood pressure  Outcome: Progressing  Note: Patient has vocalized commitment to lessening the barriers to managing HTN  Intervention: Identify with patient what the barriers are to self-management of blood pressure

## 2025-04-03 NOTE — PROGRESS NOTES
"Reason for Follow-Up:   Monthly Personal Care Management Program Outreach     Current Health Status  Qualifying diagnoses for PCM: HTN, CKD    Medical Updates:    Pt states she is \"doing ok\". Pt states that she completed the tilt table test and as a result her BP medications have been modified by cardiologist. Pt states however that she continues to have elevated blood pressures and will be sending her BP log to the cardiologist tonight.    Pt states she continues to have occasional urinary burning. Pt states that she is often thirsty and drinks a lot of water throughout the day. Pt cannot determine a pattern for the urinary burning symptom.     Pt reports no falls in the last month.     Pt reports no other recent health concerns at this time.     Medication Updates:   Pt states she is taking all medications as indicated   Pt denies any difficulty obtaining medications at this time    Symptoms:    Pt states she has new onset headaches that occur about once weekly upon waking in the morning. Pt states they are focalized to the left side of her head, behind her left eye, and on the side of her head. Denies aura. Pt states that these headaches do not respond to tylenol or advil. Pt states that when she massages the back of her head it is sore. Pt states she has not had migraines in the past and that these are a new type of headache to her. RN advised pt to share headache symptoms with cardiologist when she sends her BP log, RN will also relay new headache symptoms to PCP.     Plan of Care Goals and Progress    See updated care plan note    Priority 1. HTN    Progress:  Pt states she continues to have elevated blood pressures  Pt states she checks BP twice daily and that generally it is elevated  Pt states her BP was 170/88 once this week; pt states BP will go down to systolic value of 140 if she sits for a while  Pt is working closely with cardiology to manage BP; pt states she will send her weekly BP log to cardiology " "delvis  RN advised pt to share headache symptoms with cardiologist, pt agrees to do so   Pt aware to present to ED if BP is 180/110 or greater; and/or if she has chest pain, SOB, vision changes, nausea/vomiting, un-resolving headache, dizziness/lightheadedness  Pt states that lightheadedness upon exercise has decreased since the changes to BP medications    Barriers:  chronic disease processes    Interventions: Pt will demonstrate continued line of communication with cardiologist, check and record BP values twice daily and share with cardiology, report any changes to care team     Education:  Topics discussed with pt during this outreach: brief medication overview, ED precautions    Priority 2. CKD     Progress:  Pt states she continues to have occasional urinary burning  Pt states she is often thirsty and drinks \"a lot\" of water throughout the day    Barriers:  chronic disease processes    Interventions:  Pt will complete upcoming labs for PCP and cardiology, pt will monitor urinary signs/symptoms, report changes to care team    Education: Topics discussed with pt during this outreach: brief medication overview, avoiding NSAIDS    Patient's Concerns and Feedback with Self-Management of Care:  RN reviewed upcoming appointments with patient. Pt states she has no other questions, concerns, or needs at this time.   Pt states they will call this RN and/or PCP with any questions, concerns, needs that may arise  Pt states they will attend upcoming appointments    Next Steps:    Follow-Up Plan:  Next outreach in ~4 weeks; May 2025     Appointments:  4/24/25-behavioral health, 4/29/25- PCP, 5/22/25- neurology, 5/30/25- behavioral health     Contact Information:  Call [phone] with any questions or concerns.  "

## 2025-04-04 ENCOUNTER — HOSPITAL ENCOUNTER (OUTPATIENT)
Dept: LAB | Facility: MEDICAL CENTER | Age: 70
End: 2025-04-04
Attending: FAMILY MEDICINE
Payer: MEDICARE

## 2025-04-04 ENCOUNTER — HOSPITAL ENCOUNTER (OUTPATIENT)
Dept: LAB | Facility: MEDICAL CENTER | Age: 70
End: 2025-04-04
Attending: INTERNAL MEDICINE
Payer: MEDICARE

## 2025-04-04 DIAGNOSIS — E78.2 MIXED HYPERLIPIDEMIA: ICD-10-CM

## 2025-04-04 DIAGNOSIS — I95.1 ORTHOSTASIS: ICD-10-CM

## 2025-04-04 DIAGNOSIS — N18.31 STAGE 3A CHRONIC KIDNEY DISEASE: ICD-10-CM

## 2025-04-04 LAB
ALBUMIN SERPL BCP-MCNC: 4.2 G/DL (ref 3.2–4.9)
ALBUMIN/GLOB SERPL: 1.4 G/DL
ALP SERPL-CCNC: 92 U/L (ref 30–99)
ALT SERPL-CCNC: 16 U/L (ref 2–50)
ANION GAP SERPL CALC-SCNC: 11 MMOL/L (ref 7–16)
AST SERPL-CCNC: 26 U/L (ref 12–45)
BILIRUB SERPL-MCNC: 0.6 MG/DL (ref 0.1–1.5)
BUN SERPL-MCNC: 14 MG/DL (ref 8–22)
CALCIUM ALBUM COR SERPL-MCNC: 9.3 MG/DL (ref 8.5–10.5)
CALCIUM SERPL-MCNC: 9.5 MG/DL (ref 8.5–10.5)
CHLORIDE SERPL-SCNC: 103 MMOL/L (ref 96–112)
CHOLEST SERPL-MCNC: 183 MG/DL (ref 100–199)
CO2 SERPL-SCNC: 23 MMOL/L (ref 20–33)
CREAT SERPL-MCNC: 1.09 MG/DL (ref 0.5–1.4)
FASTING STATUS PATIENT QL REPORTED: NORMAL
GFR SERPLBLD CREATININE-BSD FMLA CKD-EPI: 55 ML/MIN/1.73 M 2
GLOBULIN SER CALC-MCNC: 3 G/DL (ref 1.9–3.5)
GLUCOSE SERPL-MCNC: 85 MG/DL (ref 65–99)
HDLC SERPL-MCNC: 68 MG/DL
LDLC SERPL CALC-MCNC: 99 MG/DL
POTASSIUM SERPL-SCNC: 4.6 MMOL/L (ref 3.6–5.5)
PROT SERPL-MCNC: 7.2 G/DL (ref 6–8.2)
SODIUM SERPL-SCNC: 137 MMOL/L (ref 135–145)
TRIGL SERPL-MCNC: 80 MG/DL (ref 0–149)

## 2025-04-04 PROCEDURE — 84156 ASSAY OF PROTEIN URINE: CPT

## 2025-04-04 PROCEDURE — 82784 ASSAY IGA/IGD/IGG/IGM EACH: CPT

## 2025-04-04 PROCEDURE — 84155 ASSAY OF PROTEIN SERUM: CPT

## 2025-04-04 PROCEDURE — 86334 IMMUNOFIX E-PHORESIS SERUM: CPT

## 2025-04-04 PROCEDURE — 80053 COMPREHEN METABOLIC PANEL: CPT

## 2025-04-04 PROCEDURE — 84165 PROTEIN E-PHORESIS SERUM: CPT

## 2025-04-04 PROCEDURE — 36415 COLL VENOUS BLD VENIPUNCTURE: CPT

## 2025-04-04 PROCEDURE — 80061 LIPID PANEL: CPT

## 2025-04-04 PROCEDURE — 83521 IG LIGHT CHAINS FREE EACH: CPT | Mod: 91

## 2025-04-07 ENCOUNTER — PATIENT MESSAGE (OUTPATIENT)
Dept: CARDIOLOGY | Facility: MEDICAL CENTER | Age: 70
End: 2025-04-07
Payer: MEDICARE

## 2025-04-07 ENCOUNTER — RESULTS FOLLOW-UP (OUTPATIENT)
Dept: MEDICAL GROUP | Facility: MEDICAL CENTER | Age: 70
End: 2025-04-07

## 2025-04-08 LAB
ALBUMIN SERPL ELPH-MCNC: 4.24 G/DL (ref 3.75–5.01)
ALPHA1 GLOB SERPL ELPH-MCNC: 0.24 G/DL (ref 0.19–0.46)
ALPHA2 GLOB SERPL ELPH-MCNC: 0.67 G/DL (ref 0.48–1.05)
B-GLOBULIN SERPL ELPH-MCNC: 0.76 G/DL (ref 0.48–1.1)
EER MONOCLONAL PROTEIN AND FLC, SERUM Q5224: NORMAL
GAMMA GLOB SERPL ELPH-MCNC: 1.1 G/DL (ref 0.62–1.51)
IGA SERPL-MCNC: 208 MG/DL (ref 68–408)
IGG SERPL-MCNC: 1219 MG/DL (ref 768–1632)
IGM SERPL-MCNC: 64 MG/DL (ref 35–263)
INTERPRETATION SERPL IFE-IMP: NORMAL
INTERPRETATION SERPL IFE-IMP: NORMAL
KAPPA LC FREE SER-MCNC: 19.09 MG/L (ref 3.3–19.4)
KAPPA LC FREE/LAMBDA FREE SER NEPH: 1.15 {RATIO} (ref 0.26–1.65)
LAMBDA LC FREE SERPL-MCNC: 16.62 MG/L (ref 5.71–26.3)
MONOCLONAL PROTEIN NL11656: NORMAL G/DL
PROT SERPL-MCNC: 7 G/DL (ref 6.3–8.2)

## 2025-04-09 ENCOUNTER — PATIENT MESSAGE (OUTPATIENT)
Dept: CARDIOLOGY | Facility: MEDICAL CENTER | Age: 70
End: 2025-04-09

## 2025-04-09 ENCOUNTER — OFFICE VISIT (OUTPATIENT)
Dept: MEDICAL GROUP | Facility: MEDICAL CENTER | Age: 70
End: 2025-04-09
Payer: MEDICARE

## 2025-04-09 VITALS
HEART RATE: 73 BPM | TEMPERATURE: 97.4 F | SYSTOLIC BLOOD PRESSURE: 130 MMHG | WEIGHT: 184.53 LBS | HEIGHT: 66 IN | DIASTOLIC BLOOD PRESSURE: 84 MMHG | BODY MASS INDEX: 29.66 KG/M2 | OXYGEN SATURATION: 98 %

## 2025-04-09 DIAGNOSIS — G44.209 ACUTE NON INTRACTABLE TENSION-TYPE HEADACHE: ICD-10-CM

## 2025-04-09 DIAGNOSIS — N18.31 STAGE 3A CHRONIC KIDNEY DISEASE: ICD-10-CM

## 2025-04-09 DIAGNOSIS — D72.819 LEUKOPENIA, UNSPECIFIED TYPE: ICD-10-CM

## 2025-04-09 DIAGNOSIS — M72.0 PALMAR FIBROMATOSIS: ICD-10-CM

## 2025-04-09 DIAGNOSIS — E78.00 HYPERCHOLESTEROLEMIA: ICD-10-CM

## 2025-04-09 LAB
ALBUMIN 24H MFR UR ELPH: 91.1 %
ALPHA1 GLOB 24H MFR UR ELPH: 5.8 %
ALPHA2 GLOB 24H MFR UR ELPH: 3.1 %
B-GLOBULIN 24H MFR UR ELPH: 0 %
COLLECT DURATION TIME SPEC: NORMAL HR
EER MONOCLONAL PROTEIN STUDY, 24 HOUR U Q5964: NORMAL
GAMMA GLOB 24H MFR UR ELPH: 0 %
INTERPRETATION UR IFE-IMP: NORMAL
M PROTEIN 24H MFR UR ELPH: 0 %
M PROTEIN 24H UR ELPH-MRATE: NORMAL MG/24 HRS
PROT 24H UR-MRATE: <6 MG/DL
PROT 24H UR-MRATE: NORMAL G/(24.H) (ref 40–150)
SPECIMEN VOL ?TM UR: NORMAL ML

## 2025-04-09 PROCEDURE — 99214 OFFICE O/P EST MOD 30 MIN: CPT | Performed by: FAMILY MEDICINE

## 2025-04-09 PROCEDURE — 3079F DIAST BP 80-89 MM HG: CPT | Performed by: FAMILY MEDICINE

## 2025-04-09 PROCEDURE — 3075F SYST BP GE 130 - 139MM HG: CPT | Performed by: FAMILY MEDICINE

## 2025-04-09 RX ORDER — CYCLOBENZAPRINE HCL 5 MG
5 TABLET ORAL 3 TIMES DAILY PRN
Qty: 100 TABLET | Refills: 1 | Status: SHIPPED | OUTPATIENT
Start: 2025-04-09

## 2025-04-09 ASSESSMENT — ENCOUNTER SYMPTOMS
CHILLS: 0
HEADACHES: 1
FEVER: 0

## 2025-04-09 ASSESSMENT — FIBROSIS 4 INDEX: FIB4 SCORE: 1.2

## 2025-04-09 NOTE — PROGRESS NOTES
Verbal consent was acquired by the patient to use [employer] ambient listening note generation during this visit.      Tita was seen today for follow-up.    Diagnoses and all orders for this visit:    Acute non intractable tension-type headache  -     cyclobenzaprine (FLEXERIL) 5 mg tablet; Take 1 Tablet by mouth 3 times a day as needed for Moderate Pain.    Stage 3a chronic kidney disease  -     Comp Metabolic Panel; Future    Hypercholesterolemia  -     Comp Metabolic Panel; Future  -     Lipid Profile; Future    Leukopenia, unspecified type  -     CBC WITH DIFFERENTIAL; Future                  Assessment & Plan  1. Tension-type headaches  New condition, unstable  - Symptoms suggest tension-type headaches due to muscle tightness  - Prescription for muscle relaxer Flexeril to be sent to [facility] on [facility]  - Muscle relaxer to be taken at bedtime or during headache episode to induce sleep and alleviate headache  - Advised against driving or engaging in activities requiring alertness after taking muscle relaxer  - Upcoming appointment with neurology next month; encouraged to discuss issue with them    2. Shoulder arthritis  - MRI results indicate arthritic changes in shoulder  - Informed about possibility of minor surgical procedures or steroid injections for pain and inflammation management  - Prefers to monitor condition for now  - Will inform if pain intensifies; referral to orthopedic specialist will be considered    3.  Palmar fibromatosis of right hand  New condition, unstable  - Swelling in hand appears to be fibroma (fibrous tissue in tendons)  - Referral to hand surgeon if fibroma becomes painful or increases in size    4.  Stage III chronic kidney disease, leukopenia  Chronic condition, improving, recheck labs in 4-month    Follow-up  - Patient to follow up in 4 months for labs          Chief complaint::Diagnoses of Acute non intractable tension-type headache, Stage 3a chronic kidney disease,  Hypercholesterolemia, and Leukopenia, unspecified type were pertinent to this visit.      History of Present Illness  The patient is a 69-year-old female who presents for discussion of lab results and evaluation of headaches, shoulder pain, hand swelling, and blood pressure management.    Headaches  - She reports experiencing weekly headaches, often waking up in the middle of the night due to their onset.  - These headaches are managed with extra strength Tylenol, which provides temporary relief but does not completely alleviate the symptoms.  - The duration of these headaches varies between 12 to 18 hours.  - She describes the pain as being located at the back of her head, sometimes severe, and other times less noticeable.  - The pain is also localized around her left eye, cheekbone, and temple.  - She reports no history of migraines or severe headaches in her childhood or adulthood.  - She has informed her cardiologist about these headaches, suspecting a possible link to her blood pressure.  - She avoids Advil due to its potential impact on her kidney function and is uncertain about its efficacy in managing her headaches.  - She reports no blurry vision but notes that when the pain localizes around her eye, it affects her ability to open her eye properly.    Shoulder Pain  - She continues to experience shoulder pain, which is exacerbated by physical activity.  - She has undergone an MRI and physical therapy for this issue.  - She expresses a desire to manage the pain without further intervention unless it worsens.    Hand Swelling  - She noticed swelling in right hand towards the end of February 2025, which was brought to her attention by a community health nurse.  - She describes the swelling as lumpy and notes that it has increased in size.  - She can feel small lumps under her skin, which have become more prominent over time.  - She reports no current pain but acknowledges a change in the shape of the lumps.  -  "She is uncertain about the presence of multiple bumps.  - She reports no interference with her daily activities but expresses concern about the potential for the lumps to increase in size.    Blood Pressure Management  - Her cardiologist is still working on her blood pressure.  - He just changed the medicine again.  - She did a tilt table test and he said it was very enlightening but evidently her blood pressure drops when she stands up and then it skyrockets and then it starts to come back down again.  - It is generally high.        Review of Systems   Constitutional:  Negative for chills and fever.   Musculoskeletal:  Positive for joint pain.   Skin:         Mass of right hand   Neurological:  Positive for headaches.          Medications and Allergies:     Current Outpatient Medications   Medication Sig Dispense Refill    cyclobenzaprine (FLEXERIL) 5 mg tablet Take 1 Tablet by mouth 3 times a day as needed for Moderate Pain. 100 Tablet 1    losartan (COZAAR) 50 MG Tab TAKE ONE 50MG TABLET BY MOUTH IN THE MORNING AND ONE HALF 50MG TABLET (25 MG) BY MOUTH IN THE EVENING (Patient taking differently: 2 times a day.) 180 Tablet 3    tretinoin (RETIN-A) 0.025 % cream AAA at bedtime, pea sized amount after moisturizer, start 2-3 times per week, increase as tolerated 45 g 1    B Complex Vitamins (VITAMIN B COMPLEX PO) Take  by mouth.      multivitamin Tab Take 1 Tablet by mouth every day.      Calcium Carb-Cholecalciferol (CALCIUM 500/VITAMIN D) 500-3.125 MG-MCG Tab Take  by mouth.      Magnesium 400 MG Cap Take 400 mg by mouth every day.      Ascorbic Acid (VITAMIN C PO) Take 1 Tablet by mouth every day.       No current facility-administered medications for this visit.       /84 (BP Location: Left arm, Patient Position: Sitting, BP Cuff Size: Adult)   Pulse 73   Temp 36.3 °C (97.4 °F) (Temporal)   Ht 1.664 m (5' 5.5\")   Wt 83.7 kg (184 lb 8.4 oz)   SpO2 98% , Body mass index is 30.24 kg/m².      Physical " Exam  Constitutional:       Appearance: Normal appearance. She is well-developed and well-groomed.   HENT:      Head: Normocephalic and atraumatic.      Right Ear: External ear normal.      Left Ear: External ear normal.   Eyes:      General:         Right eye: No discharge.         Left eye: No discharge.      Conjunctiva/sclera: Conjunctivae normal.   Cardiovascular:      Rate and Rhythm: Normal rate.   Pulmonary:      Effort: Pulmonary effort is normal. No respiratory distress.   Musculoskeletal:      Cervical back: Neck supple.      Comments: Small nodular mass present at palm of right hand   Skin:     Findings: No rash.   Neurological:      Mental Status: She is alert.   Psychiatric:         Mood and Affect: Mood and affect normal.         Behavior: Behavior normal.            I reviewed with patient lab results resulted on 4/4/2025.        Please note that this dictation was created using voice recognition software. I have made every reasonable attempt to correct obvious errors, but I expect that there are errors of grammar and possibly content that I did not discover before finalizing the note.

## 2025-04-11 ENCOUNTER — RESULTS FOLLOW-UP (OUTPATIENT)
Dept: CARDIOLOGY | Facility: MEDICAL CENTER | Age: 70
End: 2025-04-11
Payer: MEDICARE

## 2025-04-12 ENCOUNTER — PATIENT MESSAGE (OUTPATIENT)
Dept: MEDICAL GROUP | Facility: MEDICAL CENTER | Age: 70
End: 2025-04-12
Payer: MEDICARE

## 2025-04-12 DIAGNOSIS — M72.0 PALMAR FIBROMATOSIS: ICD-10-CM

## 2025-04-21 ENCOUNTER — TELEPHONE (OUTPATIENT)
Dept: CARDIOLOGY | Facility: MEDICAL CENTER | Age: 70
End: 2025-04-21
Payer: MEDICARE

## 2025-04-21 DIAGNOSIS — I10 PRIMARY HYPERTENSION: ICD-10-CM

## 2025-04-21 RX ORDER — LOSARTAN POTASSIUM 50 MG/1
TABLET ORAL
Qty: 200 TABLET | Refills: 3 | Status: SHIPPED | OUTPATIENT
Start: 2025-04-21 | End: 2025-04-22

## 2025-04-21 NOTE — TELEPHONE ENCOUNTER
Rx refill for Losartan updated to 100 days/SCP and sent to preferred pharmacy. Last OV 2/6/25. Current labs with future orders. Next OV 8/26/25. Reminders sent via [employer].

## 2025-04-22 RX ORDER — LOSARTAN POTASSIUM 50 MG/1
50 TABLET ORAL 2 TIMES DAILY
Qty: 200 TABLET | Refills: 3 | Status: SHIPPED | OUTPATIENT
Start: 2025-04-22 | End: 2025-04-23

## 2025-04-22 NOTE — TELEPHONE ENCOUNTER
Noted:  Chase Pena M.D. to Tita Stephenson Hong  4/8/25  4:57 PM  Thank you for the updates Ms. Pitts.      I suggest you increase to losartan 50mg AM and PM and let's see if that gets the overall numbers to be consistently normal. Remember: given the tilt table results and your orthostatic symptoms we are going up very slow on the pills so I appreciate your patience with the process.     Higher blood pressure numbers can cause headaches.    Prescription updated accordingly.     Called pt and updated.

## 2025-04-22 NOTE — TELEPHONE ENCOUNTER
AL    Caller: Tita Pitts    Topic/issue: It was the patient's understanding that she was to take  losartan (COZAAR) 50 MG Tab 1 X in the morning and 1 X at night but the latest prescription has 1/2 for the second dose. She has only one day of the medication remaining and does not want to  this prescription until she is sure of the dosage. See patient message enc dated 4/7/25 as well. Please advise.     Callback Number: [phone]    Thank you,  Dionisio PLASENCIA

## 2025-04-23 DIAGNOSIS — I10 PRIMARY HYPERTENSION: ICD-10-CM

## 2025-04-23 RX ORDER — AMLODIPINE BESYLATE 5 MG/1
5 TABLET ORAL EVERY EVENING
Qty: 100 TABLET | Refills: 3 | Status: SHIPPED | OUTPATIENT
Start: 2025-04-23

## 2025-04-23 RX ORDER — TELMISARTAN 80 MG/1
80 TABLET ORAL DAILY
Qty: 90 TABLET | Refills: 3 | Status: SHIPPED | OUTPATIENT
Start: 2025-04-23 | End: 2026-05-28

## 2025-04-24 ENCOUNTER — OFFICE VISIT (OUTPATIENT)
Dept: BEHAVIORAL HEALTH | Facility: CLINIC | Age: 70
End: 2025-04-24
Payer: MEDICARE

## 2025-04-24 DIAGNOSIS — F33.0 MILD EPISODE OF RECURRENT MAJOR DEPRESSIVE DISORDER (HCC): ICD-10-CM

## 2025-04-24 PROCEDURE — 90837 PSYTX W PT 60 MINUTES: CPT | Performed by: MARRIAGE & FAMILY THERAPIST

## 2025-04-24 NOTE — PROGRESS NOTES
[facility]   Therapy Progress Note        Name: Tita Pitts  MRN: [id number]  : 1955  Age: 70 y.o.  Date of assessment: 2025  PCP: Bassam Marinelli M.D.  Persons in attendance: Patient  Total session time: 58 minutes      Topics addressed in psychotherapy include:   Met with the patient in person for an individual counseling session. The patient was last seen by this clinician on 2024    Content of Therapy:   The patient discussed that she feels she is doing better with the better weather.  Patient feels that she is focusing less on the passing of her  and now more on her own issues.  Patient discussed that she still struggles with getting more than 4 to 6 hours of uninterrupted sleep.  The patient discussed that her son wants to pay off all of his bills before he moves out.  With that said he is also planning a family of reunion.  The patient discussed an incident where she had asked her son to fill up a gas can for the lawnmower.  The patient reported that a week later said he has still not completed it.    Therapeutic Intervention:   This session, the therapeutic focus was on validating the patient's progress and assisting her on setting boundaries.  Worked with the patient on identifying due dates for such things as filling a gas can.  Discussed setting those expectations when they are asked and having her own plan if those things are not accomplished.  Worked with the patient on problem solving.    Plan:    Continue working with the patient on managing stressors and anxiety.    Impression:   Major depressive disorder - Improving    This dictation has been created using voice recognition software and/or scribes. The accuracy of the dictation is limited by the abilities of the software and the expertise of the scribes. I expect there may be some errors of grammar and possibly content. I made every attempt to manually correct the errors within my dictation.  However, errors related to voice recognition software and/or scribes may still exist and should be interpreted within the appropriate context.    Objective Observations:   Participation:Active verbal participation, Attentive, Engaged, and Open to feedback   Grooming:Casual   Cognition:Alert and Fully Oriented   Eye Contact:Good   Mood:Euthymic   Affect:Flexible and Full range   Thought Process:Logical and Goal-directed   Speech:Rate within normal limits and Volume within normal limits    Current Risk:   Suicide: low   Homicide: low   Self-Harm: low   Relapse: low   Safety Plan Reviewed: not applicable    Care Plan Updated: No    Does patient express agreement with the above plan? Yes     Diagnosis:  1. Mild episode of recurrent major depressive disorder (HCC)        Referral appointment(s) scheduled? No       FELICITAS Fournier.

## 2025-05-08 DIAGNOSIS — I10 PRIMARY HYPERTENSION: ICD-10-CM

## 2025-05-08 RX ORDER — AMLODIPINE BESYLATE 10 MG/1
10 TABLET ORAL EVERY EVENING
Qty: 100 TABLET | Refills: 3 | Status: SHIPPED | OUTPATIENT
Start: 2025-05-08 | End: 2025-05-27

## 2025-05-12 ENCOUNTER — PATIENT OUTREACH (OUTPATIENT)
Dept: HEALTH INFORMATION MANAGEMENT | Facility: OTHER | Age: 70
End: 2025-05-12
Payer: MEDICARE

## 2025-05-12 DIAGNOSIS — I10 PRIMARY HYPERTENSION: ICD-10-CM

## 2025-05-12 PROBLEM — M72.0 DUPUYTREN'S CONTRACTURE OF BOTH HANDS: Status: ACTIVE | Noted: 2025-05-12

## 2025-05-12 NOTE — CARE PLAN
Problem: Knowledge surrounding fall safety  Goal: Demonstrate ability to verbalize fall safety concerns- Long term  Outcome: Not Progressing  Intervention: Educate the patient and caregivers on fall prevention strategies  Intervention: Advise on home safety modifications such as reducing tripping hazards (area rugs, electrical cords, clutter)  Intervention: Recommend the installation of grab bars, handrails, non-slip mats, and adequate lighting  Intervention: Educate on the proper use of assistive devices (e.g., canes, walkers)  Intervention: Develop a plan for getting help if a fall occurs (e.g., medical alert systems, phone access)     Problem: Risk of Falls  Goal: Reduce the Risk of Falls and Fall Related Injuries- Long term  Outcome: Not Progressing; pt had a fall 2 weeks ago  Intervention: Assess extrinsic risk factors (e.g., home environment, footwear, assistive devices)  Intervention: Encourage regular physical activity tailored to the patient's abilities     Problem: Knowledge surrounding fall safety  Goal: Demonstrate ability to verbalize fall safety concerns- Long term  Outcome: Not Progressing  Intervention: Educate the patient and caregivers on fall prevention strategies  Intervention: Advise on home safety modifications such as reducing tripping hazards (area rugs, electrical cords, clutter)  Intervention: Recommend the installation of grab bars, handrails, non-slip mats, and adequate lighting  Intervention: Educate on the proper use of assistive devices (e.g., canes, walkers)  Intervention: Develop a plan for getting help if a fall occurs (e.g., medical alert systems, phone access)

## 2025-05-12 NOTE — PROGRESS NOTES
"Reason for Follow-Up:   Monthly Personal Care Management Program Outreach     Current Health Status  Qualifying diagnoses for PCM: CKD, HTN    Medical Updates:    Pt states that overall she is doing well. Pt states that her BP appears to be better managed with updated medication regimen from cardiology. Pt denies dizziness, lightheadedness. Pt states BP since changing medications has been 120-130/75-80. Pt states that over the last 7 days she has been having fewer headaches, which she thinks may be related to her blood pressure being lowered    Pt states that she fell 2 weeks ago in her backyard. Pt states that she \"probably didn't  my feet enough\". Pt states that she fell onto her hands and knees, and hit her head on the fence. Pt denies LOC. Pt states that the top of her head and neck were initially sore, but that this has subsided. Pt states that when she fell she strained her lower back, but that the pain has been localized to her right hip. Pt states she will monitor this for now, and let this RN and/or her PCP know if pain does not continue to resolve. Pt states she is open to PT for this, RN sent PCP a message on Epic regarding a PT referral.    Pt saw hand specialist today for Dupuytren's contracture of both hands. Pt states she is advised to monitor this issue and that if it begins to impact ability to use her hands she is to reach out to the hand specialist for next steps or surgical intervention.     Pt reports no other recent health concerns at this time.     Medication Updates:   Pt states she is taking all medications as indicated   Pt denies any difficulty obtaining medications at this time  BP regimen:   Stop losartan   Telmisartan 80mg AM  Amlodipine 10mg PM  Check BP daily and send values to cardiology     Symptoms:    Pt states she has R hip pain after the GLF 2 weeks ago in her backyard.   Pt states her headaches have been better for the last week     Plan of Care Goals and Progress    See " updated care plan note    Priority 1. HTN    Progress:  Progressing; blood pressure seems to be responding to new regimen  Pt will send BP log to cardiology after 2 weeks of new regimen  Pt denies symptoms at this time     Barriers:  chronic disease processes    Interventions: Pt will continue to check BP as directed and communicate with cardiology     Education:  Topics discussed with pt during this outreach: brief medication overview    Priority 2. Fall Risk    Progress:  Pt fell 2 weeks ago; details above  RN requested PT referral from PCP; pt states she is open to this plan    Barriers:  chronic disease processes    Interventions:  Pt will schedule with PT; practice fall risk precautions    Education: Discussed medications which could increase fall risk; pt states she will only take the flexeril at night     Patient's Concerns and Feedback with Self-Management of Care:  RN reviewed upcoming appointments with patient. Pt states she has no other questions, concerns, or needs at this time.   Pt states they will call this RN and/or PCP with any questions, concerns, needs that may arise  Pt states they will attend upcoming appointments    Next Steps:    Follow-Up Plan:  Next outreach in ~4 weeks; June 2025     Appointments:  5/22/25- neurology, 5/30/25- behavioral health, 6/19/25-behavioral health, 7/22/25- behavioral health     Contact Information:  Call [phone] with any questions or concerns.

## 2025-05-13 ENCOUNTER — TELEPHONE (OUTPATIENT)
Dept: HEALTH INFORMATION MANAGEMENT | Facility: OTHER | Age: 70
End: 2025-05-13
Payer: MEDICARE

## 2025-05-13 ENCOUNTER — PATIENT MESSAGE (OUTPATIENT)
Dept: HEALTH INFORMATION MANAGEMENT | Facility: OTHER | Age: 70
End: 2025-05-13

## 2025-05-13 NOTE — TELEPHONE ENCOUNTER
RN sent [employer] message to patient informing her that PCP would like her to come in for an appointment for post-fall evaluation.

## 2025-05-14 ENCOUNTER — HOSPITAL ENCOUNTER (OUTPATIENT)
Dept: RADIOLOGY | Facility: MEDICAL CENTER | Age: 70
End: 2025-05-14
Attending: FAMILY MEDICINE
Payer: MEDICARE

## 2025-05-14 ENCOUNTER — OFFICE VISIT (OUTPATIENT)
Dept: MEDICAL GROUP | Facility: MEDICAL CENTER | Age: 70
End: 2025-05-14
Payer: MEDICARE

## 2025-05-14 VITALS
HEART RATE: 75 BPM | BODY MASS INDEX: 30.65 KG/M2 | HEIGHT: 66 IN | DIASTOLIC BLOOD PRESSURE: 74 MMHG | WEIGHT: 190.7 LBS | OXYGEN SATURATION: 100 % | TEMPERATURE: 97.7 F | SYSTOLIC BLOOD PRESSURE: 110 MMHG

## 2025-05-14 DIAGNOSIS — M54.50 ACUTE RIGHT-SIDED LOW BACK PAIN WITHOUT SCIATICA: Primary | ICD-10-CM

## 2025-05-14 DIAGNOSIS — M54.50 ACUTE RIGHT-SIDED LOW BACK PAIN WITHOUT SCIATICA: ICD-10-CM

## 2025-05-14 DIAGNOSIS — M25.551 RIGHT HIP PAIN: ICD-10-CM

## 2025-05-14 DIAGNOSIS — R10.11 RUQ PAIN: ICD-10-CM

## 2025-05-14 DIAGNOSIS — R26.89 BALANCE PROBLEM: ICD-10-CM

## 2025-05-14 PROCEDURE — 73502 X-RAY EXAM HIP UNI 2-3 VIEWS: CPT | Mod: RT

## 2025-05-14 PROCEDURE — 72100 X-RAY EXAM L-S SPINE 2/3 VWS: CPT

## 2025-05-14 PROCEDURE — 99214 OFFICE O/P EST MOD 30 MIN: CPT | Performed by: FAMILY MEDICINE

## 2025-05-14 PROCEDURE — 3074F SYST BP LT 130 MM HG: CPT | Performed by: FAMILY MEDICINE

## 2025-05-14 PROCEDURE — 3078F DIAST BP <80 MM HG: CPT | Performed by: FAMILY MEDICINE

## 2025-05-14 RX ORDER — METHYLPREDNISOLONE 4 MG/1
TABLET ORAL
Qty: 21 TABLET | Refills: 0 | Status: SHIPPED | OUTPATIENT
Start: 2025-05-14

## 2025-05-14 ASSESSMENT — ENCOUNTER SYMPTOMS
FEVER: 0
CHILLS: 0
BACK PAIN: 1
ABDOMINAL PAIN: 1

## 2025-05-14 ASSESSMENT — FIBROSIS 4 INDEX: FIB4 SCORE: 1.22

## 2025-05-14 NOTE — PROGRESS NOTES
Verbal consent was acquired by the patient to use [employer] ambient listening note generation during this visit.      Tita was seen today for follow-up.    Diagnoses and all orders for this visit:    Acute right-sided low back pain without sciatica  -     DX-LUMBAR SPINE-2 OR 3 VIEWS; Future  -     Referral to Physical Therapy  -     methylPREDNISolone (MEDROL DOSEPAK) 4 MG Tablet Therapy Pack; As directed on the packaging label.    Right hip pain  -     DX-HIP-COMPLETE - UNILATERAL 2+ RIGHT; Future  -     Referral to Physical Therapy  -     methylPREDNISolone (MEDROL DOSEPAK) 4 MG Tablet Therapy Pack; As directed on the packaging label.    Balance problem  -     Referral to Physical Therapy    RUQ pain  -     US-RUQ; Future                  Assessment & Plan  1. Recent fall, balance problem:  New condition, unstable  - Experiencing frequent falls recently  - Referral for physical therapy initiated to address balance issues  - Gait and balance training will be part of the physical therapy regimen  - Physical therapy will help teach safe movement techniques    2. Lower back pain, right hip pain:  New condition, unstable  - Persistent lower back pain worsens with movement and is associated with right hip pain  - Arthritis in back at multiple levels and osteopenia in lumbar back and hips; possible muscle strain  - X-rays of hip and lumbar spine ordered to rule out fractures or dislocations  - Steroid pack prescribed to manage potential inflammation  - Referral to physical therapy    3. Right upper quadrant pain:  New condition, unstable  - Pain in right upper quadrant worsens when bending down  - Persistent and recurring pain, particularly during activities involving bending  - Ultrasound of abdomen ordered to evaluate for potential gallstones or other abnormalities  - Pain location suggests it may be related to the gallbladder    4. Wrist and shoulder pain:  - Soreness in wrist and shoulder following the fall  -  Wrist pain present but movement still possible, indicating less likelihood of severe injury  - Pre-existing shoulder pain may have been aggravated by the fall  - Monitor symptoms closely.  If not getting better then we will refer her to physical therapy for this      Follow-up in August for regular follow-up, sooner as needed    Chief complaint::The primary encounter diagnosis was Acute right-sided low back pain without sciatica. Diagnoses of Right hip pain, Balance problem, and RUQ pain were also pertinent to this visit.      History of Present Illness  The patient is a 70-year-old female who presents for evaluation of a recent fall, lower back pain, and right upper quadrant pain.    Recent Falls  - She reports an increase in the frequency of falls, attributing this to her inability to fully lift her left foot.  - Approximately 2 weeks ago, she experienced a fall in her yard, during which she was unable to halt her forward momentum, resulting in a head impact against a fence.  - This incident led to an immediate onset of headache and neck pain, persisting for 24 hours.  - She did not experience any associated dizziness or other symptoms.  - On 05/09/2025, she experienced another fall while moving a hose in her yard, resulting in lower back pain.    Lower Back Pain  - She believes she may have pulled something, as the pain is localized to the lower back and radiates to her right hip.  - The pain is exacerbated by movement and causes discomfort during walking, transitioning between sitting and standing positions, and getting in and out of the car.  - She has attempted to manage the pain with Flexeril, which provides some relief but does not completely alleviate the pain.    Wrist and Shoulder Pain  - She reports soreness in her wrist and shoulder, which she attributes to the aforementioned fall.  - She has a pre-existing shoulder condition, which she believes was aggravated by the fall.    Right Upper Quadrant  "Pain  - She has been experiencing persistent pain in the right upper quadrant, which is sensitive and sore, particularly when bending down.  - This pain was first noticed last summer during a cardiac rehabilitation class and has been ongoing since then.  - She recalls a previous fall a few years ago, during which she landed on her abdomen on concrete.  - At that time, she was informed that there were no fractures but that she had bruised the bone.  - The current pain is located in the same area as the previous injury.        Review of Systems   Constitutional:  Negative for chills and fever.   Gastrointestinal:  Positive for abdominal pain.   Musculoskeletal:  Positive for back pain.        Right hip pain          Medications and Allergies:     Current Medications[1]    /74 (BP Location: Left arm, Patient Position: Sitting, BP Cuff Size: Adult)   Pulse 75   Temp 36.5 °C (97.7 °F) (Temporal)   Ht 1.676 m (5' 6\")   Wt 86.5 kg (190 lb 11.2 oz)   SpO2 100% , Body mass index is 30.78 kg/m².      Physical Exam  Constitutional:       Appearance: Normal appearance. She is well-developed and well-groomed.   HENT:      Head: Normocephalic and atraumatic.      Right Ear: External ear normal.      Left Ear: External ear normal.   Eyes:      General:         Right eye: No discharge.         Left eye: No discharge.      Conjunctiva/sclera: Conjunctivae normal.   Cardiovascular:      Rate and Rhythm: Normal rate.   Pulmonary:      Effort: Pulmonary effort is normal. No respiratory distress.   Abdominal:      Tenderness: There is abdominal tenderness.      Comments: Right upper quadrant tenderness   Musculoskeletal:      Cervical back: Neck supple.      Comments: Tenderness on palpation at lower back on right side as well as SI joint tenderness   Skin:     Findings: No rash.   Neurological:      Mental Status: She is alert.   Psychiatric:         Mood and Affect: Mood and affect normal.         Behavior: Behavior normal. "                  Please note that this dictation was created using voice recognition software. I have made every reasonable attempt to correct obvious errors, but I expect that there are errors of grammar and possibly content that I did not discover before finalizing the note.           [1]   Current Outpatient Medications   Medication Sig Dispense Refill    methylPREDNISolone (MEDROL DOSEPAK) 4 MG Tablet Therapy Pack As directed on the packaging label. 21 Tablet 0    amLODIPine (NORVASC) 10 MG Tab Take 1 Tablet by mouth every evening. 100 Tablet 3    telmisartan (MICARDIS) 80 MG Tab Take 1 Tablet by mouth every day. 90 Tablet 3    cyclobenzaprine (FLEXERIL) 5 mg tablet Take 1 Tablet by mouth 3 times a day as needed for Moderate Pain. 100 Tablet 1    tretinoin (RETIN-A) 0.025 % cream AAA at bedtime, pea sized amount after moisturizer, start 2-3 times per week, increase as tolerated 45 g 1    B Complex Vitamins (VITAMIN B COMPLEX PO) Take  by mouth.      multivitamin Tab Take 1 Tablet by mouth every day.      Calcium Carb-Cholecalciferol (CALCIUM 500/VITAMIN D) 500-3.125 MG-MCG Tab Take  by mouth.      Magnesium 400 MG Cap Take 400 mg by mouth every day.      Ascorbic Acid (VITAMIN C PO) Take 1 Tablet by mouth every day.       No current facility-administered medications for this visit.

## 2025-05-15 ENCOUNTER — RESULTS FOLLOW-UP (OUTPATIENT)
Dept: MEDICAL GROUP | Facility: MEDICAL CENTER | Age: 70
End: 2025-05-15

## 2025-05-20 ENCOUNTER — TELEPHONE (OUTPATIENT)
Dept: NEUROLOGY | Facility: MEDICAL CENTER | Age: 70
End: 2025-05-20
Payer: MEDICARE

## 2025-05-22 ENCOUNTER — OFFICE VISIT (OUTPATIENT)
Dept: NEUROLOGY | Facility: MEDICAL CENTER | Age: 70
End: 2025-05-22
Attending: PSYCHIATRY & NEUROLOGY
Payer: MEDICARE

## 2025-05-22 ENCOUNTER — TELEPHONE (OUTPATIENT)
Dept: HEMATOLOGY ONCOLOGY | Facility: MEDICAL CENTER | Age: 70
End: 2025-05-22

## 2025-05-22 VITALS
OXYGEN SATURATION: 100 % | WEIGHT: 187.39 LBS | SYSTOLIC BLOOD PRESSURE: 122 MMHG | HEART RATE: 80 BPM | HEIGHT: 66 IN | DIASTOLIC BLOOD PRESSURE: 64 MMHG | TEMPERATURE: 96.6 F | BODY MASS INDEX: 30.12 KG/M2

## 2025-05-22 DIAGNOSIS — R51.9 CHRONIC NONINTRACTABLE HEADACHE, UNSPECIFIED HEADACHE TYPE: ICD-10-CM

## 2025-05-22 DIAGNOSIS — R90.89 ABNORMAL FINDING ON MRI OF BRAIN: Primary | ICD-10-CM

## 2025-05-22 DIAGNOSIS — G89.29 CHRONIC NONINTRACTABLE HEADACHE, UNSPECIFIED HEADACHE TYPE: ICD-10-CM

## 2025-05-22 PROCEDURE — 3074F SYST BP LT 130 MM HG: CPT | Performed by: PSYCHIATRY & NEUROLOGY

## 2025-05-22 PROCEDURE — 99214 OFFICE O/P EST MOD 30 MIN: CPT

## 2025-05-22 PROCEDURE — 99204 OFFICE O/P NEW MOD 45 MIN: CPT | Performed by: PSYCHIATRY & NEUROLOGY

## 2025-05-22 PROCEDURE — 3078F DIAST BP <80 MM HG: CPT | Performed by: PSYCHIATRY & NEUROLOGY

## 2025-05-22 RX ORDER — RIBOFLAVIN (VITAMIN B2) 400 MG
400 TABLET ORAL DAILY
Qty: 30 TABLET | Refills: 3 | Status: SHIPPED | OUTPATIENT
Start: 2025-05-22

## 2025-05-22 ASSESSMENT — FIBROSIS 4 INDEX: FIB4 SCORE: 1.22

## 2025-05-22 ASSESSMENT — PATIENT HEALTH QUESTIONNAIRE - PHQ9
CLINICAL INTERPRETATION OF PHQ2 SCORE: 2
SUM OF ALL RESPONSES TO PHQ QUESTIONS 1-9: 7
5. POOR APPETITE OR OVEREATING: 1 - SEVERAL DAYS

## 2025-05-22 NOTE — PROGRESS NOTES
[facility]    Date of Service: 25    Referred by: Bassam Marinelli M.D.  [address]  [address]  [place] [id number]      Reason for referral  Abnormal MRI    Previously evaluated by a neurologist   NO    History of present illness (HPI)   Tita Pitts is a 70-year-old female presents with progressively worsening bilateral hand tremors, first noted approximately one year ago. She describes intermittent shaking, more prominent on the left side and often triggered by physical activity such as yard work. The tremor is most noticeable when performing fine motor tasks, like holding a glass of water. 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 She also reports subjective weakness in the left leg, making it difficult to get into the car, although she has been engaging in exercise to improve strength. She recalls having pain in the leg previously, but this has improved. Additionally, she experienced frequent headaches over several months, often waking her in the middle of the night and lasting up to 24 hours. These episodes have since improved; she now reports only about two headache days per month, each lasting roughly 12 hours. The headaches were not associated with nausea or vomiting, and Tylenol provided little relief. Her PCP prescribed a muscle relaxant instead. She has a longstanding history of visual floaters and follows regularly with an ophthalmologist. Past medical history is notable for hypertension, currently managed with medication. She denies any history of malignancy.      Review of Systems (ROS)  Negative except as above.     Medical history  Past Medical History[1]      Surgical history  Past Surgical History[2]      Relevant family history  Family History   Problem Relation Age of Onset    Breast Cancer Mother     Diabetes Mother         Developed in 70s, at 90 Kidney failure    Hypertension Mother          diagnosed in 70s    Hyperlipidemia Mother         diagnosed in 70s    Kidney Disease Mother     Heart Disease Father          at 64, CHF, CAD    Heart Disease Brother         Heart Attack at 72    Stroke Brother         occurred at 78    Diabetes Maternal Uncle          at 68         Social history  Social History     Tobacco Use    Smoking status: Never     Passive exposure: Past    Smokeless tobacco: Never   Substance Use Topics    Alcohol use: Not Currently     Alcohol/week: 0.6 oz     Types: 1 Glasses of wine per week     Comment: Some alcohol socially over the years.  Not regular use         Medications    Active Medications[3]      Screening    Depression Screening    Little interest or pleasure in doing things?  1 - several days  Feeling down, depressed , or hopeless? 1 - several days  Trouble falling or staying asleep, or sleeping too much?  2 - more than half the days  Feeling tired or having little energy?  1 - several days  Poor appetite or overeating?  1 - several days  Feeling bad about yourself - or that you are a failure or have let yourself or your family down? 0 - not at all  Trouble concentrating on things, such as reading the newspaper or watching television? 1 - several days  Moving or speaking so slowly that other people could have noticed.  Or the opposite - being so fidgety or restless that you have been moving around a lot more than usual?  0 - not at all  Thoughts that you would be better off dead, or of hurting yourself?  0 - not at all  Patient Health Questionnaire Score: 7      If depressive symptoms identified deferred to follow up visit unless specifically addressed in assesment and plan.    Interpretation of PHQ-9 Total Score   Score Severity   1-4 No Depression   5-9 Mild Depression   10-14 Moderate Depression   15-19 Moderately Severe Depression   20-27 Severe Depression          [place] Suicide Severity Rating Scale     Wish to be Dead?: No  Suicidal Thoughts: No   "  Suicidal Thoughts with Method Without Specific Plan or Intent to Act:    Suicidal Intent Without Specific Plan:    Suicide Intent with Specific Plan:    Suicide Behavior Question: No  How long ago did you do any of these?:    C-SSRS Risk Level: No Risk    Additional Suicide Screening Questions    Suspected or Confirmed Suicide Attempted?:    Harming or killing others?:      [place] Suicide Reassessment     New or continued thoughts about killing self?:    Preparing to end life?:              Physical exam    Vitals:    05/22/25 0845   BP: 122/64   BP Location: Left arm   Patient Position: Sitting   BP Cuff Size: Adult   Pulse: 80   Temp: 35.9 °C (96.6 °F)   TempSrc: Temporal   SpO2: 100%   Weight: 85 kg (187 lb 6.3 oz)   Height: 1.664 m (5' 5.5\")           On neurological exam, the patient is alert and oriented to all spheres. The speech fluency and comprehension are intact. There are no cranial neuropathies appreciated. There is no papilledema. Proximal and distal muscle strength are preserved and the muscle tone is normal. There is no drift of the upper extremities. The reflexes are 2+ at the bilateral patellar tendons. Sensation to light touch is intact, and 2-point discrimination is normal. There is no ataxia or dysmetria. Finger and foot tapping are both normal. The patient can stand up from the chair without support and walk unassisted.       Lab Results  Lab Results   Component Value Date/Time    WBC 4.4 (L) 01/21/2025 09:15 AM    RBC 4.11 (L) 01/21/2025 09:15 AM    HEMOGLOBIN 12.9 01/21/2025 09:15 AM    HEMATOCRIT 39.5 01/21/2025 09:15 AM    MCV 96.1 01/21/2025 09:15 AM    MCH 31.4 01/21/2025 09:15 AM    MCHC 32.7 01/21/2025 09:15 AM    MPV 10.5 01/21/2025 09:15 AM    NEUTSPOLYS 50.30 01/21/2025 09:15 AM    LYMPHOCYTES 36.80 01/21/2025 09:15 AM    MONOCYTES 7.90 01/21/2025 09:15 AM    EOSINOPHILS 2.90 01/21/2025 09:15 AM    BASOPHILS 1.60 01/21/2025 09:15 AM          Lab Results   Component Value " Date/Time    SODIUM 137 04/04/2025 09:47 AM    POTASSIUM 4.6 04/04/2025 09:47 AM    CHLORIDE 103 04/04/2025 09:47 AM    CO2 23 04/04/2025 09:47 AM    GLUCOSE 85 04/04/2025 09:47 AM    BUN 14 04/04/2025 09:47 AM    CREATININE 1.09 04/04/2025 09:47 AM            Imaging  MRI brain: 2/11/2025  1.  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 The proximal aqueduct is mildly prominent. However there is no hydrocephalus. The differential diagnosis includes incidental subependymoma, ependymoma and glioma. Follow-up is recommended to ensure the stability.  2.  No acute abnormality.          Impression and Plan  Tita Wright is a 70-year-old woman with a history of hypertension presenting with gradually worsening bilateral hand tremors over the past year, more pronounced on the left, and most noticeable during activity or while holding objects. Neurological examination today is normal, without evidence of parkinsonism or cerebellar findings. Given the previously identified tectal lesion and progression of symptoms, a repeat brain MRI is planned in 3 months to monitor for any interval change. She will follow up in clinic thereafter. Regarding her intermittent headaches, which have improved in frequency and intensity, she is advised to check her blood pressure during symptomatic episodes. If blood pressure is elevated, she should contact her primary care physician; if it is within normal range, she may take Tylenol as needed. For headache prevention, she will begin magnesium oxide 400 mg daily and riboflavin 400 mg daily. She is also cleared to continue using Tylenol PRN for pain management.    1. Abnormal finding on MRI of brain (Primary)    - MR-BRAIN-WITH & W/O; Future    2. Chronic nonintractable headache, unspecified headache type    - Riboflavin 400 MG Tab; Take 400 mg by mouth every day.  Dispense: 30  Tablet; Refill: 3        Numerous questions were answered to the best of my knowledge. The patient is in agreement with the plan. Return to the clinic in 3 months.     ADMINISTRATIVE BILLING  I spent a total of 42 minutes on this patient encounter.        Bruno Rojas MD  Diplomate of the American Board of Psychiatry and Neurology.  General Neurology & Neuro-Oncology.  [facility].   of Clinical Neurology at [facility].         [1]   Past Medical History:  Diagnosis Date    Allergy     Anesthesia     ponv    Breath shortness 2022    Related to current pericarditis    Bronchitis     history of    Cataract     NOT surgically removed    Gynecological disorder 1986    Had Hysterectomy 1990    Hypertension     well controlled on medication    Pericarditis 04/07/2023    Pneumonia     history of, in 20's    PONV (postoperative nausea and vomiting)     Psychiatric problem 12/27/2022    grief - in counseling   [2]   Past Surgical History:  Procedure Laterality Date    [place] THORACOSCOPY,DX NO BX Left 1/8/2024    Procedure: LEFT THORACOSCOPY, PERICARDIAL WINDOW;  Surgeon: John H Ganser, M.D.;  Location: SURGERY [facility];  Service: General    PERICARDIAL WINDOW Left 1/8/2024    Procedure: CREATION, PERICARDIAL WINDOW;  Surgeon: John H Ganser, M.D.;  Location: SURGERY [facility];  Service: General    APPENDECTOMY  1986    ABDOMINAL HYSTERECTOMY TOTAL      GYN SURGERY      laparoscopy for endometriosis    OTHER CARDIAC SURGERY     [3]   Outpatient Medications Marked as Taking for the 5/22/25 encounter (Office Visit) with Bruno Rojas M.D.   Medication Sig Dispense Refill    amLODIPine (NORVASC) 10 MG Tab Take 1 Tablet by mouth every evening. 100 Tablet 3    telmisartan (MICARDIS) 80 MG Tab Take 1 Tablet by mouth every day. 90 Tablet 3    cyclobenzaprine (FLEXERIL) 5 mg tablet Take 1 Tablet by mouth 3 times a day as needed for Moderate Pain. 100  Tablet 1    tretinoin (RETIN-A) 0.025 % cream AAA at bedtime, pea sized amount after moisturizer, start 2-3 times per week, increase as tolerated 45 g 1    multivitamin Tab Take 1 Tablet by mouth every day.      Calcium Carb-Cholecalciferol (CALCIUM 500/VITAMIN D) 500-3.125 MG-MCG Tab Take  by mouth.      Magnesium 400 MG Cap Take 400 mg by mouth every day.      Ascorbic Acid (VITAMIN C PO) Take 1 Tablet by mouth every day.

## 2025-05-23 ENCOUNTER — HOSPITAL ENCOUNTER (OUTPATIENT)
Dept: RADIOLOGY | Facility: MEDICAL CENTER | Age: 70
End: 2025-05-23
Attending: FAMILY MEDICINE
Payer: MEDICARE

## 2025-05-23 DIAGNOSIS — R10.11 RUQ PAIN: ICD-10-CM

## 2025-05-23 PROCEDURE — 76705 ECHO EXAM OF ABDOMEN: CPT

## 2025-05-27 DIAGNOSIS — I10 PRIMARY HYPERTENSION: ICD-10-CM

## 2025-05-27 RX ORDER — AMLODIPINE BESYLATE 5 MG/1
7.5 TABLET ORAL EVERY EVENING
Qty: 150 TABLET | Refills: 3 | Status: SHIPPED | OUTPATIENT
Start: 2025-05-27

## 2025-05-30 ENCOUNTER — OFFICE VISIT (OUTPATIENT)
Dept: BEHAVIORAL HEALTH | Facility: CLINIC | Age: 70
End: 2025-05-30
Payer: MEDICARE

## 2025-05-30 DIAGNOSIS — F33.0 MILD EPISODE OF RECURRENT MAJOR DEPRESSIVE DISORDER (HCC): Primary | ICD-10-CM

## 2025-05-30 NOTE — PROGRESS NOTES
[facility]   Therapy Progress Note        Name: Tita Pitts  MRN: [id number]  : 1955  Age: 70 y.o.  Date of assessment: 2025  PCP: Bassam Marinelli M.D.  Persons in attendance: Patient  Total session time: 56 minutes      Topics addressed in psychotherapy include: Met with the patient in person for an individual counseling session. The patient was last seen by this clinician on 2024     Content of Therapy:   The patient discussed that she feels she is exhausted on most days.  The patient discussed that she has also had to fall since our last visit.  Patient [facility] [place] [facility] [place] [facility] [place] having days of increasing anxiety as she thought about to drive.  The patient discussed having increase stress however knowing that she needs to continue to be active.  The patient discussed continuing to have episodes of stress along with days of feeling great.  She reported that the last time she felt great was on the day of our last visit.     Therapeutic Intervention:   This session, the therapeutic focus was on processing with the patient cumulative stress levels.  Discussed having too many web pages open and reducing working memory.  Discussed with the patient increased fatigue from emotional stress.  Discussed with the patient her conversation with her son regarding him moving.  Although it wasn't the answer she wanted, she did have the conversation and received an answer.    Plan:     Continue working with the patient on managing stressors and anxiety.     Impression:   Major depressive disorder - Improving     This dictation has been created using voice recognition software and/or scribes. The accuracy of the dictation is limited by the abilities of the software and the expertise of the scribes. I expect there may be some errors of grammar and possibly content. I made every attempt to manually correct the errors within my dictation. However, errors related  to voice recognition software and/or scribes may still exist and should be interpreted within the appropriate context.    Objective Observations:   Participation:Active verbal participation, Attentive, Engaged, and Open to feedback   Grooming:Casual   Cognition:Alert and Fully Oriented   Eye Contact:Good   Mood:Euthymic   Affect:Flexible, Full range, and Congruent with content   Thought Process:Logical and Goal-directed   Speech:Rate within normal limits and Volume within normal limits    Current Risk:   Suicide: low   Homicide: low   Self-Harm: low   Relapse: low   Safety Plan Reviewed: not applicable    Care Plan Updated: No    Does patient express agreement with the above plan? Yes     Diagnosis:  1. Mild episode of recurrent major depressive disorder (HCC)        Referral appointment(s) scheduled? No       FELICITAS Fournier.

## 2025-06-06 ENCOUNTER — PATIENT OUTREACH (OUTPATIENT)
Dept: HEALTH INFORMATION MANAGEMENT | Facility: OTHER | Age: 70
End: 2025-06-06
Payer: MEDICARE

## 2025-06-06 DIAGNOSIS — I10 PRIMARY HYPERTENSION: Primary | ICD-10-CM

## 2025-06-06 DIAGNOSIS — N18.31 STAGE 3A CHRONIC KIDNEY DISEASE: ICD-10-CM

## 2025-06-06 NOTE — CARE PLAN
Problem: Risk of Falls  Goal: Reduce the Risk of Falls and Fall Related Injuries- Long term  Outcome: Progressing  Intervention: Encourage regular vision and hearing examinations  Intervention: Ensure the patient uses prescribed glasses or hearing aids     Problem: Knowledge surrounding fall safety  Goal: Demonstrate ability to verbalize fall safety concerns- Long term  Outcome: Progressing; no falls in the last month     Problem: Knowledge of hypertension  Goal: Demonstrate Knowledge of Hypertension- Long term  Outcome: Progressing  Intervention: Educate patient on harmful effects of high blood pressure     Problem: Knowledge of factors contributing to hypertension  Goal: Demonstrate factors that can contribute to high blood pressure- Long term  Outcome: Progressing  Note: Patient has verbalized understanding of factors contributing to high blood pressure      Problem: Recognize current health habits that may contribute to hypertension  Goal: Identify which modifiable health habits can be modifed  Outcome: Progressing  Note: Patient has verbalized willingness to change modifiable health habits   Intervention: Provide education and support about changing health habits     Problem: Patient barriers to managing high blood pressure  Goal: Identify barriers to managing blood pressure  Outcome: Progressing  Note: Patient has vocalized commitment to lessening the barriers to managing HTN

## 2025-06-06 NOTE — PROGRESS NOTES
"Reason for Follow-Up:   Monthly Personal Care Management Program Outreach     Current Health Status  Qualifying diagnoses for PCM: CKD, HTN    Medical Updates:    Pt states she is doing alright overall.     HTN- pt states her BP is generally pretty good, with systolic at about 120. However, she does have instances where she feels \"a little off\" and when she checks her BP in those instances, it has been as low as 97/60's. Pt states that when she feels this way she sits down and has a glass of water. Pt states she will send her BP log to her cardiologist for review. Reviewed ED precautions.     CKD- pt states she is monitoring her hydration and urination habits/characteristics. Pt denies changes. Pt states that she was asked to start riboflavin for headache prevention. Pt notes that her urine is very yellow when she takes riboflavin, which is a common side effect of taking riboflavin.    Rash- pt states that she has been getting a recurring rash on both of her legs, typically at night. The rash comes and goes. Pt uploaded a photo to [employer] for dermatologist to review. Pt has appointment with PCP on 6/10 to discuss, and 6/18 with dermatology. Pt states she is using anti-itch benadryl cream for this, which she states helps. Pt currently taking claritin. Pt is unable to identify any triggers such as new soap, new food, weather changes, or specific clothing materials. Pt states that she noticed the rash started about the same day that she had some GI upset, and also that the rash started around the time she started taking riboflavin. Pt's GI symptoms have resolved, and pt has discontinued riboflavin for the time being.    Pt reports no falls in the last month. Pt will be starting PT in August.     Pt reports no other recent health concerns at this time.     Medication Updates:   Pt states she is taking all medications as indicated   Pt denies any difficulty obtaining medications at this time    Symptoms:    Pt states she " "is having some occasionally low BP readings with mild symptoms, and a recurring rash on her legs as described above.    Plan of Care Goals and Progress    See updated care plan note    Priority 1. HTN    Progress:  Generally BP is in the 120's (pt did not provide diastolic value)  Instances of low BP-- 97/60's with symptom of \"feeling off\"   Pt will send BP log to cardiology to review  Pt is having less headaches, which she attributes to her BP no longer being elevated    Barriers:  chronic disease processes    Interventions: Pt will send BP log to cardiology to review, pt will move from seated to standing position slowly to avoid orthostatic hypotension, pt will stay well hydated    Education:  Topics discussed with pt during this outreach: brief medication overview, hydration, positional changes    Priority 2. CKD     Progress:  Pt denies any concerns related to CKD management at this time.  Pt states she is staying well hydrated as directed  Pt denies any changes to urine characteristics/habits aside from very yellow urine when taking riboflavin    Barriers:  chronic disease processes    Interventions:  Pt will continue to monitor urinary habits/characteristics and hydration    Education: Topics discussed with pt during this outreach: urinary monitoring and hydration      Patient's Concerns and Feedback with Self-Management of Care:  RN reviewed upcoming appointments with patient. Pt states she has no other questions, concerns, or needs at this time.   Pt states she will call this RN and/or PCP with any questions, concerns, needs that may arise    Next Steps:    Follow-Up Plan:  Next outreach in ~4 weeks; July 2025     Appointments:  6/10/25- PCP, 6/18/25- derm    Contact Information:  Call [phone] with any questions or concerns.    Quarterly chart review completed. Pt continues to qualify for and benefit from Personal Care Management Program.    "

## 2025-06-10 ENCOUNTER — OFFICE VISIT (OUTPATIENT)
Dept: MEDICAL GROUP | Facility: MEDICAL CENTER | Age: 70
End: 2025-06-10
Payer: MEDICARE

## 2025-06-10 VITALS
DIASTOLIC BLOOD PRESSURE: 76 MMHG | OXYGEN SATURATION: 98 % | HEIGHT: 67 IN | BODY MASS INDEX: 29.93 KG/M2 | WEIGHT: 190.7 LBS | TEMPERATURE: 96.6 F | HEART RATE: 75 BPM | SYSTOLIC BLOOD PRESSURE: 106 MMHG

## 2025-06-10 DIAGNOSIS — Z91.018 FOOD ALLERGY: ICD-10-CM

## 2025-06-10 DIAGNOSIS — D17.71 ANGIOMYOLIPOMA OF KIDNEY: ICD-10-CM

## 2025-06-10 DIAGNOSIS — R21 RASH: Primary | ICD-10-CM

## 2025-06-10 PROCEDURE — 3074F SYST BP LT 130 MM HG: CPT | Performed by: FAMILY MEDICINE

## 2025-06-10 PROCEDURE — 99214 OFFICE O/P EST MOD 30 MIN: CPT | Performed by: FAMILY MEDICINE

## 2025-06-10 PROCEDURE — 3078F DIAST BP <80 MM HG: CPT | Performed by: FAMILY MEDICINE

## 2025-06-10 RX ORDER — LORATADINE 10 MG/1
10 TABLET ORAL DAILY
COMMUNITY

## 2025-06-10 RX ORDER — EPINEPHRINE 0.3 MG/.3ML
INJECTION SUBCUTANEOUS
Qty: 1 EACH | Refills: 1 | Status: SHIPPED | OUTPATIENT
Start: 2025-06-10

## 2025-06-10 ASSESSMENT — ENCOUNTER SYMPTOMS
CHILLS: 0
PALPITATIONS: 0
FEVER: 0

## 2025-06-10 ASSESSMENT — FIBROSIS 4 INDEX: FIB4 SCORE: 1.22

## 2025-06-11 ENCOUNTER — OFFICE VISIT (OUTPATIENT)
Dept: DERMATOLOGY | Facility: IMAGING CENTER | Age: 70
End: 2025-06-11
Payer: MEDICARE

## 2025-06-11 ENCOUNTER — HOSPITAL ENCOUNTER (OUTPATIENT)
Dept: LAB | Facility: MEDICAL CENTER | Age: 70
End: 2025-06-11
Attending: FAMILY MEDICINE
Payer: MEDICARE

## 2025-06-11 DIAGNOSIS — L50.9 URTICARIA: Primary | ICD-10-CM

## 2025-06-11 PROCEDURE — 99213 OFFICE O/P EST LOW 20 MIN: CPT | Performed by: NURSE PRACTITIONER

## 2025-06-11 PROCEDURE — 36415 COLL VENOUS BLD VENIPUNCTURE: CPT

## 2025-06-11 PROCEDURE — 86003 ALLG SPEC IGE CRUDE XTRC EA: CPT | Mod: 91

## 2025-06-11 NOTE — PROGRESS NOTES
DERMATOLOGY NOTE  FOLLOW UP VISIT       Chief complaint: Rash    HPI:rash rt thighs- no longer there  Onset:  11 days ago and lasted a night, now comes and goes   Symptoms: itchy hot, hives   Aggravating factors: none,, pt reports it might be a food allergy   Alleviating factors: benadryl   New creams/topicals: none   New medications (up to last 6 months): constant med adjustments for BP, new supplement Vit b 3-4 days rash happens   New travel: none   Other exposures: pt also had diarrhea   Treatments: Claritin and benadryl       History of skin cancer: Yes, Details: BCC upper forehead, MO[facility], 10/23  History of precancers/actinic keratoses: No  History of biopsies:Yes, Details: left leg  , results benign   History of blistering/severe sunburns:Yes, Details: child   Family history of skin cancer:No  Family history of atypical moles:No      Allergies   Allergen Reactions    Meclizine Hives    Other Drug Unspecified     Ruvert = Other reaction(s): Welts on legs    Atenolol      Very tired and slow, 25mg dose    Macrodantin [Nitrofurantoin]      nightmares        MEDICATIONS:  Medications relevant to specialty reviewed.     REVIEW OF SYSTEMS:   Positive for skin (see HPI)  Negative for fevers and chills       EXAM:  LMP  (LMP Unknown)   Constitutional: Well-developed, well-nourished, and in no distress.     A focused skin exam was performed including the affected areas of the BLE. Notable findings on exam today listed below and/or in assessment/plan.       No rash present on BLEs on exam today--reviewed pics on phone      IMPRESSION / PLAN:    1. Urticaria, favored--acute, spontaneous  Based on pt's description of symptoms and pics on phone, highly suspicious for acute spontaneous urticaria, unknown cause  Extensively discussed Dx, shared online content from [employer]  Discussed supportive measures, pt has epi-pen on hand  Taking loratadine, 1 pill daily, advised to increase to 2 pills a day, up to 4 pills daily max  PCP  ordered food allergy testing (serology)  Will await lab results. Pt aware may need referral to allergist for further testing        Patient verbalized understanding and agrees with plan regarding the above      Please note that this dictation was created using voice recognition software. I have made every reasonable attempt to correct obvious errors, but I expect that there are errors of grammar and possibly content that I did not discover before finalizing the note.      Return to clinic in: Return for pending lab results. and as needed for any new or changing skin lesions.

## 2025-06-11 NOTE — PROGRESS NOTES
Verbal consent was acquired by the patient to use [employer] ambient listening note generation during this visit.      Tita was seen today for follow-up.    Diagnoses and all orders for this visit:    Rash  -     FOOD #1 (61)    Food allergy  -     FOOD #1 (61)  -     EPINEPHrine (EPIPEN) 0.3 MG/0.3ML Solution Auto-injector solution for injection; Inject 0.3 mL into the thigh one time for 1 dose    Angiomyolipoma of kidney                  Assessment & Plan  1. Rash  New condition, stable  - Likely a result of an allergic reaction to either food or supplements, specifically the newly introduced taco powder or vitamin B2  - Increased redness, slight itchiness, and hot sensation noted, with intermittent recurrence  - Comprehensive 61-panel blood test will be ordered to identify potential allergens  - Advised to avoid taco powder and supplements until further notice  - Prescribed EpiPen for emergency use in case of anaphylactic reactions  - Can continue using Benadryl for symptom management, but should be aware of its potential to cause drowsiness  - Should go to the ER if experiencing throat closing    2. Angiomyolipoma  New condition, stable  - Ultrasound revealed a small angiomyolipoma on the kidney, measuring 5.1 mm  - Kidney function slightly low, likely due to previous medications, but not significantly concerning at this time  - Kidney function will be monitored  - Repeat ultrasound scheduled for 05/2026  - Should inform the office immediately if experiencing any pain for a repeat ultrasound and possible referral to a surgeon  - Blood work scheduled for 08/2025 to monitor kidney function          Chief complaint::The primary encounter diagnosis was Rash. Diagnoses of Food allergy and Angiomyolipoma of kidney were also pertinent to this visit.      History of Present Illness  The patient is a 70-year-old female who presents for evaluation of a rash.    Rash  - Onset of rash on legs approximately one week  "ago  - Characterized by redness, mild itching, heat, and bumpy texture  - Appeared after dinner on Sunday evening (06/01/2025) and progressively worsened  - Experienced diarrhea around the same time  - Consumed taco powder from [employer], a product she had not previously used  - Started a B2 supplement a few days prior to rash's appearance  - Discontinued all supplements  - Did not experience any respiratory distress  - No known food allergies  - Rash resolved after taking Benadryl but recurred on Wednesday (06/04/2025), less severe and confined to one leg  - Documenting rash with photographs  - Rash has not reappeared since the weekend  - Attempted self-treatment with hydrocortisone and Claritin, which she regularly takes for seasonal allergies    Supplemental information: She has an appointment to see dermatology.      Review of Systems   Constitutional:  Negative for chills and fever.   Cardiovascular:  Negative for chest pain, palpitations and leg swelling.          Medications and Allergies:       Current Outpatient Medications:     loratadine, 10 mg, Oral, DAILY, Taking    EPINEPHrine, Inject 0.3 mL into the thigh one time for 1 dose, Taking    amLODIPine, 7.5 mg, Oral, Q EVENING (Patient taking differently: 5 mg, Oral, EVERY EVENING), Taking Differently    telmisartan, 80 mg, Oral, DAILY, Taking    cyclobenzaprine, 5 mg, Oral, TID PRN, Taking As Needed    Calcium 500/Vitamin D, Take  by mouth. (Patient not taking: Reported on 6/10/2025), Not Taking     /76 (BP Location: Left arm, Patient Position: Sitting, BP Cuff Size: Adult)   Pulse 75   Temp 35.9 °C (96.6 °F) (Temporal)   Ht 1.689 m (5' 6.5\")   Wt 86.5 kg (190 lb 11.2 oz)   SpO2 98% , Body mass index is 30.32 kg/m².      Physical Exam  Constitutional:       Appearance: Normal appearance. She is well-developed and well-groomed.   HENT:      Head: Normocephalic and atraumatic.      Right Ear: External ear normal.      Left Ear: External ear normal. "   Eyes:      General:         Right eye: No discharge.         Left eye: No discharge.      Conjunctiva/sclera: Conjunctivae normal.   Cardiovascular:      Rate and Rhythm: Normal rate.   Pulmonary:      Effort: Pulmonary effort is normal. No respiratory distress.   Musculoskeletal:      Cervical back: Neck supple.   Skin:     Findings: No rash.   Neurological:      Mental Status: She is alert.   Psychiatric:         Mood and Affect: Mood and affect normal.         Behavior: Behavior normal.          US-RUQ  Narrative: 5/23/2025 10:51 AM    HISTORY/REASON FOR EXAM:  Pain  Right upper quadrant pain    TECHNIQUE/EXAM DESCRIPTION AND NUMBER OF VIEWS:  Real-time sonography of the liver and biliary tree.    COMPARISON: None    FINDINGS:  The liver is normal in contour. There is no evidence of solid mass lesion. The liver measures 17.27 cm.    The gallbladder is normal. There is no evidence of cholelithiasis.  The gallbladder wall thickness measures 0.18 cm. There is no pericholecystic fluid.  The common duct measures 0.42 cm.    The visualized pancreas is unremarkable.  The visualized aorta is normal in caliber.    Intrahepatic IVC is patent.    The portal vein is patent with hepatopetal flow. The MPV measures 1.07 cm.    The right kidney measures 10.48 cm.  There is a 5.1 x 4.4 x 3.7 mm hyperechoic nodule within the right kidney consistent with angiomyolipoma.  No right hydronephrosis.    There is no ascites.  Impression: 1.  No gallstones or dilatation of the common duct. No free fluid.    2.  5.1 mm hyperechoic nodule within the right kidney consistent with angiomyolipoma. No right hydronephrosis.           Please note that this dictation was created using voice recognition software. I have made every reasonable attempt to correct obvious errors, but I expect that there are errors of grammar and possibly content that I did not discover before finalizing the note.

## 2025-06-13 ENCOUNTER — RESULTS FOLLOW-UP (OUTPATIENT)
Dept: MEDICAL GROUP | Facility: MEDICAL CENTER | Age: 70
End: 2025-06-13

## 2025-06-13 ENCOUNTER — PATIENT MESSAGE (OUTPATIENT)
Dept: CARDIOLOGY | Facility: MEDICAL CENTER | Age: 70
End: 2025-06-13
Payer: MEDICARE

## 2025-06-13 DIAGNOSIS — R07.89 OTHER CHEST PAIN: Primary | ICD-10-CM

## 2025-06-13 LAB
ALMOND IGE QN: <0.1 KU/L
ASPARAGUS IGE QN: <0.1 KU/L
AVOCADO IGE QN: <0.1 KU/L
BAKER'S YEAST IGE QN: <0.1 KU/L
BANANA IGE QN: <0.1 KU/L
BASIL IGE QN: <0.1 KU/L
BAYLEAF IGE QN: <0.1 KU/L
BEEF IGE QN: <0.1 KU/L
BEET IGE QN: <0.1 KU/L
BELL PEPPER IGE QN: <0.1 KU/L
BLACK PEPPER IGE QN: <0.1 KU/L
BLUE MUSSEL IGE QN: <0.1 KU/L
BLUEBERRY IGE QN: <0.1 KU/L
BRAZIL NUT IGE QN: <0.1 KU/L
BROCCOLI IGE QN: <0.1 KU/L
BUCKWHEAT IGE QN: <0.1 KU/L
CABBAGE IGE QN: <0.1 KU/L
CARROT IGE QN: <0.1 KU/L
CASHEW NUT IGE QN: <0.1 KU/L
CHESTNUT IGE QN: <0.1 KU/L
CHICKPEA IGE AB [UNITS/VOLUME] IN SERUM: <0.1 KU/L
CHOCOLATE IGE QN: <0.1 KU/L
CINNAMON IGE QN: <0.1 KU/L
CLAM IGE QN: <0.1 KU/L
COCONUT IGE QN: <0.1 KU/L
CODFISH IGE QN: <0.1 KU/L
COW MILK IGE QN: <0.1 KU/L
CRAB IGE QN: <0.1 KU/L
CUCUMBER IGE QN: <0.1 KU/L
CULTIVATED COTTON IGE QN: <0.1 KU/L
DEPRECATED MISC ALLERGEN IGE RAST QL: ABNORMAL
DILL IGE QN: <0.1 KU/L
EGG WHITE IGE QN: <0.1 KU/L
GINGER IGE QN: <0.1 KU/L
GRAPE IGE QN: <0.1 KU/L
HALIBUT IGE QN: <0.1 KU/L
HAZELNUT IGE QN: 0.54 KU/L
LETTUCE IGE QN: <0.1 KU/L
LIMA BEAN IGE QN: <0.1 KU/L
MELON IGE QN: <0.1 KU/L
ONION IGE QN: <0.1 KU/L
ORANGE IGE QN: <0.1 KU/L
OREGANO IGE QN: <0.1 KU/L
PEA IGE QN: <0.1 KU/L
PEANUT IGE QN: <0.1 KU/L
PEAR IGE QN: <0.1 KU/L
PLUM IGE QN: <0.1 KU/L
PORK IGE QN: <0.1 KU/L
POTATO IGE QN: <0.1 KU/L
RASPBERRY IGE QN: <0.1 KU/L
SESAME SEED IGE QN: <0.1 KU/L
SHRIMP IGE QN: <0.1 KU/L
SOYBEAN IGE QN: <0.1 KU/L
TEA IGE QN: <0.1 KU/L
TOMATO IGE QN: <0.1 KU/L
TROUT IGE QN: <0.1 KU/L
TURKEY MEAT IGE QN: <0.1 KU/L
WALNUT IGE QN: <0.1 KU/L
WATERMELON IGE QN: <0.1 KU/L

## 2025-06-16 ENCOUNTER — HOSPITAL ENCOUNTER (OUTPATIENT)
Dept: LAB | Facility: MEDICAL CENTER | Age: 70
End: 2025-06-16
Attending: INTERNAL MEDICINE
Payer: MEDICARE

## 2025-06-16 DIAGNOSIS — R07.89 OTHER CHEST PAIN: ICD-10-CM

## 2025-06-16 LAB
CRP SERPL HS-MCNC: <0.3 MG/DL (ref 0–0.75)
ERYTHROCYTE [SEDIMENTATION RATE] IN BLOOD BY WESTERGREN METHOD: 17 MM/HOUR (ref 0–25)

## 2025-06-16 PROCEDURE — 36415 COLL VENOUS BLD VENIPUNCTURE: CPT

## 2025-06-16 PROCEDURE — 85652 RBC SED RATE AUTOMATED: CPT

## 2025-06-16 PROCEDURE — 86140 C-REACTIVE PROTEIN: CPT

## 2025-06-17 ENCOUNTER — PATIENT MESSAGE (OUTPATIENT)
Dept: CARDIOLOGY | Facility: MEDICAL CENTER | Age: 70
End: 2025-06-17
Payer: MEDICARE

## 2025-06-18 ENCOUNTER — PATIENT MESSAGE (OUTPATIENT)
Dept: CARDIOLOGY | Facility: MEDICAL CENTER | Age: 70
End: 2025-06-18
Payer: MEDICARE

## 2025-06-18 DIAGNOSIS — Z98.890 S/P PERICARDIAL WINDOW CREATION: Primary | ICD-10-CM

## 2025-06-18 DIAGNOSIS — I10 PRIMARY HYPERTENSION: ICD-10-CM

## 2025-06-18 DIAGNOSIS — I31.39 IDIOPATHIC PERICARDIAL EFFUSION: ICD-10-CM

## 2025-06-18 DIAGNOSIS — R60.0 BILATERAL LOWER EXTREMITY EDEMA: ICD-10-CM

## 2025-06-18 RX ORDER — INDAPAMIDE 2.5 MG/1
2.5 TABLET ORAL EVERY MORNING
Qty: 30 TABLET | Refills: 1 | Status: SHIPPED | OUTPATIENT
Start: 2025-06-18

## 2025-06-18 NOTE — PATIENT COMMUNICATION
S/W AL regarding pt messages and symptoms. AL reviewed messages and recommends:  -STOP amlodipine   -START Indapimide 2.5mg qAM; check on pt Monday and if tolerating - plan to increase to 5mg daily  - take telmisartan qPM  -monitor daily weight  - repeat cardiac MRI, preferably at [facility] for hx of pericardial effusion    Called pt and advised of above recommendations. Pt verbalized understanding of these changes and instructions and is agreeable with this plan. She will call her insurance to see if she can complete cardiac MRI at [facility] and will let me know. Advised pt that I will follow-up with her Monday

## 2025-06-19 ENCOUNTER — PATIENT MESSAGE (OUTPATIENT)
Dept: CARDIOLOGY | Facility: MEDICAL CENTER | Age: 70
End: 2025-06-19
Payer: MEDICARE

## 2025-06-19 ENCOUNTER — APPOINTMENT (OUTPATIENT)
Dept: BEHAVIORAL HEALTH | Facility: CLINIC | Age: 70
End: 2025-06-19
Payer: MEDICARE

## 2025-06-19 DIAGNOSIS — F33.0 MILD EPISODE OF RECURRENT MAJOR DEPRESSIVE DISORDER (HCC): Primary | ICD-10-CM

## 2025-06-19 PROCEDURE — 90837 PSYTX W PT 60 MINUTES: CPT | Performed by: MARRIAGE & FAMILY THERAPIST

## 2025-06-19 NOTE — PATIENT COMMUNICATION
Phone Number Called: [phone]     Call outcome: Spoke to patient regarding message below.    Message: Called to inquire about patient's MRI. Patient was informed by her insurance that she needs a PA to go down to [facility] for MRI. Patient is willing to go to [facility] or [facility] for MRI. Patient was informed by insurance they may not cover MRI done at [facility]. Patient did confirm that she started the indapamide. Yovana GONZALEZ to follow up with patient on Monday. Sent as Fyi to Yovana GONZLAEZ.
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
## 2025-06-20 ENCOUNTER — PATIENT MESSAGE (OUTPATIENT)
Dept: CARDIOLOGY | Facility: MEDICAL CENTER | Age: 70
End: 2025-06-20
Payer: MEDICARE

## 2025-07-01 ENCOUNTER — OFFICE VISIT (OUTPATIENT)
Facility: MEDICAL CENTER | Age: 70
End: 2025-07-01
Attending: INTERNAL MEDICINE
Payer: MEDICARE

## 2025-07-01 VITALS
RESPIRATION RATE: 16 BRPM | HEIGHT: 67 IN | HEART RATE: 81 BPM | SYSTOLIC BLOOD PRESSURE: 108 MMHG | WEIGHT: 192 LBS | BODY MASS INDEX: 30.13 KG/M2 | OXYGEN SATURATION: 97 % | DIASTOLIC BLOOD PRESSURE: 68 MMHG

## 2025-07-01 DIAGNOSIS — I10 PRIMARY HYPERTENSION: ICD-10-CM

## 2025-07-01 DIAGNOSIS — I83.893 VARICOSE VEINS OF BOTH LEGS WITH EDEMA: ICD-10-CM

## 2025-07-01 DIAGNOSIS — N18.31 STAGE 3A CHRONIC KIDNEY DISEASE: ICD-10-CM

## 2025-07-01 DIAGNOSIS — I49.3 PVC (PREMATURE VENTRICULAR CONTRACTION): ICD-10-CM

## 2025-07-01 DIAGNOSIS — R60.0 BILATERAL LOWER EXTREMITY EDEMA: Primary | ICD-10-CM

## 2025-07-01 DIAGNOSIS — Z98.890 S/P PERICARDIAL WINDOW CREATION: ICD-10-CM

## 2025-07-01 LAB — EKG IMPRESSION: NORMAL

## 2025-07-01 PROCEDURE — 93005 ELECTROCARDIOGRAM TRACING: CPT | Mod: TC | Performed by: INTERNAL MEDICINE

## 2025-07-01 PROCEDURE — 3074F SYST BP LT 130 MM HG: CPT | Performed by: INTERNAL MEDICINE

## 2025-07-01 PROCEDURE — 99212 OFFICE O/P EST SF 10 MIN: CPT | Performed by: INTERNAL MEDICINE

## 2025-07-01 PROCEDURE — 99213 OFFICE O/P EST LOW 20 MIN: CPT | Performed by: INTERNAL MEDICINE

## 2025-07-01 PROCEDURE — 93010 ELECTROCARDIOGRAM REPORT: CPT | Performed by: INTERNAL MEDICINE

## 2025-07-01 PROCEDURE — 3078F DIAST BP <80 MM HG: CPT | Performed by: INTERNAL MEDICINE

## 2025-07-01 PROCEDURE — 99214 OFFICE O/P EST MOD 30 MIN: CPT | Performed by: INTERNAL MEDICINE

## 2025-07-01 ASSESSMENT — FIBROSIS 4 INDEX: FIB4 SCORE: 1.22

## 2025-07-01 NOTE — PROGRESS NOTES
CARDIOLOGY established PATIENT:    PCP: Bassam Marinelli M.D.    1. Bilateral lower extremity edema    2. PVC (premature ventricular contraction)    3. Varicose veins of both legs with edema    4. S/P pericardial window creation    5. Primary hypertension    6. Stage 3a chronic kidney disease        Tita Pitts is here for hypertension , LEDA and CARDENAS follow-up    Chief Complaint   Patient presents with    Hypertension    Premature Ventricular Contractions (PVCs)       History:     Tita Pitts is a 70 y.o. female with history of hypertension, orthostasis with diuretics, chronic LEDA, PVCs, ZERO calcium score  11/2022,  recurrent pericardial effusion post successful pericardial window 1/2024 Dr. Ganser - Her effusion at this point has been attributed to long COVID due to extensive reassuring negative workup for autoimmune disease, thyroid disease or malignancy -.      Phone call 3/14/24: Had issues of hypotension while she was sick, UTI.     Treadmill EKG reassuring , referred to [facility] 5/2024     Clinic 7/2024: palpitations, tried atenolol, did not tolerate. Decreased leeanna to 5mg.    COVID illness 12/2024-1/2025, recovered.     Clinic 2/2025: Due to orthostasis, arrange for echocardiogram.  Then Tilt table test    Switched losartan to telmisartan 4/23/25    Amlodipine dose increase to 10mg 4/4/2025    Stopped amlodipine 6/18/2025    Stopped Indapamide 6/22: took 4 days     cMRI scheduled 7/31/25    BP log overall < 140/80  mmHg. HR 60-70's bpm.    Palpitations not much bothersome. Denies CP, stable CARDENAS (worse . LEDA better after stopping amlodipine. Recently in [place], [place] walked daily ~ 8-9 miles and felty very good at [place] level. Denies orthopnea, PND. Difficulty losing weight despite exercise. Presyncope much better after stopping HCTZ      Normal ESR and CRP 6/16/25  Normal UPEP and SPEP 4/2025    TTE 2/26/25: Personally reviewed  Normal LV size, thickness and systolic function with normal LVEF  60-65%  Normal LV diastolic function  Normal RV size and systolic function  Mild MR  Mild TR  Normal IVC size  No pericardial effusion  Normal estimated RVSP 25-30 mmHg  Reassuring echocardiogram findings    Tilt Table 2/26/25: Personally reviewed  - initial orthostatic hypotension from supine to standing that stabilizes for about 15 minutes of standing. No syncope.   - followed by a spike in systolic and drop in diastolic blood pressure   - no concerns for chronotropic incompetence   - Rare ectopy noted   - upsloping ST depressions noted, equivocal     TTE 6/2024: Personally viewed  No effusion, normal LVEF     Treadmill EKG 5/30/24: Personally reviewed  Average exercise capacity for age and gender achieving target heart rate    and 7.1 METS on the treadmill Oli protocol for 5 min (Good is >=8METS)   Normal blood pressure response to stress: peak /84mmHg   Adequate workload > 29,000 bpm.mmHg   Negative stress EKG for ischemia   Occasional PVCs and rare PACs noted, no VT or NSVT detected   Shortness of breath during the study was reported     TTE 3/15/24: Personally reviewed  /90's at time of study  Frequent PVCs noted during the study  No pericardial effusion  Normal biventricular systolic functions  LVEF 65-70%  Normal IVC size  Mild TR  Estimated RVSP 30-35mmHg     Monitor 1/2023: personally reviewed  Rare PACs   Occasional PVCs 6%  Patient triggered event correlated with sinus rhythm, PAC and PVC   1 episode of asymptomatic 5 beats run of NSVT was noted      cMRI 4/2023:  1.  Small pericardial effusion. Normal pericardial thickness. No MR evidence of constrictive pericarditis.  2.  Normal left ventricular size and function, with estimated LVEF of 60.6%. No delayed enhancement.  3.  Trivial aortic regurgitation.     CAC score 11/2022:  LMA - 0.0  LCX - 0.0  LAD - 0.0  RCA - 0.0  PDA - 0.0  Total Calcium Score: 0.0  Percentile: Calcium score is below the 25th percentile for the patient's age and  "sex.  Pericardial effusion noted    PE:  /68 (BP Location: Left arm, Patient Position: Sitting, BP Cuff Size: Adult)   Pulse 81   Resp 16   Ht 1.689 m (5' 6.5\")   Wt 87.1 kg (192 lb)   LMP  (LMP Unknown)   SpO2 97%   BMI 30.53 kg/m²     Gen: well  HEENT: Symmetric face.  NECK: No JVD.   CARDIAC: Regular, Normal S1, S2, No murmur or friction rub  VASCULATURE: carotids are normal bilaterally without bruit  RESP: Clear to auscultation bilaterally   ABD: Soft, non-tender, non-distended  EXT: Trace lower extremity edema, varicose veins noted  SKIN: Warm and dry  NEURO: No gross deficits  PSYCH: Appropriate affect, participates in conversation    The 10-year ASCVD risk score (Yo LINCOLN, et al., 2019) is: 6.7%    Past Medical History[1]  Past Surgical History[2]  Allergies[3]  Encounter Medications[4]  Social History     Socioeconomic History    Marital status:      Spouse name: Not on file    Number of children: Not on file    Years of education: Not on file    Highest education level: Bachelor's degree (e.g., BA, AB, BS)   Occupational History    Not on file   Tobacco Use    Smoking status: Never     Passive exposure: Past    Smokeless tobacco: Never   Vaping Use    Vaping status: Never Used   Substance and Sexual Activity    Alcohol use: Not Currently     Alcohol/week: 0.6 oz     Types: 1 Glasses of wine per week     Comment: Some alcohol socially over the years.  Not regular use    Drug use: Never    Sexual activity: Not Currently     Partners: Male     Birth control/protection: Surgical, Abstinence, None, Post-Menopausal     Comment: Had Endometriosis had hysterectomy.   Other Topics Concern    Not on file   Social History Narrative    Not on file     Social Drivers of Health     Financial Resource Strain: Low Risk  (3/20/2025)    Overall Financial Resource Strain (CARDIA)     Difficulty of Paying Living Expenses: Not hard at all   Food Insecurity: No Food Insecurity (3/20/2025)    Hunger Vital " Sign     Worried About Running Out of Food in the Last Year: Never true     Ran Out of Food in the Last Year: Never true   Transportation Needs: No Transportation Needs (3/20/2025)    PRAPARE - Transportation     Lack of Transportation (Medical): No     Lack of Transportation (Non-Medical): No   Physical Activity: Sufficiently Active (2024)    Exercise Vital Sign     Days of Exercise per Week: 5 days     Minutes of Exercise per Session: 30 min   Stress: Stress Concern Present (2024)    [employer] - Occupational Stress Questionnaire     Feeling of Stress : Rather much   Social Connections: Moderately Integrated (2024)    Social Connection and Isolation Panel [NHANES]     Frequency of Communication with Friends and Family: Twice a week     Frequency of Social Gatherings with Friends and Family: Once a week     Attends Buddhism Services: 1 to 4 times per year     Active Member of Clubs or Organizations: Yes     Attends Club or Organization Meetings: 1 to 4 times per year     Marital Status:    Intimate Partner Violence: Not on file   Housing Stability: Low Risk  (2024)    Housing Stability Vital Sign     Unable to Pay for Housing in the Last Year: No     Number of Times Moved in the Last Year: 0     Homeless in the Last Year: No     Family History   Problem Relation Age of Onset    Breast Cancer Mother     Diabetes Mother         Developed in 70s, at 90 Kidney failure    Hypertension Mother         diagnosed in 70s    Hyperlipidemia Mother         diagnosed in 70s    Kidney Disease Mother     Heart Disease Father          at 64, CHF, CAD    Heart Disease Brother         Heart Attack at 72    Stroke Brother         occurred at 78    Diabetes Maternal Uncle          at 68         Studies    Lab Results   Component Value Date/Time    TSHULTRASEN 1.360 2025 0915        Lab Results   Component Value Date/Time    FREET4 1.00  2025 0915      Lab Results   Component Value Date/Time    HBA1C 5.2 2022 08:19 AM     Lab Results   Component Value Date/Time    CHOLSTRLTOT 183 2025 09:47 AM    LDL 99 2025 09:47 AM    HDL 68 2025 09:47 AM    TRIGLYCERIDE 80 2025 09:47 AM       Lab Results   Component Value Date/Time    SODIUM 137 2025 09:47 AM    POTASSIUM 4.6 2025 09:47 AM    CHLORIDE 103 2025 09:47 AM    CO2 23 2025 09:47 AM    GLUCOSE 85 2025 09:47 AM    BUN 14 2025 09:47 AM    CREATININE 1.09 2025 09:47 AM     Lab Results   Component Value Date/Time    ALKPHOSPHAT 92 2025 09:47 AM    ASTSGOT 26 2025 09:47 AM    ALTSGPT 16 2025 09:47 AM    TBILIRUBIN 0.6 2025 09:47 AM        Echocardiogram:  No results found for this or any previous visit.    EC25 personally reviewed and interpreted  Sinus rhythm   Low voltage, precordial leads   Compared to ECG 2024 08:52:41   Atrial premature complex(es) no longer present   Electronically Signed On 2025 12:53:51 PDT by Chase Pena MD     Assessment and Recommendations:    Problem List Items Addressed This Visit          Cardiology Medicine Problems    Primary hypertension    Relevant Orders    proBrain Natriuretic Peptide, NT       Other    Stage 3a chronic kidney disease    PVC (premature ventricular contraction)    Relevant Orders    EKG (Completed)    S/P pericardial window creation     Other Visit Diagnoses         Bilateral lower extremity edema    -  Primary    Relevant Orders    US-EXTREMITY VENOUS LOWER BILAT      Varicose veins of both legs with edema        Relevant Orders    US-EXTREMITY VENOUS LOWER BILAT          Blood pressure overall at goal without any orthostatic concerns, with rare occasions of elevated systolic blood pressure.    Will only continue telmisartan for the time being.  Intolerant to beta-blockers, amlodipine, indapamide and HCTZ.    Ordered lower extremity  venous duplex with reflux study to assess for any underlying concerning reflux disease to treat accordingly which might contribute to her lower extremity edema    Cardiac MRI scheduled end of the month mainly to assess for any constrictive pericarditis physiology and pericardial effusion.  With history of pericardial window.  Will manage accordingly    Congratulated her on her ongoing efforts to stay active with exercise and to pursue heart healthy lifestyle.  Given her difficulty losing weight, offered semaglutide type medications but she prefers to avoid if possible.  Also offered her to look into potential hormonal replacement therapy which might help with her weight loss approach which she will look into; as long as we continue close monitoring of her blood pressure to avoid spikes in her blood pressure    Thank you for the opportunity to be involved in Tita Pitts 's cardiovascular care; and please reach out with any questions or concerns.    Return in about 6 months (around 1/1/2026).    Chase Pena MD, MPH [facility]  Interventional Cardiologist  [facility]   of Clinical Internal Medicine - [facility]    ~ Portions of this note were completed using voice recognition software (Dragon Naturally speaking software) . Occasional transcription errors may have escaped proof reading. I have made every reasonable attempt to correct obvious errors, but I expect that there are errors of grammar and possibly content that I did not discover before finalizing the note. ~         [1]   Past Medical History:  Diagnosis Date    Allergy     Anesthesia     ponv    Breath shortness 2022    Related to current pericarditis    Bronchitis     history of    Cataract     NOT surgically removed    Gynecological disorder 1986    Had Hysterectomy 1990    Hypertension     well controlled on medication    Pericarditis 04/07/2023    Pneumonia     history of,  in 20's    PONV (postoperative nausea and vomiting)     Psychiatric problem 12/27/2022    grief - in counseling   [2]   Past Surgical History:  Procedure Laterality Date    OH THORACOSCOPY,DX NO BX Left 1/8/2024    Procedure: LEFT THORACOSCOPY, PERICARDIAL WINDOW;  Surgeon: John H Ganser, M.D.;  Location: SURGERY [facility];  Service: General    PERICARDIAL WINDOW Left 1/8/2024    Procedure: CREATION, PERICARDIAL WINDOW;  Surgeon: John H Ganser, M.D.;  Location: SURGERY [facility];  Service: General    APPENDECTOMY  1986    ABDOMINAL HYSTERECTOMY TOTAL      GYN SURGERY      laparoscopy for endometriosis    OTHER CARDIAC SURGERY     [3]   Allergies  Allergen Reactions    Meclizine Hives    Other Drug Unspecified     Ruvert = Other reaction(s): Welts on legs    Amlodipine Swelling     Leg swelling    Atenolol      Very tired and slow, 25mg dose    Hydrochlorothiazide Unspecified     Presyncope, orthostasis    Indapamide Unspecified     Tired, nausea, cotton mouth    Macrodantin [Nitrofurantoin]      nightmares   [4]   Outpatient Encounter Medications as of 7/1/2025   Medication Sig Dispense Refill    loratadine (CLARITIN) 10 MG Tab Take 10 mg by mouth every day.      EPINEPHrine (EPIPEN) 0.3 MG/0.3ML Solution Auto-injector solution for injection Inject 0.3 mL into the thigh one time for 1 dose 1 Each 1    telmisartan (MICARDIS) 80 MG Tab Take 1 Tablet by mouth every day. 90 Tablet 3    cyclobenzaprine (FLEXERIL) 5 mg tablet Take 1 Tablet by mouth 3 times a day as needed for Moderate Pain. 100 Tablet 1    Calcium Carb-Cholecalciferol (CALCIUM 500/VITAMIN D) 500-3.125 MG-MCG Tab Take  by mouth.      [DISCONTINUED] indapamide (LOZOL) 2.5 MG Tab Take 1 Tablet by mouth every morning. (Patient not taking: Reported on 7/1/2025) 30 Tablet 1     No facility-administered encounter medications on file as of 7/1/2025.

## 2025-07-14 ENCOUNTER — TELEPHONE (OUTPATIENT)
Dept: HEMATOLOGY ONCOLOGY | Facility: MEDICAL CENTER | Age: 70
End: 2025-07-14
Payer: MEDICARE

## 2025-07-18 ENCOUNTER — PATIENT OUTREACH (OUTPATIENT)
Dept: HEALTH INFORMATION MANAGEMENT | Facility: OTHER | Age: 70
End: 2025-07-18
Payer: MEDICARE

## 2025-07-18 DIAGNOSIS — I10 PRIMARY HYPERTENSION: Primary | ICD-10-CM

## 2025-07-18 DIAGNOSIS — N18.31 STAGE 3A CHRONIC KIDNEY DISEASE: ICD-10-CM

## 2025-07-18 NOTE — CARE PLAN
Problem: Risk of Falls  Goal: Reduce the Risk of Falls and Fall Related Injuries- Long term  Outcome: Progressing; denies any falls in the last month     Problem: Knowledge surrounding fall safety  Goal: Demonstrate ability to verbalize fall safety concerns- Long term  Outcome: Progressing; practicing fall safety precautions to avoid falls     Problem: Knowledge of hypertension  Goal: Demonstrate Knowledge of Hypertension- Long term  Outcome: Progressing; working closely with cardiology to manage BP     Problem: Knowledge of factors contributing to hypertension  Goal: Demonstrate factors that can contribute to high blood pressure- Long term  Outcome: Progressing  Note: Patient has verbalized understanding of factors contributing to high blood pressure      Problem: Recognize current health habits that may contribute to hypertension  Goal: Identify which modifiable health habits can be modifed  Outcome: Progressing  Note: Patient has verbalized willingness to change modifiable health habits      Problem: Patient barriers to managing high blood pressure  Goal: Identify barriers to managing blood pressure  Outcome: Progressing  Note: Patient has vocalized commitment to lessening the barriers to managing HTN

## 2025-07-18 NOTE — PROGRESS NOTES
Reason for Follow-Up:   Monthly Personal Care Management Program Outreach     Current Health Status  Qualifying diagnoses for PCM: HTN, CKD    Medical Updates:    Pt states she is in her usual state of health at this time.     Pt states she will be starting PT in August for her shoulder pain- states however that she did get injections which helped with the pain.     Pt states that the thigh rashes she experienced have resolved. Pt states her allergy test showed sensitivity to hazelnut, however, she can't recall ingesting hazelnut. Pt has benadryl on hand and an Epipen should she have a severe allergic reaction.    HTN- pt states her BP has been better lately, and states that her cardiologist is OK with -140, but to let him know if she is in the 150's. Per cardiology note blood pressure is overall at goal      Pt reports no falls in the last month.     Pt reports no other recent health concerns at this time.     Medication Updates:   Pt states she is taking all medications as indicated   Pt denies any difficulty obtaining medications at this time    Symptoms:    Pt denies symptoms at this time    Plan of Care Goals and Progress    See updated care plan note    Priority 1. HTN    Progress:  Per cardiology BP is at goal  Pt continues to check and monitor BP     Barriers:  chronic disease processes    Interventions: Pt will continue to check BP and alert cardiology of +    Education:  Topics discussed with pt during this outreach: brief medication overview, BP checks    Priority 2. Fall RIsk    Progress:  Pt denies any falls in the last month  Pt states she feels her balance has improved    Barriers:  chronic disease processes    Interventions:  Pt will continue to practice fall safety precautions to avoid falls, hydrate appropriately     Education: Topics discussed with pt during this outreach: fall safety precautions, may trial PT for shoulder pain and balance      Patient's Concerns and Feedback with  Self-Management of Care:  RN reviewed upcoming appointments with patient. Pt states she has no other questions, concerns, or needs at this time.   Pt states she will call this RN and/or PCP with any questions, concerns, needs that may arise    Next Steps:    Follow-Up Plan:  Next outreach in 8 weeks; September 2025     Appointments:  7/31- radiology, 8/5- PT    Contact Information:  Call [phone] with any questions or concerns.    Quarterly chart review completed. Pt continues to qualify for and benefit from Personal Care Management Program.

## 2025-07-28 ENCOUNTER — TELEPHONE (OUTPATIENT)
Dept: PHYSICAL THERAPY | Facility: MEDICAL CENTER | Age: 70
End: 2025-07-28
Payer: MEDICARE

## 2025-07-28 ENCOUNTER — PHYSICAL THERAPY (OUTPATIENT)
Dept: PHYSICAL THERAPY | Facility: MEDICAL CENTER | Age: 70
End: 2025-07-28
Attending: FAMILY MEDICINE
Payer: MEDICARE

## 2025-07-28 DIAGNOSIS — R26.89 BALANCE PROBLEM: Primary | ICD-10-CM

## 2025-07-28 DIAGNOSIS — M25.551 RIGHT HIP PAIN: ICD-10-CM

## 2025-07-28 DIAGNOSIS — M54.50 ACUTE RIGHT-SIDED LOW BACK PAIN WITHOUT SCIATICA: ICD-10-CM

## 2025-07-28 PROCEDURE — 97163 PT EVAL HIGH COMPLEX 45 MIN: CPT

## 2025-07-28 ASSESSMENT — ACTIVITIES OF DAILY LIVING (ADL): POOR_BALANCE: 1

## 2025-07-28 NOTE — OP THERAPY EVALUATION
"  Outpatient Physical Therapy  INITIAL EVALUATION    [facility]  [address]  [place] [address]  Phone:  [phone]  Fax:  [phone]    Date of Evaluation: 07/28/2025    Patient: Tita Pitts  YOB: 1955  MRN: [id number]     Referring Provider: Bassam Marinelli M.D.  [address]  [address]  [place] [address]   Referring Diagnosis Low back pain, unspecified [M54.50];Other abnormalities of gait and mobility [R26.89]     Time Calculation  Start time: 0903  Stop time: 0948 Time Calculation (min): 45 minutes         Chief Complaint: Back Problem    Visit Diagnoses     ICD-10-CM   1. Balance problem  R26.89   2. Right hip pain  M25.551   3. Acute right-sided low back pain without sciatica  M54.50       Date of onset of impairment: No data found    Subjective:   History of Present Illness:     Mechanism of injury:  Patient is a 70 year old female with a PMH including: Stage III CKD, osteopenia, PVC, recurrent major depressive disorder, leukopenia, hysterectomy, pericarditis (2023). No prior surgeries to shoulder. She was prev seen by this author to address shoulder pain; reporting this has improved but continues to have intermittent s/s. Pt stating she was seen by neurology with incidental findings for small tumor.     Pt presents to therapy with complaints of imbalance, low back and hip pain. Per visit with Dr. Marinelli on 5/14/25, \"She reports an increase in the frequency of falls, attributing this to her inability to fully lift her left foot.  - Approximately 2 weeks ago, she experienced a fall in her yard, during which she was unable to halt her forward momentum, resulting in a head impact against a fence.\"    Pt stating she was having several falls starting in the early Spring and Summer with insidious onset, pt unable to identify any changes. Most recent fall, she was able to catch herself and not hit the ground. Was " washing the windows outside. Stating she feels she is tripping over herself. She completed an EMG that was normal (testing BLE's and LUE). She was seen by neurologist who believes her s/s are not neurological. Tilt table test with pos findings for orthostatic hypotension; bp medications have been altered. Will follow up with Cardiac later this week and US for BLE swelling. Pt reporting that the back is feeling better but feels more imbalance at this point. This will sometimes feel stiff. She is completing more activity and is uncertain if she is just klutzy. Will usually fall outdoors. 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  Denies sensation changes at BLE's.     Pt currently denies s/s with her falls: Dizziness, diploplia, dysphasia, dysarthria, drop attacks, nausea, nystagmus, vision changes, and numbness. No significant changes in walking.       Treatments:     Treatment Comments:  Steroids for LBP/hip pain following fall   Activities of Daily Living:     Patient reported ADL status: Patient's current daily routine includes:  Exercise: Walking, gym, weight training, PT shoulder exercises, exercise bike       Patient Goals:     Patient goals for therapy:  Improved balance      Past Medical History[1]  Past Surgical History[2]  Social History     Tobacco Use   • Smoking status: Never     Passive exposure: Past   • Smokeless tobacco: Never   Substance Use Topics   • Alcohol use: Not Currently     Alcohol/week: 0.6 oz     Types: 1 Glasses of wine per week     Comment: Some alcohol socially over the years.  Not regular use     Social Hx - Family and Occupational[3]    Objective     General Comments     Spine Comments   Balance:  Tandem balance: able to take a small step; slight muscle juddering with LLE posterior    SLS: 20 sec on R; 5 sec on L with visible LE buckling     Sensation: intact dermatomes BLE's    Generalized weakness throughout LLE  4-/5 at all myotomes        Therapeutic Exercises (CPT 36046):     1. pt education, re: PT exam findings and upcoming POC    2. pt education, re: different balance systems    Time-based treatments/modalities:           Assessment, Response and Plan:   Impairments: activity intolerance, impaired balance, impaired physical strength and lacks appropriate home exercise program    Assessment details:  Patient is a pleasant and cooperative 70 year old female who is known to this therapist. She returns to therapy for increased bouts of falls occurring since Spring 2025 with insidious onset. She has had a variety of tests completed but non suggestive for causation to this imbalance. Exam findings suggestive of imbalance and generalized weakness throughout LLE. She is considered a fall risk and may benefit from skilled physical therapy in order to address above impairments in order to improve QOL and return to reported ADL's.     Prognosis: good    Goals:   Short Term Goals:   1. Pt will be independent with written HEP.  2. Pt will learn 3 different systems for balance.  3. Pt will participate in formal balance testing.     Short term goal time span:  2-4 weeks      Long Term Goals:    1. Pt will be independent with written HEP.  2. Pt will have a sig improvement on ABC balance score (eval: 83%)  3. Pt will have no falls.  4. Pt will demo significant improvement on balance testing.  5. Pt will be able to recall all 3 systems contributing to balance.    Long term goal time span:  6-8 weeks    Plan:   Therapy options:  Physical therapy treatment to continue  Planned therapy interventions:  Gait Training (CPT 24413), Therapeutic Activities (CPT 96835), Therapeutic Exercise (CPT 03459), Manual Therapy (CPT 04001) and Neuromuscular Re-education (CPT 60196)  Frequency: 1-2x/wk.  Duration in weeks:  8  Discussed with:  Patient  Plan details:  UPOC: 9/26/25        Functional Assessment Used  PT Functional Assessment Tool Used: ABC  Scale  PT Functional Assessment Score: 83%  Oswestry Low Back Pain Disability Total Score: 8     Referring provider co-signature:  I have reviewed this plan of care and my co-signature certifies the need for services.    Certification Period: 07/28/2025 to  9/26/25    Physician Signature: ________________________________ Date: ______________                          [1]  Past Medical History:  Diagnosis Date   • Allergy    • Anesthesia     ponv   • Breath shortness 2022    Related to current pericarditis   • Bronchitis     history of   • Cataract     NOT surgically removed   • Gynecological disorder 1986    Had Hysterectomy 1990   • Hypertension     well controlled on medication   • Pericarditis 04/07/2023   • Pneumonia     history of, in 20's   • PONV (postoperative nausea and vomiting)    • Psychiatric problem 12/27/2022    grief - in counseling   [2]  Past Surgical History:  Procedure Laterality Date   • WI THORACOSCOPY,DX NO BX Left 1/8/2024    Procedure: LEFT THORACOSCOPY, PERICARDIAL WINDOW;  Surgeon: John H Ganser, M.D.;  Location: SURGERY [facility];  Service: General   • PERICARDIAL WINDOW Left 1/8/2024    Procedure: CREATION, PERICARDIAL WINDOW;  Surgeon: John H Ganser, M.D.;  Location: [facility];  Service: General   • APPENDECTOMY  1986   • ABDOMINAL HYSTERECTOMY TOTAL     • GYN SURGERY      laparoscopy for endometriosis   • OTHER CARDIAC SURGERY     [3]  Family and Occupational History  Socioeconomic History   • Marital status:      Spouse name: Not on file   • Number of children: Not on file   • Years of education: Not on file   • Highest education level: Bachelor's degree (e.g., BA, AB, BS)   Occupational History   • Not on file

## 2025-07-28 NOTE — OP THERAPY DISCHARGE SUMMARY
Outpatient Physical Therapy  DISCHARGE SUMMARY NOTE      [facility]  [address]  [place] [address]  Phone:  [phone]  Fax:  [phone]    Date of Visit: 07/28/2025    Patient: Tita Pitts  YOB: 1955  MRN: [id number]     Referring Provider: No referring provider defined for this encounter.   Referring Diagnosis No admission diagnoses are documented for this encounter.         Functional Assessment Used        Your patient is being discharged from Physical Therapy with the following comments:   Goals met    Comments:  Pt seen from 11/7/24-2/13/25 for shoulder pain. She is returning to PT on 7/28/25 for new PT episode to eval back, hip, imbalance.    Limitations Remaining:  Please see last DOS for PN details when last seen (2/13/25).    Recommendations:  Pt will be DC-ed from last PT episode. Will be returning to PT for new encounter/eval on 7/28.    Chris Bauman, PT    Date: 7/28/2025

## 2025-07-31 ENCOUNTER — HOSPITAL ENCOUNTER (OUTPATIENT)
Dept: RADIOLOGY | Facility: MEDICAL CENTER | Age: 70
End: 2025-07-31
Attending: INTERNAL MEDICINE
Payer: MEDICARE

## 2025-07-31 DIAGNOSIS — Z98.890 S/P PERICARDIAL WINDOW CREATION: ICD-10-CM

## 2025-07-31 DIAGNOSIS — I31.39 IDIOPATHIC PERICARDIAL EFFUSION: ICD-10-CM

## 2025-07-31 PROCEDURE — A9578 INJ MULTIHANCE MULTIPACK: HCPCS | Performed by: INTERNAL MEDICINE

## 2025-07-31 PROCEDURE — 700117 HCHG RX CONTRAST REV CODE 255: Performed by: INTERNAL MEDICINE

## 2025-07-31 PROCEDURE — 75561 CARDIAC MRI FOR MORPH W/DYE: CPT

## 2025-07-31 RX ADMIN — GADOBENATE DIMEGLUMINE 10 ML: 529 INJECTION, SOLUTION INTRAVENOUS at 14:26

## 2025-07-31 RX ADMIN — GADOBENATE DIMEGLUMINE 10 ML: 529 INJECTION, SOLUTION INTRAVENOUS at 14:25

## 2025-08-01 ENCOUNTER — HOSPITAL ENCOUNTER (OUTPATIENT)
Dept: LAB | Facility: MEDICAL CENTER | Age: 70
End: 2025-08-01
Attending: INTERNAL MEDICINE
Payer: MEDICARE

## 2025-08-01 ENCOUNTER — HOSPITAL ENCOUNTER (OUTPATIENT)
Facility: MEDICAL CENTER | Age: 70
End: 2025-08-01
Attending: FAMILY MEDICINE
Payer: MEDICARE

## 2025-08-01 ENCOUNTER — OFFICE VISIT (OUTPATIENT)
Dept: URGENT CARE | Facility: CLINIC | Age: 70
End: 2025-08-01
Payer: MEDICARE

## 2025-08-01 ENCOUNTER — HOSPITAL ENCOUNTER (OUTPATIENT)
Dept: LAB | Facility: MEDICAL CENTER | Age: 70
End: 2025-08-01
Attending: FAMILY MEDICINE
Payer: MEDICARE

## 2025-08-01 ENCOUNTER — RESULTS FOLLOW-UP (OUTPATIENT)
Dept: CARDIOLOGY | Facility: MEDICAL CENTER | Age: 70
End: 2025-08-01
Payer: MEDICARE

## 2025-08-01 VITALS
WEIGHT: 189.8 LBS | TEMPERATURE: 97.8 F | SYSTOLIC BLOOD PRESSURE: 133 MMHG | HEIGHT: 65 IN | BODY MASS INDEX: 31.62 KG/M2 | HEART RATE: 85 BPM | OXYGEN SATURATION: 98 % | DIASTOLIC BLOOD PRESSURE: 84 MMHG | RESPIRATION RATE: 18 BRPM

## 2025-08-01 DIAGNOSIS — N18.31 STAGE 3A CHRONIC KIDNEY DISEASE: ICD-10-CM

## 2025-08-01 DIAGNOSIS — I10 PRIMARY HYPERTENSION: ICD-10-CM

## 2025-08-01 DIAGNOSIS — E04.2 NON-TOXIC MULTINODULAR GOITER: ICD-10-CM

## 2025-08-01 DIAGNOSIS — D72.819 LEUKOPENIA, UNSPECIFIED TYPE: ICD-10-CM

## 2025-08-01 DIAGNOSIS — R30.0 DYSURIA: Primary | ICD-10-CM

## 2025-08-01 DIAGNOSIS — R76.8 THYROID ANTIBODY POSITIVE: ICD-10-CM

## 2025-08-01 DIAGNOSIS — E78.00 HYPERCHOLESTEROLEMIA: ICD-10-CM

## 2025-08-01 DIAGNOSIS — E55.9 VITAMIN D DEFICIENCY: ICD-10-CM

## 2025-08-01 DIAGNOSIS — R30.0 DYSURIA: ICD-10-CM

## 2025-08-01 LAB
25(OH)D3 SERPL-MCNC: 32 NG/ML (ref 30–100)
ALBUMIN SERPL BCP-MCNC: 4.3 G/DL (ref 3.2–4.9)
ALBUMIN/GLOB SERPL: 1.6 G/DL
ALP SERPL-CCNC: 86 U/L (ref 30–99)
ALT SERPL-CCNC: 12 U/L (ref 2–50)
ANION GAP SERPL CALC-SCNC: 13 MMOL/L (ref 7–16)
APPEARANCE UR: CLEAR
AST SERPL-CCNC: 23 U/L (ref 12–45)
BASOPHILS # BLD AUTO: 2.5 % (ref 0–1.8)
BASOPHILS # BLD: 0.08 K/UL (ref 0–0.12)
BILIRUB SERPL-MCNC: 0.6 MG/DL (ref 0.1–1.5)
BILIRUB UR STRIP-MCNC: NEGATIVE MG/DL
BUN SERPL-MCNC: 12 MG/DL (ref 8–22)
CALCIUM ALBUM COR SERPL-MCNC: 9.4 MG/DL (ref 8.5–10.5)
CALCIUM SERPL-MCNC: 9.6 MG/DL (ref 8.5–10.5)
CHLORIDE SERPL-SCNC: 104 MMOL/L (ref 96–112)
CHOLEST SERPL-MCNC: 182 MG/DL (ref 100–199)
CO2 SERPL-SCNC: 21 MMOL/L (ref 20–33)
COLOR UR AUTO: YELLOW
CREAT SERPL-MCNC: 1.13 MG/DL (ref 0.5–1.4)
EOSINOPHIL # BLD AUTO: 0.07 K/UL (ref 0–0.51)
EOSINOPHIL NFR BLD: 2.2 % (ref 0–6.9)
ERYTHROCYTE [DISTWIDTH] IN BLOOD BY AUTOMATED COUNT: 43.3 FL (ref 35.9–50)
FASTING STATUS PATIENT QL REPORTED: NORMAL
GFR SERPLBLD CREATININE-BSD FMLA CKD-EPI: 53 ML/MIN/1.73 M 2
GLOBULIN SER CALC-MCNC: 2.7 G/DL (ref 1.9–3.5)
GLUCOSE SERPL-MCNC: 86 MG/DL (ref 65–99)
GLUCOSE UR STRIP.AUTO-MCNC: NEGATIVE MG/DL
HCT VFR BLD AUTO: 36.9 % (ref 37–47)
HDLC SERPL-MCNC: 60 MG/DL
HGB BLD-MCNC: 12.4 G/DL (ref 12–16)
IMM GRANULOCYTES # BLD AUTO: 0.01 K/UL (ref 0–0.11)
IMM GRANULOCYTES NFR BLD AUTO: 0.3 % (ref 0–0.9)
KETONES UR STRIP.AUTO-MCNC: NEGATIVE MG/DL
LDLC SERPL CALC-MCNC: 106 MG/DL
LEUKOCYTE ESTERASE UR QL STRIP.AUTO: NORMAL
LYMPHOCYTES # BLD AUTO: 1.03 K/UL (ref 1–4.8)
LYMPHOCYTES NFR BLD: 31.7 % (ref 22–41)
MCH RBC QN AUTO: 32 PG (ref 27–33)
MCHC RBC AUTO-ENTMCNC: 33.6 G/DL (ref 32.2–35.5)
MCV RBC AUTO: 95.1 FL (ref 81.4–97.8)
MONOCYTES # BLD AUTO: 0.29 K/UL (ref 0–0.85)
MONOCYTES NFR BLD AUTO: 8.9 % (ref 0–13.4)
NEUTROPHILS # BLD AUTO: 1.77 K/UL (ref 1.82–7.42)
NEUTROPHILS NFR BLD: 54.4 % (ref 44–72)
NITRITE UR QL STRIP.AUTO: NEGATIVE
NRBC # BLD AUTO: 0 K/UL
NRBC BLD-RTO: 0 /100 WBC (ref 0–0.2)
NT-PROBNP SERPL IA-MCNC: 337 PG/ML (ref 0–125)
PH UR STRIP.AUTO: 7 [PH] (ref 5–8)
PLATELET # BLD AUTO: 333 K/UL (ref 164–446)
PMV BLD AUTO: 10.8 FL (ref 9–12.9)
POTASSIUM SERPL-SCNC: 4.3 MMOL/L (ref 3.6–5.5)
PROT SERPL-MCNC: 7 G/DL (ref 6–8.2)
PROT UR QL STRIP: NEGATIVE MG/DL
RBC # BLD AUTO: 3.88 M/UL (ref 4.2–5.4)
RBC UR QL AUTO: NORMAL
SODIUM SERPL-SCNC: 138 MMOL/L (ref 135–145)
SP GR UR STRIP.AUTO: 1.01
T4 FREE SERPL-MCNC: 1.08 NG/DL (ref 0.93–1.7)
THYROPEROXIDASE AB SERPL-ACNC: 9.8 IU/ML (ref 0–9)
TRIGL SERPL-MCNC: 81 MG/DL (ref 0–149)
TSH SERPL-ACNC: 1.92 UIU/ML (ref 0.38–5.33)
UROBILINOGEN UR STRIP-MCNC: NORMAL MG/DL
WBC # BLD AUTO: 3.3 K/UL (ref 4.8–10.8)

## 2025-08-01 PROCEDURE — 84439 ASSAY OF FREE THYROXINE: CPT

## 2025-08-01 PROCEDURE — 36415 COLL VENOUS BLD VENIPUNCTURE: CPT

## 2025-08-01 PROCEDURE — 3079F DIAST BP 80-89 MM HG: CPT | Performed by: FAMILY MEDICINE

## 2025-08-01 PROCEDURE — 83880 ASSAY OF NATRIURETIC PEPTIDE: CPT

## 2025-08-01 PROCEDURE — 80061 LIPID PANEL: CPT

## 2025-08-01 PROCEDURE — 86376 MICROSOMAL ANTIBODY EACH: CPT

## 2025-08-01 PROCEDURE — 96372 THER/PROPH/DIAG INJ SC/IM: CPT | Performed by: FAMILY MEDICINE

## 2025-08-01 PROCEDURE — 81002 URINALYSIS NONAUTO W/O SCOPE: CPT | Performed by: FAMILY MEDICINE

## 2025-08-01 PROCEDURE — 87086 URINE CULTURE/COLONY COUNT: CPT

## 2025-08-01 PROCEDURE — 99214 OFFICE O/P EST MOD 30 MIN: CPT | Performed by: FAMILY MEDICINE

## 2025-08-01 PROCEDURE — 80053 COMPREHEN METABOLIC PANEL: CPT

## 2025-08-01 PROCEDURE — 84443 ASSAY THYROID STIM HORMONE: CPT

## 2025-08-01 PROCEDURE — 85025 COMPLETE CBC W/AUTO DIFF WBC: CPT

## 2025-08-01 PROCEDURE — 82306 VITAMIN D 25 HYDROXY: CPT

## 2025-08-01 PROCEDURE — 3075F SYST BP GE 130 - 139MM HG: CPT | Performed by: FAMILY MEDICINE

## 2025-08-01 RX ORDER — CEFDINIR 300 MG/1
300 CAPSULE ORAL 2 TIMES DAILY
Qty: 8 CAPSULE | Refills: 0 | Status: SHIPPED | OUTPATIENT
Start: 2025-08-01 | End: 2025-08-05

## 2025-08-01 ASSESSMENT — FIBROSIS 4 INDEX: FIB4 SCORE: 1.36

## 2025-08-03 ENCOUNTER — RESULTS FOLLOW-UP (OUTPATIENT)
Facility: MEDICAL CENTER | Age: 70
End: 2025-08-03
Payer: MEDICARE

## 2025-08-03 DIAGNOSIS — I50.32 CHRONIC HEART FAILURE WITH PRESERVED EJECTION FRACTION (HFPEF) (HCC): Primary | ICD-10-CM

## 2025-08-03 RX ORDER — SPIRONOLACTONE 25 MG/1
25 TABLET ORAL DAILY
Qty: 90 TABLET | Refills: 3 | Status: ON HOLD | OUTPATIENT
Start: 2025-08-03

## 2025-08-04 LAB
BACTERIA UR CULT: NORMAL
SIGNIFICANT IND 70042: NORMAL
SITE SITE: NORMAL
SOURCE SOURCE: NORMAL

## 2025-08-05 ENCOUNTER — PHYSICAL THERAPY (OUTPATIENT)
Dept: PHYSICAL THERAPY | Facility: MEDICAL CENTER | Age: 70
End: 2025-08-05
Attending: FAMILY MEDICINE
Payer: MEDICARE

## 2025-08-05 DIAGNOSIS — R26.89 BALANCE PROBLEM: Primary | ICD-10-CM

## 2025-08-05 PROCEDURE — 97112 NEUROMUSCULAR REEDUCATION: CPT

## 2025-08-05 PROCEDURE — 97110 THERAPEUTIC EXERCISES: CPT

## 2025-08-06 ENCOUNTER — OFFICE VISIT (OUTPATIENT)
Dept: MEDICAL GROUP | Facility: MEDICAL CENTER | Age: 70
End: 2025-08-06
Payer: MEDICARE

## 2025-08-06 VITALS
OXYGEN SATURATION: 98 % | HEIGHT: 67 IN | WEIGHT: 191.36 LBS | BODY MASS INDEX: 30.03 KG/M2 | HEART RATE: 66 BPM | DIASTOLIC BLOOD PRESSURE: 82 MMHG | TEMPERATURE: 98.3 F | SYSTOLIC BLOOD PRESSURE: 124 MMHG

## 2025-08-06 DIAGNOSIS — E78.00 HYPERCHOLESTEROLEMIA: ICD-10-CM

## 2025-08-06 DIAGNOSIS — D72.819 LEUKOPENIA, UNSPECIFIED TYPE: Primary | ICD-10-CM

## 2025-08-06 DIAGNOSIS — G47.9 SLEEP DISORDER: ICD-10-CM

## 2025-08-06 DIAGNOSIS — M51.362 DEGENERATION OF INTERVERTEBRAL DISC OF LUMBAR REGION WITH DISCOGENIC BACK PAIN AND LOWER EXTREMITY PAIN: ICD-10-CM

## 2025-08-06 DIAGNOSIS — R53.83 OTHER FATIGUE: ICD-10-CM

## 2025-08-06 PROCEDURE — 3079F DIAST BP 80-89 MM HG: CPT | Performed by: FAMILY MEDICINE

## 2025-08-06 PROCEDURE — 3074F SYST BP LT 130 MM HG: CPT | Performed by: FAMILY MEDICINE

## 2025-08-06 PROCEDURE — 99214 OFFICE O/P EST MOD 30 MIN: CPT | Performed by: FAMILY MEDICINE

## 2025-08-06 ASSESSMENT — ENCOUNTER SYMPTOMS
PALPITATIONS: 0
CHILLS: 0
FEVER: 0

## 2025-08-06 ASSESSMENT — FIBROSIS 4 INDEX: FIB4 SCORE: 1.36

## 2025-08-07 ENCOUNTER — PHYSICAL THERAPY (OUTPATIENT)
Dept: PHYSICAL THERAPY | Facility: MEDICAL CENTER | Age: 70
End: 2025-08-07
Attending: FAMILY MEDICINE
Payer: MEDICARE

## 2025-08-07 DIAGNOSIS — R26.89 BALANCE PROBLEM: Primary | ICD-10-CM

## 2025-08-07 PROCEDURE — 97112 NEUROMUSCULAR REEDUCATION: CPT

## 2025-08-12 ENCOUNTER — APPOINTMENT (OUTPATIENT)
Dept: RADIOLOGY | Facility: MEDICAL CENTER | Age: 70
End: 2025-08-12
Attending: EMERGENCY MEDICINE
Payer: MEDICARE

## 2025-08-12 ENCOUNTER — OFFICE VISIT (OUTPATIENT)
Dept: URGENT CARE | Facility: CLINIC | Age: 70
End: 2025-08-12
Payer: MEDICARE

## 2025-08-12 ENCOUNTER — HOSPITAL ENCOUNTER (EMERGENCY)
Facility: MEDICAL CENTER | Age: 70
End: 2025-08-12
Attending: EMERGENCY MEDICINE
Payer: MEDICARE

## 2025-08-12 VITALS
OXYGEN SATURATION: 97 % | HEART RATE: 69 BPM | HEIGHT: 66 IN | BODY MASS INDEX: 30.68 KG/M2 | TEMPERATURE: 96.6 F | SYSTOLIC BLOOD PRESSURE: 145 MMHG | DIASTOLIC BLOOD PRESSURE: 76 MMHG | RESPIRATION RATE: 16 BRPM | WEIGHT: 190.92 LBS

## 2025-08-12 VITALS
TEMPERATURE: 97.3 F | BODY MASS INDEX: 30.7 KG/M2 | HEART RATE: 77 BPM | WEIGHT: 191 LBS | RESPIRATION RATE: 18 BRPM | DIASTOLIC BLOOD PRESSURE: 70 MMHG | HEIGHT: 66 IN | SYSTOLIC BLOOD PRESSURE: 122 MMHG | OXYGEN SATURATION: 100 %

## 2025-08-12 DIAGNOSIS — R10.9 LEFT FLANK PAIN: Primary | ICD-10-CM

## 2025-08-12 DIAGNOSIS — M54.50 ACUTE LEFT-SIDED LOW BACK PAIN WITHOUT SCIATICA: Primary | ICD-10-CM

## 2025-08-12 LAB
ALBUMIN SERPL BCP-MCNC: 4.5 G/DL (ref 3.2–4.9)
ALBUMIN/GLOB SERPL: 1.5 G/DL
ALP SERPL-CCNC: 106 U/L (ref 30–99)
ALT SERPL-CCNC: 14 U/L (ref 2–50)
ANION GAP SERPL CALC-SCNC: 15 MMOL/L (ref 7–16)
APPEARANCE UR: CLEAR
APPEARANCE UR: CLEAR
AST SERPL-CCNC: 23 U/L (ref 12–45)
BASOPHILS # BLD AUTO: 1.3 % (ref 0–1.8)
BASOPHILS # BLD: 0.07 K/UL (ref 0–0.12)
BILIRUB SERPL-MCNC: 0.6 MG/DL (ref 0.1–1.5)
BILIRUB UR QL STRIP.AUTO: NEGATIVE
BILIRUB UR STRIP-MCNC: NEGATIVE MG/DL
BUN SERPL-MCNC: 15 MG/DL (ref 8–22)
CALCIUM ALBUM COR SERPL-MCNC: 9.1 MG/DL (ref 8.5–10.5)
CALCIUM SERPL-MCNC: 9.5 MG/DL (ref 8.5–10.5)
CHLORIDE SERPL-SCNC: 96 MMOL/L (ref 96–112)
CO2 SERPL-SCNC: 17 MMOL/L (ref 20–33)
COLOR UR AUTO: YELLOW
COLOR UR: YELLOW
CREAT SERPL-MCNC: 1.18 MG/DL (ref 0.5–1.4)
EKG IMPRESSION: NORMAL
EOSINOPHIL # BLD AUTO: 0.03 K/UL (ref 0–0.51)
EOSINOPHIL NFR BLD: 0.6 % (ref 0–6.9)
ERYTHROCYTE [DISTWIDTH] IN BLOOD BY AUTOMATED COUNT: 40.8 FL (ref 35.9–50)
GFR SERPLBLD CREATININE-BSD FMLA CKD-EPI: 50 ML/MIN/1.73 M 2
GLOBULIN SER CALC-MCNC: 3.1 G/DL (ref 1.9–3.5)
GLUCOSE SERPL-MCNC: 87 MG/DL (ref 65–99)
GLUCOSE UR STRIP.AUTO-MCNC: 250 MG/DL
GLUCOSE UR STRIP.AUTO-MCNC: 500 MG/DL
HCT VFR BLD AUTO: 37.3 % (ref 37–47)
HGB BLD-MCNC: 12.9 G/DL (ref 12–16)
IMM GRANULOCYTES # BLD AUTO: 0.02 K/UL (ref 0–0.11)
IMM GRANULOCYTES NFR BLD AUTO: 0.4 % (ref 0–0.9)
KETONES UR STRIP.AUTO-MCNC: NEGATIVE MG/DL
KETONES UR STRIP.AUTO-MCNC: NEGATIVE MG/DL
LEUKOCYTE ESTERASE UR QL STRIP.AUTO: NEGATIVE
LEUKOCYTE ESTERASE UR QL STRIP.AUTO: NEGATIVE
LIPASE SERPL-CCNC: 38 U/L (ref 11–82)
LYMPHOCYTES # BLD AUTO: 1.48 K/UL (ref 1–4.8)
LYMPHOCYTES NFR BLD: 27.6 % (ref 22–41)
MCH RBC QN AUTO: 31.9 PG (ref 27–33)
MCHC RBC AUTO-ENTMCNC: 34.6 G/DL (ref 32.2–35.5)
MCV RBC AUTO: 92.3 FL (ref 81.4–97.8)
MICRO URNS: ABNORMAL
MONOCYTES # BLD AUTO: 0.53 K/UL (ref 0–0.85)
MONOCYTES NFR BLD AUTO: 9.9 % (ref 0–13.4)
NEUTROPHILS # BLD AUTO: 3.24 K/UL (ref 1.82–7.42)
NEUTROPHILS NFR BLD: 60.2 % (ref 44–72)
NITRITE UR QL STRIP.AUTO: NEGATIVE
NITRITE UR QL STRIP.AUTO: NEGATIVE
NRBC # BLD AUTO: 0 K/UL
NRBC BLD-RTO: 0 /100 WBC (ref 0–0.2)
NT-PROBNP SERPL IA-MCNC: 131 PG/ML (ref 0–125)
PH UR STRIP.AUTO: 5.5 [PH] (ref 5–8)
PH UR STRIP.AUTO: 8 [PH] (ref 5–8)
PLATELET # BLD AUTO: 353 K/UL (ref 164–446)
PMV BLD AUTO: 9.9 FL (ref 9–12.9)
POTASSIUM SERPL-SCNC: 4.3 MMOL/L (ref 3.6–5.5)
PROT SERPL-MCNC: 7.6 G/DL (ref 6–8.2)
PROT UR QL STRIP: NEGATIVE MG/DL
PROT UR QL STRIP: NEGATIVE MG/DL
RBC # BLD AUTO: 4.04 M/UL (ref 4.2–5.4)
RBC UR QL AUTO: NEGATIVE
RBC UR QL AUTO: NORMAL
SODIUM SERPL-SCNC: 128 MMOL/L (ref 135–145)
SP GR UR STRIP.AUTO: 1.01
SP GR UR STRIP.AUTO: 1.02
TROPONIN T SERPL-MCNC: 15 NG/L (ref 6–19)
UROBILINOGEN UR STRIP-MCNC: 0.2 MG/DL
UROBILINOGEN UR STRIP.AUTO-MCNC: 0.2 EU/DL
WBC # BLD AUTO: 5.4 K/UL (ref 4.8–10.8)

## 2025-08-12 PROCEDURE — 96375 TX/PRO/DX INJ NEW DRUG ADDON: CPT | Mod: XU

## 2025-08-12 PROCEDURE — 85025 COMPLETE CBC W/AUTO DIFF WBC: CPT

## 2025-08-12 PROCEDURE — 96376 TX/PRO/DX INJ SAME DRUG ADON: CPT | Mod: XU

## 2025-08-12 PROCEDURE — 81003 URINALYSIS AUTO W/O SCOPE: CPT

## 2025-08-12 PROCEDURE — 700117 HCHG RX CONTRAST REV CODE 255: Performed by: EMERGENCY MEDICINE

## 2025-08-12 PROCEDURE — 96374 THER/PROPH/DIAG INJ IV PUSH: CPT | Mod: XU

## 2025-08-12 PROCEDURE — 80053 COMPREHEN METABOLIC PANEL: CPT

## 2025-08-12 PROCEDURE — 700102 HCHG RX REV CODE 250 W/ 637 OVERRIDE(OP): Performed by: EMERGENCY MEDICINE

## 2025-08-12 PROCEDURE — 94760 N-INVAS EAR/PLS OXIMETRY 1: CPT

## 2025-08-12 PROCEDURE — 83880 ASSAY OF NATRIURETIC PEPTIDE: CPT

## 2025-08-12 PROCEDURE — 700111 HCHG RX REV CODE 636 W/ 250 OVERRIDE (IP): Mod: JZ | Performed by: EMERGENCY MEDICINE

## 2025-08-12 PROCEDURE — 99285 EMERGENCY DEPT VISIT HI MDM: CPT

## 2025-08-12 PROCEDURE — 74176 CT ABD & PELVIS W/O CONTRAST: CPT

## 2025-08-12 PROCEDURE — 3078F DIAST BP <80 MM HG: CPT

## 2025-08-12 PROCEDURE — 3074F SYST BP LT 130 MM HG: CPT

## 2025-08-12 PROCEDURE — 81002 URINALYSIS NONAUTO W/O SCOPE: CPT

## 2025-08-12 PROCEDURE — A9270 NON-COVERED ITEM OR SERVICE: HCPCS | Performed by: EMERGENCY MEDICINE

## 2025-08-12 PROCEDURE — 36415 COLL VENOUS BLD VENIPUNCTURE: CPT

## 2025-08-12 PROCEDURE — 74177 CT ABD & PELVIS W/CONTRAST: CPT

## 2025-08-12 PROCEDURE — 71275 CT ANGIOGRAPHY CHEST: CPT

## 2025-08-12 PROCEDURE — 84484 ASSAY OF TROPONIN QUANT: CPT

## 2025-08-12 PROCEDURE — 83690 ASSAY OF LIPASE: CPT

## 2025-08-12 PROCEDURE — 99214 OFFICE O/P EST MOD 30 MIN: CPT

## 2025-08-12 PROCEDURE — 93005 ELECTROCARDIOGRAM TRACING: CPT | Mod: TC | Performed by: EMERGENCY MEDICINE

## 2025-08-12 RX ORDER — OXYCODONE HYDROCHLORIDE 5 MG/1
5 TABLET ORAL ONCE
Refills: 0 | Status: COMPLETED | OUTPATIENT
Start: 2025-08-12 | End: 2025-08-12

## 2025-08-12 RX ORDER — INDAPAMIDE 2.5 MG/1
2.5 TABLET ORAL EVERY MORNING
Status: ON HOLD | COMMUNITY
End: 2025-08-31

## 2025-08-12 RX ORDER — KETOROLAC TROMETHAMINE 15 MG/ML
15 INJECTION, SOLUTION INTRAMUSCULAR; INTRAVENOUS ONCE
Status: COMPLETED | OUTPATIENT
Start: 2025-08-12 | End: 2025-08-12

## 2025-08-12 RX ORDER — CYCLOBENZAPRINE HCL 5 MG
5-10 TABLET ORAL 3 TIMES DAILY PRN
Qty: 20 TABLET | Refills: 0 | Status: ON HOLD | OUTPATIENT
Start: 2025-08-12 | End: 2025-08-31

## 2025-08-12 RX ORDER — ACETAMINOPHEN 500 MG
1000 TABLET ORAL EVERY 6 HOURS PRN
Status: ON HOLD | COMMUNITY

## 2025-08-12 RX ORDER — HYDROMORPHONE HYDROCHLORIDE 1 MG/ML
0.5 INJECTION, SOLUTION INTRAMUSCULAR; INTRAVENOUS; SUBCUTANEOUS
Status: DISCONTINUED | OUTPATIENT
Start: 2025-08-12 | End: 2025-08-12 | Stop reason: HOSPADM

## 2025-08-12 RX ORDER — MIDAZOLAM HYDROCHLORIDE 1 MG/ML
0.5 INJECTION INTRAMUSCULAR; INTRAVENOUS ONCE
Status: COMPLETED | OUTPATIENT
Start: 2025-08-12 | End: 2025-08-12

## 2025-08-12 RX ORDER — OXYCODONE HYDROCHLORIDE 5 MG/1
5 TABLET ORAL EVERY 6 HOURS PRN
Qty: 12 TABLET | Refills: 0 | Status: SHIPPED | OUTPATIENT
Start: 2025-08-12 | End: 2025-08-15

## 2025-08-12 RX ORDER — ONDANSETRON 2 MG/ML
4 INJECTION INTRAMUSCULAR; INTRAVENOUS ONCE
Status: COMPLETED | OUTPATIENT
Start: 2025-08-12 | End: 2025-08-12

## 2025-08-12 RX ORDER — CYCLOBENZAPRINE HCL 10 MG
10 TABLET ORAL ONCE
Status: COMPLETED | OUTPATIENT
Start: 2025-08-12 | End: 2025-08-12

## 2025-08-12 RX ORDER — CEFDINIR 300 MG/1
300 CAPSULE ORAL 2 TIMES DAILY
Status: ON HOLD | COMMUNITY
End: 2025-08-31

## 2025-08-12 RX ADMIN — HYDROMORPHONE HYDROCHLORIDE 0.5 MG: 1 INJECTION, SOLUTION INTRAMUSCULAR; INTRAVENOUS; SUBCUTANEOUS at 10:51

## 2025-08-12 RX ADMIN — CYCLOBENZAPRINE 10 MG: 10 TABLET, FILM COATED ORAL at 14:00

## 2025-08-12 RX ADMIN — OXYCODONE 5 MG: 5 TABLET ORAL at 14:00

## 2025-08-12 RX ADMIN — ONDANSETRON 4 MG: 2 INJECTION INTRAMUSCULAR; INTRAVENOUS at 13:14

## 2025-08-12 RX ADMIN — ONDANSETRON 4 MG: 2 INJECTION INTRAMUSCULAR; INTRAVENOUS at 10:50

## 2025-08-12 RX ADMIN — KETOROLAC TROMETHAMINE 15 MG: 15 INJECTION, SOLUTION INTRAMUSCULAR; INTRAVENOUS at 10:28

## 2025-08-12 RX ADMIN — IOHEXOL 100 ML: 350 INJECTION, SOLUTION INTRAVENOUS at 12:39

## 2025-08-12 RX ADMIN — MIDAZOLAM HYDROCHLORIDE 0.5 MG: 1 INJECTION, SOLUTION INTRAMUSCULAR; INTRAVENOUS at 10:51

## 2025-08-12 ASSESSMENT — FIBROSIS 4 INDEX
FIB4 SCORE: 1.36
FIB4 SCORE: 1.36

## 2025-08-12 ASSESSMENT — ENCOUNTER SYMPTOMS
CHILLS: 0
COUGH: 0
SHORTNESS OF BREATH: 0
VOMITING: 0
NAUSEA: 0
GASTROINTESTINAL NEGATIVE: 1
EYES NEGATIVE: 1
BACK PAIN: 1
DIARRHEA: 0
FLANK PAIN: 1
ABDOMINAL PAIN: 0
FEVER: 0
NEUROLOGICAL NEGATIVE: 1

## 2025-08-13 ENCOUNTER — APPOINTMENT (OUTPATIENT)
Dept: ENDOCRINOLOGY | Facility: MEDICAL CENTER | Age: 70
End: 2025-08-13
Attending: INTERNAL MEDICINE
Payer: MEDICARE

## 2025-08-13 ENCOUNTER — OFFICE VISIT (OUTPATIENT)
Dept: MEDICAL GROUP | Facility: MEDICAL CENTER | Age: 70
End: 2025-08-13
Payer: MEDICARE

## 2025-08-13 VITALS
DIASTOLIC BLOOD PRESSURE: 72 MMHG | OXYGEN SATURATION: 100 % | SYSTOLIC BLOOD PRESSURE: 114 MMHG | WEIGHT: 190.7 LBS | BODY MASS INDEX: 30.65 KG/M2 | HEIGHT: 66 IN | HEART RATE: 64 BPM | TEMPERATURE: 98.6 F

## 2025-08-13 DIAGNOSIS — J90 PLEURAL EFFUSION: ICD-10-CM

## 2025-08-13 DIAGNOSIS — R10.9 LEFT FLANK PAIN: Primary | ICD-10-CM

## 2025-08-13 PROCEDURE — 3078F DIAST BP <80 MM HG: CPT | Performed by: FAMILY MEDICINE

## 2025-08-13 PROCEDURE — 3074F SYST BP LT 130 MM HG: CPT | Performed by: FAMILY MEDICINE

## 2025-08-13 PROCEDURE — 99214 OFFICE O/P EST MOD 30 MIN: CPT | Performed by: FAMILY MEDICINE

## 2025-08-13 ASSESSMENT — FIBROSIS 4 INDEX: FIB4 SCORE: 1.18

## 2025-08-13 ASSESSMENT — ENCOUNTER SYMPTOMS
FLANK PAIN: 1
CHILLS: 0
FEVER: 0

## 2025-08-14 ENCOUNTER — PHYSICAL THERAPY (OUTPATIENT)
Dept: PHYSICAL THERAPY | Facility: MEDICAL CENTER | Age: 70
End: 2025-08-14
Attending: FAMILY MEDICINE
Payer: MEDICARE

## 2025-08-14 DIAGNOSIS — R26.89 BALANCE PROBLEM: Primary | ICD-10-CM

## 2025-08-14 PROCEDURE — 97140 MANUAL THERAPY 1/> REGIONS: CPT

## 2025-08-14 PROCEDURE — 97014 ELECTRIC STIMULATION THERAPY: CPT

## 2025-08-14 PROCEDURE — 97110 THERAPEUTIC EXERCISES: CPT

## 2025-08-18 ENCOUNTER — OFFICE VISIT (OUTPATIENT)
Dept: BEHAVIORAL HEALTH | Facility: CLINIC | Age: 70
End: 2025-08-18
Payer: MEDICARE

## 2025-08-18 DIAGNOSIS — F33.0 MILD EPISODE OF RECURRENT MAJOR DEPRESSIVE DISORDER (HCC): Primary | ICD-10-CM

## 2025-08-18 PROCEDURE — 90837 PSYTX W PT 60 MINUTES: CPT | Performed by: MARRIAGE & FAMILY THERAPIST

## 2025-08-19 ENCOUNTER — PHYSICAL THERAPY (OUTPATIENT)
Dept: PHYSICAL THERAPY | Facility: MEDICAL CENTER | Age: 70
End: 2025-08-19
Attending: FAMILY MEDICINE
Payer: MEDICARE

## 2025-08-19 DIAGNOSIS — R26.89 BALANCE PROBLEM: Primary | ICD-10-CM

## 2025-08-19 PROCEDURE — 97112 NEUROMUSCULAR REEDUCATION: CPT

## 2025-08-22 ENCOUNTER — HOSPITAL ENCOUNTER (OUTPATIENT)
Dept: RADIOLOGY | Facility: MEDICAL CENTER | Age: 70
End: 2025-08-22
Attending: PSYCHIATRY & NEUROLOGY
Payer: MEDICARE

## 2025-08-22 DIAGNOSIS — R90.89 ABNORMAL FINDING ON MRI OF BRAIN: ICD-10-CM

## 2025-08-22 PROCEDURE — 70553 MRI BRAIN STEM W/O & W/DYE: CPT

## 2025-08-22 PROCEDURE — 700117 HCHG RX CONTRAST REV CODE 255: Mod: JZ | Performed by: PSYCHIATRY & NEUROLOGY

## 2025-08-22 PROCEDURE — A9579 GAD-BASE MR CONTRAST NOS,1ML: HCPCS | Mod: JZ | Performed by: PSYCHIATRY & NEUROLOGY

## 2025-08-22 RX ORDER — GADOTERIDOL 279.3 MG/ML
20 INJECTION INTRAVENOUS ONCE
Status: COMPLETED | OUTPATIENT
Start: 2025-08-22 | End: 2025-08-22

## 2025-08-22 RX ADMIN — GADOTERIDOL 20 ML: 279.3 INJECTION, SOLUTION INTRAVENOUS at 09:27

## 2025-08-25 ENCOUNTER — PHYSICAL THERAPY (OUTPATIENT)
Dept: PHYSICAL THERAPY | Facility: MEDICAL CENTER | Age: 70
End: 2025-08-25
Attending: FAMILY MEDICINE
Payer: MEDICARE

## 2025-08-25 DIAGNOSIS — R26.89 BALANCE PROBLEM: Primary | ICD-10-CM

## 2025-08-25 PROCEDURE — 97110 THERAPEUTIC EXERCISES: CPT

## 2025-08-25 PROCEDURE — 97112 NEUROMUSCULAR REEDUCATION: CPT

## 2025-08-26 ENCOUNTER — OFFICE VISIT (OUTPATIENT)
Facility: MEDICAL CENTER | Age: 70
End: 2025-08-26
Attending: INTERNAL MEDICINE
Payer: MEDICARE

## 2025-08-26 VITALS
SYSTOLIC BLOOD PRESSURE: 100 MMHG | RESPIRATION RATE: 16 BRPM | OXYGEN SATURATION: 96 % | DIASTOLIC BLOOD PRESSURE: 64 MMHG | HEIGHT: 66 IN | HEART RATE: 70 BPM | BODY MASS INDEX: 28.93 KG/M2 | WEIGHT: 180 LBS

## 2025-08-26 DIAGNOSIS — R60.0 BILATERAL LOWER EXTREMITY EDEMA: ICD-10-CM

## 2025-08-26 DIAGNOSIS — I10 PRIMARY HYPERTENSION: ICD-10-CM

## 2025-08-26 DIAGNOSIS — E78.00 HYPERCHOLESTEREMIA: ICD-10-CM

## 2025-08-26 DIAGNOSIS — I50.32 CHRONIC HEART FAILURE WITH PRESERVED EJECTION FRACTION (HFPEF) (HCC): Primary | ICD-10-CM

## 2025-08-26 DIAGNOSIS — J90 PLEURAL EFFUSION, LEFT: ICD-10-CM

## 2025-08-26 DIAGNOSIS — I31.39 IDIOPATHIC PERICARDIAL EFFUSION: ICD-10-CM

## 2025-08-26 DIAGNOSIS — Z98.890 S/P PERICARDIAL WINDOW CREATION: ICD-10-CM

## 2025-08-26 DIAGNOSIS — I83.893 VARICOSE VEINS OF BOTH LEGS WITH EDEMA: ICD-10-CM

## 2025-08-26 PROCEDURE — 3074F SYST BP LT 130 MM HG: CPT | Performed by: INTERNAL MEDICINE

## 2025-08-26 PROCEDURE — 3078F DIAST BP <80 MM HG: CPT | Performed by: INTERNAL MEDICINE

## 2025-08-26 PROCEDURE — 99214 OFFICE O/P EST MOD 30 MIN: CPT | Performed by: INTERNAL MEDICINE

## 2025-08-26 PROCEDURE — 99213 OFFICE O/P EST LOW 20 MIN: CPT | Performed by: INTERNAL MEDICINE

## 2025-08-26 RX ORDER — COLCHICINE 0.6 MG/1
0.6 TABLET ORAL DAILY
Qty: 30 TABLET | Refills: 0 | Status: ON HOLD | OUTPATIENT
Start: 2025-08-26

## 2025-08-26 ASSESSMENT — FIBROSIS 4 INDEX: FIB4 SCORE: 1.18

## 2025-08-27 ENCOUNTER — HOSPITAL ENCOUNTER (OUTPATIENT)
Dept: HEMATOLOGY ONCOLOGY | Facility: MEDICAL CENTER | Age: 70
End: 2025-08-27
Attending: PSYCHIATRY & NEUROLOGY
Payer: MEDICARE

## 2025-08-27 VITALS
OXYGEN SATURATION: 96 % | SYSTOLIC BLOOD PRESSURE: 102 MMHG | DIASTOLIC BLOOD PRESSURE: 64 MMHG | HEIGHT: 66 IN | BODY MASS INDEX: 30.03 KG/M2 | WEIGHT: 186.84 LBS | HEART RATE: 79 BPM | TEMPERATURE: 97.3 F

## 2025-08-27 DIAGNOSIS — R90.89 ABNORMAL FINDING ON MRI OF BRAIN: Primary | ICD-10-CM

## 2025-08-27 PROCEDURE — 99212 OFFICE O/P EST SF 10 MIN: CPT | Performed by: PSYCHIATRY & NEUROLOGY

## 2025-08-27 ASSESSMENT — FIBROSIS 4 INDEX: FIB4 SCORE: 1.18

## 2025-08-30 ENCOUNTER — HOSPITAL ENCOUNTER (OUTPATIENT)
Facility: MEDICAL CENTER | Age: 70
End: 2025-08-30
Attending: EMERGENCY MEDICINE | Admitting: HOSPITALIST
Payer: MEDICARE

## 2025-08-30 DIAGNOSIS — R55 SYNCOPE, UNSPECIFIED SYNCOPE TYPE: Primary | ICD-10-CM

## 2025-08-30 DIAGNOSIS — Z98.890 S/P PERICARDIAL WINDOW CREATION: ICD-10-CM

## 2025-08-30 DIAGNOSIS — I10 PRIMARY HYPERTENSION: ICD-10-CM

## 2025-08-30 DIAGNOSIS — I95.1 ORTHOSTASIS: ICD-10-CM

## 2025-08-30 PROBLEM — E87.1 HYPONATREMIA: Status: ACTIVE | Noted: 2025-08-30

## 2025-08-30 PROBLEM — Z71.89 ADVANCE CARE PLANNING: Status: ACTIVE | Noted: 2025-08-30

## 2025-08-30 PROBLEM — R42 DIZZINESS: Status: ACTIVE | Noted: 2025-08-30

## 2025-08-30 LAB
ALBUMIN SERPL BCP-MCNC: 4.5 G/DL (ref 3.2–4.9)
ALBUMIN/GLOB SERPL: 1.7 G/DL
ALP SERPL-CCNC: 91 U/L (ref 30–99)
ALT SERPL-CCNC: 13 U/L (ref 2–50)
ANION GAP SERPL CALC-SCNC: 16 MMOL/L (ref 7–16)
APPEARANCE UR: CLEAR
AST SERPL-CCNC: 20 U/L (ref 12–45)
BACTERIA #/AREA URNS HPF: NORMAL /HPF
BASOPHILS # BLD AUTO: 1.4 % (ref 0–1.8)
BASOPHILS # BLD: 0.05 K/UL (ref 0–0.12)
BILIRUB SERPL-MCNC: 0.5 MG/DL (ref 0.1–1.5)
BILIRUB UR QL STRIP.AUTO: NEGATIVE
BUN SERPL-MCNC: 15 MG/DL (ref 8–22)
CALCIUM ALBUM COR SERPL-MCNC: 9.3 MG/DL (ref 8.5–10.5)
CALCIUM SERPL-MCNC: 9.7 MG/DL (ref 8.5–10.5)
CASTS URNS QL MICRO: NORMAL /LPF (ref 0–2)
CHLORIDE SERPL-SCNC: 98 MMOL/L (ref 96–112)
CO2 SERPL-SCNC: 17 MMOL/L (ref 20–33)
COLOR UR: YELLOW
CREAT SERPL-MCNC: 1.12 MG/DL (ref 0.5–1.4)
EKG IMPRESSION: NORMAL
EKG IMPRESSION: NORMAL
EOSINOPHIL # BLD AUTO: 0.08 K/UL (ref 0–0.51)
EOSINOPHIL NFR BLD: 2.2 % (ref 0–6.9)
EPITHELIAL CELLS 1715: NORMAL /HPF (ref 0–5)
ERYTHROCYTE [DISTWIDTH] IN BLOOD BY AUTOMATED COUNT: 39.2 FL (ref 35.9–50)
EST. AVERAGE GLUCOSE BLD GHB EST-MCNC: 117 MG/DL
GFR SERPLBLD CREATININE-BSD FMLA CKD-EPI: 53 ML/MIN/1.73 M 2
GLOBULIN SER CALC-MCNC: 2.6 G/DL (ref 1.9–3.5)
GLUCOSE BLD STRIP.AUTO-MCNC: 80 MG/DL (ref 65–99)
GLUCOSE BLD STRIP.AUTO-MCNC: 86 MG/DL (ref 65–99)
GLUCOSE BLD STRIP.AUTO-MCNC: 93 MG/DL (ref 65–99)
GLUCOSE SERPL-MCNC: 98 MG/DL (ref 65–99)
GLUCOSE UR STRIP.AUTO-MCNC: 500 MG/DL
HBA1C MFR BLD: 5.7 % (ref 4–5.6)
HCT VFR BLD AUTO: 36.7 % (ref 37–47)
HGB BLD-MCNC: 12.8 G/DL (ref 12–16)
IMM GRANULOCYTES # BLD AUTO: 0.01 K/UL (ref 0–0.11)
IMM GRANULOCYTES NFR BLD AUTO: 0.3 % (ref 0–0.9)
KETONES UR STRIP.AUTO-MCNC: NEGATIVE MG/DL
LEUKOCYTE ESTERASE UR QL STRIP.AUTO: ABNORMAL
LV EJECT FRACT  99904: 68
LV EJECT FRACT MOD 2C 99903: 76.18
LV EJECT FRACT MOD 4C 99902: 63.36
LV EJECT FRACT MOD BP 99901: 68.63
LYMPHOCYTES # BLD AUTO: 1.3 K/UL (ref 1–4.8)
LYMPHOCYTES NFR BLD: 35.7 % (ref 22–41)
MAGNESIUM SERPL-MCNC: 2.1 MG/DL (ref 1.5–2.5)
MCH RBC QN AUTO: 31.9 PG (ref 27–33)
MCHC RBC AUTO-ENTMCNC: 34.9 G/DL (ref 32.2–35.5)
MCV RBC AUTO: 91.5 FL (ref 81.4–97.8)
MICRO URNS: ABNORMAL
MONOCYTES # BLD AUTO: 0.37 K/UL (ref 0–0.85)
MONOCYTES NFR BLD AUTO: 10.2 % (ref 0–13.4)
NEUTROPHILS # BLD AUTO: 1.83 K/UL (ref 1.82–7.42)
NEUTROPHILS NFR BLD: 50.2 % (ref 44–72)
NITRITE UR QL STRIP.AUTO: NEGATIVE
NRBC # BLD AUTO: 0 K/UL
NRBC BLD-RTO: 0 /100 WBC (ref 0–0.2)
PH UR STRIP.AUTO: 6 [PH] (ref 5–8)
PLATELET # BLD AUTO: 365 K/UL (ref 164–446)
PMV BLD AUTO: 9.7 FL (ref 9–12.9)
POTASSIUM SERPL-SCNC: 4.2 MMOL/L (ref 3.6–5.5)
PROT SERPL-MCNC: 7.1 G/DL (ref 6–8.2)
PROT UR QL STRIP: NEGATIVE MG/DL
RBC # BLD AUTO: 4.01 M/UL (ref 4.2–5.4)
RBC # URNS HPF: NORMAL /HPF
RBC UR QL AUTO: NEGATIVE
SODIUM SERPL-SCNC: 131 MMOL/L (ref 135–145)
SP GR UR STRIP.AUTO: 1.01
TROPONIN T SERPL-MCNC: 12 NG/L (ref 6–19)
TROPONIN T SERPL-MCNC: 13 NG/L (ref 6–19)
UROBILINOGEN UR STRIP.AUTO-MCNC: 0.2 EU/DL
WBC # BLD AUTO: 3.6 K/UL (ref 4.8–10.8)
WBC #/AREA URNS HPF: NORMAL /HPF

## 2025-08-30 PROCEDURE — A9270 NON-COVERED ITEM OR SERVICE: HCPCS | Performed by: HOSPITALIST

## 2025-08-30 PROCEDURE — 80053 COMPREHEN METABOLIC PANEL: CPT

## 2025-08-30 PROCEDURE — 93005 ELECTROCARDIOGRAM TRACING: CPT | Mod: TC | Performed by: EMERGENCY MEDICINE

## 2025-08-30 PROCEDURE — 85025 COMPLETE CBC W/AUTO DIFF WBC: CPT

## 2025-08-30 PROCEDURE — 94760 N-INVAS EAR/PLS OXIMETRY 1: CPT

## 2025-08-30 PROCEDURE — 700105 HCHG RX REV CODE 258: Performed by: HOSPITALIST

## 2025-08-30 PROCEDURE — 84484 ASSAY OF TROPONIN QUANT: CPT | Mod: 91

## 2025-08-30 PROCEDURE — 700111 HCHG RX REV CODE 636 W/ 250 OVERRIDE (IP): Mod: JZ | Performed by: HOSPITALIST

## 2025-08-30 PROCEDURE — 82962 GLUCOSE BLOOD TEST: CPT | Performed by: HOSPITALIST

## 2025-08-30 PROCEDURE — 93005 ELECTROCARDIOGRAM TRACING: CPT | Mod: TC

## 2025-08-30 PROCEDURE — 81001 URINALYSIS AUTO W/SCOPE: CPT

## 2025-08-30 PROCEDURE — 36415 COLL VENOUS BLD VENIPUNCTURE: CPT

## 2025-08-30 PROCEDURE — G0378 HOSPITAL OBSERVATION PER HR: HCPCS

## 2025-08-30 PROCEDURE — 83735 ASSAY OF MAGNESIUM: CPT

## 2025-08-30 PROCEDURE — 83036 HEMOGLOBIN GLYCOSYLATED A1C: CPT

## 2025-08-30 PROCEDURE — 700102 HCHG RX REV CODE 250 W/ 637 OVERRIDE(OP): Performed by: HOSPITALIST

## 2025-08-30 RX ORDER — COLCHICINE 0.6 MG/1
0.6 TABLET ORAL DAILY
Status: DISPENSED | OUTPATIENT
Start: 2025-08-30

## 2025-08-30 RX ORDER — AMOXICILLIN 250 MG
2 CAPSULE ORAL EVERY EVENING
Status: DISPENSED | OUTPATIENT
Start: 2025-08-30

## 2025-08-30 RX ORDER — ACETAMINOPHEN 325 MG/1
650 TABLET ORAL EVERY 6 HOURS PRN
Status: DISPENSED | OUTPATIENT
Start: 2025-08-30

## 2025-08-30 RX ORDER — ENOXAPARIN SODIUM 100 MG/ML
40 INJECTION SUBCUTANEOUS DAILY
Status: DISPENSED | OUTPATIENT
Start: 2025-08-30

## 2025-08-30 RX ORDER — OXYCODONE HYDROCHLORIDE 5 MG/1
2.5 TABLET ORAL
Refills: 0 | Status: ACTIVE | OUTPATIENT
Start: 2025-08-30

## 2025-08-30 RX ORDER — INSULIN LISPRO 100 [IU]/ML
0.2 INJECTION, SOLUTION INTRAVENOUS; SUBCUTANEOUS
Status: DISCONTINUED | OUTPATIENT
Start: 2025-08-30 | End: 2025-08-30

## 2025-08-30 RX ORDER — HYDROMORPHONE HYDROCHLORIDE 1 MG/ML
0.25 INJECTION, SOLUTION INTRAMUSCULAR; INTRAVENOUS; SUBCUTANEOUS
Status: ACTIVE | OUTPATIENT
Start: 2025-08-30

## 2025-08-30 RX ORDER — DEXTROSE MONOHYDRATE 25 G/50ML
25 INJECTION, SOLUTION INTRAVENOUS
Status: ACTIVE | OUTPATIENT
Start: 2025-08-30

## 2025-08-30 RX ORDER — INSULIN LISPRO 100 [IU]/ML
2-9 INJECTION, SOLUTION INTRAVENOUS; SUBCUTANEOUS
Status: DISPENSED | OUTPATIENT
Start: 2025-08-30

## 2025-08-30 RX ORDER — ONDANSETRON 2 MG/ML
4 INJECTION INTRAMUSCULAR; INTRAVENOUS EVERY 4 HOURS PRN
Status: DISPENSED | OUTPATIENT
Start: 2025-08-30

## 2025-08-30 RX ORDER — ONDANSETRON 4 MG/1
4 TABLET, ORALLY DISINTEGRATING ORAL EVERY 4 HOURS PRN
Status: ACTIVE | OUTPATIENT
Start: 2025-08-30

## 2025-08-30 RX ORDER — SODIUM CHLORIDE 9 MG/ML
INJECTION, SOLUTION INTRAVENOUS CONTINUOUS
Status: DISCONTINUED | OUTPATIENT
Start: 2025-08-30 | End: 2025-08-31

## 2025-08-30 RX ORDER — POLYETHYLENE GLYCOL 3350 17 G/17G
1 POWDER, FOR SOLUTION ORAL
Status: ACTIVE | OUTPATIENT
Start: 2025-08-30

## 2025-08-30 RX ORDER — OXYCODONE HYDROCHLORIDE 5 MG/1
5 TABLET ORAL
Refills: 0 | Status: ACTIVE | OUTPATIENT
Start: 2025-08-30

## 2025-08-30 RX ORDER — HYDRALAZINE HYDROCHLORIDE 20 MG/ML
10 INJECTION INTRAMUSCULAR; INTRAVENOUS EVERY 4 HOURS PRN
Status: ACTIVE | OUTPATIENT
Start: 2025-08-30

## 2025-08-30 RX ADMIN — SODIUM CHLORIDE: 9 INJECTION, SOLUTION INTRAVENOUS at 10:15

## 2025-08-30 RX ADMIN — COLCHICINE 0.6 MG: 0.6 TABLET, FILM COATED ORAL at 10:15

## 2025-08-30 RX ADMIN — ACETAMINOPHEN 650 MG: 325 TABLET, FILM COATED ORAL at 23:45

## 2025-08-30 RX ADMIN — SODIUM CHLORIDE: 9 INJECTION, SOLUTION INTRAVENOUS at 20:15

## 2025-08-30 RX ADMIN — ENOXAPARIN SODIUM 40 MG: 100 INJECTION SUBCUTANEOUS at 17:04

## 2025-08-30 RX ADMIN — ONDANSETRON 4 MG: 2 INJECTION INTRAMUSCULAR; INTRAVENOUS at 10:15

## 2025-08-30 ASSESSMENT — PAIN DESCRIPTION - PAIN TYPE
TYPE: ACUTE PAIN

## 2025-08-30 ASSESSMENT — LIFESTYLE VARIABLES
SKIP TO QUESTIONS 9-10: 1
HOW MANY STANDARD DRINKS CONTAINING ALCOHOL DO YOU HAVE ON A TYPICAL DAY: PATIENT DOES NOT DRINK
AVERAGE NUMBER OF DAYS PER WEEK YOU HAVE A DRINK CONTAINING ALCOHOL: 0
CONSUMPTION TOTAL: NEGATIVE
HOW OFTEN DO YOU HAVE A DRINK CONTAINING ALCOHOL: NEVER
ON A TYPICAL DAY WHEN YOU DRINK ALCOHOL HOW MANY DRINKS DO YOU HAVE: 0
ALCOHOL_USE: NO
TOTAL SCORE: 0
TOTAL SCORE: 0
HOW OFTEN DO YOU HAVE SIX OR MORE DRINKS ON ONE OCCASION: NEVER
TOTAL SCORE: 0
HAVE YOU EVER FELT YOU SHOULD CUT DOWN ON YOUR DRINKING: NO
HAVE PEOPLE ANNOYED YOU BY CRITICIZING YOUR DRINKING: NO
EVER FELT BAD OR GUILTY ABOUT YOUR DRINKING: NO
HOW MANY TIMES IN THE PAST YEAR HAVE YOU HAD 5 OR MORE DRINKS IN A DAY: 0
AUDIT-C TOTAL SCORE: 0
EVER HAD A DRINK FIRST THING IN THE MORNING TO STEADY YOUR NERVES TO GET RID OF A HANGOVER: NO
DOES PATIENT WANT TO STOP DRINKING: NO

## 2025-08-30 ASSESSMENT — ENCOUNTER SYMPTOMS
MYALGIAS: 0
COUGH: 0
BLURRED VISION: 0
NAUSEA: 0
VOMITING: 0
HEARTBURN: 0
HEADACHES: 1
DIZZINESS: 1
DOUBLE VISION: 0
NERVOUS/ANXIOUS: 0

## 2025-08-30 ASSESSMENT — FIBROSIS 4 INDEX
FIB4 SCORE: 1.18
FIB4 SCORE: 1.03

## 2025-08-30 ASSESSMENT — COGNITIVE AND FUNCTIONAL STATUS - GENERAL
MOVING TO AND FROM BED TO CHAIR: A LITTLE
WALKING IN HOSPITAL ROOM: A LOT
TURNING FROM BACK TO SIDE WHILE IN FLAT BAD: A LITTLE
DAILY ACTIVITIY SCORE: 24
SUGGESTED CMS G CODE MODIFIER MOBILITY: CK
CLIMB 3 TO 5 STEPS WITH RAILING: A LITTLE
STANDING UP FROM CHAIR USING ARMS: A LITTLE
MOVING FROM LYING ON BACK TO SITTING ON SIDE OF FLAT BED: A LITTLE
MOBILITY SCORE: 17
SUGGESTED CMS G CODE MODIFIER DAILY ACTIVITY: CH

## 2025-08-31 VITALS
WEIGHT: 188.27 LBS | TEMPERATURE: 97.9 F | BODY MASS INDEX: 30.26 KG/M2 | DIASTOLIC BLOOD PRESSURE: 72 MMHG | RESPIRATION RATE: 19 BRPM | HEART RATE: 64 BPM | SYSTOLIC BLOOD PRESSURE: 123 MMHG | OXYGEN SATURATION: 97 % | HEIGHT: 66 IN

## 2025-08-31 PROBLEM — Z71.89 ADVANCE CARE PLANNING: Status: RESOLVED | Noted: 2025-08-30 | Resolved: 2025-08-31

## 2025-08-31 PROBLEM — L03.90 CELLULITIS: Status: ACTIVE | Noted: 2025-08-31

## 2025-08-31 LAB
ALBUMIN SERPL BCP-MCNC: 3.8 G/DL (ref 3.2–4.9)
ALBUMIN/GLOB SERPL: 1.5 G/DL
ALP SERPL-CCNC: 77 U/L (ref 30–99)
ALT SERPL-CCNC: 11 U/L (ref 2–50)
ANION GAP SERPL CALC-SCNC: 11 MMOL/L (ref 7–16)
AST SERPL-CCNC: 18 U/L (ref 12–45)
BILIRUB SERPL-MCNC: 0.5 MG/DL (ref 0.1–1.5)
BUN SERPL-MCNC: 12 MG/DL (ref 8–22)
CALCIUM ALBUM COR SERPL-MCNC: 9.4 MG/DL (ref 8.5–10.5)
CALCIUM SERPL-MCNC: 9.2 MG/DL (ref 8.5–10.5)
CHLORIDE SERPL-SCNC: 104 MMOL/L (ref 96–112)
CO2 SERPL-SCNC: 20 MMOL/L (ref 20–33)
CREAT SERPL-MCNC: 1.16 MG/DL (ref 0.5–1.4)
ERYTHROCYTE [DISTWIDTH] IN BLOOD BY AUTOMATED COUNT: 41.4 FL (ref 35.9–50)
GFR SERPLBLD CREATININE-BSD FMLA CKD-EPI: 51 ML/MIN/1.73 M 2
GLOBULIN SER CALC-MCNC: 2.5 G/DL (ref 1.9–3.5)
GLUCOSE BLD STRIP.AUTO-MCNC: 100 MG/DL (ref 65–99)
GLUCOSE BLD STRIP.AUTO-MCNC: 83 MG/DL (ref 65–99)
GLUCOSE BLD STRIP.AUTO-MCNC: 91 MG/DL (ref 65–99)
GLUCOSE BLD STRIP.AUTO-MCNC: 93 MG/DL (ref 65–99)
GLUCOSE SERPL-MCNC: 89 MG/DL (ref 65–99)
HCT VFR BLD AUTO: 34.3 % (ref 37–47)
HGB BLD-MCNC: 11.7 G/DL (ref 12–16)
MCH RBC QN AUTO: 32.3 PG (ref 27–33)
MCHC RBC AUTO-ENTMCNC: 34.1 G/DL (ref 32.2–35.5)
MCV RBC AUTO: 94.8 FL (ref 81.4–97.8)
PLATELET # BLD AUTO: 320 K/UL (ref 164–446)
PMV BLD AUTO: 9.8 FL (ref 9–12.9)
POTASSIUM SERPL-SCNC: 4.8 MMOL/L (ref 3.6–5.5)
PROT SERPL-MCNC: 6.3 G/DL (ref 6–8.2)
RBC # BLD AUTO: 3.62 M/UL (ref 4.2–5.4)
SODIUM SERPL-SCNC: 135 MMOL/L (ref 135–145)
WBC # BLD AUTO: 4.4 K/UL (ref 4.8–10.8)

## 2025-08-31 PROCEDURE — 700111 HCHG RX REV CODE 636 W/ 250 OVERRIDE (IP): Mod: JZ | Performed by: HOSPITALIST

## 2025-08-31 PROCEDURE — A9270 NON-COVERED ITEM OR SERVICE: HCPCS | Performed by: HOSPITALIST

## 2025-08-31 PROCEDURE — 700102 HCHG RX REV CODE 250 W/ 637 OVERRIDE(OP): Performed by: HOSPITALIST

## 2025-08-31 PROCEDURE — 97535 SELF CARE MNGMENT TRAINING: CPT

## 2025-08-31 PROCEDURE — 97161 PT EVAL LOW COMPLEX 20 MIN: CPT

## 2025-08-31 PROCEDURE — 94760 N-INVAS EAR/PLS OXIMETRY 1: CPT

## 2025-08-31 PROCEDURE — 700105 HCHG RX REV CODE 258: Performed by: HOSPITALIST

## 2025-08-31 PROCEDURE — G0378 HOSPITAL OBSERVATION PER HR: HCPCS

## 2025-08-31 PROCEDURE — 82962 GLUCOSE BLOOD TEST: CPT | Performed by: HOSPITALIST

## 2025-08-31 RX ORDER — CEFDINIR 300 MG/1
300 CAPSULE ORAL 2 TIMES DAILY
Status: ON HOLD | COMMUNITY
End: 2025-08-31

## 2025-08-31 RX ORDER — LIDOCAINE 50 MG/G
1 OINTMENT TOPICAL
Status: ON HOLD | COMMUNITY

## 2025-08-31 RX ORDER — TELMISARTAN 40 MG/1
40 TABLET ORAL DAILY
Qty: 90 TABLET | Refills: 3 | Status: SHIPPED | OUTPATIENT
Start: 2025-08-31 | End: 2026-10-05

## 2025-08-31 RX ORDER — POLYETHYLENE GLYCOL 3350 17 G/17G
17 POWDER, FOR SOLUTION ORAL
Qty: 15 EACH | Refills: 0 | Status: SHIPPED | OUTPATIENT
Start: 2025-08-31 | End: 2025-09-15

## 2025-08-31 RX ORDER — OXYCODONE HYDROCHLORIDE 5 MG/1
5 TABLET ORAL EVERY 4 HOURS PRN
Status: ON HOLD | COMMUNITY

## 2025-08-31 RX ORDER — AMOXICILLIN 250 MG
2 CAPSULE ORAL EVERY EVENING
Qty: 30 TABLET | Refills: 0 | Status: SHIPPED | OUTPATIENT
Start: 2025-08-31

## 2025-08-31 RX ORDER — SODIUM CHLORIDE 9 MG/ML
INJECTION, SOLUTION INTRAVENOUS CONTINUOUS
Status: ACTIVE | OUTPATIENT
Start: 2025-08-31

## 2025-08-31 RX ADMIN — ACETAMINOPHEN 650 MG: 325 TABLET, FILM COATED ORAL at 21:55

## 2025-08-31 RX ADMIN — SODIUM CHLORIDE: 9 INJECTION, SOLUTION INTRAVENOUS at 11:51

## 2025-08-31 RX ADMIN — SENNOSIDES, DOCUSATE SODIUM 2 TABLET: 50; 8.6 TABLET, FILM COATED ORAL at 16:47

## 2025-08-31 RX ADMIN — ACETAMINOPHEN 650 MG: 325 TABLET, FILM COATED ORAL at 05:55

## 2025-08-31 RX ADMIN — COLCHICINE 0.6 MG: 0.6 TABLET, FILM COATED ORAL at 05:55

## 2025-08-31 RX ADMIN — ENOXAPARIN SODIUM 40 MG: 100 INJECTION SUBCUTANEOUS at 16:46

## 2025-08-31 ASSESSMENT — COGNITIVE AND FUNCTIONAL STATUS - GENERAL
WALKING IN HOSPITAL ROOM: A LITTLE
SUGGESTED CMS G CODE MODIFIER MOBILITY: CJ
MOBILITY SCORE: 22
CLIMB 3 TO 5 STEPS WITH RAILING: A LITTLE

## 2025-08-31 ASSESSMENT — PAIN DESCRIPTION - PAIN TYPE
TYPE: ACUTE PAIN

## 2025-08-31 ASSESSMENT — GAIT ASSESSMENTS
DISTANCE (FEET): 180
GAIT LEVEL OF ASSIST: SUPERVISED
DEVIATION: DECREASED BASE OF SUPPORT
ASSISTIVE DEVICE: HAND HELD ASSIST

## 2025-08-31 ASSESSMENT — FIBROSIS 4 INDEX: FIB4 SCORE: 1.15

## 2025-09-19 ENCOUNTER — APPOINTMENT (OUTPATIENT)
Dept: BEHAVIORAL HEALTH | Facility: CLINIC | Age: 70
End: 2025-09-19
Payer: MEDICARE

## (undated) DEVICE — SUTURE 0 SILK CT-1 (36PK/BX)

## (undated) DEVICE — GOWN SURGEONS X-LARGE - DISP. (30/CA)

## (undated) DEVICE — DRAPE IOBAN II INCISE 23X17 - (10EA/BX 4BX/CA)

## (undated) DEVICE — GLOVE BIOGEL M SZ 8 SURGICAL PF LTX - (50/BX 4BX/CA)

## (undated) DEVICE — SET SUCTION/IRRIGATION WITH DISPOSABLE TIP (6/CA )PART #0250-070-520 IS A SUB

## (undated) DEVICE — DERMABOND ADVANCED - (12EA/BX)

## (undated) DEVICE — TUBING CLEARLINK DUO-VENT - C-FLO (48EA/CA)

## (undated) DEVICE — GOWN WARMING STANDARD FLEX - (30/CA)

## (undated) DEVICE — CLIP MED LG INTNL HRZN TI ESCP - (20/BX)

## (undated) DEVICE — SET EXTENSION WITH 2 PORTS (48EA/CA) ***PART #2C8610 IS A SUBSTITUTE*****

## (undated) DEVICE — ELECTRODE DUAL RETURN W/ CORD - (50/PK)

## (undated) DEVICE — SCISSORS 5MM CVD (6EA/BX)

## (undated) DEVICE — PACK LAP CHOLE OR - (2EA/CA)

## (undated) DEVICE — SET TUBING PNEUMOCLEAR HIGH FLOW SMOKE EVACUATION (10EA/BX)

## (undated) DEVICE — TROCAR 5X100 NON BLADED Z-TH - READ KII (6/BX)

## (undated) DEVICE — DRESSING TRANSPARENT FILM TEGADERM 2.375 X 2.75"  (100EA/BX)"

## (undated) DEVICE — SUTURE GENERAL

## (undated) DEVICE — Device

## (undated) DEVICE — COVER LIGHT HANDLE ALC PLUS DISP (18EA/BX)

## (undated) DEVICE — SUCTION INSTRUMENT YANKAUER BULBOUS TIP W/O VENT (50EA/CA)

## (undated) DEVICE — CANNULA W/SEAL 5X100 Z-THRE - ADED KII (12/BX)

## (undated) DEVICE — SODIUM CHL. INJ. 0.9% 500ML (24EA/CA 50CA/PF)

## (undated) DEVICE — SLEEVE, VASO, THIGH, MED

## (undated) DEVICE — CONNECTOR HUBLESS DRAINAGE - ONE WAY (20/BX)

## (undated) DEVICE — SUTURE 4-0 VICRYL PLUS FS-2 - 27 INCH (36/BX)

## (undated) DEVICE — SUTURE 2-0 VICRYL PLUS CT-2 - 27 INCH (36/BX)

## (undated) DEVICE — DRAPE LAPAROTOMY T SHEET - (12EA/CA)

## (undated) DEVICE — BAG SPONGE COUNT 10.25 X 32 - BLUE (250/CA)

## (undated) DEVICE — TROCAR 12MM THORACOPORT (6EA/BX)

## (undated) DEVICE — DRAPE CHEST/BREAST - (12EA/CA)

## (undated) DEVICE — GLOVE BIOGEL SZ 7.5 SURGICAL PF LTX - (50PR/BX 4BX/CA)

## (undated) DEVICE — SPONGE GAUZESTER. 2X2 4-PL - (2/PK 50PK/BX 30BX/CS)

## (undated) DEVICE — SENSOR OXIMETER ADULT SPO2 RD SET (20EA/BX)

## (undated) DEVICE — LACTATED RINGERS INJ 1000 ML - (14EA/CA 60CA/PF)

## (undated) DEVICE — DRAPESURG STERI-DRAPE LONG - (10/BX 4BX/CA)

## (undated) DEVICE — SOD. CHL. INJ. 0.9% 1000 ML - (14EA/CA 60CA/PF)

## (undated) DEVICE — SET LEADWIRE 5 LEAD BEDSIDE DISPOSABLE ECG (1SET OF 5/EA)

## (undated) DEVICE — CONNECTOR Y TBG CRTY 5 IN 1 STERILE (50EA/CA)

## (undated) DEVICE — DRESSING NON-ADHERING 8 X 3 - (50/BX)

## (undated) DEVICE — GLOVE BIOGEL INDICATOR SZ 8 SURGICAL PF LTX - (50/BX 4BX/CA)

## (undated) DEVICE — CANISTER SUCTION 3000ML MECHANICAL FILTER AUTO SHUTOFF MEDI-VAC NONSTERILE LF DISP  (40EA/CA)

## (undated) DEVICE — CHLORAPREP 26 ML APPLICATOR - ORANGE TINT(25/CA)

## (undated) DEVICE — STERI STRIP COMPOUND BENZOIN - TINCTURE 0.6ML WITH APPLICATOR (40EA/BX)

## (undated) DEVICE — SODIUM CHL IRRIGATION 0.9% 1000ML (12EA/CA)

## (undated) DEVICE — CLOSURE WOUND 1/4 X 4 (STERI - STRIP) (50/BX 4BX/CA)